# Patient Record
Sex: FEMALE | Race: WHITE | NOT HISPANIC OR LATINO | Employment: OTHER | URBAN - METROPOLITAN AREA
[De-identification: names, ages, dates, MRNs, and addresses within clinical notes are randomized per-mention and may not be internally consistent; named-entity substitution may affect disease eponyms.]

---

## 2017-01-05 DIAGNOSIS — E89.0 POSTOPERATIVE HYPOTHYROIDISM: Primary | ICD-10-CM

## 2017-01-06 RX ORDER — LEVOTHYROXINE SODIUM 100 UG/1
TABLET ORAL
Qty: 90 TABLET | Refills: 0 | Status: SHIPPED | OUTPATIENT
Start: 2017-01-06 | End: 2017-05-17 | Stop reason: DRUGHIGH

## 2017-04-28 DIAGNOSIS — E78.5 HYPERLIPIDEMIA LDL GOAL <130: ICD-10-CM

## 2017-05-01 RX ORDER — SIMVASTATIN 20 MG
TABLET ORAL
Qty: 30 TABLET | Refills: 0 | Status: SHIPPED | OUTPATIENT
Start: 2017-05-01 | End: 2017-06-29

## 2017-05-01 NOTE — TELEPHONE ENCOUNTER
Medication is being filled for 1 time refill only due to:  Patient needs to be seen because it has been more than one year since last visit.   Reminder sent. Marija Guzman RN

## 2017-05-01 NOTE — TELEPHONE ENCOUNTER
SIMVASTATIN     Last Written Prescription Date: 2-26-17  Last Fill Quantity: 90, # refills: 0  Last Office Visit with [facility] or [facility] prescribing provider: 2-1-16       Lab Results   Component Value Date    CHOL 143 02/01/2016     Lab Results   Component Value Date    HDL 59 02/01/2016     Lab Results   Component Value Date    LDL 73 02/01/2016     Lab Results   Component Value Date    TRIG 54 02/01/2016     Lab Results   Component Value Date    CHOLHDLRATIO 2.0 11/21/2014

## 2017-05-15 ENCOUNTER — MYC MEDICAL ADVICE (OUTPATIENT)
Dept: ENDOCRINOLOGY | Facility: CLINIC | Age: 57
End: 2017-05-15

## 2017-05-15 DIAGNOSIS — E89.0 POSTOPERATIVE HYPOTHYROIDISM: Primary | ICD-10-CM

## 2017-05-16 DIAGNOSIS — E89.0 POSTOPERATIVE HYPOTHYROIDISM: ICD-10-CM

## 2017-05-16 LAB
T4 FREE SERPL-MCNC: 1.06 NG/DL (ref 0.76–1.46)
TSH SERPL DL<=0.05 MIU/L-ACNC: 6.37 MU/L (ref 0.4–4)

## 2017-05-16 PROCEDURE — 84439 ASSAY OF FREE THYROXINE: CPT | Performed by: INTERNAL MEDICINE

## 2017-05-16 PROCEDURE — 36415 COLL VENOUS BLD VENIPUNCTURE: CPT | Performed by: INTERNAL MEDICINE

## 2017-05-16 PROCEDURE — 84443 ASSAY THYROID STIM HORMONE: CPT | Performed by: INTERNAL MEDICINE

## 2017-05-17 DIAGNOSIS — E89.0 POSTSURGICAL HYPOTHYROIDISM: Primary | ICD-10-CM

## 2017-05-17 RX ORDER — LEVOTHYROXINE SODIUM 112 UG/1
112 TABLET ORAL DAILY
Qty: 90 TABLET | Refills: 0 | Status: SHIPPED | OUTPATIENT
Start: 2017-05-17 | End: 2017-08-16

## 2017-05-30 ENCOUNTER — OFFICE VISIT (OUTPATIENT)
Dept: FAMILY MEDICINE | Facility: CLINIC | Age: 57
End: 2017-05-30
Payer: COMMERCIAL

## 2017-05-30 VITALS
OXYGEN SATURATION: 98 % | HEIGHT: 65 IN | TEMPERATURE: 98.1 F | DIASTOLIC BLOOD PRESSURE: 76 MMHG | HEART RATE: 78 BPM | SYSTOLIC BLOOD PRESSURE: 127 MMHG | WEIGHT: 215.8 LBS | BODY MASS INDEX: 35.96 KG/M2

## 2017-05-30 DIAGNOSIS — F43.22 ADJUSTMENT DISORDER WITH ANXIOUS MOOD: ICD-10-CM

## 2017-05-30 DIAGNOSIS — D12.6 TUBULAR ADENOMA OF COLON: ICD-10-CM

## 2017-05-30 DIAGNOSIS — R14.0 POSTPRANDIAL ABDOMINAL BLOATING: Primary | ICD-10-CM

## 2017-05-30 DIAGNOSIS — K59.00 CONSTIPATION, UNSPECIFIED CONSTIPATION TYPE: ICD-10-CM

## 2017-05-30 DIAGNOSIS — K22.70 BARRETT'S ESOPHAGUS DETERMINED BY ENDOSCOPY: ICD-10-CM

## 2017-05-30 PROCEDURE — 99214 OFFICE O/P EST MOD 30 MIN: CPT | Performed by: FAMILY MEDICINE

## 2017-05-30 NOTE — NURSING NOTE
"Chief Complaint   Patient presents with     MOOD CHANGES     Constipation       Initial /76  Pulse 78  Temp 98.1  F (36.7  C) (Tympanic)  Ht 5' 4.5\" (1.638 m)  Wt 215 lb 12.8 oz (97.9 kg)  LMP 04/01/2012  SpO2 98%  Breastfeeding? No  BMI 36.47 kg/m2 Estimated body mass index is 36.47 kg/(m^2) as calculated from the following:    Height as of this encounter: 5' 4.5\" (1.638 m).    Weight as of this encounter: 215 lb 12.8 oz (97.9 kg).  Medication Reconciliation: complete       Susanne Collazo MA      "

## 2017-05-30 NOTE — PROGRESS NOTES
SUBJECTIVE:                                                    Laura Ferreira is a 56 year old female who presents to clinic today for the following health issues:      Constipation     Onset: chronic     Description:   Frequency of bowel movements: very small BM daily/ but can go x3 days without BM .  Stool consistency: all different consistencies     Progression of Symptoms:  worsening    Accompanying Signs & Symptoms:  Abdominal pain (cramping?): YES- lower abdominal pains when going x2 days w/o BM.  Abdominal pains while having BM   Blood in stool: no   Rectal pain: YES- last week   Nausea/vomiting: YES- nausea after eating   Weight loss or gain: YES- weight gain    History:   History of abdominal surgery: YES- appendix     Precipitating factors:   Recent use of narcotics, anticholinergics, calcium channel blockers, antacids, or iron supplements: no   Chronic Laxative Use: no          Therapies Tried and outcome: laxatives 1x q3 weeks       Previous upper and lower endoscopies reviewed.     2/2009: Colonoscopy reviewed two polyps otherwise normal. Pathology report revealed Tubular adenoma. Overdue for a repeat Colonoscopy. Patient states that she was unaware.    12/2014: Upper Endoscopy revealed an esophageal mucosal changes suspicious for short-segment Leblanc's esophagus. Pathology reports no evidence of dysplasia or malignancy. Patient has not had a follow up Endoscopy since then or followed with GI    Abnormal Mood Symptoms     Onset: x8 months     Description:   Depression: no  Anxiety: YES- feeling anxious often     Accompanying Signs & Symptoms:  Still participating in activities that you used to enjoy: YES  Fatigue: YES  Irritability: no  Difficulty concentrating: YES- sometimes  Changes in appetite: YES- possibly increased because she is trying to watch what she is eating.  Has had a 30# gain in a x3 mo period.  Feels very nauseated after eating meals.   Problems with sleep: YES- sleeping 4.5-6 hours/  night.  Challenges with falling asleep and staying asleep.  Does not nap during the day.   Heart racing/beating fast : YES, thought it was from her thyroid medication.    Thoughts of hurting yourself or others: none     History:   Recent stress: YES- will be losing job at the end of the year (found news out x6 months ago)   Prior depression hospitalization: None  Family history of depression: no  Family history of anxiety: no      Precipitating factors:   Alcohol/drug use: YES- alcohol; 2x/ week     Alleviating factors:  none       Therapies Tried and outcome: Tried tylenol PM and Zquil - no relief, would still wake in the middle of the night.  Is currently taking melatonin has offered more sleep for her.       Denies a prior history of depression/anxiety.    ROBER-7   [employer], 2002; Used with Permission) 5/31/2017   1. Feeling nervous, anxious, or on edge 2   2. Not being able to stop or control worrying 0   3. Worrying too much about different things 1   4. Trouble relaxing 0   5. Being so restless that it is hard to sit still 1   6. Becoming easily annoyed or irritable 0   7. Feeling afraid, as if something awful might happen 0   ROBER-7 Total Score 4   If you checked any problems, how difficult have they made it for you to do your work, take care of things at home, or get along with other people? Not difficult at all         Problem list and histories reviewed & adjusted, as indicated.  Additional history: as documented    Current Outpatient Prescriptions   Medication Sig Dispense Refill     pantoprazole (PROTONIX) 40 MG EC tablet Take 1 tablet (40 mg) by mouth daily - DUE FOR ANNUAL FOLLOW UP 90 tablet 0     levothyroxine (SYNTHROID/LEVOTHROID) 112 MCG tablet Take 1 tablet (112 mcg) by mouth daily 90 tablet 0     simvastatin (ZOCOR) 20 MG tablet TAKE 1 TABLET(20 MG) BY MOUTH DAILY 30 tablet 0     Fish Oil-Cholecalciferol (FISH OIL-VITAMIN D) 4852-0039 MG-UNIT CAPS        MULTI-VITAMIN OR TABS 1 TABLET DAILY    "    Allergies   Allergen Reactions     Latex Anaphylaxis     Cortisone Nausea and Vomiting     oral     Vicodin [Acetaminophen] Nausea and Vomiting       Reviewed and updated as needed this visit by clinical staff  Tobacco  Allergies  Meds       Reviewed and updated as needed this visit by Provider         ROS:  Constitutional, HEENT, cardiovascular, pulmonary, gi and gu systems are negative, except as otherwise noted.    OBJECTIVE:                                                    /76  Pulse 78  Temp 98.1  F (36.7  C) (Tympanic)  Ht 5' 4.5\" (1.638 m)  Wt 215 lb 12.8 oz (97.9 kg)  LMP 04/01/2012  SpO2 98%  Breastfeeding? No  BMI 36.47 kg/m2  Body mass index is 36.47 kg/(m^2).  GENERAL: healthy, alert and no distress  PSYCH: Mood and affect seem appropriate. Judgment and insight seem appropriate.   RESP: lungs clear to auscultation - no rales, rhonchi or wheezes  CV: regular rate and rhythm, normal S1 S2, no S3 or S4, no murmur, click or rub, no peripheral edema and peripheral pulses strong  ABDOMEN: soft, nontender, no hepatosplenomegaly, no masses and bowel sounds normal    Diagnostic Test Results:  Labs in process, will notify patient with results.      ASSESSMENT/PLAN:                                                    Laura was seen today for mood changes and constipation.    Diagnoses and all orders for this visit:    Postprandial abdominal bloating, differentials to include H pylori; IBS  -     H Pylori antigen stool; Future    Constipation, unspecified constipation type       -    Recommended a trial of OTC Miralax as needed    Adjustment disorder with anxious mood       -     PHQ/ROBER 7 completed, see above       -     Likely due to recent stress, losing job in the next 6 months       -     Recommended watchful waiting. Offered referral for Psychotherapy Counseling, patient declined at this time.    Leblanc's esophagus determined by endoscopy  Recommended a follow up Upper Endoscopy  -     " GASTROENTEROLOGY ADULT REF PROCEDURE ONLY; Future    Tubular adenoma of colon  Due for a follow up Colonoscopy  -     GASTROENTEROLOGY ADULT REF PROCEDURE ONLY; Future      Will notify patient with results.     Total time spent face to face was 30 min. Greater than 50% of the time was spent counseling and/OR coordination of care regarding today's complaints and symptoms and discussing the treatment plan as described immediately above.    Sarita Shultz MD  [facility]

## 2017-05-30 NOTE — MR AVS SNAPSHOT
After Visit Summary   5/30/2017    Laura Ferreira    MRN: [id number]           Patient Information     Date Of Birth          1960        Visit Information        Provider Department      5/30/2017 2:00 PM Sarita Shultz MD [facility]        Today's Diagnoses     Leblanc's esophagus determined by endoscopy    -  1    Tubular adenoma of colon        Postprandial abdominal bloating        Constipation, unspecified constipation type        Adjustment disorder with anxious mood          Care Instructions      Constipation (Adult)  Constipation means that you have bowel movements that are less frequent than usual. Stools often become very hard and difficult to pass.  Constipation is very common. At some point in life it affects almost everyone. Since everyone's bowel habits are different, what is constipation to one person may not be to another. Your healthcare provider may do tests to diagnose constipation. It depends on what he or she finds when evaluating you.    Symptoms of constipation include:    Abdominal pain    Bloating    Vomiting    Painful bowel movements    Itching, swelling, bleeding, or pain around the anus  Causes  Constipation can have many causes. These include:    Diet low in fiber    Too much dairy    Not drinking enough liquids    Lack of exercise or physical activity. This is especially true for older adults.    Changes in lifestyle or daily routine, including pregnancy, aging, work, and travel    Frequent use or misuse of laxatives    Ignoring the urge to have a bowel movement or delaying it until later    Medicines, such as certain prescription pain medicines, iron supplements, antacids, certain antidepressants, and calcium supplements    Diseases like irritable bowel syndrome, bowel obstructions, stroke, diabetes, thyroid disease, Parkinson disease, hemorrhoids, and colon cancer  Complications  Potential complications of constipation can  include:    Hemorrhoids    Rectal bleeding from hemorrhoids or anal fissures (skin tears)    Hernias    Dependency on laxatives    Chronic constipation    Fecal impaction    Bowel obstruction or perforation  Home care  All treatment should be done after talking with your healthcare provider. This is especially true if you have another medical problems, are taking prescription medicines, or are an older adult. Treatment most often involves lifestyle changes. You may also need medicines. Your healthcare provider will tell you which will work best for you. Follow the advice below to help avoid this problem in the future.  Lifestyle changes  These lifestyle changes can help prevent constipation:    Diet. Eat a high-fiber diet, with fresh fruit and vegetables, and reduce dairy intake, meats, and processed foods    Fluids. It's important to get enough fluids each day. Drink plenty of water when you eat more fiber. If you are on diet that limits the amount of fluid you can have, talk about this with your healthcare provider.    Regular exercise. Check with your healthcare provider first.  Medications  Take any medicines as directed. Some laxatives are safe to use only every now and then. Others can be taken on a regular basis. Talk with your doctor or pharmacist if you have questions.  Prescription pain medicines can cause constipation. If you are taking this kind of medicine, ask your healthcare provider if you should also take a stool softener.  Medicines you may take to treat constipation include:    Fiber supplements    Stool softeners    Laxatives    Enemas    Rectal suppositories  Follow-up care  Follow up with your healthcare provider if symptoms don't get better in the next few days. You may need to have more tests or see a specialist.  Call 911  Call 911 if any of these occur:    Trouble breathing    Stiff, rigid abdomen that is severely painful to touch    Confusion    Fainting or loss of consciousness    Rapid  heart rate    Chest pain  When to seek medical advice  Call your healthcare provider right away if any of these occur:    Fever over 100.4 F (38 C)    Failure to resume normal bowel movements    Pain in your abdomen or back gets worse    Nausea or vomiting    Swelling in your abdomen    Blood in the stool    Black, tarry stool    Involuntary weight loss    Weakness    8193-3002 [employer]. [address] [place] [address]. All rights reserved. This information is not intended as a substitute for professional medical care. Always follow your healthcare professional's instructions.                Follow-ups after your visit        Additional Services     GASTROENTEROLOGY ADULT REF PROCEDURE ONLY       Leblanc's esophagus                  Follow-up notes from your care team     Return if symptoms worsen or fail to improve.      Future tests that were ordered for you today     Open Future Orders        Priority Expected Expires Ordered    H Pylori antigen stool Routine  6/29/2017 5/30/2017            Who to contact     Normal or non-critical lab and imaging results will be communicated to you by Simplificarehart, letter or phone within 4 business days after the clinic has received the results. If you do not hear from us within 7 days, please contact the clinic through Simplificarehart or phone. If you have a critical or abnormal lab result, we will notify you by phone as soon as possible.  Submit refill requests through ThemBid or call your pharmacy and they will forward the refill request to us. Please allow 3 business days for your refill to be completed.          If you need to speak with a  for additional information , please call: [phone]             Additional Information About Your Visit        ThemBid Information     ThemBid gives you secure access to your electronic health record. If you see a primary care provider, you can also send messages to your care team and make appointments.  "If you have questions, please call your primary care clinic.  If you do not have a primary care provider, please call [phone] and they will assist you.        Care EveryWhere ID     This is your Care EveryWhere ID. This could be used by other organizations to access your [facility] medical records  [id number]        Your Vitals Were     Pulse Temperature Height Last Period Pulse Oximetry Breastfeeding?    78 98.1  F (36.7  C) (Tympanic) 5' 4.5\" (1.638 m) 04/01/2012 98% No    BMI (Body Mass Index)                   36.47 kg/m2            Blood Pressure from Last 3 Encounters:   05/30/17 127/76   02/01/16 113/73   11/30/15 120/68    Weight from Last 3 Encounters:   05/30/17 215 lb 12.8 oz (97.9 kg)   02/01/16 201 lb 5 oz (91.3 kg)   11/30/15 207 lb 6.4 oz (94.1 kg)              We Performed the Following     GASTROENTEROLOGY ADULT REF PROCEDURE ONLY        Primary Care Provider Office Phone # Fax #    Mary Hogan PA-C [phone]       [facility] [address]  [facility] [address]        Thank you!     Thank you for choosing [facility]  for your care. Our goal is always to provide you with excellent care. Hearing back from our patients is one way we can continue to improve our services. Please take a few minutes to complete the written survey that you may receive in the mail after your visit with us. Thank you!             Your Updated Medication List - Protect others around you: Learn how to safely use, store and throw away your medicines at www.disposemymeds.org.          This list is accurate as of: 5/30/17  2:52 PM.  Always use your most recent med list.                   Brand Name Dispense Instructions for use    Fish Oil-Vitamin D 9686-3598 MG-UNIT Caps          levothyroxine 112 MCG tablet    SYNTHROID/LEVOTHROID    90 tablet    Take 1 tablet (112 mcg) by mouth daily       Multi-vitamin Tabs tablet   Generic drug:  multivitamin, therapeutic with minerals      " 1 TABLET DAILY       pantoprazole 40 MG EC tablet    PROTONIX    90 tablet    Take 1 tablet (40 mg) by mouth daily - DUE FOR ANNUAL FOLLOW UP       simvastatin 20 MG tablet    ZOCOR    30 tablet    TAKE 1 TABLET(20 MG) BY MOUTH DAILY

## 2017-05-30 NOTE — PATIENT INSTRUCTIONS

## 2017-05-31 ASSESSMENT — ANXIETY QUESTIONNAIRES
7. FEELING AFRAID AS IF SOMETHING AWFUL MIGHT HAPPEN: NOT AT ALL
5. BEING SO RESTLESS THAT IT IS HARD TO SIT STILL: SEVERAL DAYS
2. NOT BEING ABLE TO STOP OR CONTROL WORRYING: NOT AT ALL
1. FEELING NERVOUS, ANXIOUS, OR ON EDGE: MORE THAN HALF THE DAYS
3. WORRYING TOO MUCH ABOUT DIFFERENT THINGS: SEVERAL DAYS
IF YOU CHECKED OFF ANY PROBLEMS ON THIS QUESTIONNAIRE, HOW DIFFICULT HAVE THESE PROBLEMS MADE IT FOR YOU TO DO YOUR WORK, TAKE CARE OF THINGS AT HOME, OR GET ALONG WITH OTHER PEOPLE: NOT DIFFICULT AT ALL
6. BECOMING EASILY ANNOYED OR IRRITABLE: NOT AT ALL
GAD7 TOTAL SCORE: 4

## 2017-05-31 ASSESSMENT — PATIENT HEALTH QUESTIONNAIRE - PHQ9: 5. POOR APPETITE OR OVEREATING: NOT AT ALL

## 2017-06-01 DIAGNOSIS — R14.0 POSTPRANDIAL ABDOMINAL BLOATING: ICD-10-CM

## 2017-06-01 PROCEDURE — 87338 HPYLORI STOOL AG IA: CPT | Performed by: FAMILY MEDICINE

## 2017-06-01 ASSESSMENT — PATIENT HEALTH QUESTIONNAIRE - PHQ9: SUM OF ALL RESPONSES TO PHQ QUESTIONS 1-9: 8

## 2017-06-01 ASSESSMENT — ANXIETY QUESTIONNAIRES: GAD7 TOTAL SCORE: 4

## 2017-06-02 LAB
H PYLORI AG STL QL IA: NORMAL
MICRO REPORT STATUS: NORMAL
SPECIMEN SOURCE: NORMAL

## 2017-06-29 DIAGNOSIS — E78.5 HYPERLIPIDEMIA LDL GOAL <130: ICD-10-CM

## 2017-06-29 NOTE — TELEPHONE ENCOUNTER
SIMVASTATIN     Last Written Prescription Date: 5-1-17  Last Fill Quantity: 30, # refills: 0  Last Office Visit with [facility] or [facility] prescribing provider: 11-30-15       Lab Results   Component Value Date    CHOL 143 02/01/2016     Lab Results   Component Value Date    HDL 59 02/01/2016     Lab Results   Component Value Date    LDL 73 02/01/2016     Lab Results   Component Value Date    TRIG 54 02/01/2016     Lab Results   Component Value Date    CHOLHDLRATIO 2.0 11/21/2014

## 2017-06-30 ENCOUNTER — TRANSFERRED RECORDS (OUTPATIENT)
Dept: HEALTH INFORMATION MANAGEMENT | Facility: CLINIC | Age: 57
End: 2017-06-30

## 2017-06-30 RX ORDER — SIMVASTATIN 20 MG
TABLET ORAL
Qty: 30 TABLET | Refills: 0 | Status: SHIPPED | OUTPATIENT
Start: 2017-06-30 | End: 2017-08-25

## 2017-08-16 DIAGNOSIS — E89.0 POSTSURGICAL HYPOTHYROIDISM: ICD-10-CM

## 2017-08-16 RX ORDER — LEVOTHYROXINE SODIUM 112 UG/1
112 TABLET ORAL DAILY
Qty: 90 TABLET | Refills: 0 | Status: SHIPPED | OUTPATIENT
Start: 2017-08-16 | End: 2017-12-05

## 2017-08-16 NOTE — TELEPHONE ENCOUNTER
Detailed message left on patient cell phone. Encouraged to return clinic call with any questions.    Per Austyn Message:  I gave her 3 mo without refill. She should f/u with PCP for thyroid.     Meggan Salinas LPN  Adult Endocrinology  [facility]

## 2017-08-16 NOTE — TELEPHONE ENCOUNTER
Last Office Visit: 2/1/16  Future Appt: NONE    Will forward to Dr. Zaman for review per Endocrine Refill Protocol.    Yamileth Salinas LPN  Adult Endocrinology  [facility]

## 2017-08-25 DIAGNOSIS — E78.5 HYPERLIPIDEMIA LDL GOAL <130: ICD-10-CM

## 2017-08-25 RX ORDER — SIMVASTATIN 20 MG
20 TABLET ORAL DAILY
Qty: 30 TABLET | Refills: 0 | Status: SHIPPED | OUTPATIENT
Start: 2017-08-25 | End: 2017-12-05

## 2017-08-25 NOTE — TELEPHONE ENCOUNTER
Routing refill request to provider for review/approval because:  Kita given x1 and patient did not follow up, please advise  Labs not current:

## 2017-08-25 NOTE — TELEPHONE ENCOUNTER
SIMVASTATIN     Last Written Prescription Date: 6-30-17  Last Fill Quantity: 30, # refills: 0  Last Office Visit with [facility], [facility] or [facility] prescribing provider: 11-30-15       Lab Results   Component Value Date    CHOL 143 02/01/2016     Lab Results   Component Value Date    HDL 59 02/01/2016     Lab Results   Component Value Date    LDL 73 02/01/2016     Lab Results   Component Value Date    TRIG 54 02/01/2016     Lab Results   Component Value Date    CHOLHDLRATIO 2.0 11/21/2014

## 2017-09-13 ENCOUNTER — MYC REFILL (OUTPATIENT)
Dept: FAMILY MEDICINE | Facility: CLINIC | Age: 57
End: 2017-09-13

## 2017-09-13 DIAGNOSIS — K21.9 GASTROESOPHAGEAL REFLUX DISEASE WITHOUT ESOPHAGITIS: ICD-10-CM

## 2017-09-13 NOTE — TELEPHONE ENCOUNTER
[facility]  Original authorizing provider: LASHAWN Beltran would like a refill of the following medications:  pantoprazole (PROTONIX) 40 MG EC tablet [Mary Hogan PA-C]    Preferred pharmacy: [facility] [address] [place] [address] [facility] [address] [facility]    Comment:      Medication renewals requested in this message routed to other providers:  levothyroxine (SYNTHROID/LEVOTHROID) 112 MCG tablet [Meggan Zaman MD]

## 2017-09-13 NOTE — TELEPHONE ENCOUNTER
Routing refill request to provider for review/approval because:  Kita given x1 and patient did not follow up, please advise  Patient needs to be seen because it has been more than 1 year since last office visit.  Pended for 1 month with reminder appt due

## 2017-09-14 RX ORDER — PANTOPRAZOLE SODIUM 40 MG/1
40 TABLET, DELAYED RELEASE ORAL DAILY
Qty: 30 TABLET | Refills: 0 | Status: CANCELLED | OUTPATIENT
Start: 2017-09-14

## 2017-09-19 DIAGNOSIS — K21.9 GASTROESOPHAGEAL REFLUX DISEASE WITHOUT ESOPHAGITIS: ICD-10-CM

## 2017-09-20 RX ORDER — PANTOPRAZOLE SODIUM 40 MG/1
TABLET, DELAYED RELEASE ORAL
Qty: 90 TABLET | Refills: 0 | Status: SHIPPED | OUTPATIENT
Start: 2017-09-20 | End: 2017-12-05

## 2017-09-20 NOTE — TELEPHONE ENCOUNTER
Routing refill request to provider for review/approval because:  Patient has not seen Mary in over a year.  Dr Shultz has seen patient more recent.  Will send to her to advise.

## 2017-09-20 NOTE — TELEPHONE ENCOUNTER
Pantoprazole      Last Written Prescription Date: 08/13/17  Last Fill Quantity: 90,  # refills: 0   Last Office Visit with [facility], [facility] or [facility] prescribing provider: 05/30/17

## 2017-12-04 NOTE — PROGRESS NOTES
SUBJECTIVE:   CC: Laura Ferreira is an 57 year old woman who presents for preventive health visit.     Physical   Annual:     Getting at least 3 servings of Calcium per day::  Yes    Bi-annual eye exam::  Yes    Dental care twice a year::  Yes    Sleep apnea or symptoms of sleep apnea::  Daytime drowsiness    Diet::  Regular (no restrictions)    Frequency of exercise::  2-3 days/week    Duration of exercise::  15-30 minutes    Taking medications regularly::  Yes    Medication side effects::  Not applicable    Additional concerns today::  YES    Sleep Problem- ongoing issue  Trouble with falling asleep and staying asleep.  Sleeping 5 at most hours/ night.  Denies daytime napping.  Also reporting a lot of fatigue.  Recent stressor=  had heart attack.   Postmenopausal with occasional night sweats.   Denies a history of depression or fatigue.     PHQ-9 ([employer]) 12/6/2017   1.  Little interest or pleasure in doing things 1   2.  Feeling down, depressed, or hopeless 0   3.  Trouble falling or staying asleep, or sleeping too much 3   4.  Feeling tired or having little energy 3   5.  Poor appetite or overeating 1   6.  Feeling bad about yourself 1   7.  Trouble concentrating 0   8.  Moving slowly or restless 1   9.  Suicidal or self-harm thoughts 0   PHQ-9 Total Score 10   Difficulty at work, home, or with people Not difficult at all     ROBER-7   [employer], 2002; Used with Permission) 12/6/2017   1. Feeling nervous, anxious, or on edge 1   2. Not being able to stop or control worrying 1   3. Worrying too much about different things 0   4. Trouble relaxing 0   5. Being so restless that it is hard to sit still 1   6. Becoming easily annoyed or irritable 1   7. Feeling afraid, as if something awful might happen 0   ROBER-7 Total Score 4   If you checked any problems, how difficult have they made it for you to do your work, take care of things at home, or get along with other people? Not difficult at all     Patient  informed that anything we discuss that is not related to preventative medicine, may be billed for; patient verbalizes understanding.      Today's PHQ-2 Score:   PHQ-2 ( 1999 [employer]) 12/5/2017   Q1: Little interest or pleasure in doing things 0   Q2: Feeling down, depressed or hopeless 1   PHQ-2 Score 1   Q1: Little interest or pleasure in doing things -   Q2: Feeling down, depressed or hopeless -   PHQ-2 Score -       Abuse: Current or Past(Physical, Sexual or Emotional)- No  Do you feel safe in your environment - Yes    Social History   Substance Use Topics     Smoking status: Former Smoker     Years: 14.00     Types: Cigarettes     Quit date: 12/15/1998     Smokeless tobacco: Never Used      Comment: quit 12 years ago     Alcohol use Yes      Comment: occ     The patient does not drink >3 drinks per day nor >7 drinks per week.    Reviewed orders with patient.  Reviewed health maintenance and updated orders accordingly - Yes  Patient Active Problem List   Diagnosis     GERD (gastroesophageal reflux disease)     Menorrhagia     Iron deficiency anemia     Simple endometrial hyperplasia without atypia     Postsurgical hypothyroidism     Hyperlipidemia LDL goal <130     Past Surgical History:   Procedure Laterality Date     APPENDECTOMY       ARTHROSCOPY KNEE RT/LT  11/15/2007    right knee arthroscopy with arthroscopic lateral meniscectomy     BIOPSY Left     Breast. Benign     C THYROIDECTOMY      goitre     CL AFF SURGICAL PATHOLOGY  09/18/2002    endometrial biopsy     COLONOSCOPY       ESOPHAGOSCOPY, GASTROSCOPY, DUODENOSCOPY (EGD), COMBINED N/A 12/23/2014    Procedure: COMBINED ESOPHAGOSCOPY, GASTROSCOPY, DUODENOSCOPY (EGD), BIOPSY SINGLE OR MULTIPLE;  Surgeon: Paradise Radford MD;  Location: [facility] OR     LAMINECTOMY, FUSION LUMBAR ONE LEVEL, COMBINED  10/26/2011    Procedure:COMBINED LAMINECTOMY, FUSION LUMBAR ONE LEVEL; L4-5 Decompression, L4-5 Posterior Lateral Fusion with Madhavi and Local Bone; Surgeon:DEANGELO  BARBRA JONES; Location: OR     TONSILLECTOMY & ADENOIDECTOMY         Social History   Substance Use Topics     Smoking status: Former Smoker     Years: 14.00     Types: Cigarettes     Quit date: 12/15/1998     Smokeless tobacco: Never Used      Comment: quit 12 years ago     Alcohol use Yes      Comment: occ     Family History   Problem Relation Age of Onset     Asthma Mother      DIABETES Mother      Hypertension Mother      Arthritis Mother      Circulatory Mother      Obesity Mother      Thyroid Disease Mother      Hypertension Father      Alcohol/Drug Father      Arthritis Father      Obesity Father      Breast Cancer Maternal Grandmother      Thyroid Disease Brother      Colon Cancer No family hx of          Current Outpatient Prescriptions   Medication Sig Dispense Refill     pantoprazole (PROTONIX) 40 MG EC tablet TAKE 1 TABLET(40 MG) BY MOUTH DAILY 90 tablet 3     simvastatin (ZOCOR) 20 MG tablet Take 1 tablet (20 mg) by mouth daily - LAST REFILL 90 tablet 3     levothyroxine (SYNTHROID/LEVOTHROID) 112 MCG tablet Take 1 tablet (112 mcg) by mouth daily 30 tablet 0     Fish Oil-Cholecalciferol (FISH OIL-VITAMIN D) 3110-4331 MG-UNIT CAPS        MULTI-VITAMIN OR TABS 1 TABLET DAILY       Allergies   Allergen Reactions     Latex Anaphylaxis     Cortisone Nausea and Vomiting     oral     Vicodin [Acetaminophen] Nausea and Vomiting         Patient over age 50, mutual decision to screen reflected in health maintenance.      Pertinent mammograms are reviewed under the imaging tab.  History of abnormal Pap smear:   NO - age 30- 65 PAP every 3 years recommended  Last 3 Pap Results:   PAP (no units)   Date Value   12/05/2017 NIL   11/28/2014 NIL   10/14/2013 NIL       Reviewed and updated as needed this visit by clinical staff  Tobacco  Allergies  Meds         Reviewed and updated as needed this visit by Provider        Past Medical History:   Diagnosis Date     Leblanc's esophagus     EGD in 2014; recent EGD in 2017 was  normal     Ex-smoker     1 PPD x 28 years, quit 20 years ago     Hematuria 2007     History of breast biopsy     left     Menopausal symptoms 2009     Symptomatic menopausal or female climacteric states 2009     Tear of lateral cartilage or meniscus of knee, current 10/24/2007     Unspecified hypothyroidism 2009      Past Surgical History:   Procedure Laterality Date     APPENDECTOMY       ARTHROSCOPY KNEE RT/LT  11/15/2007    right knee arthroscopy with arthroscopic lateral meniscectomy     BIOPSY Left     Breast. Benign     C THYROIDECTOMY      goitre     CL AFF SURGICAL PATHOLOGY  2002    endometrial biopsy     COLONOSCOPY       ESOPHAGOSCOPY, GASTROSCOPY, DUODENOSCOPY (EGD), COMBINED N/A 2014    Procedure: COMBINED ESOPHAGOSCOPY, GASTROSCOPY, DUODENOSCOPY (EGD), BIOPSY SINGLE OR MULTIPLE;  Surgeon: Paradise Radford MD;  Location: [facility] OR     LAMINECTOMY, FUSION LUMBAR ONE LEVEL, COMBINED  10/26/2011    Procedure:COMBINED LAMINECTOMY, FUSION LUMBAR ONE LEVEL; L4-5 Decompression, L4-5 Posterior Lateral Fusion with Madhavi and Local Bone; Surgeon:BARBRA BRIGHT; Location:[facility] OR     TONSILLECTOMY & ADENOIDECTOMY       Obstetric History       T2      L2     SAB0   TAB0   Ectopic0   Multiple0   Live Births2       # Outcome Date GA Lbr Arthur/2nd Weight Sex Delivery Anes PTL Lv   2 Term 77 40w0d   F Vag-Spont   ADAM   1 Term 76 40w0d   F Vag-Spont   ADAM      Review of Systems  C: NEGATIVE for fever, chills, change in weight  I: NEGATIVE for worrisome rashes, moles or lesions  E: NEGATIVE for vision changes or irritation  ENT: NEGATIVE for ear, mouth and throat problems  R: NEGATIVE for significant cough or SOB  B: NEGATIVE for masses, tenderness or discharge  CV: NEGATIVE for chest pain, palpitations or peripheral edema  GI: NEGATIVE for nausea, abdominal pain, heartburn, or change in bowel habits  : NEGATIVE for unusual urinary or vaginal symptoms. No vaginal  "bleeding.  M: NEGATIVE for significant arthralgias or myalgia  N: NEGATIVE for weakness, dizziness or paresthesias  P: NEGATIVE for changes in mood or affect      OBJECTIVE:   /76  Pulse 74  Temp 97.7  F (36.5  C) (Tympanic)  Ht 5' 4.5\" (1.638 m)  Wt 216 lb (98 kg)  LMP 04/01/2012  SpO2 98%  Breastfeeding? No  BMI 36.5 kg/m2  Physical Exam  GENERAL: healthy, alert and no distress  EYES: Eyes grossly normal to inspection, PERRL and conjunctivae and sclerae normal  HENT: ear canals and TM's normal, nose and mouth without ulcers or lesions  NECK: no adenopathy, no asymmetry, masses, or scars and thyroid normal to palpation  RESP: lungs clear to auscultation - no rales, rhonchi or wheezes  BREAST: normal without masses, tenderness or nipple discharge and no palpable axillary masses or adenopathy  CV: regular rate and rhythm, normal S1 S2, no S3 or S4, no murmur, click or rub, no peripheral edema and peripheral pulses strong  ABDOMEN: soft, nontender, no hepatosplenomegaly, no masses and bowel sounds normal   (female): normal female external genitalia, normal urethral meatus, vaginal mucosa pink, moist, well rugated, and normal cervix/adnexa/uterus without masses or discharge  MS: no gross musculoskeletal defects noted, no edema  SKIN: no suspicious lesions or rashes  NEURO: Normal strength and tone, mentation intact and speech normal  PSYCH: mentation appears normal, affect normal/bright    DATA  Future labs ordered.    ASSESSMENT/PLAN:   Laura was seen today for physical.    Diagnoses and all orders for this visit:    Routine general medical examination at a health care facility    Encounter for screening mammogram for breast cancer  -     *MA Screening Digital Bilateral; Future    Need for hepatitis C screening test  -     **Hepatitis C Screen Reflex to RNA FUTURE anytime; Future    Hyperlipidemia LDL goal <130  -     Lipid Profile (Chol, Trig, HDL, LDL calc); Future  -     Comprehensive metabolic panel " "(BMP + Alb, Alk Phos, ALT, AST, Total. Bili, TP); Future  -     simvastatin (ZOCOR) 20 MG tablet; Take 1 tablet (20 mg) by mouth daily - LAST REFILL    Postsurgical hypothyroidism  -  TSH with reflex free T4 FUTURE  - Refill Levothyroxine (SYNTHROID/LEVOTHROID) 112 MCG tablet; Take 1 tablet (112 mcg) by mouth daily    Cervical cancer screening  -     Pap imaged thin layer screen with HPV - recommended age 30 - 65 years (select HPV order below)  -     HPV High Risk Types DNA Cervical    Gastroesophageal reflux disease without esophagitis  -    Refill: pantoprazole (PROTONIX) 40 MG EC tablet; TAKE 1 TABLET(40 MG) BY MOUTH DAILY    Insomnia, unspecified type, likely multifactorial  PHQ-9/ROBER 7 completed.   Patient opted to try OTC Melatonin.      Postmenopausal status  Patient education and Handout given  Treatment options discussed. Patient opted to improve and maximize on non-pharmacology lifestyle changes    Need for prophylactic vaccination and inoculation against influenza  -     FLU VAC, SPLIT VIRUS IM > 3 YO (QUADRIVALENT) [19650]  -     Vaccine Administration, Initial [28285]    Advanced directives, counseling/discussion  -     HONORING CHOICES REFERRAL          COUNSELING:  Reviewed preventive health counseling, as reflected in patient instructions       Regular exercise       Healthy diet/nutrition    BP Screening:   Last 3 BP Readings:    BP Readings from Last 3 Encounters:   12/05/17 138/76   05/30/17 127/76   02/01/16 113/73       The following was recommended to the patient:  Re-screen BP within a year and recommended lifestyle modifications     reports that she quit smoking about 19 years ago. Her smoking use included Cigarettes. She quit after 14.00 years of use. She has never used smokeless tobacco.    Estimated body mass index is 36.5 kg/(m^2) as calculated from the following:    Height as of this encounter: 5' 4.5\" (1.638 m).    Weight as of this encounter: 216 lb (98 kg).   Weight management plan: " Discussed healthy diet and exercise guidelines and patient will follow up in 12 months in clinic to re-evaluate.    Counseling Resources:  ATP IV Guidelines  Pooled Cohorts Equation Calculator  Breast Cancer Risk Calculator  FRAX Risk Assessment  ICSI Preventive Guidelines  Dietary Guidelines for Americans, 2010  USDA's MyPlate  ASA Prophylaxis  Lung CA Screening      Follow up annually and as needed thoughout the year.    Sarita Shultz MD  [facility]

## 2017-12-05 ENCOUNTER — OFFICE VISIT (OUTPATIENT)
Dept: FAMILY MEDICINE | Facility: CLINIC | Age: 57
End: 2017-12-05
Payer: COMMERCIAL

## 2017-12-05 VITALS
SYSTOLIC BLOOD PRESSURE: 138 MMHG | HEIGHT: 65 IN | BODY MASS INDEX: 35.99 KG/M2 | WEIGHT: 216 LBS | HEART RATE: 74 BPM | TEMPERATURE: 97.7 F | DIASTOLIC BLOOD PRESSURE: 76 MMHG | OXYGEN SATURATION: 98 %

## 2017-12-05 DIAGNOSIS — Z71.89 ADVANCED DIRECTIVES, COUNSELING/DISCUSSION: ICD-10-CM

## 2017-12-05 DIAGNOSIS — G47.00 INSOMNIA, UNSPECIFIED TYPE: ICD-10-CM

## 2017-12-05 DIAGNOSIS — Z12.4 CERVICAL CANCER SCREENING: ICD-10-CM

## 2017-12-05 DIAGNOSIS — Z23 NEED FOR PROPHYLACTIC VACCINATION AND INOCULATION AGAINST INFLUENZA: ICD-10-CM

## 2017-12-05 DIAGNOSIS — E78.5 HYPERLIPIDEMIA LDL GOAL <130: ICD-10-CM

## 2017-12-05 DIAGNOSIS — Z00.00 ROUTINE GENERAL MEDICAL EXAMINATION AT A HEALTH CARE FACILITY: Primary | ICD-10-CM

## 2017-12-05 DIAGNOSIS — E89.0 POSTSURGICAL HYPOTHYROIDISM: ICD-10-CM

## 2017-12-05 DIAGNOSIS — Z78.0 POSTMENOPAUSAL STATUS: ICD-10-CM

## 2017-12-05 DIAGNOSIS — K21.9 GASTROESOPHAGEAL REFLUX DISEASE WITHOUT ESOPHAGITIS: ICD-10-CM

## 2017-12-05 DIAGNOSIS — Z11.59 NEED FOR HEPATITIS C SCREENING TEST: ICD-10-CM

## 2017-12-05 DIAGNOSIS — Z12.31 ENCOUNTER FOR SCREENING MAMMOGRAM FOR BREAST CANCER: ICD-10-CM

## 2017-12-05 PROCEDURE — 87624 HPV HI-RISK TYP POOLED RSLT: CPT | Performed by: FAMILY MEDICINE

## 2017-12-05 PROCEDURE — 99212 OFFICE O/P EST SF 10 MIN: CPT | Mod: 25 | Performed by: FAMILY MEDICINE

## 2017-12-05 PROCEDURE — 99396 PREV VISIT EST AGE 40-64: CPT | Mod: 25 | Performed by: FAMILY MEDICINE

## 2017-12-05 PROCEDURE — 90686 IIV4 VACC NO PRSV 0.5 ML IM: CPT | Performed by: FAMILY MEDICINE

## 2017-12-05 PROCEDURE — G0145 SCR C/V CYTO,THINLAYER,RESCR: HCPCS | Performed by: FAMILY MEDICINE

## 2017-12-05 PROCEDURE — 90471 IMMUNIZATION ADMIN: CPT | Performed by: FAMILY MEDICINE

## 2017-12-05 RX ORDER — PANTOPRAZOLE SODIUM 40 MG/1
TABLET, DELAYED RELEASE ORAL
Qty: 90 TABLET | Refills: 3 | Status: SHIPPED | OUTPATIENT
Start: 2017-12-05 | End: 2018-12-06

## 2017-12-05 RX ORDER — LEVOTHYROXINE SODIUM 112 UG/1
112 TABLET ORAL DAILY
Qty: 30 TABLET | Refills: 0 | Status: SHIPPED | OUTPATIENT
Start: 2017-12-05 | End: 2018-01-03

## 2017-12-05 RX ORDER — SIMVASTATIN 20 MG
20 TABLET ORAL DAILY
Qty: 90 TABLET | Refills: 3 | Status: SHIPPED | OUTPATIENT
Start: 2017-12-05 | End: 2018-11-27

## 2017-12-05 NOTE — PATIENT INSTRUCTIONS
"Sleep Hygiene habits that tend to improve and maintain good sleep  -Sleep only long enough to feel rested and then get out of bed  -Go to bed and get up at the same time every day  -Do not try to force yourself to sleep. If you can't sleep, get out of bed and try again later.  -Have coffee, tea, and other foods that have caffeine only in the morning  -Avoid alcohol in the late afternoon, evening, and bedtime  -Avoid smoking, especially in the evening  -Keep your bedroom dark, cool, quiet, and free of reminders of work or other things that cause you stress  -Solve problems you have before you go to bed  -Exercise several days a week, but not right before bed  - Avoid looking at phones or reading devices (\"e-books\") that give off light before bed. This can make it harder to fall asleep    Anxiety Reaction  Anxiety is the feeling we all get when we think something bad might happen. It is a normal response to stress and usually causes only a mild reaction. When anxiety becomes more severe, it can interfere with daily life. In some cases, you may not even be aware of what it is you re anxious about. There may also be a genetic link or it may be a learned behavior in the home.  Both psychological and physical triggers cause stress reaction. It's often a response to fear or emotional stress, real or imagined. This stress may come from home, family, work, or social relationships.  During an anxiety reaction, you may feel:    Helpless    Nervous    Depressed    Irritable  Your body may show signs of anxiety in many ways. You may experience:    Dry mouth    Shakiness    Dizziness    Weakness    Trouble breathing    Breathing fast (hyperventilating)    Chest pressure    Sweating    Headache    Nausea    Diarrhea    Tiredness    Inability to sleep    Sexual problems  Home care    Try to locate the sources of stress in your life. They may not be obvious. These may include:    Daily hassles of life (traffic jams, missed " appointments, car troubles, etc.)    Major life changes, both good (new baby, job promotion) and bad (loss of job, loss of loved one)    Overload: feeling that you have too many responsibilities and can't take care of all of them at once    Feeling helpless, feeling that your problems are beyond what you re able to solve    Notice how your body reacts to stress. Learn to listen to your body signals. This will help you take action before the stress becomes severe.    When you can, do something about the source of your stress. (Avoid hassles, limit the amount of change that happens in your life at one time and take a break when you feel overloaded).    Unfortunately, many stressful situations can't be avoided. It is necessary to learn how to better manage stress. There are many proven methods that will reduce your anxiety. These include simple things like exercise, good nutrition and adequate rest. Also, there are certain techniques that are helpful:    Relaxation    Breathing exercises    Visualization    Biofeedback    Meditation  For more information about this, consult your doctor or go to a local bookstore and review the many books and tapes available on this subject.  Follow-up care  If you feel that your anxiety is not responding to self-help measures, contact your doctor or make an appointment with a counselor. You may need short-term psychological counseling and temporary medicine to help you manage stress.  Call 911  Call your healthcare provider right away if any of these occur:    Trouble breathing    Confusion    Drowsiness or trouble wakening    Fainting or loss of consciousness    Rapid heart rate    Seizure    New chest pain that becomes more severe, lasts longer, or spreads into your shoulder, arm, neck, jaw, or back  When to seek medical advice  Call your healthcare provider right away if any of these occur:    Your symptoms get worse    Severe headache not relieved by rest and mild pain  reliever  Date Last Reviewed: 9/29/2015 2000-2017 [employer]. [address] [place] [address], [place] [address]. All rights reserved. This information is not intended as a substitute for professional medical care. Always follow your healthcare professional's instructions.

## 2017-12-05 NOTE — MR AVS SNAPSHOT
"              After Visit Summary   12/5/2017    Laura Ferreira    MRN: [id number]           Patient Information     Date Of Birth          1960        Visit Information        Provider Department      12/5/2017 4:30 PM [facility]ita Shultz [facility]        Today's Diagnoses     Routine general medical examination at a health care facility    -  1    Encounter for screening mammogram for breast cancer        Need for hepatitis C screening test        Hyperlipidemia LDL goal <130        Postsurgical hypothyroidism        Cervical cancer screening        Gastroesophageal reflux disease without esophagitis        Insomnia, unspecified type        Postmenopausal status        Need for prophylactic vaccination and inoculation against influenza        Muscle cramps        Advanced directives, counseling/discussion          Care Instructions    Sleep Hygiene habits that tend to improve and maintain good sleep  -Sleep only long enough to feel rested and then get out of bed  -Go to bed and get up at the same time every day  -Do not try to force yourself to sleep. If you can't sleep, get out of bed and try again later.  -Have coffee, tea, and other foods that have caffeine only in the morning  -Avoid alcohol in the late afternoon, evening, and bedtime  -Avoid smoking, especially in the evening  -Keep your bedroom dark, cool, quiet, and free of reminders of work or other things that cause you stress  -Solve problems you have before you go to bed  -Exercise several days a week, but not right before bed  - Avoid looking at phones or reading devices (\"e-books\") that give off light before bed. This can make it harder to fall asleep            Follow-ups after your visit        Additional Services     HONORING CHOICES REFERRAL       Your provider has referred you to Outpatient [facility] Advance Care Planning Facilitator or Serious illness clinic support staff. The facilitator or support staff will contact " you to schedule the appointment or for the follow up call    Reason for Referral: Basic Advance Care Planning - 1:1 need                  Follow-up notes from your care team     Return if symptoms worsen or fail to improve.      Future tests that were ordered for you today     Open Future Orders        Priority Expected Expires Ordered    *MA Screening Digital Bilateral Routine  12/5/2018 12/5/2017    Lipid Profile (Chol, Trig, HDL, LDL calc) Routine  12/5/2018 12/5/2017    Comprehensive metabolic panel (BMP + Alb, Alk Phos, ALT, AST, Total. Bili, TP) Routine  12/5/2018 12/5/2017    **Hepatitis C Screen Reflex to RNA FUTURE anytime Routine 12/5/2017 12/5/2018 12/5/2017            Who to contact     Normal or non-critical lab and imaging results will be communicated to you by [employer], letter or phone within 4 business days after the clinic has received the results. If you do not hear from us within 7 days, please contact the clinic through [employer] or phone. If you have a critical or abnormal lab result, we will notify you by phone as soon as possible.  Submit refill requests through [employer] or call your pharmacy and they will forward the refill request to us. Please allow 3 business days for your refill to be completed.          If you need to speak with a  for additional information , please call: [phone]             Additional Information About Your Visit        [employer] Information     [employer] gives you secure access to your electronic health record. If you see a primary care provider, you can also send messages to your care team and make appointments. If you have questions, please call your primary care clinic.  If you do not have a primary care provider, please call [phone] and they will assist you.        Care EveryWhere ID     This is your Care EveryWhere ID. This could be used by other organizations to access your [facility] medical records  [id number]        Your Vitals Were      Last Period                   04/01/2012            Blood Pressure from Last 3 Encounters:   05/30/17 127/76   02/01/16 113/73   11/30/15 120/68    Weight from Last 3 Encounters:   05/30/17 215 lb 12.8 oz (97.9 kg)   02/01/16 201 lb 5 oz (91.3 kg)   11/30/15 207 lb 6.4 oz (94.1 kg)              We Performed the Following     HONORING CHOICES REFERRAL     HPV High Risk Types DNA Cervical     Pap imaged thin layer screen with HPV - recommended age 30 - 65 years (select HPV order below)          Today's Medication Changes          These changes are accurate as of: 12/5/17  5:21 PM.  If you have any questions, ask your nurse or doctor.               These medicines have changed or have updated prescriptions.        Dose/Directions    pantoprazole 40 MG EC tablet   Commonly known as:  PROTONIX   This may have changed:  See the new instructions.   Used for:  Gastroesophageal reflux disease without esophagitis   Changed by:  Sarita Shultz MD        TAKE 1 TABLET(40 MG) BY MOUTH DAILY   Quantity:  90 tablet   Refills:  3            Where to get your medicines      [id number] [place] - [address] [place] AT [address] [place], [place] [address]     Phone:  [phone]     levothyroxine 112 MCG tablet    pantoprazole 40 MG EC tablet    simvastatin 20 MG tablet                Primary Care Provider Office Phone # Fax #    Mary Hogan PA-C [phone]       [address] [place] [address]        Equal Access to Services     [facility] AH: Hadii aad ku hadasho Soomaali, waaxda luqadaha, qaybta kaalmada adeegyada, gabbi smith haynelson briggs . So [facility] [phone].    ATENCIÓN: Si habla español, tiene a sanchez disposición servicios gratuitos de asistencia lingüística. Llame al [phone].    We comply with applicable federal civil rights laws and [place] laws. We do not  discriminate on the basis of race, color, national origin, age, disability, sex, sexual orientation, or gender identity.            Thank you!     Thank you for choosing [facility]  for your care. Our goal is always to provide you with excellent care. Hearing back from our patients is one way we can continue to improve our services. Please take a few minutes to complete the written survey that you may receive in the mail after your visit with us. Thank you!             Your Updated Medication List - Protect others around you: Learn how to safely use, store and throw away your medicines at www.disposemymeds.org.          This list is accurate as of: 12/5/17  5:21 PM.  Always use your most recent med list.                   Brand Name Dispense Instructions for use Diagnosis    Fish Oil-Vitamin D 3278-5278 MG-UNIT Caps           levothyroxine 112 MCG tablet    SYNTHROID/LEVOTHROID    30 tablet    Take 1 tablet (112 mcg) by mouth daily    Postsurgical hypothyroidism       Multi-vitamin Tabs tablet   Generic drug:  multivitamin, therapeutic with minerals      1 TABLET DAILY        pantoprazole 40 MG EC tablet    PROTONIX    90 tablet    TAKE 1 TABLET(40 MG) BY MOUTH DAILY    Gastroesophageal reflux disease without esophagitis       simvastatin 20 MG tablet    ZOCOR    90 tablet    Take 1 tablet (20 mg) by mouth daily - LAST REFILL    Hyperlipidemia LDL goal <130

## 2017-12-05 NOTE — PROGRESS NOTES

## 2017-12-06 ASSESSMENT — ANXIETY QUESTIONNAIRES
5. BEING SO RESTLESS THAT IT IS HARD TO SIT STILL: SEVERAL DAYS
3. WORRYING TOO MUCH ABOUT DIFFERENT THINGS: NOT AT ALL
IF YOU CHECKED OFF ANY PROBLEMS ON THIS QUESTIONNAIRE, HOW DIFFICULT HAVE THESE PROBLEMS MADE IT FOR YOU TO DO YOUR WORK, TAKE CARE OF THINGS AT HOME, OR GET ALONG WITH OTHER PEOPLE: NOT DIFFICULT AT ALL
GAD7 TOTAL SCORE: 4
6. BECOMING EASILY ANNOYED OR IRRITABLE: SEVERAL DAYS
1. FEELING NERVOUS, ANXIOUS, OR ON EDGE: SEVERAL DAYS
2. NOT BEING ABLE TO STOP OR CONTROL WORRYING: SEVERAL DAYS
7. FEELING AFRAID AS IF SOMETHING AWFUL MIGHT HAPPEN: NOT AT ALL

## 2017-12-06 ASSESSMENT — PATIENT HEALTH QUESTIONNAIRE - PHQ9
SUM OF ALL RESPONSES TO PHQ QUESTIONS 1-9: 10
5. POOR APPETITE OR OVEREATING: NOT AT ALL

## 2017-12-07 LAB
COPATH REPORT: NORMAL
PAP: NORMAL

## 2017-12-07 ASSESSMENT — ANXIETY QUESTIONNAIRES: GAD7 TOTAL SCORE: 4

## 2017-12-11 LAB
FINAL DIAGNOSIS: NORMAL
HPV HR 12 DNA CVX QL NAA+PROBE: NEGATIVE
HPV16 DNA SPEC QL NAA+PROBE: NEGATIVE
HPV18 DNA SPEC QL NAA+PROBE: NEGATIVE
SPECIMEN DESCRIPTION: NORMAL

## 2017-12-27 ENCOUNTER — TRANSFERRED RECORDS (OUTPATIENT)
Dept: HEALTH INFORMATION MANAGEMENT | Facility: CLINIC | Age: 57
End: 2017-12-27

## 2017-12-27 DIAGNOSIS — E78.5 HYPERLIPIDEMIA LDL GOAL <130: ICD-10-CM

## 2017-12-27 DIAGNOSIS — Z11.59 NEED FOR HEPATITIS C SCREENING TEST: ICD-10-CM

## 2017-12-27 LAB
ALBUMIN SERPL-MCNC: 3.5 G/DL (ref 3.4–5)
ALP SERPL-CCNC: 57 U/L (ref 40–150)
ALT SERPL W P-5'-P-CCNC: 37 U/L (ref 0–50)
ANION GAP SERPL CALCULATED.3IONS-SCNC: 7 MMOL/L (ref 3–14)
AST SERPL W P-5'-P-CCNC: 22 U/L (ref 0–45)
BILIRUB SERPL-MCNC: 0.3 MG/DL (ref 0.2–1.3)
BUN SERPL-MCNC: 13 MG/DL (ref 7–30)
CALCIUM SERPL-MCNC: 8.8 MG/DL (ref 8.5–10.1)
CHLORIDE SERPL-SCNC: 107 MMOL/L (ref 94–109)
CHOLEST SERPL-MCNC: 190 MG/DL
CO2 SERPL-SCNC: 28 MMOL/L (ref 20–32)
CREAT SERPL-MCNC: 0.68 MG/DL (ref 0.52–1.04)
GFR SERPL CREATININE-BSD FRML MDRD: 89 ML/MIN/1.7M2
GLUCOSE SERPL-MCNC: 97 MG/DL (ref 70–99)
HDLC SERPL-MCNC: 71 MG/DL
LDLC SERPL CALC-MCNC: 91 MG/DL
NONHDLC SERPL-MCNC: 119 MG/DL
POTASSIUM SERPL-SCNC: 4.4 MMOL/L (ref 3.4–5.3)
PROT SERPL-MCNC: 7.1 G/DL (ref 6.8–8.8)
SODIUM SERPL-SCNC: 142 MMOL/L (ref 133–144)
TRIGL SERPL-MCNC: 140 MG/DL

## 2017-12-27 PROCEDURE — 86803 HEPATITIS C AB TEST: CPT | Performed by: FAMILY MEDICINE

## 2017-12-27 PROCEDURE — 36415 COLL VENOUS BLD VENIPUNCTURE: CPT | Performed by: FAMILY MEDICINE

## 2017-12-27 PROCEDURE — 80053 COMPREHEN METABOLIC PANEL: CPT | Performed by: FAMILY MEDICINE

## 2017-12-27 PROCEDURE — 80061 LIPID PANEL: CPT | Performed by: FAMILY MEDICINE

## 2017-12-28 LAB — HCV AB SERPL QL IA: NONREACTIVE

## 2018-01-02 ENCOUNTER — MYC MEDICAL ADVICE (OUTPATIENT)
Dept: FAMILY MEDICINE | Facility: CLINIC | Age: 58
End: 2018-01-02

## 2018-01-02 DIAGNOSIS — E89.0 POSTSURGICAL HYPOTHYROIDISM: ICD-10-CM

## 2018-01-03 RX ORDER — LEVOTHYROXINE SODIUM 112 UG/1
112 TABLET ORAL DAILY
Qty: 90 TABLET | Refills: 1 | Status: SHIPPED | OUTPATIENT
Start: 2018-01-03 | End: 2018-03-06

## 2018-02-28 ENCOUNTER — OFFICE VISIT (OUTPATIENT)
Dept: FAMILY MEDICINE | Facility: CLINIC | Age: 58
End: 2018-02-28
Payer: COMMERCIAL

## 2018-02-28 VITALS
HEIGHT: 65 IN | DIASTOLIC BLOOD PRESSURE: 73 MMHG | BODY MASS INDEX: 35.85 KG/M2 | TEMPERATURE: 98.1 F | HEART RATE: 81 BPM | WEIGHT: 215.2 LBS | SYSTOLIC BLOOD PRESSURE: 113 MMHG | OXYGEN SATURATION: 96 %

## 2018-02-28 DIAGNOSIS — E89.0 POSTSURGICAL HYPOTHYROIDISM: ICD-10-CM

## 2018-02-28 DIAGNOSIS — R53.83 FATIGUE, UNSPECIFIED TYPE: ICD-10-CM

## 2018-02-28 DIAGNOSIS — K21.9 GASTROESOPHAGEAL REFLUX DISEASE WITH HIATAL HERNIA: ICD-10-CM

## 2018-02-28 DIAGNOSIS — K44.9 GASTROESOPHAGEAL REFLUX DISEASE WITH HIATAL HERNIA: ICD-10-CM

## 2018-02-28 DIAGNOSIS — F43.23 SITUATIONAL MIXED ANXIETY AND DEPRESSIVE DISORDER: ICD-10-CM

## 2018-02-28 DIAGNOSIS — K22.70 BARRETT'S ESOPHAGUS DETERMINED BY BIOPSY: Primary | ICD-10-CM

## 2018-02-28 DIAGNOSIS — E66.9 OBESITY (BMI 30-39.9): ICD-10-CM

## 2018-02-28 DIAGNOSIS — R13.10 DYSPHAGIA, UNSPECIFIED TYPE: ICD-10-CM

## 2018-02-28 PROBLEM — E66.01 MORBID OBESITY (H): Status: ACTIVE | Noted: 2018-02-28

## 2018-02-28 LAB
ERYTHROCYTE [DISTWIDTH] IN BLOOD BY AUTOMATED COUNT: 12.4 % (ref 10–15)
HCT VFR BLD AUTO: 42.2 % (ref 35–47)
HGB BLD-MCNC: 13.7 G/DL (ref 11.7–15.7)
MCH RBC QN AUTO: 31.2 PG (ref 26.5–33)
MCHC RBC AUTO-ENTMCNC: 32.5 G/DL (ref 31.5–36.5)
MCV RBC AUTO: 96 FL (ref 78–100)
PLATELET # BLD AUTO: 225 10E9/L (ref 150–450)
RBC # BLD AUTO: 4.39 10E12/L (ref 3.8–5.2)
WBC # BLD AUTO: 6.4 10E9/L (ref 4–11)

## 2018-02-28 PROCEDURE — 80048 BASIC METABOLIC PNL TOTAL CA: CPT | Performed by: FAMILY MEDICINE

## 2018-02-28 PROCEDURE — 82306 VITAMIN D 25 HYDROXY: CPT | Performed by: FAMILY MEDICINE

## 2018-02-28 PROCEDURE — 85027 COMPLETE CBC AUTOMATED: CPT | Performed by: FAMILY MEDICINE

## 2018-02-28 PROCEDURE — 99214 OFFICE O/P EST MOD 30 MIN: CPT | Performed by: FAMILY MEDICINE

## 2018-02-28 PROCEDURE — 36415 COLL VENOUS BLD VENIPUNCTURE: CPT | Performed by: FAMILY MEDICINE

## 2018-02-28 PROCEDURE — 84443 ASSAY THYROID STIM HORMONE: CPT | Performed by: FAMILY MEDICINE

## 2018-02-28 PROCEDURE — 82607 VITAMIN B-12: CPT | Performed by: FAMILY MEDICINE

## 2018-02-28 ASSESSMENT — ANXIETY QUESTIONNAIRES
2. NOT BEING ABLE TO STOP OR CONTROL WORRYING: SEVERAL DAYS
5. BEING SO RESTLESS THAT IT IS HARD TO SIT STILL: SEVERAL DAYS
IF YOU CHECKED OFF ANY PROBLEMS ON THIS QUESTIONNAIRE, HOW DIFFICULT HAVE THESE PROBLEMS MADE IT FOR YOU TO DO YOUR WORK, TAKE CARE OF THINGS AT HOME, OR GET ALONG WITH OTHER PEOPLE: SOMEWHAT DIFFICULT
3. WORRYING TOO MUCH ABOUT DIFFERENT THINGS: SEVERAL DAYS
6. BECOMING EASILY ANNOYED OR IRRITABLE: NOT AT ALL
7. FEELING AFRAID AS IF SOMETHING AWFUL MIGHT HAPPEN: SEVERAL DAYS
1. FEELING NERVOUS, ANXIOUS, OR ON EDGE: MORE THAN HALF THE DAYS
GAD7 TOTAL SCORE: 7

## 2018-02-28 ASSESSMENT — PATIENT HEALTH QUESTIONNAIRE - PHQ9: 5. POOR APPETITE OR OVEREATING: SEVERAL DAYS

## 2018-02-28 NOTE — PROGRESS NOTES
SUBJECTIVE:   Laura Ferreira is a 57 year old female who presents to clinic today for the following health issues:      Throat Problem x9 months  Trouble swallowing- feels tightness with swallowing, hard to breathe with laying down.    Cramping and pain in right side of neck.    Thyroid Removal 10+ years ago.   Patient has a known history of GERD with a hiatal hernia and Barretts esophagus by biopsy.in 2014.   Most recent Upper endoscopy was unrevealing in 6/2017.     Rib Pain  Reporting that she has a cyst near 3rd rib, has been having pain in that area. States that this has been noted on some imaging studies completed at [facility]. Will request for records. VIDAL signed.  Pain has been intermittent over the past x15 years.  Has been happening more frequently since she has had cough on and off over the past x1 year, cough has worsened over the past x3 months.     Fatigue  Previously dx with insomnia, tried taking Melatonin to help her sleep- no relief of symptoms.   Feels she has a hard time sleeping.    States that she is in the process of being laid off from work. Thinking of possibly retiring.  recently had a heart attack.   Has been feeling slightly anxious.     ROBER-7   [employer], 2002; Used with Permission) 2/28/2018   1. Feeling nervous, anxious, or on edge 2   2. Not being able to stop or control worrying 1   3. Worrying too much about different things 1   4. Trouble relaxing 1   5. Being so restless that it is hard to sit still 1   6. Becoming easily annoyed or irritable 0   7. Feeling afraid, as if something awful might happen 1   ROBER-7 Total Score 7   If you checked any problems, how difficult have they made it for you to do your work, take care of things at home, or get along with other people? Somewhat difficult       PHQ-9 ([employer]) 2/28/2018   1.  Little interest or pleasure in doing things 1   2.  Feeling down, depressed, or hopeless 0   3.  Trouble falling or staying asleep, or sleeping  too much 2   4.  Feeling tired or having little energy 2   5.  Poor appetite or overeating 1   6.  Feeling bad about yourself 0   7.  Trouble concentrating 1   8.  Moving slowly or restless 1   9.  Suicidal or self-harm thoughts 0   PHQ-9 Total Score 8   Difficulty at work, home, or with people Somewhat difficult         Problem list and histories reviewed & adjusted, as indicated.  Additional history: as documented    Patient Active Problem List   Diagnosis     GERD (gastroesophageal reflux disease)     Menorrhagia     Iron deficiency anemia     Simple endometrial hyperplasia without atypia     Postsurgical hypothyroidism     Hyperlipidemia LDL goal <130     Morbid obesity (H)     Past Surgical History:   Procedure Laterality Date     APPENDECTOMY       ARTHROSCOPY KNEE RT/LT  11/15/2007    right knee arthroscopy with arthroscopic lateral meniscectomy     BIOPSY Left     Breast. Benign     C THYROIDECTOMY      goitre     CL AFF SURGICAL PATHOLOGY  09/18/2002    endometrial biopsy     COLONOSCOPY       ESOPHAGOSCOPY, GASTROSCOPY, DUODENOSCOPY (EGD), COMBINED N/A 12/23/2014    Procedure: COMBINED ESOPHAGOSCOPY, GASTROSCOPY, DUODENOSCOPY (EGD), BIOPSY SINGLE OR MULTIPLE;  Surgeon: Paradise Radford MD;  Location:  OR     LAMINECTOMY, FUSION LUMBAR ONE LEVEL, COMBINED  10/26/2011    Procedure:COMBINED LAMINECTOMY, FUSION LUMBAR ONE LEVEL; L4-5 Decompression, L4-5 Posterior Lateral Fusion with Madhavi and Local Bone; Surgeon:BARBRA BRIGHT; Location: OR     TONSILLECTOMY & ADENOIDECTOMY         Social History   Substance Use Topics     Smoking status: Former Smoker     Years: 14.00     Types: Cigarettes     Quit date: 12/15/1998     Smokeless tobacco: Never Used      Comment: quit 12 years ago     Alcohol use Yes      Comment: occ     Family History   Problem Relation Age of Onset     Asthma Mother      DIABETES Mother      Hypertension Mother      Arthritis Mother      Circulatory Mother      Obesity Mother       "Thyroid Disease Mother      Hypertension Father      Alcohol/Drug Father      Arthritis Father      Obesity Father      Breast Cancer Maternal Grandmother      Thyroid Disease Brother      Colon Cancer No family hx of          Current Outpatient Prescriptions   Medication Sig Dispense Refill     Lifitegrast (XIIDRA) 5 % SOLN opthalmic solution        levothyroxine (SYNTHROID/LEVOTHROID) 112 MCG tablet Take 1 tablet (112 mcg) by mouth daily 90 tablet 1     pantoprazole (PROTONIX) 40 MG EC tablet TAKE 1 TABLET(40 MG) BY MOUTH DAILY 90 tablet 3     simvastatin (ZOCOR) 20 MG tablet Take 1 tablet (20 mg) by mouth daily - LAST REFILL 90 tablet 3     Fish Oil-Cholecalciferol (FISH OIL-VITAMIN D) 3706-0027 MG-UNIT CAPS        MULTI-VITAMIN OR TABS 1 TABLET DAILY       Allergies   Allergen Reactions     Latex Anaphylaxis     Cortisone Nausea and Vomiting     oral     Vicodin [Acetaminophen] Nausea and Vomiting       Reviewed and updated as needed this visit by clinical staff  Tobacco  Meds       Reviewed and updated as needed this visit by Provider         ROS:  Constitutional, HEENT, cardiovascular, pulmonary, gi and gu systems are negative, except as otherwise noted.    OBJECTIVE:     /73  Pulse 81  Temp 98.1  F (36.7  C) (Tympanic)  Ht 5' 4.5\" (1.638 m)  Wt 215 lb 3.2 oz (97.6 kg)  LMP 04/01/2012  SpO2 96%  Breastfeeding? No  BMI 36.37 kg/m2  Body mass index is 36.37 kg/(m^2).  GENERAL: healthy, alert and no distress  HENT: ear canals and TM's normal, nose and mouth without ulcers or lesions  NECK: no adenopathy, no asymmetry, masses, or scars and thyroid normal to palpation  RESP: lungs clear to auscultation - no rales, rhonchi or wheezes  CV: regular rate and rhythm, normal S1 S2, no S3 or S4, no murmur, click or rub, no peripheral edema and peripheral pulses strong  ABDOMEN: soft, nontender, no hepatosplenomegaly, no masses and bowel sounds normal    Diagnostic Test Results:  Labs in " progress    ASSESSMENT/PLAN:   Laura was seen today for throat problem, rib pain and fatigue.    Diagnoses and all orders for this visit:      Dysphagia, unspecified type  -   Recent Upper endoscopy in 6/2017 was unrevealing; will consul with GI    -   GASTROENTEROLOGY ADULT REF CONSULT ONLY; Future      Leblanc's esophagus determined by biopsy in 12/2014  -    Recent Upper endoscopy in 6/2017 was unrevealing; will consul with GI  -     GASTROENTEROLOGY ADULT REF CONSULT ONLY; Future      Gastroesophageal reflux disease with hiatal hernia  -    GASTROENTEROLOGY ADULT REF CONSULT ONLY    Fatigue, unspecified type  -     CBC with platelets  -     TSH with free T4 reflex  -     Vitamin D Deficiency  -     Vitamin B12  -     Basic metabolic panel  -     PHQ-9/ROBER 7 completed, see Epic for details    Postsurgical hypothyroidism, on Levothyroxine  -     TSH with free T4 reflex    Situational mixed anxiety and depressive disorder  -     DEPRESSION ACTION PLAN (DAP)        -     PHQ-9/ROBER 7 completed, see Epic for details    Obesity (BMI 30-39.9)  Weight management plan: Discussed healthy diet and exercise guidelines and patient will follow up in 6 months in clinic to re-evaluate.      Follow up in a month, sooner if needed.       Sarita Shultz MD  [facility]

## 2018-02-28 NOTE — MR AVS SNAPSHOT
After Visit Summary   2/28/2018    Laura Ferreira    MRN: [id number]           Patient Information     Date Of Birth          1960        Visit Information        Provider Department      2/28/2018 4:30 PM Sarita Shultz MD [facility]        Today's Diagnoses     Leblanc's esophagus determined by biopsy    -  1    Dysphagia, unspecified type        Gastroesophageal reflux disease with hiatal hernia        Fatigue, unspecified type        Postsurgical hypothyroidism        Situational mixed anxiety and depressive disorder           Follow-ups after your visit        Additional Services     GASTROENTEROLOGY ADULT REF CONSULT ONLY       Preferred Location: [facility] [phone] or [facility]: [phone]      Please be aware that coverage of these services is subject to the terms and limitations of your health insurance plan.  Call member services at your health plan with any benefit or coverage questions.  Any procedures must be performed at a [facility] facility OR coordinated by your clinic's referral office.    Please bring the following with you to your appointment:    (1) Any X-Rays, CTs or MRIs which have been performed.  Contact the facility where they were done to arrange for  prior to your scheduled appointment.    (2) List of current medications   (3) This referral request   (4) Any documents/labs given to you for this referral                  Follow-up notes from your care team     Return in about 1 month (around 3/28/2018) for Follow up, sooner if needed..      Future tests that were ordered for you today     Open Future Orders        Priority Expected Expires Ordered    GASTROENTEROLOGY ADULT REF CONSULT ONLY Routine  12/28/2018 2/28/2018            Who to contact     Normal or non-critical lab and imaging results will be communicated to you by MyChart, letter or phone within 4 business days after the clinic has received the results. If  "you do not hear from us within 7 days, please contact the clinic through [employer] or phone. If you have a critical or abnormal lab result, we will notify you by phone as soon as possible.  Submit refill requests through [employer] or call your pharmacy and they will forward the refill request to us. Please allow 3 business days for your refill to be completed.          If you need to speak with a  for additional information , please call: [phone]             Additional Information About Your Visit        Par[employer] Information     [employer] gives you secure access to your electronic health record. If you see a primary care provider, you can also send messages to your care team and make appointments. If you have questions, please call your primary care clinic.  If you do not have a primary care provider, please call [phone] and they will assist you.        Care EveryWhere ID     This is your Care EveryWhere ID. This could be used by other organizations to access your [place] [facility] medical records  [id number]        Your Vitals Were     Pulse Temperature Height Last Period Pulse Oximetry Breastfeeding?    81 98.1  F (36.7  C) (Tympanic) 5' 4.5\" (1.638 m) 04/01/2012 96% No    BMI (Body Mass Index)                   36.37 kg/m2            Blood Pressure from Last 3 Encounters:   02/28/18 113/73   12/05/17 138/76   05/30/17 127/76    Weight from Last 3 Encounters:   02/28/18 215 lb 3.2 oz (97.6 kg)   12/05/17 216 lb (98 kg)   05/30/17 215 lb 12.8 oz (97.9 kg)              We Performed the Following     Basic metabolic panel     CBC with platelets     TSH with free T4 reflex     Vitamin B12     Vitamin D Deficiency        Primary Care Provider Office Phone # Fax #    Mary Hogan PA-C [phone]       [address] [place] [address]        Equal Access to Services     JUSTIN [facility] AH: Hadii iraj webbero Sosandra, waaxda luqadaha, qaybta kaalmada mehreen, gabbi clements " mando dixonradhanu galeanaRubionelson ah. So [facility] [phone].    ATENCIÓN: Si joryla carmela, tiene a sanchez disposición servicios gratuitos de asistencia lingüística. Leighton al [phone].    We comply with applicable federal civil rights laws and [place] laws. We do not discriminate on the basis of race, color, national origin, age, disability, sex, sexual orientation, or gender identity.            Thank you!     Thank you for choosing [facility]  for your care. Our goal is always to provide you with excellent care. Hearing back from our patients is one way we can continue to improve our services. Please take a few minutes to complete the written survey that you may receive in the mail after your visit with us. Thank you!             Your Updated Medication List - Protect others around you: Learn how to safely use, store and throw away your medicines at www.disposemymeds.org.          This list is accurate as of 2/28/18  5:24 PM.  Always use your most recent med list.                   Brand Name Dispense Instructions for use Diagnosis    Fish Oil-Vitamin D 2324-6122 MG-UNIT Caps           levothyroxine 112 MCG tablet    SYNTHROID/LEVOTHROID    90 tablet    Take 1 tablet (112 mcg) by mouth daily    Postsurgical hypothyroidism       Multi-vitamin Tabs tablet   Generic drug:  multivitamin, therapeutic with minerals      1 TABLET DAILY        pantoprazole 40 MG EC tablet    PROTONIX    90 tablet    TAKE 1 TABLET(40 MG) BY MOUTH DAILY    Gastroesophageal reflux disease without esophagitis       simvastatin 20 MG tablet    ZOCOR    90 tablet    Take 1 tablet (20 mg) by mouth daily - LAST REFILL    Hyperlipidemia LDL goal <130       XIIDRA 5 % Soln opthalmic solution   Generic drug:  Lifitegrast

## 2018-03-01 ENCOUNTER — TELEPHONE (OUTPATIENT)
Dept: FAMILY MEDICINE | Facility: CLINIC | Age: 58
End: 2018-03-01

## 2018-03-01 DIAGNOSIS — K22.70 BARRETT'S ESOPHAGUS DETERMINED BY BIOPSY: Primary | ICD-10-CM

## 2018-03-01 DIAGNOSIS — R13.10 DYSPHAGIA, UNSPECIFIED TYPE: ICD-10-CM

## 2018-03-01 DIAGNOSIS — K21.9 GASTROESOPHAGEAL REFLUX DISEASE WITH HIATAL HERNIA: ICD-10-CM

## 2018-03-01 DIAGNOSIS — K44.9 GASTROESOPHAGEAL REFLUX DISEASE WITH HIATAL HERNIA: ICD-10-CM

## 2018-03-01 LAB
ANION GAP SERPL CALCULATED.3IONS-SCNC: 5 MMOL/L (ref 3–14)
BUN SERPL-MCNC: 17 MG/DL (ref 7–30)
CALCIUM SERPL-MCNC: 9 MG/DL (ref 8.5–10.1)
CHLORIDE SERPL-SCNC: 106 MMOL/L (ref 94–109)
CO2 SERPL-SCNC: 30 MMOL/L (ref 20–32)
CREAT SERPL-MCNC: 0.71 MG/DL (ref 0.52–1.04)
DEPRECATED CALCIDIOL+CALCIFEROL SERPL-MC: 52 UG/L (ref 20–75)
GFR SERPL CREATININE-BSD FRML MDRD: 84 ML/MIN/1.7M2
GLUCOSE SERPL-MCNC: 100 MG/DL (ref 70–99)
POTASSIUM SERPL-SCNC: 4.5 MMOL/L (ref 3.4–5.3)
SODIUM SERPL-SCNC: 141 MMOL/L (ref 133–144)
TSH SERPL DL<=0.005 MIU/L-ACNC: 1.52 MU/L (ref 0.4–4)
VIT B12 SERPL-MCNC: 1022 PG/ML (ref 193–986)

## 2018-03-01 ASSESSMENT — PATIENT HEALTH QUESTIONNAIRE - PHQ9: SUM OF ALL RESPONSES TO PHQ QUESTIONS 1-9: 8

## 2018-03-01 ASSESSMENT — ANXIETY QUESTIONNAIRES: GAD7 TOTAL SCORE: 7

## 2018-03-01 NOTE — TELEPHONE ENCOUNTER
Patient cannot get into [facility] in [place] until July. Can she get a referral for [facility] in [place]? There phone is [phone].  Please advise. Ok to leave a message.

## 2018-04-18 ENCOUNTER — TRANSFERRED RECORDS (OUTPATIENT)
Dept: HEALTH INFORMATION MANAGEMENT | Facility: CLINIC | Age: 58
End: 2018-04-18

## 2018-05-25 ENCOUNTER — TRANSFERRED RECORDS (OUTPATIENT)
Dept: HEALTH INFORMATION MANAGEMENT | Facility: CLINIC | Age: 58
End: 2018-05-25

## 2018-11-27 DIAGNOSIS — E78.5 HYPERLIPIDEMIA LDL GOAL <130: ICD-10-CM

## 2018-11-27 NOTE — LETTER
November 28, 2018        Laura Ferreira  [address] [place] [id number]      Dear Laura,    Your medication has been approved for simvastatin (ZOCOR) 20 MG tablet for one month only.    However, you are due for annual Physical and med check appointment for further refills. Please schedule this visit at your earliest convenience.    Thank you.      Sarita Shultz MD/radha

## 2018-11-27 NOTE — TELEPHONE ENCOUNTER
"Requested Prescriptions   Pending Prescriptions Disp Refills     simvastatin (ZOCOR) 20 MG tablet 90 tablet 3     Sig: Take 1 tablet (20 mg) by mouth daily - LAST REFILL    Statins Protocol Passed    11/27/2018 10:22 AM       Passed - LDL on file in past 12 months    Recent Labs   Lab Test  12/27/17   0937   LDL  91            Passed - No abnormal creatine kinase in past 12 months    Recent Labs   Lab Test  02/15/13   1350   CKT  161               Passed - Recent (12 mo) or future (30 days) visit within the authorizing provider's specialty    Patient had office visit in the last 12 months or has a visit in the next 30 days with authorizing provider or within the authorizing provider's specialty.  See \"Patient Info\" tab in inbasket, or \"Choose Columns\" in Meds & Orders section of the refill encounter.             Passed - Patient is age 18 or older       Passed - No active pregnancy on record       Passed - No positive pregnancy test in past 12 months          "

## 2018-11-28 RX ORDER — SIMVASTATIN 20 MG
20 TABLET ORAL DAILY
Qty: 30 TABLET | Refills: 0 | Status: SHIPPED | OUTPATIENT
Start: 2018-11-28 | End: 2018-12-07

## 2018-11-28 NOTE — TELEPHONE ENCOUNTER
Refill x 1 month completed. Please call/send letter reminding patient that she is due for an annual Physical and med check. Thanks

## 2018-11-28 NOTE — TELEPHONE ENCOUNTER
Routing refill request to provider for review/approval because:  Patient needs to be seen because:  Due for follow up now and labs in a month    Med pended for 30 day supply with reminder.

## 2018-12-06 ENCOUNTER — RADIANT APPOINTMENT (OUTPATIENT)
Dept: GENERAL RADIOLOGY | Facility: CLINIC | Age: 58
End: 2018-12-06
Attending: FAMILY MEDICINE
Payer: COMMERCIAL

## 2018-12-06 ENCOUNTER — OFFICE VISIT (OUTPATIENT)
Dept: FAMILY MEDICINE | Facility: CLINIC | Age: 58
End: 2018-12-06
Payer: COMMERCIAL

## 2018-12-06 VITALS
TEMPERATURE: 98.2 F | OXYGEN SATURATION: 97 % | DIASTOLIC BLOOD PRESSURE: 77 MMHG | BODY MASS INDEX: 36.19 KG/M2 | HEIGHT: 65 IN | SYSTOLIC BLOOD PRESSURE: 139 MMHG | WEIGHT: 217.2 LBS | HEART RATE: 78 BPM

## 2018-12-06 DIAGNOSIS — E78.5 HYPERLIPIDEMIA LDL GOAL <130: ICD-10-CM

## 2018-12-06 DIAGNOSIS — M72.2 PLANTAR FASCIITIS, BILATERAL: ICD-10-CM

## 2018-12-06 DIAGNOSIS — Z00.00 ROUTINE GENERAL MEDICAL EXAMINATION AT A HEALTH CARE FACILITY: Primary | ICD-10-CM

## 2018-12-06 DIAGNOSIS — E89.0 POSTSURGICAL HYPOTHYROIDISM: ICD-10-CM

## 2018-12-06 DIAGNOSIS — E66.01 MORBID OBESITY (H): ICD-10-CM

## 2018-12-06 DIAGNOSIS — M25.561 RIGHT KNEE PAIN, UNSPECIFIED CHRONICITY: ICD-10-CM

## 2018-12-06 LAB
ALBUMIN SERPL-MCNC: 3.7 G/DL (ref 3.4–5)
ALP SERPL-CCNC: 59 U/L (ref 40–150)
ALT SERPL W P-5'-P-CCNC: 39 U/L (ref 0–50)
ANION GAP SERPL CALCULATED.3IONS-SCNC: 9 MMOL/L (ref 3–14)
AST SERPL W P-5'-P-CCNC: 23 U/L (ref 0–45)
BILIRUB SERPL-MCNC: 0.3 MG/DL (ref 0.2–1.3)
BUN SERPL-MCNC: 17 MG/DL (ref 7–30)
CALCIUM SERPL-MCNC: 8.9 MG/DL (ref 8.5–10.1)
CHLORIDE SERPL-SCNC: 108 MMOL/L (ref 94–109)
CHOLEST SERPL-MCNC: 188 MG/DL
CO2 SERPL-SCNC: 25 MMOL/L (ref 20–32)
CREAT SERPL-MCNC: 0.67 MG/DL (ref 0.52–1.04)
GFR SERPL CREATININE-BSD FRML MDRD: >90 ML/MIN/1.7M2
GLUCOSE SERPL-MCNC: 97 MG/DL (ref 70–99)
HDLC SERPL-MCNC: 67 MG/DL
LDLC SERPL CALC-MCNC: 107 MG/DL
NONHDLC SERPL-MCNC: 121 MG/DL
POTASSIUM SERPL-SCNC: 4.4 MMOL/L (ref 3.4–5.3)
PROT SERPL-MCNC: 7 G/DL (ref 6.8–8.8)
SODIUM SERPL-SCNC: 142 MMOL/L (ref 133–144)
TRIGL SERPL-MCNC: 72 MG/DL
TSH SERPL DL<=0.005 MIU/L-ACNC: 0.7 MU/L (ref 0.4–4)

## 2018-12-06 PROCEDURE — 36415 COLL VENOUS BLD VENIPUNCTURE: CPT | Performed by: FAMILY MEDICINE

## 2018-12-06 PROCEDURE — 80061 LIPID PANEL: CPT | Performed by: FAMILY MEDICINE

## 2018-12-06 PROCEDURE — 84443 ASSAY THYROID STIM HORMONE: CPT | Performed by: FAMILY MEDICINE

## 2018-12-06 PROCEDURE — 99396 PREV VISIT EST AGE 40-64: CPT | Performed by: FAMILY MEDICINE

## 2018-12-06 PROCEDURE — 80053 COMPREHEN METABOLIC PANEL: CPT | Performed by: FAMILY MEDICINE

## 2018-12-06 PROCEDURE — 73562 X-RAY EXAM OF KNEE 3: CPT | Mod: RT

## 2018-12-06 PROCEDURE — 99214 OFFICE O/P EST MOD 30 MIN: CPT | Mod: 25 | Performed by: FAMILY MEDICINE

## 2018-12-06 RX ORDER — DICLOFENAC SODIUM 75 MG/1
75 TABLET, DELAYED RELEASE ORAL 2 TIMES DAILY PRN
Qty: 30 TABLET | Refills: 1 | Status: SHIPPED | OUTPATIENT
Start: 2018-12-06 | End: 2019-01-05

## 2018-12-06 ASSESSMENT — PATIENT HEALTH QUESTIONNAIRE - PHQ9
SUM OF ALL RESPONSES TO PHQ QUESTIONS 1-9: 5
5. POOR APPETITE OR OVEREATING: NOT AT ALL

## 2018-12-06 ASSESSMENT — ANXIETY QUESTIONNAIRES
3. WORRYING TOO MUCH ABOUT DIFFERENT THINGS: NOT AT ALL
2. NOT BEING ABLE TO STOP OR CONTROL WORRYING: NOT AT ALL
IF YOU CHECKED OFF ANY PROBLEMS ON THIS QUESTIONNAIRE, HOW DIFFICULT HAVE THESE PROBLEMS MADE IT FOR YOU TO DO YOUR WORK, TAKE CARE OF THINGS AT HOME, OR GET ALONG WITH OTHER PEOPLE: NOT DIFFICULT AT ALL
1. FEELING NERVOUS, ANXIOUS, OR ON EDGE: SEVERAL DAYS
6. BECOMING EASILY ANNOYED OR IRRITABLE: NOT AT ALL
GAD7 TOTAL SCORE: 1
7. FEELING AFRAID AS IF SOMETHING AWFUL MIGHT HAPPEN: NOT AT ALL
5. BEING SO RESTLESS THAT IT IS HARD TO SIT STILL: NOT AT ALL

## 2018-12-06 NOTE — MR AVS SNAPSHOT
After Visit Summary   12/6/2018    Laura Ferreira    MRN: [id number]           Patient Information     Date Of Birth          1960        Visit Information        Provider Department      12/6/2018 10:30 AM Sarita Shultz MD [facility]        Today's Diagnoses     Routine general medical examination at a health care facility    -  1    Hyperlipidemia LDL goal <130        Right knee pain, unspecified chronicity        Postsurgical hypothyroidism        Plantar fasciitis, bilateral          Care Instructions      Preventive Health Recommendations  Female Ages 50 - 64    Yearly exam: See your health care provider every year in order to  o Review health changes.   o Discuss preventive care.    o Review your medicines if your doctor has prescribed any.      Get a Pap test every three years (unless you have an abnormal result and your provider advises testing more often).    If you get Pap tests with HPV test, you only need to test every 5 years, unless you have an abnormal result.     You do not need a Pap test if your uterus was removed (hysterectomy) and you have not had cancer.    You should be tested each year for STDs (sexually transmitted diseases) if you're at risk.     Have a mammogram every 1 to 2 years.    Have a colonoscopy at age 50, or have a yearly FIT test (stool test). These exams screen for colon cancer.      Have a cholesterol test every 5 years, or more often if advised.    Have a diabetes test (fasting glucose) every three years. If you are at risk for diabetes, you should have this test more often.     If you are at risk for osteoporosis (brittle bone disease), think about having a bone density scan (DEXA).    Shots: Get a flu shot each year. Get a tetanus shot every 10 years.    Nutrition:     Eat at least 5 servings of fruits and vegetables each day.    Eat whole-grain bread, whole-wheat pasta and brown rice instead of white grains and rice.    Get adequate  "Calcium and Vitamin D.     Lifestyle    Exercise at least 150 minutes a week (30 minutes a day, 5 days a week). This will help you control your weight and prevent disease.    Limit alcohol to one drink per day.    No smoking.     Wear sunscreen to prevent skin cancer.     See your dentist every six months for an exam and cleaning.    See your eye doctor every 1 to 2 years.            Follow-ups after your visit        Follow-up notes from your care team     Return in about 1 year (around 12/6/2019) for Physical Exam and as needed throughout the year.      Who to contact     Normal or non-critical lab and imaging results will be communicated to you by Covelushart, letter or phone within 4 business days after the clinic has received the results. If you do not hear from us within 7 days, please contact the clinic through IMTt or phone. If you have a critical or abnormal lab result, we will notify you by phone as soon as possible.  Submit refill requests through [employer] or call your pharmacy and they will forward the refill request to us. Please allow 3 business days for your refill to be completed.          If you need to speak with a  for additional information , please call: [phone]             Additional Information About Your Visit        [facility] Information     [facility] gives you secure access to your electronic health record. If you see a primary care provider, you can also send messages to your care team and make appointments. If you have questions, please call your primary care clinic.  If you do not have a primary care provider, please call [phone] and they will assist you.        Care EveryWhere ID     This is your Care EveryWhere ID. This could be used by other organizations to access your [place] medical records  [id number]        Your Vitals Were     Pulse Temperature Height Last Period Pulse Oximetry Breastfeeding?    78 98.2  F (36.8  C) (Tympanic) 5' 4.5\" (1.638 m) 04/01/2012 " 97% No    BMI (Body Mass Index)                   36.71 kg/m2            Blood Pressure from Last 3 Encounters:   12/06/18 139/77   02/28/18 113/73   12/05/17 138/76    Weight from Last 3 Encounters:   12/06/18 217 lb 3.2 oz (98.5 kg)   02/28/18 215 lb 3.2 oz (97.6 kg)   12/05/17 216 lb (98 kg)              We Performed the Following     Comprehensive metabolic panel (BMP + Alb, Alk Phos, ALT, AST, Total. Bili, TP)     Lipid Profile (Chol, Trig, HDL, LDL calc)     TSH with free T4 reflex     XR Knee Right 3 Views          Today's Medication Changes          These changes are accurate as of 12/6/18 11:13 AM.  If you have any questions, ask your nurse or doctor.               Start taking these medicines.        Dose/Directions    diclofenac 75 MG EC tablet   Commonly known as:  VOLTAREN   Used for:  Plantar fasciitis, bilateral   Started by:  Sarita Shultz MD        Dose:  75 mg   Take 1 tablet (75 mg) by mouth 2 times daily as needed for moderate pain (take with food)   Quantity:  30 tablet   Refills:  1            Where to get your medicines      These medications were sent to [facility] [address] [place] [facility] [place] [address] [place] [address], [place] [address]     Phone:  [phone]     diclofenac 75 MG EC tablet                Primary Care Provider Office Phone # Fax #    Sarita Shultz [phone]       [address] [place] [address]        Equal Access to Services     [facility]: Hadii aad ku hadasho Soomaali, waaxda luqadaha, qaybta kaalmada adeegdarnell, gabbi briggs . So [facility] [phone].    ATENCIÓN: Si habla español, tiene a sanchez disposición servicios gratuitos de asistencia lingüística. Llame al [phone].    We comply with applicable federal civil rights laws and [place] laws. We do not discriminate on the basis of race, color, national origin, age, disability, sex, sexual  orientation, or gender identity.            Thank you!     Thank you for choosing [facility]  for your care. Our goal is always to provide you with excellent care. Hearing back from our patients is one way we can continue to improve our services. Please take a few minutes to complete the written survey that you may receive in the mail after your visit with us. Thank you!             Your Updated Medication List - Protect others around you: Learn how to safely use, store and throw away your medicines at www.disposemymeds.org.          This list is accurate as of 12/6/18 11:13 AM.  Always use your most recent med list.                   Brand Name Dispense Instructions for use Diagnosis    diclofenac 75 MG EC tablet    VOLTAREN    30 tablet    Take 1 tablet (75 mg) by mouth 2 times daily as needed for moderate pain (take with food)    Plantar fasciitis, bilateral       Fish Oil-Vitamin D 7109-4343 MG-UNIT Caps           levothyroxine 112 MCG tablet    SYNTHROID/LEVOTHROID    90 tablet    Take 1 tablet (112 mcg) by mouth daily    Postsurgical hypothyroidism       Multi-vitamin tablet   Generic drug:  multivitamin w/minerals      1 TABLET DAILY        OMEPRAZOLE PO      Take 20 mg by mouth 2 times daily (before meals)        simvastatin 20 MG tablet    ZOCOR    30 tablet    Take 1 tablet (20 mg) by mouth daily    Hyperlipidemia LDL goal <130

## 2018-12-06 NOTE — PROGRESS NOTES
SUBJECTIVE:   CC: Laura Ferreira is an 58 year old woman who presents for preventive health visit.     Healthy Habits:    Do you get at least three servings of calcium containing foods daily (dairy, green leafy vegetables, etc.)? yes    Amount of exercise or daily activities, outside of work: 4 day(s) per week    Problems taking medications regularly No    Medication side effects: No    Have you had an eye exam in the past two years? yes    Do you see a dentist twice per year? yes    Do you have sleep apnea, excessive snoring or daytime drowsiness?no        Right Knee Pain  Previous knee surgery 2002- knee arthroscopy  Has become bothersome over the past x 8 months  No history of trauma or recent injury.   Tried orthotics. OTC NSAIDs- mild relief.       Heel  Pain- Bilateral   Previously dx with plantar fasciitis  Stabbing pain with pressure, walking. 7/10  Worse after inactivity or long periods of standing.   Tried orthotics. OTC NSAIDs- mild relief.         Patient informed that anything we discuss that is not related to preventative medicine, may be billed for; patient verbalizes understanding.      Today's PHQ-2 Score:   PHQ-2 ( 1999 [employer]) 12/6/2018 12/5/2017   Q1: Little interest or pleasure in doing things 0 0   Q2: Feeling down, depressed or hopeless 0 1   PHQ-2 Score 0 1   Q1: Little interest or pleasure in doing things - -   Q2: Feeling down, depressed or hopeless - -   PHQ-2 Score - -       Abuse: Current or Past(Physical, Sexual or Emotional)- No  Do you feel safe in your environment? Yes    Social History   Substance Use Topics     Smoking status: Former Smoker     Years: 14.00     Types: Cigarettes     Quit date: 12/15/1998     Smokeless tobacco: Never Used      Comment: quit 20 years ago     Alcohol use Yes      Comment: occ     If you drink alcohol do you typically have >3 drinks per day or >7 drinks per week? No                     Reviewed orders with patient.  Reviewed health maintenance and  updated orders accordingly - Yes  BP Readings from Last 3 Encounters:   12/06/18 139/77   02/28/18 113/73   12/05/17 138/76    Wt Readings from Last 3 Encounters:   12/06/18 217 lb 3.2 oz (98.5 kg)   02/28/18 215 lb 3.2 oz (97.6 kg)   12/05/17 216 lb (98 kg)                  Patient Active Problem List   Diagnosis     GERD (gastroesophageal reflux disease)     Menorrhagia     Iron deficiency anemia     Simple endometrial hyperplasia without atypia     Postsurgical hypothyroidism     Hyperlipidemia LDL goal <130     Morbid obesity (H)     Past Surgical History:   Procedure Laterality Date     APPENDECTOMY OPEN       ARTHROSCOPY KNEE RT/LT  11/15/2007    right knee arthroscopy with arthroscopic lateral meniscectomy     BIOPSY Left     Breast. Benign     C THYROIDECTOMY      goitre     CL AFF SURGICAL PATHOLOGY  09/18/2002    endometrial biopsy     COLONOSCOPY       ESOPHAGOSCOPY, GASTROSCOPY, DUODENOSCOPY (EGD), COMBINED N/A 12/23/2014    Procedure: COMBINED ESOPHAGOSCOPY, GASTROSCOPY, DUODENOSCOPY (EGD), BIOPSY SINGLE OR MULTIPLE;  Surgeon: Paradise Radford MD;  Location:  OR     LAMINECTOMY, FUSION LUMBAR ONE LEVEL, COMBINED  10/26/2011    Procedure:COMBINED LAMINECTOMY, FUSION LUMBAR ONE LEVEL; L4-5 Decompression, L4-5 Posterior Lateral Fusion with Madhavi and Local Bone; Surgeon:BARBRA BRIGHT; Location: OR     TONSILLECTOMY & ADENOIDECTOMY         Social History   Substance Use Topics     Smoking status: Former Smoker     Years: 14.00     Types: Cigarettes     Quit date: 12/15/1998     Smokeless tobacco: Never Used      Comment: quit 20 years ago     Alcohol use Yes      Comment: occ     Family History   Problem Relation Age of Onset     Asthma Mother      Diabetes Mother      Hypertension Mother      Arthritis Mother      Circulatory Mother      Obesity Mother      Thyroid Disease Mother      Hypertension Father      Alcohol/Drug Father      Arthritis Father      Obesity Father      Breast Cancer Maternal  Grandmother      Thyroid Disease Brother      Colon Cancer No family hx of          Current Outpatient Prescriptions   Medication Sig Dispense Refill     diclofenac (VOLTAREN) 75 MG EC tablet Take 1 tablet (75 mg) by mouth 2 times daily as needed for moderate pain (take with food) 30 tablet 1     Fish Oil-Cholecalciferol (FISH OIL-VITAMIN D) 1280-7571 MG-UNIT CAPS        levothyroxine (SYNTHROID/LEVOTHROID) 112 MCG tablet Take 1 tablet (112 mcg) by mouth daily 90 tablet 3     MULTI-VITAMIN OR TABS 1 TABLET DAILY       OMEPRAZOLE PO Take 20 mg by mouth 2 times daily (before meals)       simvastatin (ZOCOR) 20 MG tablet Take 1 tablet (20 mg) by mouth daily 30 tablet 0     Allergies   Allergen Reactions     Latex Anaphylaxis     Cortisone Nausea and Vomiting     oral     Vicodin [Acetaminophen] Nausea and Vomiting       Mammogram Screening: Patient over age 50, mutual decision to screen reflected in health maintenance.    Pertinent mammograms are reviewed under the imaging tab.  History of abnormal Pap smear: NO - age 30- 65 PAP every 3 years recommended  PAP / HPV Latest Ref Rng & Units 12/5/2017 11/28/2014 10/14/2013   PAP - NIL NIL NIL   HPV 16 DNA NEG:Negative Negative - -   HPV 18 DNA NEG:Negative Negative - -   OTHER HR HPV NEG:Negative Negative - -     Reviewed and updated as needed this visit by clinical staff  Tobacco  Allergies  Meds  Med Hx  Surg Hx  Fam Hx  Soc Hx        Reviewed and updated as needed this visit by Provider  Tobacco  Med Hx  Surg Hx  Fam Hx  Soc Hx           ROS:  CONSTITUTIONAL: NEGATIVE for fever, chills, change in weight  INTEGUMENTARY/SKIN: NEGATIVE for worrisome rashes, moles or lesions  EYES: NEGATIVE for vision changes or irritation  ENT: NEGATIVE for ear, mouth and throat problems  RESP: NEGATIVE for significant cough or SOB  BREAST: NEGATIVE for masses, tenderness or discharge  CV: NEGATIVE for chest pain, palpitations or peripheral edema  GI: NEGATIVE for nausea,  "abdominal pain, heartburn, or change in bowel habits  : NEGATIVE for unusual urinary or vaginal symptoms. No vaginal bleeding.  MUSCULOSKELETAL:as above  NEURO: NEGATIVE for weakness, dizziness or paresthesias  PSYCHIATRIC: NEGATIVE for changes in mood or affect     OBJECTIVE:   /77  Pulse 78  Temp 98.2  F (36.8  C) (Tympanic)  Ht 5' 4.5\" (1.638 m)  Wt 217 lb 3.2 oz (98.5 kg)  LMP 04/01/2012  SpO2 97%  Breastfeeding? No  BMI 36.71 kg/m2  EXAM:  GENERAL: healthy, alert and no distress  EYES: Eyes grossly normal to inspection, PERRL and conjunctivae and sclerae normal  HENT: ear canals and TM's normal, nose and mouth without ulcers or lesions  NECK: no adenopathy, no asymmetry, masses, or scars and thyroid normal to palpation  RESP: lungs clear to auscultation - no rales, rhonchi or wheezes  BREAST: normal without masses, tenderness or nipple discharge and no palpable axillary masses or adenopathy  CV: regular rate and rhythm, normal S1 S2, no S3 or S4, no murmur, click or rub, no peripheral edema and peripheral pulses strong  ABDOMEN: soft, nontender, no hepatosplenomegaly, no masses and bowel sounds normal  MS: no gross musculoskeletal defects noted, no edema  SKIN: no suspicious lesions or rashes  NEURO: Normal strength and tone, mentation intact and speech normal  PSYCH: mentation appears normal, affect normal/bright    Diagnostic Test Results:  Labs drawn and in process    ASSESSMENT/PLAN:   Laura was seen today for physical.    Diagnoses and all orders for this visit:    Routine general medical examination at a health care facility  -     Comprehensive metabolic panel (BMP + Alb, Alk Phos, ALT, AST, Total. Bili, TP)    Hyperlipidemia LDL goal <130  -     Lipid Profile (Chol, Trig, HDL, LDL calc)  -     Comprehensive metabolic panel (BMP + Alb, Alk Phos, ALT, AST, Total. Bili, TP)    Right knee pain, unspecified chronicity  -     XR Knee Right 3 Views  If X rays are unremarkable- will obtain Knee " "MRI to further evaluate    Postsurgical hypothyroidism, on Levothyroxine  -     TSH with free T4 reflex    Plantar fasciitis, bilateral  -     diclofenac (VOLTAREN) 75 MG EC tablet; Take 1 tablet (75 mg) by mouth 2 times daily as needed for moderate pain (take with food)  Recommended RICE therapy, NSAIDs, special foot exercises. Patient handout from up to date given.     Morbid obesity (H)  Weight management plan: Discussed healthy diet and exercise guidelines      COUNSELING:   Reviewed preventive health counseling, as reflected in patient instructions       Regular exercise       Healthy diet/nutrition    BP Readings from Last 1 Encounters:   12/06/18 139/77     Estimated body mass index is 36.71 kg/(m^2) as calculated from the following:    Height as of this encounter: 5' 4.5\" (1.638 m).    Weight as of this encounter: 217 lb 3.2 oz (98.5 kg).    BP Screening:   Last 3 BP Readings:    BP Readings from Last 3 Encounters:   12/06/18 139/77   02/28/18 113/73   12/05/17 138/76       The following was recommended to the patient:  Re-screen BP within a year and recommended lifestyle modifications  Weight management plan: Discussed healthy diet and exercise guidelines     reports that she quit smoking about 19 years ago. Her smoking use included Cigarettes. She quit after 14.00 years of use. She has never used smokeless tobacco.      Counseling Resources:  ATP IV Guidelines  Pooled Cohorts Equation Calculator  Breast Cancer Risk Calculator  FRAX Risk Assessment  ICSI Preventive Guidelines  Dietary Guidelines for Americans, 2010  USDA's MyPlate  ASA Prophylaxis  Lung CA Screening    Follow up annually and as needed thoughout the year.    Sarita Shultz MD  [facility]  "

## 2018-12-07 DIAGNOSIS — E78.5 HYPERLIPIDEMIA LDL GOAL <130: ICD-10-CM

## 2018-12-07 DIAGNOSIS — M25.561 RIGHT KNEE PAIN, UNSPECIFIED CHRONICITY: Primary | ICD-10-CM

## 2018-12-07 RX ORDER — SIMVASTATIN 20 MG
20 TABLET ORAL DAILY
Qty: 90 TABLET | Refills: 3 | Status: SHIPPED | OUTPATIENT
Start: 2018-12-07 | End: 2019-12-27

## 2018-12-07 ASSESSMENT — ANXIETY QUESTIONNAIRES: GAD7 TOTAL SCORE: 1

## 2018-12-19 ENCOUNTER — ANCILLARY PROCEDURE (OUTPATIENT)
Dept: MRI IMAGING | Facility: CLINIC | Age: 58
End: 2018-12-19
Attending: FAMILY MEDICINE
Payer: COMMERCIAL

## 2018-12-19 DIAGNOSIS — M25.561 RIGHT KNEE PAIN, UNSPECIFIED CHRONICITY: ICD-10-CM

## 2018-12-19 PROCEDURE — 73721 MRI JNT OF LWR EXTRE W/O DYE: CPT | Mod: TC

## 2018-12-28 ENCOUNTER — TRANSFERRED RECORDS (OUTPATIENT)
Dept: HEALTH INFORMATION MANAGEMENT | Facility: CLINIC | Age: 58
End: 2018-12-28

## 2018-12-31 ENCOUNTER — TRANSFERRED RECORDS (OUTPATIENT)
Dept: HEALTH INFORMATION MANAGEMENT | Facility: CLINIC | Age: 58
End: 2018-12-31

## 2019-01-05 DIAGNOSIS — M72.2 PLANTAR FASCIITIS, BILATERAL: ICD-10-CM

## 2019-01-07 RX ORDER — DICLOFENAC SODIUM 75 MG/1
TABLET, DELAYED RELEASE ORAL
Qty: 30 TABLET | Refills: 0 | Status: SHIPPED | OUTPATIENT
Start: 2019-01-07 | End: 2019-12-10

## 2019-01-07 NOTE — TELEPHONE ENCOUNTER
"Requested Prescriptions   Pending Prescriptions Disp Refills     diclofenac (VOLTAREN) 75 MG EC tablet [Pharmacy Med Name: DICLOFENAC SODIUM 75MG DR TABLETS] 30 tablet 0    Last Written Prescription Date:  12-18-18  Last Fill Quantity: 30,  # refills: 0   Last office visit: 12/6/2018 with prescribing provider:  12-6-18   Future Office Visit:   Sig: TAKE 1 TABLET(75 MG) BY MOUTH TWICE DAILY WITH FOOD AS NEEDED FOR MODERATE PAIN    NSAID Medications Passed - 1/5/2019  7:14 PM       Passed - Blood pressure under 140/90 in past 12 months    BP Readings from Last 3 Encounters:   12/06/18 139/77   02/28/18 113/73   12/05/17 138/76                Passed - Normal ALT on file in past 12 months    Recent Labs   Lab Test 12/06/18  1120   ALT 39            Passed - Normal AST on file in past 12 months    Recent Labs   Lab Test 12/06/18  1120   AST 23            Passed - Recent (12 mo) or future (30 days) visit within the authorizing provider's specialty    Patient had office visit in the last 12 months or has a visit in the next 30 days with authorizing provider or within the authorizing provider's specialty.  See \"Patient Info\" tab in inbasket, or \"Choose Columns\" in Meds & Orders section of the refill encounter.             Passed - Patient is age 6-64 years       Passed - Normal CBC on file in past 12 months    Recent Labs   Lab Test 02/28/18  1727   WBC 6.4   RBC 4.39   HGB 13.7   HCT 42.2                   Passed - Medication is active on med list       Passed - No active pregnancy on record       Passed - Normal serum creatinine on file in past 12 months    Recent Labs   Lab Test 12/06/18  1120   CR 0.67            Passed - No positive pregnancy test in past 12 months        "

## 2019-01-08 ENCOUNTER — ALLIED HEALTH/NURSE VISIT (OUTPATIENT)
Dept: NURSING | Facility: CLINIC | Age: 59
End: 2019-01-08
Payer: COMMERCIAL

## 2019-01-08 DIAGNOSIS — Z23 NEED FOR SHINGLES VACCINE: Primary | ICD-10-CM

## 2019-01-08 PROCEDURE — 99207 ZZC NO CHARGE NURSE ONLY: CPT

## 2019-01-08 PROCEDURE — 90750 HZV VACC RECOMBINANT IM: CPT

## 2019-01-08 PROCEDURE — 90471 IMMUNIZATION ADMIN: CPT

## 2019-01-09 ENCOUNTER — OFFICE VISIT (OUTPATIENT)
Dept: ORTHOPEDICS | Facility: CLINIC | Age: 59
End: 2019-01-09
Payer: COMMERCIAL

## 2019-01-09 VITALS
DIASTOLIC BLOOD PRESSURE: 80 MMHG | HEIGHT: 65 IN | BODY MASS INDEX: 36.82 KG/M2 | WEIGHT: 221 LBS | SYSTOLIC BLOOD PRESSURE: 123 MMHG

## 2019-01-09 DIAGNOSIS — M25.561 CHRONIC PAIN OF RIGHT KNEE: Primary | ICD-10-CM

## 2019-01-09 DIAGNOSIS — G89.29 CHRONIC PAIN OF RIGHT KNEE: Primary | ICD-10-CM

## 2019-01-09 DIAGNOSIS — M25.861 IMPINGEMENT SYNDROME INVOLVING PATELLAR FAT PAD OF RIGHT KNEE: ICD-10-CM

## 2019-01-09 DIAGNOSIS — M25.561 PATELLOFEMORAL ARTHRALGIA OF RIGHT KNEE: ICD-10-CM

## 2019-01-09 PROCEDURE — 20611 DRAIN/INJ JOINT/BURSA W/US: CPT | Mod: RT | Performed by: FAMILY MEDICINE

## 2019-01-09 PROCEDURE — 99243 OFF/OP CNSLTJ NEW/EST LOW 30: CPT | Mod: 25 | Performed by: FAMILY MEDICINE

## 2019-01-09 RX ADMIN — ROPIVACAINE HYDROCHLORIDE 3 ML: 5 INJECTION, SOLUTION EPIDURAL; INFILTRATION; PERINEURAL at 18:40

## 2019-01-09 RX ADMIN — TRIAMCINOLONE ACETONIDE 40 MG: 40 INJECTION, SUSPENSION INTRA-ARTICULAR; INTRAMUSCULAR at 18:40

## 2019-01-09 ASSESSMENT — MIFFLIN-ST. JEOR: SCORE: 1565.39

## 2019-01-09 NOTE — LETTER
2019         RE: Laura Ferreira  [address] [place] [facility] [address]        Dear Colleague,    Thank you for referring your patient, Laura Ferreira, to the [facility]. Please see a copy of my visit note below.    Laura Ferreira  :  1960  DOS: 2019  MRN: [id number]    Sports Medicine Clinic Visit    PCP: Sarita Shultz    Laura Ferreira is a 58 year old female who is seen in consultation at the request of  Sarita Shultz M.D. presenting with chronic right knee pain.    Injury: Gradual onset of right knee pain over the last ~ 10 months.  Pain located over right deep medial and lateral joint lines, nonradiating.  Additional Features:  Positive: swelling, weakness and stiffness.  Symptoms are better with Ibuprofen and Rest.  Symptoms are worse with: going from sit to stand, walking, ascending/descending stairs.  Other evaluation and/or treatments so far consists of: Ibuprofen, Rest and PCP consult.  Recent imaging completed: MRI completed 1/3/19.  Prior History of related problems: H/o right knee arthroscopy in .    Social History: unemployed    Review of Systems  Musculoskeletal: as above  Remainder of review of systems is negative including constitutional, CV, pulmonary, GI, Skin and Neurologic except as noted in HPI or medical history.    Past Medical History:   Diagnosis Date     Leblanc's esophagus     EGD in ; recent EGD in 2018; repeat EGD in 3 years     Ex-smoker     1 PPD x 28 years, quit 20 years ago     Hematuria 2007     History of breast biopsy     left     Menopausal symptoms 2009     Symptomatic menopausal or female climacteric states 2009     Tear of lateral cartilage or meniscus of knee, current 10/24/2007     Unspecified hypothyroidism 2009     Past Surgical History:   Procedure Laterality Date     APPENDECTOMY OPEN       ARTHROSCOPY KNEE RT/LT  11/15/2007    right knee arthroscopy with arthroscopic lateral  "meniscectomy     BIOPSY Left     Breast. Benign     C THYROIDECTOMY      goitre     CL AFF SURGICAL PATHOLOGY  09/18/2002    endometrial biopsy     COLONOSCOPY       ESOPHAGOSCOPY, GASTROSCOPY, DUODENOSCOPY (EGD), COMBINED N/A 12/23/2014    Procedure: COMBINED ESOPHAGOSCOPY, GASTROSCOPY, DUODENOSCOPY (EGD), BIOPSY SINGLE OR MULTIPLE;  Surgeon: Paradise Radford MD;  Location: [facility] OR     LAMINECTOMY, FUSION LUMBAR ONE LEVEL, COMBINED  10/26/2011    Procedure:COMBINED LAMINECTOMY, FUSION LUMBAR ONE LEVEL; L4-5 Decompression, L4-5 Posterior Lateral Fusion with Madhavi and Local Bone; Surgeon:BARBRA BRIGHT; Location:[facility] OR     TONSILLECTOMY & ADENOIDECTOMY         Objective  /80   Ht 1.638 m (5' 4.5\")   Wt 100.2 kg (221 lb)   LMP 04/01/2012   BMI 37.35 kg/m       General: healthy, alert and in no distress    HEENT: no scleral icterus or conjunctival erythema   Skin: no suspicious lesions or rash. No jaundice.   CV: regular rhythm by palpation, 2+ distal pulses, no pedal edema    Resp: normal respiratory effort without conversational dyspnea   Psych: normal mood and affect    Gait: mildly antalgic, appropriate coordination and balance   Neuro: normal light touch sensory exam of the extremities. Motor strength as noted below     Right Knee exam    ROM:        Flexion 140 degrees       Extension 0 degrees       Range of motion limited by mild pain in terminal flexion    Inspection:       no visible ecchymosis        effusion noted trace    Skin:       no visible deformities       well perfused       capillary refill brisk    Patellar Motion:        Lateral tilt noted in patella       Crepitus noted in the patellofemoral joint    Tender:        lateral patellar border       medial joint line    Non Tender:         remainder of knee area        medial patellar border        lateral joint line        along MCL        distal IT Band        infrapatellar tendon        tibial tubercle       pes anserine bursa    Special " Tests:        neg (-) Roberto       neg (-) Lachmans       neg (-) anterior drawer       neg (-) posterior drawer       + PF grind       neg (-) varus at 0 deg and 30 deg       neg (-) valgus at 0 deg and 30 deg    Evaluation of ipsilateral kinetic chain       normal strength with hip extension and abduction, but somewhat deconditioned b/l       Decreased strength with single leg squat b/l, R>L    Radiology  Results for orders placed or performed in visit on 12/19/18   MR Knee Right w/o Contrast    Narrative    MR KNEE RIGHT WITHOUT CONTRAST December 19, 2018 11:40 AM    HISTORY: Right knee pain, unspecified chronicity.     COMPARISON: 3/17/2012.     TECHNIQUE: Axial and coronal T2 with fat suppression. Coronal T1.  Sagittal dual echo T2.    FINDINGS:   Medial Meniscus: No tear, displaced fragment, or extrusion.        Lateral Meniscus: There appears to be degeneration/fraying at the free  edge of the anterior horn with no definite discrete tear identified.        Anterior Cruciate Ligament: Unremarkable. No sprain or tear  identified.     Posterior Cruciate Ligament: Unremarkable. No sprain or tear  identified.     Medial Collateral Ligament: No sprain or tear identified.    Lateral Collateral Ligament Complex, Popliteus Tendon: The iliotibial  band, fibular collateral ligament, biceps femoris tendon, and  popliteus tendon are intact.    Osseous and Cartilaginous Structures: No bone contusion. Small focus  of medial patellar chondromalacia (at least grade 1).    Extensor Mechanism: The quadriceps and patellar tendons are intact.  The medial and lateral patellar retinacula appear unremarkable.    Joint Space: Minimal joint effusion. Again there is an oval shaped  area of fairly low signal posterior to the medial meniscus. This  measures 0.5 x 1.2 x 1.3 cm (previously 0.5 x 1 x 1.3 cm). This could  represent focal synovitis, arthrofibrosis, or a nonossified loose body    Additional Findings: No significant Junior's  cyst. There is minimal  gastrocnemius muscle edema, which is nonspecific but could simply  relate to recent strenuous exercise. No semimembranosus-tibial  collateral ligament or pes anserine bursitis. Suprapatellar fat pad  edema.         Impression    IMPRESSION:  1. Degeneration/fraying at the free edge of the anterior horn lateral  meniscus, with no discrete tear identified.  2. Small focus of patellar chondromalacia.  3. Minimal effusion.  4. Minimal interval increase in the focal soft tissue density  posterior to the medial meniscus, as above.  5. Suprapatellar fat pad edema. This can be seen in association with  suprapatellar (quadriceps) fat pad impingement syndrome and clinical  correlation is recommended.          MARIA LUISA CISNEROS MD     Large Joint Injection/Arthocentesis: R knee joint  Date/Time: 1/9/2019 6:40 PM  Performed by: Alexis Aparicio DO  Authorized by: Alexis Aparicio DO     Indications:  Osteoarthritis and pain  Needle Size:  21 G  Guidance: ultrasound    Approach:  Superolateral  Location:  Knee  Site:  R knee joint  Medications:  40 mg triamcinolone 40 MG/ML; 3 mL ropivacaine 5 MG/ML  Outcome:  Tolerated well, no immediate complications  Procedure discussed: discussed risks, benefits, and alternatives    Consent Given by:  Patient  Prep: patient was prepped and draped in usual sterile fashion          Assessment:  1. Chronic pain of right knee    2. Impingement syndrome involving patellar fat pad of right knee    3. Patellofemoral arthralgia of right knee        Plan:  Discussed the assessment with the patient.  Follow up: prn based on clinical progress  Signs of PFS and fat pad impingement clinically and on imaging  Trial of US guided CSI today  PT can help with safe HEP development, increasing activity progressively, K-Tape if needed/helpful for superolateral PF pain/impingement  MR images independently visualized and reviewed with patient today in clinic  Expectations and  goals of CSI reviewed  Often 2-3 days for steroid effect, and can take up to two weeks for maximum effect  We discussed modified progressive pain-free activity as tolerated  Do not overuse in first two weeks if feeling better due to concern for vulnerability while steroid is working  Supportive care reviewed  All questions were answered today  Contact us with additional questions or concerns  Signs and sx of concern reviewed    Thanks very much for sending this nice lady to us, I will keep you updated with her progress      Alexis Aparicio DO, CAQ  Primary Care Sports Medicine  [facility]             Disclaimer: This note consists of symbols derived from keyboarding, dictation and/or voice recognition software. As a result, there may be errors in the script that have gone undetected. Please consider this when interpreting information found in this chart.    Again, thank you for allowing me to participate in the care of your patient.        Sincerely,        Alexis Aparicio DO

## 2019-01-10 NOTE — PROGRESS NOTES
Laura Ferreira  :  1960  DOS: 2019  MRN: [id number]    Sports Medicine Clinic Visit    PCP: Sarita Shultz RAMO Ferreira is a 58 year old female who is seen in consultation at the request of  Sarita Shultz M.D. presenting with chronic right knee pain.    Injury: Gradual onset of right knee pain over the last ~ 10 months.  Pain located over right deep medial and lateral joint lines, nonradiating.  Additional Features:  Positive: swelling, weakness and stiffness.  Symptoms are better with Ibuprofen and Rest.  Symptoms are worse with: going from sit to stand, walking, ascending/descending stairs.  Other evaluation and/or treatments so far consists of: Ibuprofen, Rest and PCP consult.  Recent imaging completed: MRI completed 1/3/19.  Prior History of related problems: H/o right knee arthroscopy in .    Social History: unemployed    Review of Systems  Musculoskeletal: as above  Remainder of review of systems is negative including constitutional, CV, pulmonary, GI, Skin and Neurologic except as noted in HPI or medical history.    Past Medical History:   Diagnosis Date     Leblanc's esophagus     EGD in ; recent EGD in 2018; repeat EGD in 3 years     Ex-smoker     1 PPD x 28 years, quit 20 years ago     Hematuria 2007     History of breast biopsy     left     Menopausal symptoms 2009     Symptomatic menopausal or female climacteric states 2009     Tear of lateral cartilage or meniscus of knee, current 10/24/2007     Unspecified hypothyroidism 2009     Past Surgical History:   Procedure Laterality Date     APPENDECTOMY OPEN       ARTHROSCOPY KNEE RT/LT  11/15/2007    right knee arthroscopy with arthroscopic lateral meniscectomy     BIOPSY Left     Breast. Benign     C THYROIDECTOMY      goitre     CL AFF SURGICAL PATHOLOGY  2002    endometrial biopsy     COLONOSCOPY       ESOPHAGOSCOPY, GASTROSCOPY, DUODENOSCOPY (EGD), COMBINED N/A 2014    Procedure: COMBINED  "ESOPHAGOSCOPY, GASTROSCOPY, DUODENOSCOPY (EGD), BIOPSY SINGLE OR MULTIPLE;  Surgeon: Paradise Radford MD;  Location: [facility]     LAMINECTOMY, FUSION LUMBAR ONE LEVEL, COMBINED  10/26/2011    Procedure:COMBINED LAMINECTOMY, FUSION LUMBAR ONE LEVEL; L4-5 Decompression, L4-5 Posterior Lateral Fusion with Madhavi and Local Bone; Surgeon:BARBRA BRIGHT; Location:[facility]     TONSILLECTOMY & ADENOIDECTOMY         Objective  /80   Ht 1.638 m (5' 4.5\")   Wt 100.2 kg (221 lb)   LMP 04/01/2012   BMI 37.35 kg/m      General: healthy, alert and in no distress    HEENT: no scleral icterus or conjunctival erythema   Skin: no suspicious lesions or rash. No jaundice.   CV: regular rhythm by palpation, 2+ distal pulses, no pedal edema    Resp: normal respiratory effort without conversational dyspnea   Psych: normal mood and affect    Gait: mildly antalgic, appropriate coordination and balance   Neuro: normal light touch sensory exam of the extremities. Motor strength as noted below     Right Knee exam    ROM:        Flexion 140 degrees       Extension 0 degrees       Range of motion limited by mild pain in terminal flexion    Inspection:       no visible ecchymosis        effusion noted trace    Skin:       no visible deformities       well perfused       capillary refill brisk    Patellar Motion:        Lateral tilt noted in patella       Crepitus noted in the patellofemoral joint    Tender:        lateral patellar border       medial joint line    Non Tender:         remainder of knee area        medial patellar border        lateral joint line        along MCL        distal IT Band        infrapatellar tendon        tibial tubercle       pes anserine bursa    Special Tests:        neg (-) Roberto       neg (-) Lachmans       neg (-) anterior drawer       neg (-) posterior drawer       + PF grind       neg (-) varus at 0 deg and 30 deg       neg (-) valgus at 0 deg and 30 deg    Evaluation of ipsilateral kinetic chain       " normal strength with hip extension and abduction, but somewhat deconditioned b/l       Decreased strength with single leg squat b/l, R>L    Radiology  Results for orders placed or performed in visit on 12/19/18   MR Knee Right w/o Contrast    Narrative    MR KNEE RIGHT WITHOUT CONTRAST December 19, 2018 11:40 AM    HISTORY: Right knee pain, unspecified chronicity.     COMPARISON: 3/17/2012.     TECHNIQUE: Axial and coronal T2 with fat suppression. Coronal T1.  Sagittal dual echo T2.    FINDINGS:   Medial Meniscus: No tear, displaced fragment, or extrusion.        Lateral Meniscus: There appears to be degeneration/fraying at the free  edge of the anterior horn with no definite discrete tear identified.        Anterior Cruciate Ligament: Unremarkable. No sprain or tear  identified.     Posterior Cruciate Ligament: Unremarkable. No sprain or tear  identified.     Medial Collateral Ligament: No sprain or tear identified.    Lateral Collateral Ligament Complex, Popliteus Tendon: The iliotibial  band, fibular collateral ligament, biceps femoris tendon, and  popliteus tendon are intact.    Osseous and Cartilaginous Structures: No bone contusion. Small focus  of medial patellar chondromalacia (at least grade 1).    Extensor Mechanism: The quadriceps and patellar tendons are intact.  The medial and lateral patellar retinacula appear unremarkable.    Joint Space: Minimal joint effusion. Again there is an oval shaped  area of fairly low signal posterior to the medial meniscus. This  measures 0.5 x 1.2 x 1.3 cm (previously 0.5 x 1 x 1.3 cm). This could  represent focal synovitis, arthrofibrosis, or a nonossified loose body    Additional Findings: No significant Baker's cyst. There is minimal  gastrocnemius muscle edema, which is nonspecific but could simply  relate to recent strenuous exercise. No semimembranosus-tibial  collateral ligament or pes anserine bursitis. Suprapatellar fat pad  edema.         Impression     IMPRESSION:  1. Degeneration/fraying at the free edge of the anterior horn lateral  meniscus, with no discrete tear identified.  2. Small focus of patellar chondromalacia.  3. Minimal effusion.  4. Minimal interval increase in the focal soft tissue density  posterior to the medial meniscus, as above.  5. Suprapatellar fat pad edema. This can be seen in association with  suprapatellar (quadriceps) fat pad impingement syndrome and clinical  correlation is recommended.          MARIA LUISA CISNEROS MD     Large Joint Injection/Arthocentesis: R knee joint  Date/Time: 1/9/2019 6:40 PM  Performed by: Alexis Aparicio DO  Authorized by: Alexis Aparicio DO     Indications:  Osteoarthritis and pain  Needle Size:  21 G  Guidance: ultrasound    Approach:  Superolateral  Location:  Knee  Site:  R knee joint  Medications:  40 mg triamcinolone 40 MG/ML; 3 mL ropivacaine 5 MG/ML  Outcome:  Tolerated well, no immediate complications  Procedure discussed: discussed risks, benefits, and alternatives    Consent Given by:  Patient  Prep: patient was prepped and draped in usual sterile fashion          Assessment:  1. Chronic pain of right knee    2. Impingement syndrome involving patellar fat pad of right knee    3. Patellofemoral arthralgia of right knee        Plan:  Discussed the assessment with the patient.  Follow up: prn based on clinical progress  Signs of PFS and fat pad impingement clinically and on imaging  Trial of US guided CSI today  PT can help with safe HEP development, increasing activity progressively, K-Tape if needed/helpful for superolateral PF pain/impingement  MR images independently visualized and reviewed with patient today in clinic  Expectations and goals of CSI reviewed  Often 2-3 days for steroid effect, and can take up to two weeks for maximum effect  We discussed modified progressive pain-free activity as tolerated  Do not overuse in first two weeks if feeling better due to concern for  vulnerability while steroid is working  Supportive care reviewed  All questions were answered today  Contact us with additional questions or concerns  Signs and sx of concern reviewed    Thanks very much for sending this nice lady to us, I will keep you updated with her progress      Alexis Aparicio DO, [place]  Primary Care Sports Medicine  [facility]             Disclaimer: This note consists of symbols derived from keyboarding, dictation and/or voice recognition software. As a result, there may be errors in the script that have gone undetected. Please consider this when interpreting information found in this chart.

## 2019-01-11 RX ORDER — TRIAMCINOLONE ACETONIDE 40 MG/ML
40 INJECTION, SUSPENSION INTRA-ARTICULAR; INTRAMUSCULAR
Status: DISCONTINUED | OUTPATIENT
Start: 2019-01-09 | End: 2021-06-11

## 2019-01-11 RX ORDER — ROPIVACAINE HYDROCHLORIDE 5 MG/ML
3 INJECTION, SOLUTION EPIDURAL; INFILTRATION; PERINEURAL
Status: DISCONTINUED | OUTPATIENT
Start: 2019-01-09 | End: 2021-06-11

## 2019-01-21 ENCOUNTER — THERAPY VISIT (OUTPATIENT)
Dept: PHYSICAL THERAPY | Facility: CLINIC | Age: 59
End: 2019-01-21
Payer: COMMERCIAL

## 2019-01-21 DIAGNOSIS — M25.861 IMPINGEMENT SYNDROME INVOLVING PATELLAR FAT PAD OF RIGHT KNEE: ICD-10-CM

## 2019-01-21 DIAGNOSIS — M25.561 CHRONIC PAIN OF RIGHT KNEE: ICD-10-CM

## 2019-01-21 DIAGNOSIS — G89.29 CHRONIC PAIN OF RIGHT KNEE: ICD-10-CM

## 2019-01-21 DIAGNOSIS — M25.561 PATELLOFEMORAL ARTHRALGIA OF RIGHT KNEE: ICD-10-CM

## 2019-01-21 PROCEDURE — 97161 PT EVAL LOW COMPLEX 20 MIN: CPT | Mod: GP | Performed by: PHYSICAL THERAPIST

## 2019-01-21 PROCEDURE — 97110 THERAPEUTIC EXERCISES: CPT | Mod: GP | Performed by: PHYSICAL THERAPIST

## 2019-01-21 ASSESSMENT — ACTIVITIES OF DAILY LIVING (ADL)
SIT WITH YOUR KNEE BENT: ACTIVITY IS SOMEWHAT DIFFICULT
RISE FROM A CHAIR: ACTIVITY IS MINIMALLY DIFFICULT
SQUAT: ACTIVITY IS SOMEWHAT DIFFICULT
KNEE_ACTIVITY_OF_DAILY_LIVING_SCORE: 71.43
GO UP STAIRS: ACTIVITY IS SOMEWHAT DIFFICULT
SWELLING: I HAVE THE SYMPTOM BUT IT DOES NOT AFFECT MY ACTIVITY
KNEEL ON THE FRONT OF YOUR KNEE: ACTIVITY IS VERY DIFFICULT
PAIN: I HAVE THE SYMPTOM BUT IT DOES NOT AFFECT MY ACTIVITY
GO DOWN STAIRS: ACTIVITY IS SOMEWHAT DIFFICULT
HOW_WOULD_YOU_RATE_THE_CURRENT_FUNCTION_OF_YOUR_KNEE_DURING_YOUR_USUAL_DAILY_ACTIVITIES_ON_A_SCALE_FROM_0_TO_100_WITH_100_BEING_YOUR_LEVEL_OF_KNEE_FUNCTION_PRIOR_TO_YOUR_INJURY_AND_0_BEING_THE_INABILITY_TO_PERFORM_ANY_OF_YOUR_USUAL_DAILY_ACTIVITIES?: 50
WALK: ACTIVITY IS MINIMALLY DIFFICULT
HOW_WOULD_YOU_RATE_THE_OVERALL_FUNCTION_OF_YOUR_KNEE_DURING_YOUR_USUAL_DAILY_ACTIVITIES?: ABNORMAL
STAND: ACTIVITY IS MINIMALLY DIFFICULT
WEAKNESS: I HAVE THE SYMPTOM BUT IT DOES NOT AFFECT MY ACTIVITY
AS_A_RESULT_OF_YOUR_KNEE_INJURY,_HOW_WOULD_YOU_RATE_YOUR_CURRENT_LEVEL_OF_DAILY_ACTIVITY?: ABNORMAL
LIMPING: I HAVE THE SYMPTOM BUT IT DOES NOT AFFECT MY ACTIVITY
STIFFNESS: I HAVE THE SYMPTOM BUT IT DOES NOT AFFECT MY ACTIVITY
KNEE_ACTIVITY_OF_DAILY_LIVING_SUM: 50
RAW_SCORE: 50
GIVING WAY, BUCKLING OR SHIFTING OF KNEE: I DO NOT HAVE THE SYMPTOM

## 2019-01-21 NOTE — PROGRESS NOTES
"[facility] Initial Evaluation  Subjective:  The history is provided by the patient. No  was used.   Laura Ferreira is a 58 year old female with a right knee condition.  Condition occurred with:  Insidious onset.  Condition occurred: for unknown reasons.  This is a chronic condition  Pt states has been battling knee pain for ten months without specific incident.  Anti-inflammatory course in December helpful in that pain was less constant.  Saw Dr Aparicio nearly two weeks ago and had injection, which pt states has been somewhat helpful.  Referred to PT 01/09/2019 and went on vacation the next day and found walking was rather uncomfortable.    Patient reports pain:  Lateral.    Pain is described as aching (\"just uncomfortable\") and is intermittent and reported as 1/10.   Pain is worse in the P.M..  Exacerbated by: up and down stairs, walking, fully straightening the knee. Relieved by: slight bend in the knee.  Pain course: pt states she has gotten better since injection but has stalled lately.  Special testing: see xray and MRI in chart 12/2018.  Previous treatment: injection 01/09/2019 as above.    General health as reported by patient is good.  Pertinent medical history includes:  Overweight and menopausal.  Medical allergies: yes (latex).  Surgical history: R knee scope about eleven years ago without residual difficulty, lumbar fusion, elbow, breast, appendix, thyroid.  Current medications:  Anti-inflammatory and thyroid medication.  Current occupation is retired.        Barriers include:  Stairs.    Red flags:  None as reported by the patient.    Pt states her goals are to return her \"old routine of exercising;\" has included treadmill walking and elliptical.  She also describes inclusion of wall squats, lunges, and some running which have been OK in the context of her lumbar fusion history.                    Objective:  Standing Alignment:              Knee:  Genu valgus R and " "genu valgus L (greater lateral patellar tilt R than L)      Gait:  Pt ambulates without device without gross asymmetry.                                                     Hip Evaluation  Hip PROM:  Hip PROM:  Left Hip:    Normal  Right Hip:  Normal                          Hip Strength:      Extension:  Left: 4/5  -  Pain:Right: 4/5    -  Pain:    Abduction:  Left: 4/5    -   Pain:Right: 4/5   -   Pain:                           Knee Evaluation:  ROM:    AROM    Hyperextension: Left:  1    Right:  Extension: Left:    Right:  3  Flexion: Left: 111    Right: 102  PROM        Flexion: Left:   Right:  122  Pain: \"tight\" end range flexion and extension    Strength:     Extension:  Left: 5-/5    Pain:-      Right: 4+/5    Pain:-  Flexion:  Left: 5-/5    Pain:-      Right: 4+/5    Pain:-    Quad Set Left:  Good    Pain: -   Quad Set Right:  Good    Pain: -  Ligament Testing:    Varus 0:  Left:  Neg   Right:  Neg  Varus 30:  Left:  Neg  Right:  Neg  Valgus 0:  Left:  Neg  Right:  Neg  Valgus 30:  Left:  Neg    Right:  Neg    Posterior Drawer: Left:  Neg  Right:  Neg  Lachman's:  Left:  Neg  Right:  Neg    Palpation:      Right knee tenderness present at:  Lateral Joint Line  Right knee tenderness not present at:  Medial Joint Line; Patellar Medial; Patellar Lateral; Patellar Superior and Patellar Inferior  Edema:    Circumference:      Joint Line:  Left:  44.8 cm   Right:  44.9 cm    Mobility Testing:      Patellofemoral Medial:  Left: normal    Right: hypomobile  Patellofemoral Lateral:  Left: normal    Right: hypomobile        Valgus deviation double limb squat B.    General     ROS    Assessment/Plan:    Patient is a 58 year old female with right side knee complaints.    Patient has the following significant findings with corresponding treatment plan.                Diagnosis 1:  R knee pain  Pain -  hot/cold therapy and splint/taping/bracing/orthotics  Decreased ROM/flexibility - manual therapy and therapeutic " exercise  Decreased joint mobility - manual therapy and therapeutic exercise  Decreased strength - therapeutic exercise and therapeutic activities  Impaired muscle performance - neuro re-education  Decreased function - therapeutic activities  Impaired posture - neuro re-education    Therapy Evaluation Codes:   1) History comprised of:   Personal factors that impact the plan of care:      Time since onset of symptoms.    Comorbidity factors that impact the plan of care are:      Overweight.     Medications impacting care: Anti-inflammatory.  2) Examination of Body Systems comprised of:   Body structures and functions that impact the plan of care:      Hip and Knee.   Activity limitations that impact the plan of care are:      Stairs and Walking.  3) Clinical presentation characteristics are:   Stable/Uncomplicated.  4) Decision-Making    Low complexity using standardized patient assessment instrument and/or measureable assessment of functional outcome.  Cumulative Therapy Evaluation is: Low complexity.    Previous and current functional limitations:  (See Goal Flow Sheet for this information)    Short term and Long term goals: (See Goal Flow Sheet for this information)     Communication ability:  Patient appears to be able to clearly communicate and understand verbal and written communication and follow directions correctly.  Treatment Explanation - The following has been discussed with the patient:   RX ordered/plan of care  Anticipated outcomes  Possible risks and side effects  This patient would benefit from PT intervention to resume normal activities.   Rehab potential is good.    Frequency:  1 X week, once daily  Duration:  for 4 weeks  Discharge Plan:  Achieve all LTG.  Independent in home treatment program.  Reach maximal therapeutic benefit.    Please refer to the daily flowsheet for treatment today, total treatment time and time spent performing 1:1 timed codes.

## 2019-02-04 ENCOUNTER — THERAPY VISIT (OUTPATIENT)
Dept: PHYSICAL THERAPY | Facility: CLINIC | Age: 59
End: 2019-02-04
Payer: COMMERCIAL

## 2019-02-04 DIAGNOSIS — M25.561 CHRONIC PAIN OF RIGHT KNEE: ICD-10-CM

## 2019-02-04 DIAGNOSIS — G89.29 CHRONIC PAIN OF RIGHT KNEE: ICD-10-CM

## 2019-02-04 PROCEDURE — 97110 THERAPEUTIC EXERCISES: CPT | Mod: GP | Performed by: PHYSICAL THERAPIST

## 2019-02-04 PROCEDURE — 97530 THERAPEUTIC ACTIVITIES: CPT | Mod: GP | Performed by: PHYSICAL THERAPIST

## 2019-02-04 NOTE — PROGRESS NOTES
Subjective:  HPI                    Objective:  System    Physical Exam    General     ROS    Assessment/Plan:    SUBJECTIVE  Subjective: Pt reports still having discomfort but notices that stairs are definitely easier.  Still struggling with prolonged sitting.   Current Pain level: 1/10   Changes in function:  Yes (See Goal flowsheet attached for changes in current functional level)     Adverse reaction to treatment or activity:  None    OBJECTIVE  Objective: AROM 0-0-112 R knee.  Ascent and descent of stairs in reciprocal pattern with railing.     ASSESSMENT  Laura continues to require intervention to meet STG and LTG's: PT  Patient is progressing as expected.  Response to therapy has shown an improvement in  function  Progress made towards STG/LTG?  Yes (See Goal flowsheet attached for updates on achievement of STG and LTG)    PLAN  Current treatment program is being advanced to more complex exercises.    PTA/ATC plan:  N/A    Please refer to the daily flowsheet for treatment today, total treatment time and time spent performing 1:1 timed codes.

## 2019-02-11 ENCOUNTER — THERAPY VISIT (OUTPATIENT)
Dept: PHYSICAL THERAPY | Facility: CLINIC | Age: 59
End: 2019-02-11
Payer: COMMERCIAL

## 2019-02-11 DIAGNOSIS — M25.561 CHRONIC PAIN OF RIGHT KNEE: ICD-10-CM

## 2019-02-11 DIAGNOSIS — G89.29 CHRONIC PAIN OF RIGHT KNEE: ICD-10-CM

## 2019-02-11 PROCEDURE — 97110 THERAPEUTIC EXERCISES: CPT | Mod: GP | Performed by: PHYSICAL THERAPIST

## 2019-02-11 ASSESSMENT — ACTIVITIES OF DAILY LIVING (ADL)
GIVING WAY, BUCKLING OR SHIFTING OF KNEE: I DO NOT HAVE THE SYMPTOM
GO UP STAIRS: ACTIVITY IS NOT DIFFICULT
WEAKNESS: I HAVE THE SYMPTOM BUT IT DOES NOT AFFECT MY ACTIVITY
KNEE_ACTIVITY_OF_DAILY_LIVING_SUM: 65
GO DOWN STAIRS: ACTIVITY IS NOT DIFFICULT
LIMPING: I DO NOT HAVE THE SYMPTOM
RISE FROM A CHAIR: ACTIVITY IS NOT DIFFICULT
HOW_WOULD_YOU_RATE_THE_OVERALL_FUNCTION_OF_YOUR_KNEE_DURING_YOUR_USUAL_DAILY_ACTIVITIES?: NORMAL
STIFFNESS: I HAVE THE SYMPTOM BUT IT DOES NOT AFFECT MY ACTIVITY
RAW_SCORE: 65
SQUAT: ACTIVITY IS MINIMALLY DIFFICULT
SWELLING: I DO NOT HAVE THE SYMPTOM
WALK: ACTIVITY IS NOT DIFFICULT
KNEE_ACTIVITY_OF_DAILY_LIVING_SCORE: 92.86
PAIN: I DO NOT HAVE THE SYMPTOM
AS_A_RESULT_OF_YOUR_KNEE_INJURY,_HOW_WOULD_YOU_RATE_YOUR_CURRENT_LEVEL_OF_DAILY_ACTIVITY?: NORMAL
KNEEL ON THE FRONT OF YOUR KNEE: ACTIVITY IS MINIMALLY DIFFICULT
STAND: ACTIVITY IS NOT DIFFICULT
SIT WITH YOUR KNEE BENT: ACTIVITY IS MINIMALLY DIFFICULT
HOW_WOULD_YOU_RATE_THE_CURRENT_FUNCTION_OF_YOUR_KNEE_DURING_YOUR_USUAL_DAILY_ACTIVITIES_ON_A_SCALE_FROM_0_TO_100_WITH_100_BEING_YOUR_LEVEL_OF_KNEE_FUNCTION_PRIOR_TO_YOUR_INJURY_AND_0_BEING_THE_INABILITY_TO_PERFORM_ANY_OF_YOUR_USUAL_DAILY_ACTIVITIES?: 90

## 2019-02-11 NOTE — PROGRESS NOTES
"DISCHARGE REPORT    Progress reporting period is from 01/21/2019 to 02/11/2019.       SUBJECTIVE  Subjective: \"I feel better.  I really seriously don't have the pain that much.\"   Has found HEP helpful and is not painful to do.  No issues with stairs any longer.    Current Pain level: 0/10.     Initial Pain level: 1/10.   Changes in function:  Yes (See Goal flowsheet attached for changes in current functional level)  Adverse reaction to treatment or activity: None    OBJECTIVE  Objective: AROM 0-0-111 without discomfort.  No tenderness to palpation.  No discomfort resisted knee flexion and extension.     ASSESSMENT/PLAN  Updated problem list and treatment plan: Diagnosis 1:  R knee pain -- home program  STG/LTGs have been met or progress has been made towards goals:  Yes (See Goal flow sheet completed today.)  Assessment of Progress: The patient's condition is improving.  Self Management Plans:  Patient is independent in a home treatment program.  I have re-evaluated this patient and find that the nature, scope, duration and intensity of the therapy is appropriate for the medical condition of the patient.  Laura continues to require the following intervention to meet STG and LTG's:  PT intervention is no longer required to meet STG/LTG.    Recommendations:  Given progress, pt agrees discharge to HEP but will let me know if there are further issues.    Please refer to the daily flowsheet for treatment today, total treatment time and time spent performing 1:1 timed codes.        "

## 2019-02-22 ENCOUNTER — OFFICE VISIT (OUTPATIENT)
Dept: FAMILY MEDICINE | Facility: CLINIC | Age: 59
End: 2019-02-22
Payer: COMMERCIAL

## 2019-02-22 VITALS
RESPIRATION RATE: 20 BRPM | SYSTOLIC BLOOD PRESSURE: 121 MMHG | HEART RATE: 76 BPM | DIASTOLIC BLOOD PRESSURE: 79 MMHG | TEMPERATURE: 97.6 F | WEIGHT: 214.8 LBS | BODY MASS INDEX: 36.3 KG/M2 | OXYGEN SATURATION: 97 %

## 2019-02-22 DIAGNOSIS — K21.00 GASTROESOPHAGEAL REFLUX DISEASE WITH ESOPHAGITIS: ICD-10-CM

## 2019-02-22 DIAGNOSIS — R35.0 URINARY FREQUENCY: ICD-10-CM

## 2019-02-22 DIAGNOSIS — E66.01 MORBID OBESITY (H): ICD-10-CM

## 2019-02-22 DIAGNOSIS — R10.11 POSTPRANDIAL ABDOMINAL PAIN IN RIGHT UPPER QUADRANT: Primary | ICD-10-CM

## 2019-02-22 LAB
ALBUMIN UR-MCNC: NEGATIVE MG/DL
APPEARANCE UR: CLEAR
BILIRUB UR QL STRIP: NEGATIVE
COLOR UR AUTO: YELLOW
GLUCOSE UR STRIP-MCNC: NEGATIVE MG/DL
HGB UR QL STRIP: NEGATIVE
KETONES UR STRIP-MCNC: NEGATIVE MG/DL
LEUKOCYTE ESTERASE UR QL STRIP: NEGATIVE
NITRATE UR QL: NEGATIVE
PH UR STRIP: 7 PH (ref 5–7)
SOURCE: NORMAL
SP GR UR STRIP: 1.01 (ref 1–1.03)
UROBILINOGEN UR STRIP-ACNC: 0.2 EU/DL (ref 0.2–1)

## 2019-02-22 PROCEDURE — 87338 HPYLORI STOOL AG IA: CPT | Performed by: FAMILY MEDICINE

## 2019-02-22 PROCEDURE — 83516 IMMUNOASSAY NONANTIBODY: CPT | Performed by: FAMILY MEDICINE

## 2019-02-22 PROCEDURE — 83516 IMMUNOASSAY NONANTIBODY: CPT | Mod: 59 | Performed by: FAMILY MEDICINE

## 2019-02-22 PROCEDURE — 99214 OFFICE O/P EST MOD 30 MIN: CPT | Performed by: FAMILY MEDICINE

## 2019-02-22 PROCEDURE — 36415 COLL VENOUS BLD VENIPUNCTURE: CPT | Performed by: FAMILY MEDICINE

## 2019-02-22 PROCEDURE — 81003 URINALYSIS AUTO W/O SCOPE: CPT | Performed by: FAMILY MEDICINE

## 2019-02-22 RX ORDER — SUCRALFATE 1 G/1
1 TABLET ORAL 4 TIMES DAILY
Qty: 40 TABLET | Refills: 3 | Status: SHIPPED | OUTPATIENT
Start: 2019-02-22 | End: 2019-12-10

## 2019-02-22 NOTE — PROGRESS NOTES
SUBJECTIVE:   Laura Ferreira is a 58 year old female who presents to clinic today for the following health issues:      ABDOMINAL   PAIN     Onset: x1 week    Description:   Character: Sharp  Location: right upper quadrant  Radiation: None    Intensity: 5/10 at it's worse    Progression of Symptoms:  Improving- no longer having constant pain     Accompanying Signs & Symptoms:  Fever/Chills?: YES- cold sweats  Gas/Bloating: YES  Nausea: YES  Vomitting: no   Diarrhea?: YES  Constipation:YES  Dysuria or Hematuria: YES- increased urination    History:   Trauma: no   Previous similar pain: 5/30/17   Previous tests done: none    Precipitating factors:   Does the pain change with:     Food: YES- worsens after eating     BM: YES- will sometimes have relief after BM    Urination: no     Alleviating factors:  none    Therapies Tried and outcome: omeprazole, tums- no relief     LMP:  not applicable       Reports that she has had Abdominal US in the past that were reported normal.   States that she has been under stress lately preparing for a road trip to [place], [place].         Problem list and histories reviewed & adjusted, as indicated.  Additional history: as documented    Patient Active Problem List   Diagnosis     GERD (gastroesophageal reflux disease)     Menorrhagia     Iron deficiency anemia     Simple endometrial hyperplasia without atypia     Postsurgical hypothyroidism     Hyperlipidemia LDL goal <130     Morbid obesity (H)     Past Surgical History:   Procedure Laterality Date     APPENDECTOMY OPEN       ARTHROSCOPY KNEE RT/LT  11/15/2007    right knee arthroscopy with arthroscopic lateral meniscectomy     BIOPSY Left     Breast. Benign     C THYROIDECTOMY      goitre     CL AFF SURGICAL PATHOLOGY  09/18/2002    endometrial biopsy     COLONOSCOPY       ESOPHAGOSCOPY, GASTROSCOPY, DUODENOSCOPY (EGD), COMBINED N/A 12/23/2014    Procedure: COMBINED ESOPHAGOSCOPY, GASTROSCOPY, DUODENOSCOPY (EGD), BIOPSY SINGLE OR  MULTIPLE;  Surgeon: Paradise Radford MD;  Location: [facility]     LAMINECTOMY, FUSION LUMBAR ONE LEVEL, COMBINED  10/26/2011    Procedure:COMBINED LAMINECTOMY, FUSION LUMBAR ONE LEVEL; L4-5 Decompression, L4-5 Posterior Lateral Fusion with Madhavi and Local Bone; Surgeon:BARBRA BRIGHT; Location:[facility]     TONSILLECTOMY & ADENOIDECTOMY         Social History     Tobacco Use     Smoking status: Former Smoker     Years: 14.00     Types: Cigarettes     Last attempt to quit: 12/15/1998     Years since quittin.2     Smokeless tobacco: Never Used     Tobacco comment: quit 20 years ago   Substance Use Topics     Alcohol use: Yes     Comment: occ     Family History   Problem Relation Age of Onset     Asthma Mother      Diabetes Mother      Hypertension Mother      Arthritis Mother      Circulatory Mother      Obesity Mother      Thyroid Disease Mother      Hypertension Father      Alcohol/Drug Father      Arthritis Father      Obesity Father      Breast Cancer Maternal Grandmother      Thyroid Disease Brother      Colon Cancer No family hx of          Current Outpatient Medications   Medication Sig Dispense Refill     levothyroxine (SYNTHROID/LEVOTHROID) 112 MCG tablet Take 1 tablet (112 mcg) by mouth daily 90 tablet 3     MULTI-VITAMIN OR TABS 1 TABLET DAILY       OMEPRAZOLE PO Take 20 mg by mouth 2 times daily (before meals)       simvastatin (ZOCOR) 20 MG tablet Take 1 tablet (20 mg) by mouth daily 90 tablet 3     sucralfate (CARAFATE) 1 GM tablet Take 1 tablet (1 g) by mouth 4 times daily 40 tablet 3     diclofenac (VOLTAREN) 75 MG EC tablet TAKE 1 TABLET(75 MG) BY MOUTH TWICE DAILY WITH FOOD AS NEEDED FOR MODERATE PAIN 30 tablet 0     Allergies   Allergen Reactions     Latex Anaphylaxis     Cortisone Nausea and Vomiting     oral     Vicodin [Acetaminophen] Nausea and Vomiting       Reviewed and updated as needed this visit by clinical staff       Reviewed and updated as needed this visit by Provider          ROS:  Constitutional, HEENT, cardiovascular, pulmonary and gu systems are negative, except as otherwise noted.    OBJECTIVE:     /79   Pulse 76   Temp 97.6  F (36.4  C) (Tympanic)   Resp 20   Wt 97.4 kg (214 lb 12.8 oz)   LMP 04/01/2012   SpO2 97%   BMI 36.30 kg/m    Body mass index is 36.3 kg/m .  GENERAL: healthy, alert and no distress  RESP: lungs clear to auscultation - no rales, rhonchi or wheezes  CV: regular rate and rhythm, normal S1 S2, no S3 or S4, no murmur, click or rub, no peripheral edema and peripheral pulses strong  ABDOMEN: soft, tenderness in the RUQ, mild epigastric tenderness, no hepatosplenomegaly, no masses and bowel sounds normal    Diagnostic Test Results:  Reviewed and discussed with patient prior to discharge.  Results for orders placed or performed in visit on 02/22/19   UA reflex to Microscopic and Culture   Result Value Ref Range    Color Urine Yellow     Appearance Urine Clear     Glucose Urine Negative NEG^Negative mg/dL    Bilirubin Urine Negative NEG^Negative    Ketones Urine Negative NEG^Negative mg/dL    Specific Gravity Urine 1.010 1.003 - 1.035    Blood Urine Negative NEG^Negative    pH Urine 7.0 5.0 - 7.0 pH    Protein Albumin Urine Negative NEG^Negative mg/dL    Urobilinogen Urine 0.2 0.2 - 1.0 EU/dL    Nitrite Urine Negative NEG^Negative    Leukocyte Esterase Urine Negative NEG^Negative    Source Midstream Urine      Previous Colonoscopy and Upper Endoscopy reviewed.     ASSESSMENT/PLAN:   Laura was seen today for abdominal pain.    Diagnoses and all orders for this visit:    Postprandial abdominal pain in right upper quadrant  -     H Pylori antigen stool; Future  -     Tissue transglutaminase sharonda IgA and IgG  -     US ABDOMEN COMPLETE; Future; if unremarkable consider obtaining a HIDA scan.   -     -     Trial: sucralfate (CARAFATE) 1 GM tablet; Take 1 tablet (1 g) by mouth 4 times daily    Gastroesophageal reflux disease with esophagitis  -     Trial:  sucralfate (CARAFATE) 1 GM tablet; Take 1 tablet (1 g) by mouth 4 times daily  Continue taking Omeprazole    Urinary frequency  -     UA reflex to Microscopic and Culture    Morbid obesity (H)  Weight management plan: Discussed healthy diet and exercise guidelines      Will notify her with results.        Follow up if symptoms fail to improve in 1 weeks or worsen.      The patient was in agreement with the plan today and had no questions or concerns prior to leaving the clinic.      Sarita Shultz MD  [facility]

## 2019-02-25 DIAGNOSIS — R10.11 POSTPRANDIAL ABDOMINAL PAIN IN RIGHT UPPER QUADRANT: ICD-10-CM

## 2019-02-25 LAB
TTG IGA SER-ACNC: 1 U/ML
TTG IGG SER-ACNC: <1 U/ML

## 2019-02-26 LAB
H PYLORI AG STL QL IA: NORMAL
SPECIMEN SOURCE: NORMAL

## 2019-03-04 ENCOUNTER — MYC MEDICAL ADVICE (OUTPATIENT)
Dept: FAMILY MEDICINE | Facility: CLINIC | Age: 59
End: 2019-03-04

## 2019-03-05 NOTE — TELEPHONE ENCOUNTER
Per US abdomen order, this is placed as a future order expected date of 5/23/19.    Please advise if you would like patient to have this completed prior to this date -- if so new order will need to be placed.     Shawna Rothman RN, BSN, PHN

## 2019-03-09 ENCOUNTER — ANCILLARY PROCEDURE (OUTPATIENT)
Dept: ULTRASOUND IMAGING | Facility: CLINIC | Age: 59
End: 2019-03-09
Attending: FAMILY MEDICINE
Payer: COMMERCIAL

## 2019-03-09 DIAGNOSIS — R10.11 POSTPRANDIAL ABDOMINAL PAIN IN RIGHT UPPER QUADRANT: ICD-10-CM

## 2019-03-09 PROCEDURE — 76700 US EXAM ABDOM COMPLETE: CPT

## 2019-03-28 DIAGNOSIS — E89.0 POSTSURGICAL HYPOTHYROIDISM: ICD-10-CM

## 2019-03-28 RX ORDER — LEVOTHYROXINE SODIUM 112 UG/1
TABLET ORAL
Qty: 90 TABLET | Refills: 1 | Status: SHIPPED | OUTPATIENT
Start: 2019-03-28 | End: 2019-09-26

## 2019-03-28 NOTE — TELEPHONE ENCOUNTER
"Requested Prescriptions   Pending Prescriptions Disp Refills     levothyroxine (SYNTHROID/LEVOTHROID) 112 MCG tablet [Pharmacy Med Name: LEVOTHYROXINE 0.112MG (112MCG) TABS] 90 tablet 0    Last Written Prescription Date:  3-3-19  Last Fill Quantity: 90,  # refills: 0   Last office visit: 2/22/2019 with prescribing provider:  2-22-19   Future Office Visit:   Sig: TAKE 1 TABLET(112 MCG) BY MOUTH DAILY    Thyroid Protocol Passed - 3/28/2019  6:59 AM       Passed - Patient is 12 years or older       Passed - Recent (12 mo) or future (30 days) visit within the authorizing provider's specialty    Patient had office visit in the last 12 months or has a visit in the next 30 days with authorizing provider or within the authorizing provider's specialty.  See \"Patient Info\" tab in inbasket, or \"Choose Columns\" in Meds & Orders section of the refill encounter.             Passed - Medication is active on med list       Passed - Normal TSH on file in past 12 months    Recent Labs   Lab Test 12/06/18  1120   TSH 0.70             Passed - No active pregnancy on record    If patient is pregnant or has had a positive pregnancy test, please check TSH.         Passed - No positive pregnancy test in past 12 months    If patient is pregnant or has had a positive pregnancy test, please check TSH.          "

## 2019-09-26 DIAGNOSIS — E89.0 POSTSURGICAL HYPOTHYROIDISM: ICD-10-CM

## 2019-09-26 NOTE — TELEPHONE ENCOUNTER
"Requested Prescriptions   Pending Prescriptions Disp Refills     levothyroxine (SYNTHROID/LEVOTHROID) 112 MCG tablet [Pharmacy Med Name: LEVOTHYROXINE 0.112MG (112MCG) TABS] 90 tablet 0     Sig: TAKE 1 TABLET(112 MCG) BY MOUTH DAILY   Last Written Prescription Date:  8-25-19  Last Fill Quantity: 90,  # refills: 0   Last office visit: 2/22/2019 with prescribing provider:  2-22-19   Future Office Visit:      Thyroid Protocol Passed - 9/26/2019  7:42 AM        Passed - Patient is 12 years or older        Passed - Recent (12 mo) or future (30 days) visit within the authorizing provider's specialty     Patient had office visit in the last 12 months or has a visit in the next 30 days with authorizing provider or within the authorizing provider's specialty.  See \"Patient Info\" tab in inbasket, or \"Choose Columns\" in Meds & Orders section of the refill encounter.              Passed - Medication is active on med list        Passed - Normal TSH on file in past 12 months     Recent Labs   Lab Test 12/06/18  1120   TSH 0.70              Passed - No active pregnancy on record     If patient is pregnant or has had a positive pregnancy test, please check TSH.          Passed - No positive pregnancy test in past 12 months     If patient is pregnant or has had a positive pregnancy test, please check TSH.          "

## 2019-09-27 RX ORDER — LEVOTHYROXINE SODIUM 112 UG/1
TABLET ORAL
Qty: 90 TABLET | Refills: 0 | Status: SHIPPED | OUTPATIENT
Start: 2019-09-27 | End: 2019-12-11

## 2019-09-27 NOTE — TELEPHONE ENCOUNTER
Prescription approved per [facility] Refill Protocol.    Shawna Mariscal, RN, BSN, PHN

## 2019-10-28 ENCOUNTER — TRANSFERRED RECORDS (OUTPATIENT)
Dept: HEALTH INFORMATION MANAGEMENT | Facility: CLINIC | Age: 59
End: 2019-10-28

## 2019-11-04 ENCOUNTER — HEALTH MAINTENANCE LETTER (OUTPATIENT)
Age: 59
End: 2019-11-04

## 2019-12-08 ASSESSMENT — ENCOUNTER SYMPTOMS
DIARRHEA: 0
FEVER: 0
HEARTBURN: 1
PARESTHESIAS: 0
PALPITATIONS: 1
HEMATOCHEZIA: 0
COUGH: 1
NAUSEA: 0
HEADACHES: 0
DIZZINESS: 0
WEAKNESS: 0
DYSURIA: 0
JOINT SWELLING: 0
FREQUENCY: 0
BREAST MASS: 0
HEMATURIA: 0
NERVOUS/ANXIOUS: 1
ARTHRALGIAS: 1
EYE PAIN: 0
CONSTIPATION: 0
CHILLS: 0
MYALGIAS: 1
SORE THROAT: 0
ABDOMINAL PAIN: 0

## 2019-12-10 ENCOUNTER — OFFICE VISIT (OUTPATIENT)
Dept: FAMILY MEDICINE | Facility: CLINIC | Age: 59
End: 2019-12-10
Payer: COMMERCIAL

## 2019-12-10 VITALS
HEIGHT: 64 IN | OXYGEN SATURATION: 96 % | SYSTOLIC BLOOD PRESSURE: 114 MMHG | DIASTOLIC BLOOD PRESSURE: 76 MMHG | TEMPERATURE: 98.5 F | BODY MASS INDEX: 34.45 KG/M2 | WEIGHT: 201.8 LBS | RESPIRATION RATE: 20 BRPM | HEART RATE: 82 BPM

## 2019-12-10 DIAGNOSIS — E89.0 POSTSURGICAL HYPOTHYROIDISM: ICD-10-CM

## 2019-12-10 DIAGNOSIS — F43.23 ADJUSTMENT DISORDER WITH MIXED ANXIETY AND DEPRESSED MOOD: ICD-10-CM

## 2019-12-10 DIAGNOSIS — Z11.4 ENCOUNTER FOR SCREENING FOR HIV: ICD-10-CM

## 2019-12-10 DIAGNOSIS — Z12.31 ENCOUNTER FOR SCREENING MAMMOGRAM FOR BREAST CANCER: ICD-10-CM

## 2019-12-10 DIAGNOSIS — Z00.00 ROUTINE GENERAL MEDICAL EXAMINATION AT A HEALTH CARE FACILITY: Primary | ICD-10-CM

## 2019-12-10 LAB — TSH SERPL DL<=0.005 MIU/L-ACNC: 3.27 MU/L (ref 0.4–4)

## 2019-12-10 PROCEDURE — 99396 PREV VISIT EST AGE 40-64: CPT | Performed by: FAMILY MEDICINE

## 2019-12-10 PROCEDURE — 99213 OFFICE O/P EST LOW 20 MIN: CPT | Mod: 25 | Performed by: FAMILY MEDICINE

## 2019-12-10 PROCEDURE — 36415 COLL VENOUS BLD VENIPUNCTURE: CPT | Performed by: FAMILY MEDICINE

## 2019-12-10 PROCEDURE — 87389 HIV-1 AG W/HIV-1&-2 AB AG IA: CPT | Performed by: FAMILY MEDICINE

## 2019-12-10 PROCEDURE — 84443 ASSAY THYROID STIM HORMONE: CPT | Performed by: FAMILY MEDICINE

## 2019-12-10 ASSESSMENT — ENCOUNTER SYMPTOMS
BREAST MASS: 0
HEMATURIA: 0
COUGH: 1
ARTHRALGIAS: 1
ABDOMINAL PAIN: 0
HEMATOCHEZIA: 0
NERVOUS/ANXIOUS: 1
FREQUENCY: 0
FEVER: 0
PARESTHESIAS: 0
HEARTBURN: 1
DYSURIA: 0
DIZZINESS: 0
CONSTIPATION: 0
JOINT SWELLING: 0
WEAKNESS: 0
PALPITATIONS: 1
CHILLS: 0
NAUSEA: 0
MYALGIAS: 1
HEADACHES: 0
DIARRHEA: 0
EYE PAIN: 0
SORE THROAT: 0

## 2019-12-10 ASSESSMENT — PATIENT HEALTH QUESTIONNAIRE - PHQ9
SUM OF ALL RESPONSES TO PHQ QUESTIONS 1-9: 8
5. POOR APPETITE OR OVEREATING: SEVERAL DAYS

## 2019-12-10 ASSESSMENT — ANXIETY QUESTIONNAIRES
IF YOU CHECKED OFF ANY PROBLEMS ON THIS QUESTIONNAIRE, HOW DIFFICULT HAVE THESE PROBLEMS MADE IT FOR YOU TO DO YOUR WORK, TAKE CARE OF THINGS AT HOME, OR GET ALONG WITH OTHER PEOPLE: NOT DIFFICULT AT ALL
1. FEELING NERVOUS, ANXIOUS, OR ON EDGE: SEVERAL DAYS
6. BECOMING EASILY ANNOYED OR IRRITABLE: SEVERAL DAYS
2. NOT BEING ABLE TO STOP OR CONTROL WORRYING: SEVERAL DAYS
3. WORRYING TOO MUCH ABOUT DIFFERENT THINGS: SEVERAL DAYS
7. FEELING AFRAID AS IF SOMETHING AWFUL MIGHT HAPPEN: SEVERAL DAYS
5. BEING SO RESTLESS THAT IT IS HARD TO SIT STILL: SEVERAL DAYS
GAD7 TOTAL SCORE: 7

## 2019-12-10 ASSESSMENT — MIFFLIN-ST. JEOR: SCORE: 1468.11

## 2019-12-10 NOTE — PROGRESS NOTES
SUBJECTIVE:   CC: Laura Ferreira is an 59 year old woman who presents for preventive health visit.     Healthy Habits:     Getting at least 3 servings of Calcium per day:  Yes    Bi-annual eye exam:  Yes    Dental care twice a year:  Yes    Sleep apnea or symptoms of sleep apnea:  Daytime drowsiness and Excessive snoring    Diet:  Regular (no restrictions)    Frequency of exercise:  4-5 days/week    Duration of exercise:  45-60 minutes    Taking medications regularly:  Yes    Medication side effects:  None    PHQ-2 Total Score: 2    Additional concerns today:  Yes     Reporting increased stress at home,  is an alcoholic.    Would like to talk to someone about these issues.    Feels she has been struggling with this mentally over the past few years.    Last PHQ-9 12/10/2019   1.  Little interest or pleasure in doing things 1   2.  Feeling down, depressed, or hopeless 1   3.  Trouble falling or staying asleep, or sleeping too much 1   4.  Feeling tired or having little energy 1   5.  Poor appetite or overeating 1   6.  Feeling bad about yourself 1   7.  Trouble concentrating 1   8.  Moving slowly or restless 1   Q9: Thoughts of better off dead/self-harm past 2 weeks 0   PHQ-9 Total Score 8   Difficulty at work, home, or with people Not difficult at all     ROBER-7  12/10/2019   1. Feeling nervous, anxious, or on edge 1   2. Not being able to stop or control worrying 1   3. Worrying too much about different things 1   4. Trouble relaxing 1   5. Being so restless that it is hard to sit still 1   6. Becoming easily annoyed or irritable 1   7. Feeling afraid, as if something awful might happen 1   ROBER-7 Total Score 7   If you checked any problems, how difficult have they made it for you to do your work, take care of things at home, or get along with other people? Not difficult at all       History of postsurgical hypothyroidism- on Levothyroxine.   Due for a TSH check.     States that she recently had biometric  screening labs done at work to include lipid panel and fasting glucose.     Patient informed that anything we discuss that is not related to preventative medicine, may be billed for; patient verbalizes understanding.      Today's PHQ-2 Score:   PHQ-2 (  [employer]) 2019   Q1: Little interest or pleasure in doing things 1   Q2: Feeling down, depressed or hopeless 1   PHQ-2 Score 2   Q1: Little interest or pleasure in doing things Several days   Q2: Feeling down, depressed or hopeless Several days   PHQ-2 Score 2       Abuse: Current or Past(Physical, Sexual or Emotional)- No  Do you feel safe in your environment? Yes        Social History     Tobacco Use     Smoking status: Former Smoker     Years: 14.00     Types: Cigarettes     Last attempt to quit: 12/15/1998     Years since quittin.0     Smokeless tobacco: Never Used     Tobacco comment: quit 20 years ago   Substance Use Topics     Alcohol use: Yes     Comment: occ     If you drink alcohol do you typically have >3 drinks per day or >7 drinks per week? No    No flowsheet data found.    Reviewed orders with patient.  Reviewed health maintenance and updated orders accordingly - Yes  Lab work is in process  Labs reviewed in EPIC  BP Readings from Last 3 Encounters:   12/10/19 114/76   19 121/79   19 123/80    Wt Readings from Last 3 Encounters:   12/10/19 91.5 kg (201 lb 12.8 oz)   19 97.4 kg (214 lb 12.8 oz)   19 100.2 kg (221 lb)                  Patient Active Problem List   Diagnosis     GERD (gastroesophageal reflux disease)     Menorrhagia     Iron deficiency anemia     Simple endometrial hyperplasia without atypia     Postsurgical hypothyroidism     Hyperlipidemia LDL goal <130     Morbid obesity (H)     Past Surgical History:   Procedure Laterality Date     APPENDECTOMY OPEN       ARTHROSCOPY KNEE RT/LT  11/15/2007    right knee arthroscopy with arthroscopic lateral meniscectomy     BIOPSY Left     Breast. Benign     CL AFF  SURGICAL PATHOLOGY  2002    endometrial biopsy     COLONOSCOPY       ESOPHAGOSCOPY, GASTROSCOPY, DUODENOSCOPY (EGD), COMBINED N/A 2014    Procedure: COMBINED ESOPHAGOSCOPY, GASTROSCOPY, DUODENOSCOPY (EGD), BIOPSY SINGLE OR MULTIPLE;  Surgeon: Paradise Radford MD;  Location: [facility] OR     HC THYROIDECTOMY      goitre     LAMINECTOMY, FUSION LUMBAR ONE LEVEL, COMBINED  10/26/2011    Procedure:COMBINED LAMINECTOMY, FUSION LUMBAR ONE LEVEL; L4-5 Decompression, L4-5 Posterior Lateral Fusion with Madhavi and Local Bone; Surgeon:BARBRA BRIGHT; Location:[facility] OR     TONSILLECTOMY & ADENOIDECTOMY         Social History     Tobacco Use     Smoking status: Former Smoker     Years: 14.00     Types: Cigarettes     Last attempt to quit: 12/15/1998     Years since quittin.0     Smokeless tobacco: Never Used     Tobacco comment: quit 20 years ago   Substance Use Topics     Alcohol use: Yes     Comment: occ     Family History   Problem Relation Age of Onset     Asthma Mother      Diabetes Mother      Hypertension Mother      Arthritis Mother      Circulatory Mother      Obesity Mother      Thyroid Disease Mother      Hypertension Father      Alcohol/Drug Father      Arthritis Father      Obesity Father      Breast Cancer Maternal Grandmother      Thyroid Disease Brother      Colon Cancer No family hx of          Current Outpatient Medications   Medication Sig Dispense Refill     MULTI-VITAMIN OR TABS 1 TABLET DAILY       OMEPRAZOLE PO Take 20 mg by mouth 2 times daily (before meals)       simvastatin (ZOCOR) 20 MG tablet Take 1 tablet (20 mg) by mouth daily 90 tablet 3     levothyroxine (SYNTHROID/LEVOTHROID) 112 MCG tablet TAKE 1 TABLET(112 MCG) BY MOUTH DAILY 90 tablet 3     Allergies   Allergen Reactions     Latex Anaphylaxis     Cortisone Nausea and Vomiting     oral     Vicodin [Acetaminophen] Nausea and Vomiting       Mammogram Screening: Patient over age 50, mutual decision to screen reflected in health  "maintenance.    Pertinent mammograms are reviewed under the imaging tab.  History of abnormal Pap smear: NO - age 30-65 PAP every 5 years with negative HPV co-testing recommended  PAP / HPV Latest Ref Rng & Units 12/5/2017 11/28/2014 10/14/2013   PAP - NIL NIL NIL   HPV 16 DNA NEG:Negative Negative - -   HPV 18 DNA NEG:Negative Negative - -   OTHER HR HPV NEG:Negative Negative - -     Reviewed and updated as needed this visit by clinical staff  Tobacco  Allergies  Meds  Med Hx  Surg Hx  Soc Hx        Reviewed and updated as needed this visit by Provider  Tobacco  Surg Hx  Soc Hx           Review of Systems   Constitutional: Negative for chills and fever.   HENT: Positive for congestion. Negative for ear pain, hearing loss and sore throat.    Eyes: Negative for pain and visual disturbance.   Respiratory: Positive for cough.    Cardiovascular: Positive for chest pain, palpitations and peripheral edema.   Gastrointestinal: Positive for heartburn. Negative for abdominal pain, constipation, diarrhea, hematochezia and nausea.   Breasts:  Positive for tenderness. Negative for breast mass and discharge.   Genitourinary: Negative for dysuria, frequency, genital sores, hematuria, pelvic pain, urgency, vaginal bleeding and vaginal discharge.   Musculoskeletal: Positive for arthralgias and myalgias. Negative for joint swelling.   Skin: Negative for rash.   Neurological: Negative for dizziness, weakness, headaches and paresthesias.   Psychiatric/Behavioral: Positive for mood changes. The patient is nervous/anxious.           OBJECTIVE:   /76   Pulse 82   Temp 98.5  F (36.9  C) (Tympanic)   Resp 20   Ht 1.63 m (5' 4.17\")   Wt 91.5 kg (201 lb 12.8 oz)   LMP 04/01/2012   SpO2 96%   BMI 34.45 kg/m    Physical Exam  GENERAL APPEARANCE: healthy, alert and no distress  EYES: Eyes grossly normal to inspection, PERRL and conjunctivae and sclerae normal  HENT: ear canals and TM's normal, nose and mouth without ulcers " or lesions, oropharynx clear and oral mucous membranes moist  NECK: no adenopathy, no asymmetry, masses, or scars and thyroid normal to palpation  RESP: lungs clear to auscultation - no rales, rhonchi or wheezes  BREAST: normal without masses, tenderness or nipple discharge and no palpable axillary masses or adenopathy  CV: regular rate and rhythm, normal S1 S2, no S3 or S4, no murmur, click or rub, no peripheral edema and peripheral pulses strong  ABDOMEN: soft, nontender, no hepatosplenomegaly, no masses and bowel sounds normal  MS: no musculoskeletal defects are noted and gait is age appropriate without ataxia  SKIN: no suspicious lesions or rashes  NEURO: Normal strength and tone, sensory exam grossly normal, mentation intact and speech normal  PSYCH: mentation appears normal and affect normal/bright    Diagnostic Test Results:  Labs reviewed in [facility]  Had [facility] Screening tests- brought in a copy.   Lipid panel done on 10/28/2019 revealed a   total cholesterol 157,   HDL 61, triglycerides 54   LDL 85.  Fasting glucose 94.    Refer to copy in Epic for details.    ASSESSMENT/PLAN:   Laura was seen today for physical.    Diagnoses and all orders for this visit:    Routine general medical examination at a health care facility    Encounter for screening mammogram for breast cancer  -     *MA Screening Digital Bilateral; Future    Encounter for screening for HIV  -     HIV Antigen Antibody Combo;     Postsurgical hypothyroidism  -     TSH with free T4 reflex  -      Refill levothyroxine after labs.      Adjustment disorder with mixed anxiety and depressed mood  -   PHQ-9/ROBER 7 completed, see above/Epic for details    -   MENTAL HEALTH REFERRAL  - Adult; Outpatient Treatment; Individual/Couples/Family/Group Therapy/Health Psychology[place] [facility] [place] [facility] [phone]; We will contact you to schedule the appointment or please call with any questions        COUNSELING:  Reviewed preventive health  "counseling, as reflected in patient instructions       Regular exercise       Healthy diet/nutrition    Estimated body mass index is 34.45 kg/m  as calculated from the following:    Height as of this encounter: 1.63 m (5' 4.17\").    Weight as of this encounter: 91.5 kg (201 lb 12.8 oz).    Weight management plan: Discussed healthy diet and exercise guidelines     reports that she quit smoking about 21 years ago. Her smoking use included cigarettes. She quit after 14.00 years of use. She has never used smokeless tobacco.      Counseling Resources:  ATP IV Guidelines  Pooled Cohorts Equation Calculator  Breast Cancer Risk Calculator  FRAX Risk Assessment  ICSI Preventive Guidelines  Dietary Guidelines for Americans, 2010  USDA's MyPlate  ASA Prophylaxis  Lung CA Screening    Follow up annually and as needed thoughout the year.    Sarita Shultz MD  [facility]  "

## 2019-12-11 DIAGNOSIS — E89.0 POSTSURGICAL HYPOTHYROIDISM: ICD-10-CM

## 2019-12-11 LAB — HIV 1+2 AB+HIV1 P24 AG SERPL QL IA: NONREACTIVE

## 2019-12-11 RX ORDER — LEVOTHYROXINE SODIUM 112 UG/1
TABLET ORAL
Qty: 90 TABLET | Refills: 3 | Status: SHIPPED | OUTPATIENT
Start: 2019-12-11 | End: 2020-12-29

## 2019-12-11 ASSESSMENT — ANXIETY QUESTIONNAIRES: GAD7 TOTAL SCORE: 7

## 2019-12-27 DIAGNOSIS — E78.5 HYPERLIPIDEMIA LDL GOAL <130: ICD-10-CM

## 2019-12-28 NOTE — TELEPHONE ENCOUNTER
Routing refill request to provider for review/approval because:  Labs not current:  LDL  Patient needs to be seen because it has been more than 1 year since last office visit. Pt was due for annual 12/10/19    Med pended for 30 day supply with reminder

## 2019-12-29 RX ORDER — SIMVASTATIN 20 MG
TABLET ORAL
Qty: 30 TABLET | Refills: 0 | Status: SHIPPED | OUTPATIENT
Start: 2019-12-29 | End: 2020-01-28

## 2020-01-27 DIAGNOSIS — E78.5 HYPERLIPIDEMIA LDL GOAL <130: ICD-10-CM

## 2020-01-27 NOTE — TELEPHONE ENCOUNTER
"Requested Prescriptions   Pending Prescriptions Disp Refills     simvastatin (ZOCOR) 20 MG tablet [Pharmacy Med Name: SIMVASTATIN 20MG TABLETS] 30 tablet 0     Sig: TAKE 1 TABLET(20 MG) BY MOUTH DAILY  Last Written Prescription Date:  1/27/20  Last Fill Quantity: 30,  # refills: 0   Last office visit: 12/10/2019 with prescribing provider:  Lexii   Future Office Visit:   Next 5 appointments (look out 90 days)    Feb 20, 2020 12:00 PM CST  Return Visit with Trice [facility] [address] [address]  [phone]              Statins Protocol Failed - 1/27/2020 11:29 AM        Failed - LDL on file in past 12 months     Recent Labs   Lab Test 12/06/18  1120   *             Passed - No abnormal creatine kinase in past 12 months     Recent Labs   Lab Test 02/15/13  1350                   Passed - Recent (12 mo) or future (30 days) visit within the authorizing provider's specialty     Patient has had an office visit with the authorizing provider or a provider within the authorizing providers department within the previous 12 mos or has a future within next 30 days. See \"Patient Info\" tab in inbasket, or \"Choose Columns\" in Meds & Orders section of the refill encounter.              Passed - Medication is active on med list        Passed - Patient is age 18 or older        Passed - No active pregnancy on record        Passed - No positive pregnancy test in past 12 months        "

## 2020-01-28 NOTE — TELEPHONE ENCOUNTER
Routing refill request to provider for review/approval because:  Labs not current:  ldl    Med pended for 30 day supply with reminder for fasting labs. Lipid panel also pended for approval.     Pt seen by pcp for annual visit on 12/10/19

## 2020-01-29 RX ORDER — SIMVASTATIN 20 MG
TABLET ORAL
Qty: 30 TABLET | Refills: 0 | Status: SHIPPED | OUTPATIENT
Start: 2020-01-29 | End: 2020-02-26

## 2020-02-13 ENCOUNTER — OFFICE VISIT (OUTPATIENT)
Dept: PSYCHOLOGY | Facility: CLINIC | Age: 60
End: 2020-02-13
Attending: FAMILY MEDICINE
Payer: COMMERCIAL

## 2020-02-13 DIAGNOSIS — F43.23 ADJUSTMENT DISORDER WITH MIXED ANXIETY AND DEPRESSED MOOD: Primary | ICD-10-CM

## 2020-02-13 PROCEDURE — 90791 PSYCH DIAGNOSTIC EVALUATION: CPT | Performed by: SOCIAL WORKER

## 2020-02-13 ASSESSMENT — COLUMBIA-SUICIDE SEVERITY RATING SCALE - C-SSRS
3. HAVE YOU BEEN THINKING ABOUT HOW YOU MIGHT KILL YOURSELF?: NO
5. HAVE YOU STARTED TO WORK OUT OR WORKED OUT THE DETAILS OF HOW TO KILL YOURSELF? DO YOU INTEND TO CARRY OUT THIS PLAN?: NO
TOTAL  NUMBER OF ABORTED OR SELF INTERRUPTED ATTEMPTS PAST LIFETIME: NO
TOTAL  NUMBER OF INTERRUPTED ATTEMPTS PAST 3 MONTHS: NO
6. HAVE YOU EVER DONE ANYTHING, STARTED TO DO ANYTHING, OR PREPARED TO DO ANYTHING TO END YOUR LIFE?: NO
1. IN THE PAST MONTH, HAVE YOU WISHED YOU WERE DEAD OR WISHED YOU COULD GO TO SLEEP AND NOT WAKE UP?: NO
TOTAL  NUMBER OF ABORTED OR SELF INTERRUPTED ATTEMPTS PAST 3 MONTHS: NO
1. IN THE PAST MONTH, HAVE YOU WISHED YOU WERE DEAD OR WISHED YOU COULD GO TO SLEEP AND NOT WAKE UP?: NO
2. HAVE YOU ACTUALLY HAD ANY THOUGHTS OF KILLING YOURSELF LIFETIME?: NO
6. HAVE YOU EVER DONE ANYTHING, STARTED TO DO ANYTHING, OR PREPARED TO DO ANYTHING TO END YOUR LIFE?: NO
ATTEMPT LIFETIME: NO
4. HAVE YOU HAD THESE THOUGHTS AND HAD SOME INTENTION OF ACTING ON THEM?: NO
2. HAVE YOU ACTUALLY HAD ANY THOUGHTS OF KILLING YOURSELF?: NO
ATTEMPT PAST THREE MONTHS: NO
TOTAL  NUMBER OF INTERRUPTED ATTEMPTS LIFETIME: NO
5. HAVE YOU STARTED TO WORK OUT OR WORKED OUT THE DETAILS OF HOW TO KILL YOURSELF? DO YOU INTEND TO CARRY OUT THIS PLAN?: NO
4. HAVE YOU HAD THESE THOUGHTS AND HAD SOME INTENTION OF ACTING ON THEM?: NO

## 2020-02-13 ASSESSMENT — ANXIETY QUESTIONNAIRES
4. TROUBLE RELAXING: SEVERAL DAYS
5. BEING SO RESTLESS THAT IT IS HARD TO SIT STILL: SEVERAL DAYS
IF YOU CHECKED OFF ANY PROBLEMS ON THIS QUESTIONNAIRE, HOW DIFFICULT HAVE THESE PROBLEMS MADE IT FOR YOU TO DO YOUR WORK, TAKE CARE OF THINGS AT HOME, OR GET ALONG WITH OTHER PEOPLE: SOMEWHAT DIFFICULT
7. FEELING AFRAID AS IF SOMETHING AWFUL MIGHT HAPPEN: SEVERAL DAYS
1. FEELING NERVOUS, ANXIOUS, OR ON EDGE: SEVERAL DAYS
3. WORRYING TOO MUCH ABOUT DIFFERENT THINGS: SEVERAL DAYS
GAD7 TOTAL SCORE: 7
2. NOT BEING ABLE TO STOP OR CONTROL WORRYING: SEVERAL DAYS
6. BECOMING EASILY ANNOYED OR IRRITABLE: SEVERAL DAYS

## 2020-02-13 ASSESSMENT — PATIENT HEALTH QUESTIONNAIRE - PHQ9: SUM OF ALL RESPONSES TO PHQ QUESTIONS 1-9: 8
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
## 2020-02-14 ASSESSMENT — ANXIETY QUESTIONNAIRES: GAD7 TOTAL SCORE: 7

## 2020-02-20 ENCOUNTER — OFFICE VISIT (OUTPATIENT)
Dept: PSYCHOLOGY | Facility: CLINIC | Age: 60
End: 2020-02-20
Attending: FAMILY MEDICINE
Payer: COMMERCIAL

## 2020-02-20 DIAGNOSIS — F43.23 ADJUSTMENT DISORDER WITH MIXED ANXIETY AND DEPRESSED MOOD: Primary | ICD-10-CM

## 2020-02-20 PROCEDURE — 90834 PSYTX W PT 45 MINUTES: CPT | Performed by: SOCIAL WORKER

## 2020-02-20 ASSESSMENT — ANXIETY QUESTIONNAIRES
GAD7 TOTAL SCORE: 0
6. BECOMING EASILY ANNOYED OR IRRITABLE: NOT AT ALL
7. FEELING AFRAID AS IF SOMETHING AWFUL MIGHT HAPPEN: NOT AT ALL
IF YOU CHECKED OFF ANY PROBLEMS ON THIS QUESTIONNAIRE, HOW DIFFICULT HAVE THESE PROBLEMS MADE IT FOR YOU TO DO YOUR WORK, TAKE CARE OF THINGS AT HOME, OR GET ALONG WITH OTHER PEOPLE: NOT DIFFICULT AT ALL
5. BEING SO RESTLESS THAT IT IS HARD TO SIT STILL: NOT AT ALL
2. NOT BEING ABLE TO STOP OR CONTROL WORRYING: NOT AT ALL
1. FEELING NERVOUS, ANXIOUS, OR ON EDGE: NOT AT ALL
4. TROUBLE RELAXING: NOT AT ALL
3. WORRYING TOO MUCH ABOUT DIFFERENT THINGS: NOT AT ALL

## 2020-02-20 ASSESSMENT — PATIENT HEALTH QUESTIONNAIRE - PHQ9: SUM OF ALL RESPONSES TO PHQ QUESTIONS 1-9: 0

## 2020-02-20 NOTE — PROGRESS NOTES
Progress Note    Patient Name: Laura Ferreira  Date: 2/20/2020           Service Type: Individual  Video Visit: No     Session Start Time: 1205  Session End Time: 1255     Session Length: 50    Session #: 1    Attendees: Client     Treatment Plan Last Reviewed: 2/20/2020    PHQ-9 / ROBER-7 : 0/0    DATA  Interactive Complexity: No  Crisis: No       Progress Since Last Session (Related to Symptoms / Goals / Homework):   Symptoms: first session since DA    Homework: first session since DA      Episode of Care Goals: first session since DA     Current / Ongoing Stressors and Concerns:   family conflict, developmental history/trauma     Treatment Objective(s) Addressed in This Session:     Patient will demonstrate/report improved family dynamics that can be safely managed in a lower level of care. absence of persistent conflict in family relationships and reduction in family conflict to level of comfort and satisfaction of family members as measured by client report for a period of at least 30 days within 6 months as clinically observed and by patient self-report.  Patient will demonstrate/report an improved level of self-care and self-worth that can be safely managed at a lower level of care.  Absence of persistent low self-care and report of reduced frequency and intensity of low mood to acceptable levels, report subjective improved self-care for a period of 90 days, within 6 months as clinically observed and by patient self-reports.     Intervention:   Motivational Interviewing    MI Intervention: Co-Developed Goal: family conflict/communication and self-care, Expressed Empathy/Understanding, Supported Autonomy, Collaboration, Evocation, Permission to raise concern or advise and Open-ended questions     Change Talk Expressed by the Patient: Desire to change Ability to change Reasons to change Need to change    Provider Response to Change Talk: E - Evoked more info from patient  about behavior change, A - Affirmed patient's thoughts, decisions, or attempts at behavior change, R - Reflected patient's change talk and S - Summarized patient's change talk statements          ASSESSMENT: Current Emotional / Mental Status (status of significant symptoms):   Risk status (Self / Other harm or suicidal ideation)   Patient denies current fears or concerns for personal safety.   Patient denies current or recent suicidal ideation or behaviors.   Patientdenies current or recent homicidal ideation or behaviors.   Patient denies current or recent self injurious behavior or ideation.   Patient denies other safety concerns.   Patient reports there has been no change in risk factors since their last session.     Patient reports there has been no change in protective factors since their last session.     Recommended that patient call 911 or go to the local ED should there be a change in any of these risk factors.     Appearance:   Appropriate    Eye Contact:   Good    Psychomotor Behavior: Normal    Attitude:   Cooperative    Orientation:   All   Speech    Rate / Production: Normal     Volume:  Soft    Mood:    Anxious  Depressed    Affect:    Worrisome    Thought Content:  Clear    Thought Form:  Coherent    Insight:    Fair      Medication Review:   No current psychiatric medications prescribed     Medication Compliance:   NA     Changes in Health Issues:   None reported     Chemical Use Review:   Substance Use: Chemical use reviewed, no active concerns identified      Tobacco Use: No current tobacco use.      Diagnosis:  1. Adjustment disorder with mixed anxiety and depressed mood        Collateral Reports Completed:   Not Applicable    PLAN: (Patient Tasks / Therapist Tasks / Other)  Patient to look at self-care and hobbies        Trice Cole, RICHIE                                                         ______________________________________________________________________    Treatment  Plan    Patient's Name: Laura Ferreira  YOB: 1960    Date: 2/20/2020      DSM5 Diagnoses: Adjustment Disorders  309.28 (F43.23) With mixed anxiety and depressed mood  Psychosocial / Contextual Factors: family conflict, developmental history/trauma  WHODAS: 15    Referral / Collaboration:  Referral to another professional/service is not indicated at this time..    Anticipated number of session or this episode of care: 20      MeasurableTreatment Goal(s) related to diagnosis / functional impairment(s)  Goal 1: Patient will report absence of persistent conflict in family relationships and reduction in family conflict to level of comfort and satisfaction of family members as measured by client report for a period of at least 30 days within 6 months as clinically observed and by patient self-report    I will know I've met my goal when I feel better about it and I can talk openly with them.      Objective #A (Patient Action)    Patient will demonstrate/report improved family dynamics that can be safely managed in a lower level of care. absence of persistent conflict in family relationships and reduction in family conflict to level of comfort and satisfaction of family members as measured by client report for a period of at least 30 days within 6 months as clinically observed and by patient self-report.  Status: New - Date: 2.20.20     Intervention(s)  Therapist will provide individual therapy to process stressors within family dynamics, identify areas within her control, and identify preferred way of being within family relationships.  Tx to discuss current stressors and interpersonal conflicts and how to cope with these, coaching, diagnostic testing, referral for medication as indicated, use prescribed medication, cognitive restructuring, interpersonal family therapy, supportive therapy services.      Goal 2: Patient will report an increase of self-care and report of reduced frequency and intensity of low  mood to acceptable levels, report subjective improved self-care for a period of 90 days, within 6 months as clinically observed and by patient self-report    I will know I've met my goal when I can put myself first.      Objective #A (Patient Action)    Status: New - Date: 2.20.20     Patient will demonstrate/report an improved level of self-care and self-worth that can be safely managed at a lower level of care.  Absence of persistent low self-care and report of reduced frequency and intensity of low mood to acceptable levels, report subjective improved self-care for a period of 90 days, within 6 months as clinically observed and by patient self-reports.    Intervention(s)  Therapist will provide individual therapy to identify etiology of limited self-care, identify and practice thought-reframing techniques, and identify preferred way of being. Tx to discuss current stressors and interpersonal conflicts and how to cope with these, coaching, diagnostic testing, referral for medication as indicated, use prescribed medication, cognitive restructuring, interpersonal family therapy, supportive therapy services.                Patient has reviewed and agreed to the above plan.      Trice Cole, [facility]  February 20, 2020  '

## 2020-02-21 ASSESSMENT — ANXIETY QUESTIONNAIRES: GAD7 TOTAL SCORE: 0

## 2020-02-26 ENCOUNTER — MYC REFILL (OUTPATIENT)
Dept: FAMILY MEDICINE | Facility: CLINIC | Age: 60
End: 2020-02-26

## 2020-02-26 DIAGNOSIS — E78.5 HYPERLIPIDEMIA LDL GOAL <130: ICD-10-CM

## 2020-02-27 NOTE — TELEPHONE ENCOUNTER
Left message on voice mail for patient to call clinic. [phone]    My Chart message also sent.     Patient is informed that she is due for her Lipid panel reflex to direct LDL Fasting.     Routing refill request to provider for review/approval because:  Kita given x1 and patient did not follow up, please advise  Last written on 1/29/20.   Pended for 30 days with Lab reminder.     Ginger Rodriguez RN BSN

## 2020-03-02 DIAGNOSIS — E78.5 HYPERLIPIDEMIA LDL GOAL <130: ICD-10-CM

## 2020-03-02 RX ORDER — SIMVASTATIN 20 MG
20 TABLET ORAL DAILY
Qty: 30 TABLET | Refills: 0 | Status: SHIPPED | OUTPATIENT
Start: 2020-03-02 | End: 2020-03-02

## 2020-03-02 RX ORDER — SIMVASTATIN 20 MG
20 TABLET ORAL DAILY
Qty: 90 TABLET | Refills: 3 | Status: SHIPPED | OUTPATIENT
Start: 2020-03-02 | End: 2020-12-15

## 2020-03-17 ENCOUNTER — E-VISIT (OUTPATIENT)
Dept: FAMILY MEDICINE | Facility: CLINIC | Age: 60
End: 2020-03-17
Payer: COMMERCIAL

## 2020-03-17 DIAGNOSIS — J01.90 ACUTE SINUSITIS WITH SYMPTOMS > 10 DAYS: Primary | ICD-10-CM

## 2020-03-17 PROCEDURE — 99421 OL DIG E/M SVC 5-10 MIN: CPT | Performed by: FAMILY MEDICINE

## 2020-03-17 NOTE — PATIENT INSTRUCTIONS
Thank you for choosing us for your care. I have placed an order for a prescription so that you can start treatment. View your full visit summary for details by clicking on the link below. Your pharmacist will able to address any questions you may have about the medication.     If you're not feeling better within 5-7 days, please schedule an appointment.  You can schedule an appointment right here in [facility], or call [phone]  If the visit is for the same symptoms as your e-visit, we'll refund the cost of your e-visit if seen within seven days.      Sinusitis (Antibiotic Treatment)    The sinuses are air-filled spaces within the bones of the face. They connect to the inside of the nose. Sinusitis is an inflammation of the tissue that lines the sinuses. Sinusitis can occur during a cold. It can also happen due to allergies to pollens and other particles in the air. Sinusitis can cause symptoms of sinus congestion and a feeling of fullness. A sinus infection causes fever, headache, and facial pain. There is often green or yellow fluid draining from the nose or into the back of the throat (post-nasal drip). You have been given antibiotics to treat this condition.  Home care    Take the full course of antibiotics as instructed. Do not stop taking them, even when you feel better.    Drink plenty of water, hot tea, and other liquids. This may help thin nasal mucus. It also may help your sinuses drain fluids.    Heat may help soothe painful areas of your face. Use a towel soaked in hot water. Or,  the shower and direct the warm spray onto your face. Using a vaporizer along with a menthol rub at night may also help soothe symptoms.     An expectorant with guaifenesin may help thin nasal mucus and help your sinuses drain fluids.    You can use an over-the-counter decongestant, unless a similar medicine was prescribed to you. Nasal sprays work the fastest. Use one that contains phenylephrine or oxymetazoline.  First blow your nose gently. Then use the spray. Do not use these medicines more often than directed on the label. If you do, your symptoms may get worse. You may also take pills that contain pseudoephedrine. Don t use products that combine multiple medicines. This is because side effects may be increased. Read labels. You can also ask the pharmacist for help. (People with high blood pressure should not use decongestants. They can raise blood pressure.)    Over-the-counter antihistamines may help if allergies contributed to your sinusitis.      Do not use nasal rinses or irrigation during an acute sinus infection, unless your healthcare provider tells you to. Rinsing may spread the infection to other areas in your sinuses.    Use acetaminophen or ibuprofen to control pain, unless another pain medicine was prescribed to you. If you have chronic liver or kidney disease or ever had a stomach ulcer, talk with your healthcare provider before using these medicines. (Aspirin should never be taken by anyone under age 18 who is ill with a fever. It may cause severe liver damage.)    Don't smoke. This can make symptoms worse.  Follow-up care  Follow up with your healthcare provider or our staff if you are not better in 1 week.  When to seek medical advice  Call your healthcare provider if any of these occur:    Facial pain or headache that gets worse    Stiff neck    Unusual drowsiness or confusion    Swelling of your forehead or eyelids    Vision problems, such as blurred or double vision    Fever of 100.4 F (38 C) or higher, or as directed by your healthcare provider    Seizure    Breathing problems    Symptoms don't go away in 10 days  Prevention  Here are steps you can take to help prevent an infection:    Keep good hand washing habits.    Don t have close contact with people who have sore throats, colds, or other upper respiratory infections.    Don t smoke, and stay away from secondhand smoke.    Stay up to date with of  your vaccines.  Date Last Reviewed: 11/1/2017 2000-2019 [employer], [email or web address]. [address] [place] [address], [place] [address]. All rights reserved. This information is not intended as a substitute for professional medical care. Always follow your healthcare professional's instructions.

## 2020-12-14 ASSESSMENT — ENCOUNTER SYMPTOMS
HEMATOCHEZIA: 0
NERVOUS/ANXIOUS: 1
DIZZINESS: 0
ABDOMINAL PAIN: 0
EYE PAIN: 0
FREQUENCY: 0
HEADACHES: 0
HEMATURIA: 0
DIARRHEA: 0
COUGH: 1
BREAST MASS: 0
FEVER: 0
CHILLS: 0

## 2020-12-15 ENCOUNTER — OFFICE VISIT (OUTPATIENT)
Dept: FAMILY MEDICINE | Facility: CLINIC | Age: 60
End: 2020-12-15
Payer: COMMERCIAL

## 2020-12-15 VITALS
HEART RATE: 73 BPM | OXYGEN SATURATION: 96 % | WEIGHT: 195.6 LBS | RESPIRATION RATE: 16 BRPM | DIASTOLIC BLOOD PRESSURE: 64 MMHG | TEMPERATURE: 97.8 F | HEIGHT: 65 IN | SYSTOLIC BLOOD PRESSURE: 134 MMHG | BODY MASS INDEX: 32.59 KG/M2

## 2020-12-15 DIAGNOSIS — E78.5 HYPERLIPIDEMIA LDL GOAL <130: ICD-10-CM

## 2020-12-15 DIAGNOSIS — E89.0 POSTSURGICAL HYPOTHYROIDISM: ICD-10-CM

## 2020-12-15 DIAGNOSIS — Z12.31 ENCOUNTER FOR SCREENING MAMMOGRAM FOR BREAST CANCER: ICD-10-CM

## 2020-12-15 DIAGNOSIS — Z00.00 ROUTINE GENERAL MEDICAL EXAMINATION AT A HEALTH CARE FACILITY: Primary | ICD-10-CM

## 2020-12-15 DIAGNOSIS — Z12.4 CERVICAL CANCER SCREENING: ICD-10-CM

## 2020-12-15 DIAGNOSIS — Z86.16 HISTORY OF 2019 NOVEL CORONAVIRUS DISEASE (COVID-19): ICD-10-CM

## 2020-12-15 DIAGNOSIS — Z23 NEED FOR VACCINATION: ICD-10-CM

## 2020-12-15 LAB
ALBUMIN SERPL-MCNC: 4 G/DL (ref 3.4–5)
ALP SERPL-CCNC: 65 U/L (ref 40–150)
ALT SERPL W P-5'-P-CCNC: 35 U/L (ref 0–50)
ANION GAP SERPL CALCULATED.3IONS-SCNC: 6 MMOL/L (ref 3–14)
AST SERPL W P-5'-P-CCNC: 19 U/L (ref 0–45)
BILIRUB SERPL-MCNC: 0.4 MG/DL (ref 0.2–1.3)
BUN SERPL-MCNC: 15 MG/DL (ref 7–30)
CALCIUM SERPL-MCNC: 9.2 MG/DL (ref 8.5–10.1)
CHLORIDE SERPL-SCNC: 106 MMOL/L (ref 94–109)
CHOLEST SERPL-MCNC: 250 MG/DL
CO2 SERPL-SCNC: 29 MMOL/L (ref 20–32)
CREAT SERPL-MCNC: 0.72 MG/DL (ref 0.52–1.04)
GFR SERPL CREATININE-BSD FRML MDRD: 89 ML/MIN/{1.73_M2}
GLUCOSE SERPL-MCNC: 91 MG/DL (ref 70–99)
HDLC SERPL-MCNC: 68 MG/DL
LDLC SERPL CALC-MCNC: 162 MG/DL
NONHDLC SERPL-MCNC: 182 MG/DL
POTASSIUM SERPL-SCNC: 4.2 MMOL/L (ref 3.4–5.3)
PROT SERPL-MCNC: 7.3 G/DL (ref 6.8–8.8)
SODIUM SERPL-SCNC: 141 MMOL/L (ref 133–144)
TRIGL SERPL-MCNC: 98 MG/DL
TSH SERPL DL<=0.005 MIU/L-ACNC: 1.06 MU/L (ref 0.4–4)

## 2020-12-15 PROCEDURE — 99213 OFFICE O/P EST LOW 20 MIN: CPT | Mod: 25 | Performed by: FAMILY MEDICINE

## 2020-12-15 PROCEDURE — 80053 COMPREHEN METABOLIC PANEL: CPT | Performed by: FAMILY MEDICINE

## 2020-12-15 PROCEDURE — 80061 LIPID PANEL: CPT | Performed by: FAMILY MEDICINE

## 2020-12-15 PROCEDURE — 90471 IMMUNIZATION ADMIN: CPT | Performed by: FAMILY MEDICINE

## 2020-12-15 PROCEDURE — 87624 HPV HI-RISK TYP POOLED RSLT: CPT | Performed by: FAMILY MEDICINE

## 2020-12-15 PROCEDURE — 36415 COLL VENOUS BLD VENIPUNCTURE: CPT | Performed by: FAMILY MEDICINE

## 2020-12-15 PROCEDURE — 99396 PREV VISIT EST AGE 40-64: CPT | Mod: 25 | Performed by: FAMILY MEDICINE

## 2020-12-15 PROCEDURE — G0145 SCR C/V CYTO,THINLAYER,RESCR: HCPCS | Performed by: FAMILY MEDICINE

## 2020-12-15 PROCEDURE — 84443 ASSAY THYROID STIM HORMONE: CPT | Performed by: FAMILY MEDICINE

## 2020-12-15 PROCEDURE — 90715 TDAP VACCINE 7 YRS/> IM: CPT | Performed by: FAMILY MEDICINE

## 2020-12-15 ASSESSMENT — ENCOUNTER SYMPTOMS
HEMATOCHEZIA: 0
DIARRHEA: 0
BREAST MASS: 0
FEVER: 0
EYE PAIN: 0
CHILLS: 0
HEADACHES: 0
FREQUENCY: 0
ABDOMINAL PAIN: 0
COUGH: 1
NERVOUS/ANXIOUS: 1
HEMATURIA: 0
DIZZINESS: 0

## 2020-12-15 ASSESSMENT — MIFFLIN-ST. JEOR: SCORE: 1440.18

## 2020-12-15 NOTE — PROGRESS NOTES
"   SUBJECTIVE:   CC: Laura Ferreira is an 60 year old woman who presents for preventive health visit.     {Split Bill scripting  The purpose of this visit is to discuss your medical history and prevent health problems before you are sick. You may be responsible for a co-pay, coinsurance, or deductible if your visit today includes services such as checking on a sore throat, having an x-ray or lab test, or treating and evaluating a new or existing condition :764429}  Patient has been advised of split billing requirements and indicates understanding: {Yes and No:432593}  Healthy Habits:    Do you get at least three servings of calcium containing foods daily (dairy, green leafy vegetables, etc.)? { :360132::\"yes\"}    Amount of exercise or daily activities, outside of work: { :858586}    Problems taking medications regularly { :287408::\"No\"}    Medication side effects: { :526292::\"No\"}    Have you had an eye exam in the past two years? { :241836}    Do you see a dentist twice per year? { :753204}    Do you have sleep apnea, excessive snoring or daytime drowsiness?{ :917997}  {Outside tests to abstract? :348961}    {additional problems to add (Optional):343271}    Today's PHQ-2 Score:   PHQ-2 (  [employer]) 2020   Q1: Little interest or pleasure in doing things 0 1   Q2: Feeling down, depressed or hopeless 0 1   PHQ-2 Score 0 2   Q1: Little interest or pleasure in doing things Not at all Several days   Q2: Feeling down, depressed or hopeless Not at all Several days   PHQ-2 Score 0 2     {PHQ-2 LOOK IN ASSESSMENTS (Optional) :749321}  Abuse: Current or Past(Physical, Sexual or Emotional)- {YES/NO/NA:926457}  Do you feel safe in your environment? {YES/NO/NA:533337}        Social History     Tobacco Use     Smoking status: Former Smoker     Years: 14.00     Types: Cigarettes     Quit date: 12/15/1998     Years since quittin.0     Smokeless tobacco: Never Used     Tobacco comment: quit 20 years ago " "  Substance Use Topics     Alcohol use: Yes     Comment: occ     If you drink alcohol do you typically have >3 drinks per day or >7 drinks per week? {ETOH :569099}                     Reviewed orders with patient.  Reviewed health maintenance and updated orders accordingly - {Yes/No:090767::\"Yes\"}  {Chronicprobdata (Optional):537936}    {Mammo Decision Support (Optional):864282}    Pertinent mammograms are reviewed under the imaging tab.  History of abnormal Pap smear: {PAP HX:095004}  PAP / HPV Latest Ref Rng & Units 12/5/2017 11/28/2014 10/14/2013   PAP - NIL NIL NIL   HPV 16 DNA NEG:Negative Negative - -   HPV 18 DNA NEG:Negative Negative - -   OTHER HR HPV NEG:Negative Negative - -     Reviewed and updated as needed this visit by clinical staff                 Reviewed and updated as needed this visit by Provider                {HISTORY OPTIONS (Optional):771800}    ROS:  { :701775}    OBJECTIVE:   LMP 04/01/2012   EXAM:  {Exam Choices:014610}    {Diagnostic Test Results (Optional):617718::\"Diagnostic Test Results:\",\"Labs reviewed in Epic\"}    ASSESSMENT/PLAN:   {Diag Picklist:768644}    Patient has been advised of split billing requirements and indicates understanding: {YES / NO:349377::\"Yes\"}  COUNSELING:   {FEMALE COUNSELING MESSAGES:625486::\"Reviewed preventive health counseling, as reflected in patient instructions\"}    Estimated body mass index is 34.45 kg/m  as calculated from the following:    Height as of 12/10/19: 1.63 m (5' 4.17\").    Weight as of 12/10/19: 91.5 kg (201 lb 12.8 oz).    {Weight Management Plan (ACO) Complete if BMI is abnormal-  Ages 18-64  BMI >24.9.  Age 65+ with BMI <23 or >30 (Optional):450215}    She reports that she quit smoking about 22 years ago. Her smoking use included cigarettes. She quit after 14.00 years of use. She has never used smokeless tobacco.      Counseling Resources:  ATP IV Guidelines  Pooled Cohorts Equation Calculator  Breast Cancer Risk " Calculator  BRCA-Related Cancer Risk Assessment: FHS-7 Tool  FRAX Risk Assessment  ICSI Preventive Guidelines  Dietary Guidelines for Americans, 2010  [employer]'s MyPlate  ASA Prophylaxis  Lung CA Screening    Sairta Shultz MD  [facility]

## 2020-12-15 NOTE — PROGRESS NOTES
SUBJECTIVE:   CC: Laura Ferreira is an 60 year old woman who presents for preventive health visit.       Patient has been advised of split billing requirements and indicates understanding: Yes       Healthy Habits:     Getting at least 3 servings of Calcium per day:  Yes    Bi-annual eye exam:  Yes    Dental care twice a year:  Yes    Sleep apnea or symptoms of sleep apnea:  None    Diet:  Regular (no restrictions)    Frequency of exercise:  4-5 days/week    Duration of exercise:  45-60 minutes    Taking medications regularly:  Yes    Medication side effects:  None    PHQ-2 Total Score: 0    ADDITIONAL CONCERNS    Recent history of COVID-19- infection.   Reports that her symptoms have significantly improved. No longer having any fever, chills, cough.     Component      Latest Ref Rng & Units 11/16/2020   COVID-19 Virus by PCR (External Result)      Not Detected Detected (A)       Hypothyroidism Follow-up  Currently on Levothyroxine 112 mcg/day.     Since last visit, patient describes the following symptoms: Weight stable, no hair loss, no skin changes, no constipation, no loose stools    Last TSH-  TSH   Date Value Ref Range Status   12/10/2019 3.27 0.40 - 4.00 mU/L Final       Hyperlipidemia Follow-Up    Are you regularly taking any medication or supplement to lower your cholesterol?   No- Stopped taking simvastatin due to muscle cramps, has been off medication x 3.5 months.    Are you having muscle aches or other side effects that you think could be caused by your cholesterol lowering medication?  Yes- muscle cramps.        Patient informed that anything we discuss that is not related to preventative medicine, may be billed for; patient verbalizes understanding.      Today's PHQ-2 Score:   PHQ-2 ( 1999 Pfizer) 12/14/2020   Q1: Little interest or pleasure in doing things 0   Q2: Feeling down, depressed or hopeless 0   PHQ-2 Score 0   Q1: Little interest or pleasure in doing things Not at all   Q2: Feeling down,  depressed or hopeless Not at all   PHQ-2 Score 0       Abuse: Current or Past (Physical, Sexual or Emotional) - No  Do you feel safe in your environment? Yes        Social History     Tobacco Use     Smoking status: Former Smoker     Years: 14.00     Types: Cigarettes     Quit date: 12/15/1998     Years since quittin.0     Smokeless tobacco: Never Used     Tobacco comment: quit 20 years ago   Substance Use Topics     Alcohol use: Yes     Comment: occ     If you drink alcohol do you typically have >3 drinks per day or >7 drinks per week? No    No flowsheet data found.    Reviewed orders with patient.  Reviewed health maintenance and updated orders accordingly - Yes  Lab work is in process  Labs reviewed in EPIC  BP Readings from Last 3 Encounters:   12/15/20 134/64   12/10/19 114/76   19 121/79    Wt Readings from Last 3 Encounters:   12/15/20 88.7 kg (195 lb 9.6 oz)   12/10/19 91.5 kg (201 lb 12.8 oz)   19 97.4 kg (214 lb 12.8 oz)                  Patient Active Problem List   Diagnosis     GERD (gastroesophageal reflux disease)     Menorrhagia     Iron deficiency anemia     Simple endometrial hyperplasia without atypia     Postsurgical hypothyroidism     Hyperlipidemia LDL goal <130     Morbid obesity (H)     Past Surgical History:   Procedure Laterality Date     APPENDECTOMY OPEN       ARTHROSCOPY KNEE RT/LT  11/15/2007    right knee arthroscopy with arthroscopic lateral meniscectomy     BIOPSY Left     Breast. Benign     CL AFF SURGICAL PATHOLOGY  2002    endometrial biopsy     COLONOSCOPY       ESOPHAGOSCOPY, GASTROSCOPY, DUODENOSCOPY (EGD), COMBINED N/A 2014    Procedure: COMBINED ESOPHAGOSCOPY, GASTROSCOPY, DUODENOSCOPY (EGD), BIOPSY SINGLE OR MULTIPLE;  Surgeon: Paradise Radford MD;  Location: [facility] OR     HC THYROIDECTOMY      goitre     LAMINECTOMY, FUSION LUMBAR ONE LEVEL, COMBINED  10/26/2011    Procedure:COMBINED LAMINECTOMY, FUSION LUMBAR ONE LEVEL; L4-5 Decompression, L4-5  Posterior Lateral Fusion with Madhavi and Local Bone; Surgeon:BARBRA BRIGHT; Location:[facility] OR     TONSILLECTOMY & ADENOIDECTOMY         Social History     Tobacco Use     Smoking status: Former Smoker     Years: 14.00     Types: Cigarettes     Quit date: 12/15/1998     Years since quittin.0     Smokeless tobacco: Never Used     Tobacco comment: quit 20 years ago   Substance Use Topics     Alcohol use: Yes     Comment: occ     Family History   Problem Relation Age of Onset     Asthma Mother      Diabetes Mother      Hypertension Mother      Arthritis Mother      Circulatory Mother      Obesity Mother      Thyroid Disease Mother      Hypertension Father      Alcohol/Drug Father      Arthritis Father      Obesity Father      Breast Cancer Maternal Grandmother      Thyroid Disease Brother      Colon Cancer No family hx of          Current Outpatient Medications   Medication Sig Dispense Refill     levothyroxine (SYNTHROID/LEVOTHROID) 112 MCG tablet TAKE 1 TABLET(112 MCG) BY MOUTH DAILY 90 tablet 3     MULTI-VITAMIN OR TABS 1 TABLET DAILY       OMEPRAZOLE PO Take 20 mg by mouth 2 times daily (before meals)       simvastatin (ZOCOR) 20 MG tablet Take 1 tablet (20 mg) by mouth daily 90 tablet 3     Allergies   Allergen Reactions     Latex Anaphylaxis     Cortisone Nausea and Vomiting     oral     Vicodin [Acetaminophen] Nausea and Vomiting       Mammogram Screening: Patient over age 50, mutual decision to screen reflected in health maintenance.    Pertinent mammograms are reviewed under the imaging tab.  History of abnormal Pap smear: NO - age 30-65 PAP every 5 years with negative HPV co-testing recommended  PAP / HPV Latest Ref Rng & Units 2017 2014 10/14/2013   PAP - NIL NIL NIL   HPV 16 DNA NEG:Negative Negative - -   HPV 18 DNA NEG:Negative Negative - -   OTHER HR HPV NEG:Negative Negative - -     Reviewed and updated as needed this visit by clinical staff  Tobacco  Allergies  Meds   Med Hx  Surg Hx  " [facility] Hx          Reviewed and updated as needed this visit by Provider  Tobacco     Med Hx  Surg Hx  Fam Hx           Review of Systems   Constitutional: Negative for chills and fever.   HENT: Positive for congestion. Negative for ear pain.    Eyes: Negative for pain.   Respiratory: Positive for cough.    Cardiovascular: Negative for chest pain.   Gastrointestinal: Negative for abdominal pain, diarrhea and hematochezia.   Breasts:  Negative for tenderness, breast mass and discharge.   Genitourinary: Negative for frequency, genital sores, hematuria, pelvic pain, vaginal bleeding and vaginal discharge.   Neurological: Negative for dizziness and headaches.   Psychiatric/Behavioral: The patient is nervous/anxious.           OBJECTIVE:   /64   Pulse 73   Temp 97.8  F (36.6  C) (Tympanic)   Resp 16   Ht 1.638 m (5' 4.5\")   Wt 88.7 kg (195 lb 9.6 oz)   LMP 04/01/2012   SpO2 96%   Breastfeeding No   BMI 33.06 kg/m    Physical Exam  GENERAL APPEARANCE: healthy, alert and no distress  EYES: Eyes grossly normal to inspection, PERRL and conjunctivae and sclerae normal  HENT: ear canals and TM's normal, nose and mouth without ulcers or lesions, oropharynx clear and oral mucous membranes moist  NECK: no adenopathy, no asymmetry, masses, or scars and thyroid normal to palpation  RESP: lungs clear to auscultation - no rales, rhonchi or wheezes  BREAST: normal without masses, tenderness or nipple discharge and no palpable axillary masses or adenopathy  CV: regular rate and rhythm, normal S1 S2, no S3 or S4, no murmur, click or rub, no peripheral edema and peripheral pulses strong  ABDOMEN: soft, nontender, no hepatosplenomegaly, no masses and bowel sounds normal   (female): normal female external genitalia, normal urethral meatus, vaginal mucosal atrophy noted, normal cervix, adnexae, and uterus without masses or abnormal discharge. Pap smear done.   MS: no musculoskeletal defects are noted and gait is age " "appropriate without ataxia  SKIN: no suspicious lesions or rashes  NEURO: Normal strength and tone, sensory exam grossly normal, mentation intact and speech normal  PSYCH: mentation appears normal and affect normal/bright    Diagnostic Test Results:  Labs in process    ASSESSMENT/PLAN:   Laura was seen today for physical.    Diagnoses and all orders for this visit:    Routine general medical examination at a health care facility  -     Comprehensive metabolic panel    Encounter for screening mammogram for breast cancer  -     *MA Screening Digital Bilateral; Future    Cervical cancer screening  -     Pap imaged thin layer screen with HPV - recommended age 30 - 65 years (select HPV order below)  -     HPV High Risk Types DNA Cervical    Postsurgical hypothyroidism, on Levothyroxine  -     TSH with free T4 reflex  -     Will refill Levothyroxine after labs     Hyperlipidemia LDL goal <130, off Simvastatin (concerns about myalgias)  -     Lipid panel reflex to direct LDL Fasting    History of 2019 novel coronavirus disease (COVID-19)        -     Symptoms have since resolved.     Need for vaccination  -     TDAP VACCINE (Adacel, Boostrix)  [[id number]]  -     ADMIN 1st VACCINE        Patient has been advised of split billing requirements and indicates understanding: Yes  COUNSELING:  Reviewed preventive health counseling, as reflected in patient instructions       Regular exercise       Healthy diet/nutrition    Estimated body mass index is 33.06 kg/m  as calculated from the following:    Height as of this encounter: 1.638 m (5' 4.5\").    Weight as of this encounter: 88.7 kg (195 lb 9.6 oz).    Weight management plan: Discussed healthy diet and exercise guidelines    She reports that she quit smoking about 22 years ago. Her smoking use included cigarettes. She quit after 14.00 years of use. She has never used smokeless tobacco.      Counseling Resources:  ATP IV Guidelines  Pooled Cohorts Equation Calculator  Breast Cancer " Risk Calculator  BRCA-Related Cancer Risk Assessment: FHS-7 Tool  FRAX Risk Assessment  ICSI Preventive Guidelines  Dietary Guidelines for Americans, 2010  USDA's MyPlate  ASA Prophylaxis  Lung CA Screening    Follow up annually and as needed thoughout the year.    Sarita Shultz MD  [facility]

## 2020-12-17 LAB
COPATH REPORT: NORMAL
PAP: NORMAL

## 2020-12-18 LAB
FINAL DIAGNOSIS: NORMAL
HPV HR 12 DNA CVX QL NAA+PROBE: NEGATIVE
HPV16 DNA SPEC QL NAA+PROBE: NEGATIVE
HPV18 DNA SPEC QL NAA+PROBE: NEGATIVE
SPECIMEN DESCRIPTION: NORMAL
SPECIMEN SOURCE CVX/VAG CYTO: NORMAL

## 2020-12-19 DIAGNOSIS — E78.5 HYPERLIPIDEMIA LDL GOAL <130: Primary | ICD-10-CM

## 2020-12-19 RX ORDER — ROSUVASTATIN CALCIUM 20 MG/1
20 TABLET, COATED ORAL DAILY
Qty: 90 TABLET | Refills: 3 | Status: SHIPPED | OUTPATIENT
Start: 2020-12-19 | End: 2021-11-21

## 2020-12-27 DIAGNOSIS — E89.0 POSTSURGICAL HYPOTHYROIDISM: ICD-10-CM

## 2020-12-29 RX ORDER — LEVOTHYROXINE SODIUM 112 UG/1
TABLET ORAL
Qty: 90 TABLET | Refills: 3 | Status: SHIPPED | OUTPATIENT
Start: 2020-12-29 | End: 2021-03-16

## 2020-12-29 NOTE — TELEPHONE ENCOUNTER
TSH   Date Value Ref Range Status   12/15/2020 1.06 0.40 - 4.00 mU/L Final     Last Written Prescription Date:  12/11/2019  Last Fill Quantity: 90,  # refills: 3   Last office visit: 12/15/2020 with prescribing provider:  Dr. Shultz   Future Office Visit:  None    Prescription approved per [facility] Refill Protocol.    Guadalupe Harris RN, BSN  [facility]

## 2021-01-06 ENCOUNTER — ANCILLARY PROCEDURE (OUTPATIENT)
Dept: MAMMOGRAPHY | Facility: CLINIC | Age: 61
End: 2021-01-06
Attending: FAMILY MEDICINE
Payer: COMMERCIAL

## 2021-01-06 DIAGNOSIS — Z12.31 ENCOUNTER FOR SCREENING MAMMOGRAM FOR BREAST CANCER: ICD-10-CM

## 2021-01-06 PROCEDURE — 77063 BREAST TOMOSYNTHESIS BI: CPT | Mod: TC | Performed by: RADIOLOGY

## 2021-01-06 PROCEDURE — 77067 SCR MAMMO BI INCL CAD: CPT | Mod: TC | Performed by: RADIOLOGY

## 2021-01-20 ENCOUNTER — ANCILLARY PROCEDURE (OUTPATIENT)
Dept: ULTRASOUND IMAGING | Facility: CLINIC | Age: 61
End: 2021-01-20
Attending: FAMILY MEDICINE
Payer: COMMERCIAL

## 2021-01-20 ENCOUNTER — ANCILLARY PROCEDURE (OUTPATIENT)
Dept: MAMMOGRAPHY | Facility: CLINIC | Age: 61
End: 2021-01-20
Attending: FAMILY MEDICINE
Payer: COMMERCIAL

## 2021-01-20 DIAGNOSIS — R92.8 ABNORMAL MAMMOGRAM: ICD-10-CM

## 2021-01-20 PROCEDURE — 77065 DX MAMMO INCL CAD UNI: CPT | Mod: RT

## 2021-01-20 PROCEDURE — 76642 ULTRASOUND BREAST LIMITED: CPT | Mod: RT

## 2021-01-28 ENCOUNTER — ANCILLARY PROCEDURE (OUTPATIENT)
Dept: CT IMAGING | Facility: CLINIC | Age: 61
End: 2021-01-28
Attending: FAMILY MEDICINE
Payer: COMMERCIAL

## 2021-01-28 ENCOUNTER — ANCILLARY PROCEDURE (OUTPATIENT)
Dept: MAMMOGRAPHY | Facility: CLINIC | Age: 61
End: 2021-01-28
Attending: FAMILY MEDICINE
Payer: COMMERCIAL

## 2021-01-28 DIAGNOSIS — R92.8 ABNORMAL MAMMOGRAM: ICD-10-CM

## 2021-01-28 PROCEDURE — 19082 BX BREAST ADD LESION STRTCTC: CPT | Mod: RT | Performed by: RADIOLOGY

## 2021-01-28 PROCEDURE — 88342 IMHCHEM/IMCYTCHM 1ST ANTB: CPT | Mod: 59 | Performed by: RADIOLOGY

## 2021-01-28 PROCEDURE — 77066 DX MAMMO INCL CAD BI: CPT | Performed by: RADIOLOGY

## 2021-01-28 PROCEDURE — 88305 TISSUE EXAM BY PATHOLOGIST: CPT | Performed by: RADIOLOGY

## 2021-01-28 PROCEDURE — 88360 TUMOR IMMUNOHISTOCHEM/MANUAL: CPT | Performed by: RADIOLOGY

## 2021-01-28 PROCEDURE — 19081 BX BREAST 1ST LESION STRTCTC: CPT | Mod: RT | Performed by: RADIOLOGY

## 2021-01-28 PROCEDURE — 88341 IMHCHEM/IMCYTCHM EA ADD ANTB: CPT | Mod: 59 | Performed by: RADIOLOGY

## 2021-01-28 RX ORDER — LIDOCAINE HYDROCHLORIDE AND EPINEPHRINE 10; 10 MG/ML; UG/ML
32 INJECTION, SOLUTION INFILTRATION; PERINEURAL ONCE
Status: COMPLETED | OUTPATIENT
Start: 2021-01-28 | End: 2021-01-28

## 2021-01-28 RX ORDER — IOPAMIDOL 755 MG/ML
100 INJECTION, SOLUTION INTRAVASCULAR ONCE
Status: COMPLETED | OUTPATIENT
Start: 2021-01-28 | End: 2021-01-28

## 2021-01-28 RX ADMIN — LIDOCAINE HYDROCHLORIDE AND EPINEPHRINE 32 ML: 10; 10 INJECTION, SOLUTION INFILTRATION; PERINEURAL at 15:05

## 2021-01-28 RX ADMIN — IOPAMIDOL 100 ML: 755 INJECTION, SOLUTION INTRAVASCULAR at 13:16

## 2021-02-02 ENCOUNTER — TELEPHONE (OUTPATIENT)
Dept: GENERAL RADIOLOGY | Facility: CLINIC | Age: 61
End: 2021-02-02

## 2021-02-02 DIAGNOSIS — R92.8 ABNORMAL MAMMOGRAM: Primary | ICD-10-CM

## 2021-02-02 LAB — COPATH REPORT: NORMAL

## 2021-02-02 NOTE — TELEPHONE ENCOUNTER
Spoke to patient regarding right breast biopsy results from last week with findings of site A: DCIS, high nuclear grade, solid,cribriform   and comedo types. No evidence of invasive malignancy identified this biopsy. Calcification are associated with DCIS and benign epithelium. Fibrocystic changes including microcysts and apocrine metaplasia. DCIS is positive for estrogen and progesterone receptors.    Site B:DCIS, high nuclear grade, solid, cribriform and comedo types. No evidence of invasive malignancy identified this biopsy. Calcification are associated with DCIS and benign epithelium. Fibrocystic changes including microcysts and apocrine metaplasia.Radiologist recommendation for Surgical consultation discussed with the patient and referral orders placed. All questions and concerns answered.

## 2021-02-03 NOTE — TELEPHONE ENCOUNTER
ONCOLOGY INTAKE: Records Information      APPT INFORMATION:  Referring provider:  Sarita Shultz MD  Referring provider s clinic:  PCP  Reason for visit/diagnosis: Abnormal mammogram.  Has patient been notified of appointment date and time?: Yes    RECORDS INFORMATION:  Were the records received with the referral (via [employer])? No    Has patient been seen for any external appt for this diagnosis? No    If yes, where? N/a    Has patient had any imaging or procedures outside of [facility] for this condition? No      If Yes, where? N/a    ADDITIONAL INFORMATION:  None

## 2021-02-10 ENCOUNTER — TELEPHONE (OUTPATIENT)
Dept: SURGERY | Facility: CLINIC | Age: 61
End: 2021-02-10

## 2021-02-10 NOTE — TELEPHONE ENCOUNTER
PREVISIT INFORMATION                                                    Laura Ferreira is scheduled for future visit with Dr. Stein on 2/11/21 at the [facility].    Reason for visit: abnormal mammgoram  Referring provider: [facility]  Have images been done?: Yes   Location: [facility]   Date: 1/6/21, 1/20/21, 1/28/21  Previous pathology reports?: yes, available in chart  Have previous office records been requested?: no      REVIEW                                                      New patient packet mailed to patient: No    PLAN/FOLLOW-UP NEEDED                                                      Previsit review complete.  Patient will see provider at future scheduled appointment.     Patient Reminders Given:    Informed patient to bring an updated list of allergies, medications, pharmacy details and insurance information. Directed patient to come to the 2nd floor, check-in D for their appointment. Informed patient to call back if appointment needs to be cancelled or rescheduled at [phone].    Reminded patient to bring any outside records regarding this appointment or have them faxed to clinic at [phone].    Magdalene Crawford LPN

## 2021-02-11 ENCOUNTER — TELEPHONE (OUTPATIENT)
Dept: SURGERY | Facility: CLINIC | Age: 61
End: 2021-02-11

## 2021-02-11 ENCOUNTER — OFFICE VISIT (OUTPATIENT)
Dept: SURGERY | Facility: CLINIC | Age: 61
End: 2021-02-11
Attending: FAMILY MEDICINE
Payer: COMMERCIAL

## 2021-02-11 VITALS — HEIGHT: 65 IN | WEIGHT: 203.6 LBS | BODY MASS INDEX: 33.92 KG/M2

## 2021-02-11 DIAGNOSIS — D05.11 DUCTAL CARCINOMA IN SITU (DCIS) OF RIGHT BREAST: Primary | ICD-10-CM

## 2021-02-11 DIAGNOSIS — R92.8 ABNORMAL MAMMOGRAM: ICD-10-CM

## 2021-02-11 PROCEDURE — 99205 OFFICE O/P NEW HI 60 MIN: CPT | Performed by: SURGERY

## 2021-02-11 RX ORDER — DIAZEPAM 5 MG
5 TABLET ORAL PRN
Qty: 2 TABLET | Refills: 0 | Status: SHIPPED | OUTPATIENT
Start: 2021-02-11 | End: 2021-03-12

## 2021-02-11 ASSESSMENT — MIFFLIN-ST. JEOR: SCORE: 1476.46

## 2021-02-11 NOTE — PROGRESS NOTES
[facility] Breast Surgery Consultation    HPI:   Laura Ferriera is a 60 year old female who is seen in consultation at the request of Dr. Shultz for evaluation of newly diagnosed right breast DCIS. She presented for screening imaging on 1/6/2021 which revealed developing microcalcifications in the right breast at 9:00 spanning 6cm from 5-11cm FN. Diagnostic imaging confirmed this. She had a stereotactic biopsy with the above results. She had no breast concerns prior to her mammogram. Mammogram prior was 2018. She has had a prior left lumpectomy which was benign. She does note some intermittent right upper outer breast tenderness recently.     Hormonal history:   menarche 10,  2 children, 1st at age 17, postmenopausal, <5 years OCP use, no HRT, no fertility treatment.     Family history of breast cancer: Yes - maternal grandmother at 60  Family history of ovarian cancer:  No  Family history of colon cancer: No  Family history of prostate cancer: No      Past Medical History:   has a past medical history of Leblanc's esophagus, Ex-smoker, Hematuria (01/23/2007), History of breast biopsy, Menopausal symptoms (07/17/2009), Postsurgical hypothyroidism (07/17/2009), Symptomatic menopausal or female climacteric states (07/17/2009), and Tear of lateral cartilage or meniscus of knee, current (10/24/2007).      Current Outpatient Medications:      levothyroxine (SYNTHROID/LEVOTHROID) 112 MCG tablet, TAKE 1 TABLET(112 MCG) BY MOUTH DAILY, Disp: 90 tablet, Rfl: 3     MULTI-VITAMIN OR TABS, 1 TABLET DAILY, Disp: , Rfl:      omeprazole (PRILOSEC) 20 MG DR capsule, Take 1 capsule (20 mg) by mouth 2 times daily (before meals), Disp: 180 capsule, Rfl: 1     rosuvastatin (CRESTOR) 20 MG tablet, Take 1 tablet (20 mg) by mouth daily, Disp: 90 tablet, Rfl: 3    Current Facility-Administered Medications:      ropivacaine (NAROPIN) injection 3 mL, 3 mL, , , Alexis Aparicio DO, 3 mL at 01/09/19 9693      triamcinolone (KENALOG-40) injection 40 mg, 40 mg, , , ReprenatoAlexis, , 40 mg at 19 1840    Past Surgical History:  Past Surgical History:   Procedure Laterality Date     APPENDECTOMY OPEN       ARTHROSCOPY KNEE RT/LT  11/15/2007    right knee arthroscopy with arthroscopic lateral meniscectomy     BIOPSY Left     Breast. Benign     CL AFF SURGICAL PATHOLOGY  2002    endometrial biopsy     COLONOSCOPY       ESOPHAGOSCOPY, GASTROSCOPY, DUODENOSCOPY (EGD), COMBINED N/A 2014    Procedure: COMBINED ESOPHAGOSCOPY, GASTROSCOPY, DUODENOSCOPY (EGD), BIOPSY SINGLE OR MULTIPLE;  Surgeon: Paradise Radford MD;  Location: [facility] OR     HC THYROIDECTOMY      goitre     LAMINECTOMY, FUSION LUMBAR ONE LEVEL, COMBINED  10/26/2011    Procedure:COMBINED LAMINECTOMY, FUSION LUMBAR ONE LEVEL; L4-5 Decompression, L4-5 Posterior Lateral Fusion with Madhavi and Local Bone; Surgeon:BARBRA BRIGHT; Location:[facility] OR     TONSILLECTOMY & ADENOIDECTOMY             Allergies   Allergen Reactions     Latex Anaphylaxis     Cortisone Nausea and Vomiting     oral     Vicodin [Acetaminophen] Nausea and Vomiting         Social History:  Social History     Socioeconomic History     Marital status:      Spouse name: Not on file     Number of children: Not on file     Years of education: Not on file     Highest education level: Not on file   Occupational History     Not on file   Social Needs     Financial resource strain: Not on file     Food insecurity     Worry: Not on file     Inability: Not on file     Transportation needs     Medical: Not on file     Non-medical: Not on file   Tobacco Use     Smoking status: Former Smoker     Years: 14.00     Types: Cigarettes     Quit date: 12/15/1998     Years since quittin.1     Smokeless tobacco: Never Used     Tobacco comment: quit 20 years ago   Substance and Sexual Activity     Alcohol use: Yes     Comment: occ     Drug use: No     Sexual activity: Yes     Partners: Male    Lifestyle     Physical activity     Days per week: Not on file     Minutes per session: Not on file     Stress: Not on file   Relationships     Social connections     Talks on phone: Not on file     Gets together: Not on file     Attends [employer] service: Not on file     Active member of club or organization: Not on file     Attends meetings of clubs or organizations: Not on file     Relationship status: Not on file     Intimate partner violence     Fear of current or ex partner: Not on file     Emotionally abused: Not on file     Physically abused: Not on file     Forced sexual activity: Not on file   Other Topics Concern     Parent/sibling w/ CABG, MI or angioplasty before 65F 55M? Not Asked   Social History Narrative     Not on file        ROS:  The 10 point review of systems is negative other than noted in the HPI and above.    PE:  Vitals: LMP 04/01/2012   General appearance: well-nourished, sitting comfortably, no apparent distress  Psych: normal affect, pleasant  HEENT:  Head normocephalic and atraumatic, pupils equal and round, conjunctivae clear, mucous membranes moist, external ears and nose normal  Neck: Supple without thyromegaly or masses  Lungs: Respirations unlabored  Lymphatic: No cervical, or supraclavicular lymphadenopathy  Extremities: Without edema  Musculoskeletal:  Normal station and gait  Neurologic: nonfocal, grossly intact times four extremities, alert and oriented times three  Psychiatric: Mood and affect are appropriate  Skin: Without lesions or rashes    Breast:  A bilateral breast exam was performed in the supine position.. Bilateral breasts were palpated in a circumferential clockwise fashion including the supraclavicular and axillary areas.   Right breast is larger than left. There is significant ecchymosis surrounding the right NAC and breast is very tender which limits exam. No palpable masses in either breast. Biopsy sites look fine on the right upper outer and lateral breast.  NAC everted bilaterally.       Lymph:       No supraclavicular/infraclavicular adenopathy.   Axillary adenopathy: none    Assessment/Plan: Laura Ferreira is a 60 year old who presents with right breast high grade ER/ND positive DCIS in the associated with a 6cm area of calcification seen on mammogram.  I reviewed the imaging and pathology reports with her and explained the findings.  We talked about the fact that this is non-invasive, or stage 0, breast cancer that was found on screening mammogram.  We also talked about the fact that the tumor has favorable features (ER strongly positive) and should be quite treatable and not life threatening.    We next discussed the surgical options for treatment.  I described the procedures for lumpectomy and mastectomy including the details of the procedures, the risks, anesthesia and expected recovery.  We talked about post-lumpectomy radiation, the course and usual side effects. we discussed that a breast MRI would be helpful to further evaluate for ability to do lumpectomy given the span of disease. She does have large breasts and if on MRI the span is 6cm or less, lumpectomy can be considered. If >6cm, I would recommend a mastectomy.       We also talked about post-mastectomy reconstruction and the stages involved.  I reviewed the data regarding lumpectomy and radiation vs mastectomy that shows that the local recurrence risk is slightly higher for lumpectomy and radiation vs mastectomy (5-10% vs. 1-2%), but the survival at 20 years is the same.  I advised  that lymph node biopsy is recommended whenever we are dealing with invasive breast cancer, but that if this is just DCIS, no lymph node biopsy is needed.  If she were to have a lumpectomy, I would not do a lymph node biopsy, but if she were to have a mastectomy I would because I would not be able to go back and do a sentinel lymph node biopsy in the event that invasive cancer is found on final pathology.  I described the  procedure for sentinel lymph node biopsy.  We talked about the risk for lymphedema which is small with removal of only a few nodes, but certainly not zero.    Lastly, we discussed genetic testing.  She is interested in this and a referral was placed.     Plan:   Breast MRI  Determine plan for right breast two wire or seed localized lumpectomies vs mastectomy with SLNB.       Time spent with the patient with greater that 50% of the time in discussion was 60 minutes.    Carmen Stein MD      Please route or send letter to:  Primary Care Provider (PCP) and Referring Provider

## 2021-02-11 NOTE — NURSING NOTE
"Laura Ferreira's goals for this visit include:   Chief Complaint   Patient presents with     Consult     abnormal mammogram       She requests these members of her care team be copied on today's visit information: Yes    PCP: Sarita Shultz    Referring Provider:  Sarita Shultz MD  [address] [place] [address]    Ht 1.638 m (5' 4.5\")   Wt 92.4 kg (203 lb 9.6 oz)   LMP 04/01/2012   BMI 34.41 kg/m      Do you need any medication refills at today's visit? No    Magdalene Crawford LPN      "

## 2021-02-11 NOTE — TELEPHONE ENCOUNTER
Call placed to Laura at request of Dr. Stein. Left message requesting call back.    Shima Sellers RN, BSN, OCN  Breast Nurse Navigator  [facility]  [facility]  Phone: [phone]

## 2021-02-11 NOTE — LETTER
2/11/2021         RE: Laura Ferreira  [address] [facility] [address]        Dear Colleague,    Thank you for referring your patient, Laura Ferreira, to the [facility]. Please see a copy of my visit note below.    [facility] Breast Surgery Consultation    HPI:   Laura Ferreira is a 60 year old female who is seen in consultation at the request of Dr. Shultz for evaluation of newly diagnosed right breast DCIS. She presented for screening imaging on 1/6/2021 which revealed developing microcalcifications in the right breast at 9:00 spanning 6cm from 5-11cm FN. Diagnostic imaging confirmed this. She had a stereotactic biopsy with the above results. She had no breast concerns prior to her mammogram. Mammogram prior was 2018. She has had a prior left lumpectomy which was benign. She does note some intermittent right upper outer breast tenderness recently.     Hormonal history:   menarche 10,  2 children, 1st at age 17, postmenopausal, <5 years OCP use, no HRT, no fertility treatment.     Family history of breast cancer: Yes - maternal grandmother at 60  Family history of ovarian cancer:  No  Family history of colon cancer: No  Family history of prostate cancer: No      Past Medical History:   has a past medical history of Leblanc's esophagus, Ex-smoker, Hematuria (01/23/2007), History of breast biopsy, Menopausal symptoms (07/17/2009), Postsurgical hypothyroidism (07/17/2009), Symptomatic menopausal or female climacteric states (07/17/2009), and Tear of lateral cartilage or meniscus of knee, current (10/24/2007).      Current Outpatient Medications:      levothyroxine (SYNTHROID/LEVOTHROID) 112 MCG tablet, TAKE 1 TABLET(112 MCG) BY MOUTH DAILY, Disp: 90 tablet, Rfl: 3     MULTI-VITAMIN OR TABS, 1 TABLET DAILY, Disp: , Rfl:      omeprazole (PRILOSEC) 20 MG DR capsule, Take 1 capsule (20 mg) by mouth 2 times daily (before meals), Disp: 180 capsule, Rfl: 1      rosuvastatin (CRESTOR) 20 MG tablet, Take 1 tablet (20 mg) by mouth daily, Disp: 90 tablet, Rfl: 3    Current Facility-Administered Medications:      ropivacaine (NAROPIN) injection 3 mL, 3 mL, , , Alexis Aparicio DO, 3 mL at 01/09/19 1840     triamcinolone (KENALOG-40) injection 40 mg, 40 mg, , , Alexis Aparicio DO, 40 mg at 01/09/19 1840    Past Surgical History:  Past Surgical History:   Procedure Laterality Date     APPENDECTOMY OPEN       ARTHROSCOPY KNEE RT/LT  11/15/2007    right knee arthroscopy with arthroscopic lateral meniscectomy     BIOPSY Left     Breast. Benign     CL AFF SURGICAL PATHOLOGY  09/18/2002    endometrial biopsy     COLONOSCOPY       ESOPHAGOSCOPY, GASTROSCOPY, DUODENOSCOPY (EGD), COMBINED N/A 12/23/2014    Procedure: COMBINED ESOPHAGOSCOPY, GASTROSCOPY, DUODENOSCOPY (EGD), BIOPSY SINGLE OR MULTIPLE;  Surgeon: Paradise Radford MD;  Location: [facility] OR     HC THYROIDECTOMY      goitre     LAMINECTOMY, FUSION LUMBAR ONE LEVEL, COMBINED  10/26/2011    Procedure:COMBINED LAMINECTOMY, FUSION LUMBAR ONE LEVEL; L4-5 Decompression, L4-5 Posterior Lateral Fusion with Madhavi and Local Bone; Surgeon:BARBRA BRIGHT; Location:[facility] OR     TONSILLECTOMY & ADENOIDECTOMY             Allergies   Allergen Reactions     Latex Anaphylaxis     Cortisone Nausea and Vomiting     oral     Vicodin [Acetaminophen] Nausea and Vomiting         Social History:  Social History     Socioeconomic History     Marital status:      Spouse name: Not on file     Number of children: Not on file     Years of education: Not on file     Highest education level: Not on file   Occupational History     Not on file   Social Needs     Financial resource strain: Not on file     Food insecurity     Worry: Not on file     Inability: Not on file     Transportation needs     Medical: Not on file     Non-medical: Not on file   Tobacco Use     Smoking status: Former Smoker     Years: 14.00     Types: Cigarettes     Quit  date: 12/15/1998     Years since quittin.1     Smokeless tobacco: Never Used     Tobacco comment: quit 20 years ago   Substance and Sexual Activity     Alcohol use: Yes     Comment: occ     Drug use: No     Sexual activity: Yes     Partners: Male   Lifestyle     Physical activity     Days per week: Not on file     Minutes per session: Not on file     Stress: Not on file   Relationships     Social connections     Talks on phone: Not on file     Gets together: Not on file     Attends [employer] service: Not on file     Active member of club or organization: Not on file     Attends meetings of clubs or organizations: Not on file     Relationship status: Not on file     Intimate partner violence     Fear of current or ex partner: Not on file     Emotionally abused: Not on file     Physically abused: Not on file     Forced sexual activity: Not on file   Other Topics Concern     Parent/sibling w/ CABG, MI or angioplasty before 65F 55M? Not Asked   Social History Narrative     Not on file        ROS:  The 10 point review of systems is negative other than noted in the HPI and above.    PE:  Vitals: LMP 2012   General appearance: well-nourished, sitting comfortably, no apparent distress  Psych: normal affect, pleasant  HEENT:  Head normocephalic and atraumatic, pupils equal and round, conjunctivae clear, mucous membranes moist, external ears and nose normal  Neck: Supple without thyromegaly or masses  Lungs: Respirations unlabored  Lymphatic: No cervical, or supraclavicular lymphadenopathy  Extremities: Without edema  Musculoskeletal:  Normal station and gait  Neurologic: nonfocal, grossly intact times four extremities, alert and oriented times three  Psychiatric: Mood and affect are appropriate  Skin: Without lesions or rashes    Breast:  A bilateral breast exam was performed in the supine position.. Bilateral breasts were palpated in a circumferential clockwise fashion including the supraclavicular and axillary  areas.   Right breast is larger than left. There is significant ecchymosis surrounding the right NAC and breast is very tender which limits exam. No palpable masses in either breast. Biopsy sites look fine on the right upper outer and lateral breast. NAC everted bilaterally.       Lymph:       No supraclavicular/infraclavicular adenopathy.   Axillary adenopathy: none    Assessment/Plan: Laura Ferreira is a 60 year old who presents with right breast high grade ER/AL positive DCIS in the associated with a 6cm area of calcification seen on mammogram.  I reviewed the imaging and pathology reports with her and explained the findings.  We talked about the fact that this is non-invasive, or stage 0, breast cancer that was found on screening mammogram.  We also talked about the fact that the tumor has favorable features (ER strongly positive) and should be quite treatable and not life threatening.    We next discussed the surgical options for treatment.  I described the procedures for lumpectomy and mastectomy including the details of the procedures, the risks, anesthesia and expected recovery.  We talked about post-lumpectomy radiation, the course and usual side effects. we discussed that a breast MRI would be helpful to further evaluate for ability to do lumpectomy given the span of disease. She does have large breasts and if on MRI the span is 6cm or less, lumpectomy can be considered. If >6cm, I would recommend a mastectomy.       We also talked about post-mastectomy reconstruction and the stages involved.  I reviewed the data regarding lumpectomy and radiation vs mastectomy that shows that the local recurrence risk is slightly higher for lumpectomy and radiation vs mastectomy (5-10% vs. 1-2%), but the survival at 20 years is the same.  I advised  that lymph node biopsy is recommended whenever we are dealing with invasive breast cancer, but that if this is just DCIS, no lymph node biopsy is needed.  If she were to have a  lumpectomy, I would not do a lymph node biopsy, but if she were to have a mastectomy I would because I would not be able to go back and do a sentinel lymph node biopsy in the event that invasive cancer is found on final pathology.  I described the procedure for sentinel lymph node biopsy.  We talked about the risk for lymphedema which is small with removal of only a few nodes, but certainly not zero.    Lastly, we discussed genetic testing.  She is interested in this and a referral was placed.     Plan:   Breast MRI  Determine plan for right breast two wire or seed localized lumpectomies vs mastectomy with SLNB.       Time spent with the patient with greater that 50% of the time in discussion was 60 minutes.    Carmen Stein MD      Please route or send letter to:  Primary Care Provider (PCP) and Referring Provider                  Again, thank you for allowing me to participate in the care of your patient.        Sincerely,        Carmen Stein MD

## 2021-02-12 NOTE — TELEPHONE ENCOUNTER
ONCOLOGY INTAKE: Records Information      APPT INFORMATION:  Referring provider:  Dr. Carmen Stein MD  Referring provider s clinic:  [facility]  Reason for visit/diagnosis:  Ductal carcinoma in situ (DCIS) of right breast   Has patient been notified of appointment date and time?: Yes    RECORDS INFORMATION:  Were the records received with the referral (via [employer])? No,Internal Referral      Has patient been seen for any external appt for this diagnosis? No    If yes, where? NA      ADDITIONAL INFORMATION:  None

## 2021-02-16 ENCOUNTER — HOSPITAL ENCOUNTER (OUTPATIENT)
Dept: MRI IMAGING | Facility: CLINIC | Age: 61
Discharge: HOME | End: 2021-02-16
Attending: SURGERY | Admitting: SURGERY
Payer: COMMERCIAL

## 2021-02-16 DIAGNOSIS — R92.8 ABNORMAL MAMMOGRAM: ICD-10-CM

## 2021-02-16 PROCEDURE — 255N000002 HC RX 255 OP 636: Performed by: SURGERY

## 2021-02-16 PROCEDURE — A9585 GADOBUTROL INJECTION: HCPCS | Performed by: SURGERY

## 2021-02-16 PROCEDURE — 77049 MRI BREAST C-+ W/CAD BI: CPT

## 2021-02-16 RX ORDER — GADOBUTROL 604.72 MG/ML
9 INJECTION INTRAVENOUS ONCE
Status: COMPLETED | OUTPATIENT
Start: 2021-02-16 | End: 2021-02-16

## 2021-02-16 RX ADMIN — GADOBUTROL 9 ML: 604.72 INJECTION INTRAVENOUS at 16:37

## 2021-02-17 ENCOUNTER — TELEPHONE (OUTPATIENT)
Dept: SURGERY | Facility: PHYSICIAN GROUP | Age: 61
End: 2021-02-17

## 2021-02-17 NOTE — TELEPHONE ENCOUNTER
I called Laura and left her a message that I would like to discuss her breast MRI. I will try her again later today.     Carmen Stein MD  [facility]  [phone]

## 2021-02-17 NOTE — TELEPHONE ENCOUNTER
I called Laura and discussed her breast MRI results. It revealed a 7.1cm span of concern for DCIS. With this I would recommend a mastectomy with SLNB. She is interested in reconstruction and I will have Savita call her to assist in scheduling.     Carmen Stein MD  [facility]  [phone]

## 2021-02-19 ENCOUNTER — TELEPHONE (OUTPATIENT)
Dept: SURGERY | Facility: PHYSICIAN GROUP | Age: 61
End: 2021-02-19

## 2021-02-23 ENCOUNTER — PRE VISIT (OUTPATIENT)
Dept: ONCOLOGY | Facility: CLINIC | Age: 61
End: 2021-02-23

## 2021-02-23 ENCOUNTER — VIRTUAL VISIT (OUTPATIENT)
Dept: ONCOLOGY | Facility: CLINIC | Age: 61
End: 2021-02-23
Attending: SURGERY
Payer: COMMERCIAL

## 2021-02-23 DIAGNOSIS — D05.11 DUCTAL CARCINOMA IN SITU (DCIS) OF RIGHT BREAST: Primary | ICD-10-CM

## 2021-02-23 DIAGNOSIS — Z80.3 FAMILY HISTORY OF MALIGNANT NEOPLASM OF BREAST: ICD-10-CM

## 2021-02-23 PROCEDURE — 96040 HC GENETIC COUNSELING, EACH 30 MINUTES: CPT | Mod: GT | Performed by: GENETIC COUNSELOR, MS

## 2021-02-23 NOTE — LETTER
2/23/2021         RE: Laura Ferreira  [address] [facility] [id number]        Dear Colleague,    Thank you for referring your patient, Laura Ferreira, to the [facility]. Please see a copy of my visit note below.    2/23/2021    Laura is a 60 year old who is being evaluated via a billable video visit.      How would you like to obtain your AVS? MyChart  If the video visit is dropped, the invitation should be resent by: Text to cell phone: [phone]  Will anyone else be joining your video visit? No    Video-Visit Details  Type of service:  Video Visit  Video Start Time: 2:08pm  Video End Time:3:24pm  Originating Location (pt. Location): Home  Distant Location (provider location):  [facility]   Platform used for Video Visit: [employer]    Referring Provider: Carmen Stein MD    Presenting Information:   Given concerns regarding the potential for COVID-19 exposure during a clinic visit, Laura elected for a video genetic counseling visit through the Cancer Risk Management Program to discuss her personal and family history of breast cancer. We reviewed this history, cancer screening recommendations, and available genetic testing options.    Personal History:  Laura is a 60 year old female. 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 Treatment is currently being planned, but will likely include a unilateral mastectomy in a few weeks. Of note, Laura stated she may consider changing surgery decisions based on her genetic testing results.    Laura has her ovaries, fallopian tubes and uterus in place, and she has had no ovarian cancer screening to date. She has annual mammograms and her most recent mammogram in January 2021 identified this cancer. Her most recent upper endoscopy and colonoscopy in April 2018 identified signs of Leblanc's esophagus, but no polyps; follow-up was recommended in 3-5 years. A previous  "colonoscopy in 2009 identified one hyperplastic polyp and one tubular adenoma. She does not regularly do any other cancer screening at this time.    Family History: (Please see scanned pedigree for detailed family history information)    One maternal uncle was diagnosed with Hodgkin's lymphoma and passed away in his 30's.    One maternal uncle and two maternal aunts were diagnosed with lung cancer and passed away, likely in their 40/50's. They all had a history of smoking.    Laura's maternal grandmother was diagnosed with breast cancer in her 60's and passed away at age 82.    Laura reports that she has no known paternal family history of cancer, but has limited information regarding several of these relatives.    Her maternal ethnicity is Scandinavian. Her paternal ethnicity is Slovenian and \"[employer]ovenian [place]\".    Discussion:    Laura's personal and family history of breast cancer may be suggestive of a hereditary cancer syndrome.    We reviewed the features of sporadic, familial, and hereditary cancers. In looking at Laura's family history, it is possible that a cancer susceptibility gene is present as both Laura and her maternal grandmother have been diagnosed with breast cancer. We also reviewed the association between certain hereditary cancer syndrome and ancestry. That being said, neither Laura or her grandmother were diagnosed under age 50, several of her other relatives with cancer had significant associated risk factors (I.e. lung cancer and smoking), and a number of her relatives on both sides of her family have never had a cancer. This likely reduces, but does not eliminate, the chance for a hereditary cancer syndrome in her family.    We discussed the natural history and genetics of several hereditary cancer syndromes, including Hereditary Breast and Ovarian Cancer (HBOC) syndrome.     We reviewed that the most common cause of hereditary breast cancer is HBOC syndrome, which is caused by mutations in the BRCA1 and " "BRCA2 genes. Individuals with HBOC syndrome are at increased risk for several different cancers, including breast, ovarian, male breast, prostate, melanoma, and pancreatic cancer.    We then discussed that there are three mutations in the BRCA genes that are more common in the Ashkenazi Bahai population (possibly what her family means by \"Sinhala Congregation\"). About 1 in 40 individuals of Ashkenazi Bahai background have one of these three mutations, which account for about 90% of the BRCA mutations in this population.    We discussed that there are additional genes that could cause increased risk for breast and related cancers. As many of these genes present with overlapping features in a family and accurate cancer risk cannot always be established based upon the pedigree analysis alone, it would be reasonable for Laura to consider panel genetic testing to analyze multiple genes at once.    Based on her personal and family history, particularly if she is of Ashkenazi Bahai ancestry, Laura meets current National Comprehensive Cancer Network (NCCN) criteria for genetic testing of BRCA1, BRCA2, and other high penetrance breast cancer genes (i.e. JULIA, CHEK2, CDH1, PALB2, PTEN, TP53, etc.).    A detailed handout regarding these genes/syndromes and the information we discussed was provided to Laura via [employer] and can be found in the after visit summary. Topics included: inheritance pattern, cancer risks, cancer screening recommendations, and also risks, benefits and limitations of testing.    We reviewed genetic testing options for hereditary breast and related cancers: actionable breast cancer panel (BRCAplus genes, 8 genes), expanded breast/gynecologic cancer panel (BRCANext-Expanded, 23 genes), and expanded multi-cancer panel (CancerNext, 36 genes). We also reviewed the estimated turn around time to receive the results of these panels. Laura expressed interested in testing that would provide her with results quickly to make " upcoming surgery decisions. As such, she opted for the BRCAplus panel. Of note, Laura explained that she may be interested in pursuing expanded testing at a later date, which we will readdress once her BRCAplus results are available.    Genetic testing is available for 8 genes associated with high or moderate risk for breast cancer: BRCAplus (JULIA, BRCA1, BRCA2, CDH1, CHEK2, PALB2, PTEN, and TP53).    We discussed that many of the genes in the BRCAplus panel are associated with specific hereditary cancer syndromes and published management guidelines: Hereditary Breast and Ovarian Cancer syndrome (BRCA1, BRCA2), Hereditary Diffuse Gastric Cancer (CDH1), Cowden syndrome (PTEN), and Li Fraumeni syndrome (TP53).     The remaining genes (JULIA, CHEK2, and PALB2) are associated with moderate breast cancer risk and have published screening guidelines.    Consent was obtained over the video. Laura elected to schedule a lab appointment at the [facility] at her earliest convenience (ideally tomorrow). Once her blood is drawn, genetic testing using the BRCAplus will be sent to [facility]. Turn around time: 7-10 days after [facility] receives her blood sample.    Medical Management: For Laura, we reviewed that the information from genetic testing may determine:    surgery to treat Laura's active cancer diagnosis (i.e. unilateral versus bilateral mastectomy),    additional cancer screening for which Laura may qualify (i.e. mammogram and breast MRI, more frequent colonoscopies, more frequent dermatologic exams, etc.),    options for risk reducing surgeries Laura could consider (i.e. surgery to remove her ovaries and/or uterus, etc.),      and targeted chemotherapies for Laura's active cancer, or if she were to develop certain cancers in the future (i.e. immunotherapy for individuals with Lugo syndrome, PARP inhibitors, etc.).     These recommendations and possible targeted chemotherapies will be discussed  in detail once genetic testing is completed.     Plan:  1) Today Laura elected to proceed with genetic testing using the BRCAplus panel offered by [facility]. She will schedule a lab appointment at her earliest convenience.  2) The results should be available in 7-10 days, after Stephanie receives her blood sample.  3) Laura will be called to discuss the results. At that time, we will then readdress her expanded testing options.    Vanita Orozco MS, [facility]  Licensed Genetic Counselor  Office: [phone]  Pager: [phone]

## 2021-02-23 NOTE — PROGRESS NOTES
2/23/2021    Laura is a 60 year old who is being evaluated via a billable video visit.      How would you like to obtain your AVS? MyChart  If the video visit is dropped, the invitation should be resent by: Text to cell phone: [phone]  Will anyone else be joining your video visit? No    Video-Visit Details  Type of service:  Video Visit  Video Start Time: 2:08pm  Video End Time:3:24pm  Originating Location (pt. Location): Home  Distant Location (provider location):  [facility]   Platform used for Video Visit: [employer]    Referring Provider: Carmen Stein MD    Presenting Information:   Given concerns regarding the potential for COVID-19 exposure during a clinic visit, Laura elected for a video genetic counseling visit through the Cancer Risk Management Program to discuss her personal and family history of breast cancer. We reviewed this history, cancer screening recommendations, and available genetic testing options.    Personal History:  Laura is a 60 year old female. 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 Treatment is currently being planned, but will likely include a unilateral mastectomy in a few weeks. Of note, Laura stated she may consider changing surgery decisions based on her genetic testing results.    Laura has her ovaries, fallopian tubes and uterus in place, and she has had no ovarian cancer screening to date. She has annual mammograms and her most recent mammogram in January 2021 identified this cancer. Her most recent upper endoscopy and colonoscopy in April 2018 identified signs of Leblanc's esophagus, but no polyps; follow-up was recommended in 3-5 years. A previous colonoscopy in 2009 identified one hyperplastic polyp and one tubular adenoma. She does not regularly do any other cancer screening at this time.    Family History: (Please see scanned pedigree for detailed family history information)    One maternal uncle was  "diagnosed with Hodgkin's lymphoma and passed away in his 30's.    One maternal uncle and two maternal aunts were diagnosed with lung cancer and passed away, likely in their 40/50's. They all had a history of smoking.    Laura's maternal grandmother was diagnosed with breast cancer in her 60's and passed away at age 82.    Laura reports that she has no known paternal family history of cancer, but has limited information regarding several of these relatives.    Her maternal ethnicity is Scandinavian. Her paternal ethnicity is Turkish and \"Turkish Congregation\".    Discussion:    Laura's personal and family history of breast cancer may be suggestive of a hereditary cancer syndrome.    We reviewed the features of sporadic, familial, and hereditary cancers. In looking at Laura's family history, it is possible that a cancer susceptibility gene is present as both Laura and her maternal grandmother have been diagnosed with breast cancer. We also reviewed the association between certain hereditary cancer syndrome and ancestry. That being said, neither Laura or her grandmother were diagnosed under age 50, several of her other relatives with cancer had significant associated risk factors (I.e. lung cancer and smoking), and a number of her relatives on both sides of her family have never had a cancer. This likely reduces, but does not eliminate, the chance for a hereditary cancer syndrome in her family.    We discussed the natural history and genetics of several hereditary cancer syndromes, including Hereditary Breast and Ovarian Cancer (HBOC) syndrome.     We reviewed that the most common cause of hereditary breast cancer is HBOC syndrome, which is caused by mutations in the BRCA1 and BRCA2 genes. Individuals with HBOC syndrome are at increased risk for several different cancers, including breast, ovarian, male breast, prostate, melanoma, and pancreatic cancer.    We then discussed that there are three mutations in the BRCA genes that are more " "common in the Ashkenazi Hoahaoism population (possibly what her family means by \"Irish Lutheran\"). About 1 in 40 individuals of Ashkenazi Hoahaoism background have one of these three mutations, which account for about 90% of the BRCA mutations in this population.    We discussed that there are additional genes that could cause increased risk for breast and related cancers. As many of these genes present with overlapping features in a family and accurate cancer risk cannot always be established based upon the pedigree analysis alone, it would be reasonable for Laura to consider panel genetic testing to analyze multiple genes at once.    Based on her personal and family history, particularly if she is of Ashkenazi Hoahaoism ancestry, Laura meets current National Comprehensive Cancer Network (NCCN) criteria for genetic testing of BRCA1, BRCA2, and other high penetrance breast cancer genes (i.e. JULIA, CHEK2, CDH1, PALB2, PTEN, TP53, etc.).    A detailed handout regarding these genes/syndromes and the information we discussed was provided to Laura via [employer] and can be found in the after visit summary. Topics included: inheritance pattern, cancer risks, cancer screening recommendations, and also risks, benefits and limitations of testing.    We reviewed genetic testing options for hereditary breast and related cancers: actionable breast cancer panel (BRCAplus genes, 8 genes), expanded breast/gynecologic cancer panel (BRCANext-Expanded, 23 genes), and expanded multi-cancer panel (CancerNext, 36 genes). We also reviewed the estimated turn around time to receive the results of these panels. Laura expressed interested in testing that would provide her with results quickly to make upcoming surgery decisions. As such, she opted for the BRCAplus panel. Of note, Laura explained that she may be interested in pursuing expanded testing at a later date, which we will readdress once her BRCAplus results are available.    Genetic testing is available " for 8 genes associated with high or moderate risk for breast cancer: BRCAplus (JULIA, BRCA1, BRCA2, CDH1, CHEK2, PALB2, PTEN, and TP53).    We discussed that many of the genes in the BRCAplus panel are associated with specific hereditary cancer syndromes and published management guidelines: Hereditary Breast and Ovarian Cancer syndrome (BRCA1, BRCA2), Hereditary Diffuse Gastric Cancer (CDH1), Cowden syndrome (PTEN), and Li Fraumeni syndrome (TP53).     The remaining genes (JULIA, CHEK2, and PALB2) are associated with moderate breast cancer risk and have published screening guidelines.    Consent was obtained over the video. Laura elected to schedule a lab appointment at the [facility] at her earliest convenience (ideally tomorrow). Once her blood is drawn, genetic testing using the BRCAplus will be sent to [facility]. Turn around time: 7-10 days after Stephanie receives her blood sample.    Medical Management: For Laura, we reviewed that the information from genetic testing may determine:    surgery to treat Laura's active cancer diagnosis (i.e. unilateral versus bilateral mastectomy),    additional cancer screening for which Laura may qualify (i.e. mammogram and breast MRI, more frequent colonoscopies, more frequent dermatologic exams, etc.),    options for risk reducing surgeries Laura could consider (i.e. surgery to remove her ovaries and/or uterus, etc.),      and targeted chemotherapies for Laura's active cancer, or if she were to develop certain cancers in the future (i.e. immunotherapy for individuals with Lugo syndrome, PARP inhibitors, etc.).     These recommendations and possible targeted chemotherapies will be discussed in detail once genetic testing is completed.     Plan:  1) Today Laura elected to proceed with genetic testing using the BRCAplus panel offered by [facility]. She will schedule a lab appointment at her earliest convenience.  2) The results should be available in  7-10 days, after Stephanie receives her blood sample.  3) Laura will be called to discuss the results. At that time, we will then readdress her expanded testing options.    Vanita Orozco MS, [facility]  Licensed Genetic Counselor  Office: [phone]  Pager: [phone]

## 2021-02-24 DIAGNOSIS — Z80.3 FAMILY HISTORY OF MALIGNANT NEOPLASM OF BREAST: ICD-10-CM

## 2021-02-24 DIAGNOSIS — D05.11 DUCTAL CARCINOMA IN SITU (DCIS) OF RIGHT BREAST: ICD-10-CM

## 2021-02-24 LAB — MISCELLANEOUS TEST: NORMAL

## 2021-02-25 ENCOUNTER — PREP FOR PROCEDURE (OUTPATIENT)
Dept: SURGERY | Facility: CLINIC | Age: 61
End: 2021-02-25

## 2021-02-25 ENCOUNTER — TELEPHONE (OUTPATIENT)
Dept: SURGERY | Facility: PHYSICIAN GROUP | Age: 61
End: 2021-02-25

## 2021-02-25 DIAGNOSIS — C50.912 MALIGNANT NEOPLASM OF LEFT BREAST (H): Primary | ICD-10-CM

## 2021-02-25 NOTE — TELEPHONE ENCOUNTER
I called Laura and discussed a number of questions. She would like to proceed with scheduling RIGHT skin sparing mastectomy with RIGHT SLNB (possible bilateral pending genetic testing) for about 2 weeks from now as we should have genetic testing back by then. Plan for immediate reconstruction with Dr. Garcia.     I will have Savita call her to assist with scheduling.     Addendum: this note was changed  to change the side of surgery. Pt has RIGHT ductal carcinoma in situ.      Carmen Stein MD  [facility]  [phone]

## 2021-02-26 ENCOUNTER — TELEPHONE (OUTPATIENT)
Dept: SURGERY | Facility: PHYSICIAN GROUP | Age: 61
End: 2021-02-26

## 2021-02-26 ENCOUNTER — PRE VISIT (OUTPATIENT)
Dept: SURGERY | Facility: CLINIC | Age: 61
End: 2021-02-26

## 2021-02-26 DIAGNOSIS — C50.912 MALIGNANT NEOPLASM OF LEFT BREAST (H): Primary | ICD-10-CM

## 2021-02-26 NOTE — TELEPHONE ENCOUNTER
Type of surgery: Bilateral skin sparing mastectomy with right sentinel lymph node biopsy  Location of surgery: [facility]  Date and time of surgery: 3/17/21 at 1pm  Surgeon: Dr. Carmen Stein  Pre-Op Appt Date: Patient to schedule  Post-Op Appt Date: Patient to schedule   Packet sent out: Yes  Pre-cert/Authorization completed:  Not Applicable  Date: 2/26/21

## 2021-02-28 DIAGNOSIS — Z11.59 ENCOUNTER FOR SCREENING FOR OTHER VIRAL DISEASES: ICD-10-CM

## 2021-03-02 ENCOUNTER — CARE COORDINATION (OUTPATIENT)
Dept: SURGERY | Facility: CLINIC | Age: 61
End: 2021-03-02

## 2021-03-02 DIAGNOSIS — Z90.12 ACQUIRED ABSENCE OF LEFT BREAST AND NIPPLE: Primary | ICD-10-CM

## 2021-03-02 DIAGNOSIS — C50.912 MALIGNANT NEOPLASM OF LEFT BREAST (H): ICD-10-CM

## 2021-03-02 NOTE — PATIENT INSTRUCTIONS
Assessing Cancer Risk  Only about 5-10% of cancers are thought to be due to an inherited cancer susceptibility gene.    These families often have:    Several people with the same or related types of cancer    Cancers diagnosed at a young age (before age 50)    Individuals with more than one primary cancer    Multiple generations of the family affected with cancer    Some people may be candidates for genetic testing of more than one gene.  For these families, genetic testing using a multi-gene cancer panel may be offered.  These panels may test genes known to increase the risk for breast (and other) cancers: JULIA, BRCA1, BRCA2, CDH1, CHEK2, PALB2, PTEN, and TP53.  The purpose of this handout is to serve as a brief summary of the high/moderate-risk breast cancer genes which have published clinical management guidelines for individuals who are found to carry a mutation.    Hereditary Breast and Ovarian Cancer Syndrome (HBOC)  A single mutation in one of the copies of BRCA1 or BRCA2 increases the risk for breast and ovarian cancer, among others.  The risk for pancreatic cancer and melanoma may also be slightly increased in some families.  The tables below list the chance that someone with a BRCA mutation would develop cancer in his or her lifetime1,2,3,4.          Women   Men     General Population BRCA+    General Population BRCA+   Breast  12% 40-80%  Breast <1% ~7%   Ovarian  1-2% 10-40%  Prostate 16% 20%     A person s ethnic background is also important to consider, as individuals of Ashkenazi Buddhist ancestry have a higher chance of having a BRCA gene mutation.  There are three BRCA mutations that occur more frequently in this population.    Li-Fraumeni Syndrome (LFS)  LFS is a cancer predisposition syndrome. Individuals with LFS are at an increased risk for developing cancer at a young age. The general lifetime risk for development of cancer is 50% by age 30 and 90% by age 60.  LFS is caused by a mutation in the  TP53 gene.  A single mutation in one of the copies of TP53 increases the risk for multiple cancers.     Core Cancers: Sarcomas, Breast, Brain, Lung, Leukemias/Lymphomas, Adrenocortical carcinomas  Other Cancers: Gastrointestinal, Thyroid, Skin, Genitourinary    Cowden Syndrome  Cowden syndrome is a hereditary condition that increases the risk for breast, thyroid, endometrial, and kidney cancer.  Cowden syndrome is caused by a mutation in the PTEN gene.  A single mutation in one of the copies of PTEN causes Cowden syndrome and increases cancer risk.  The table below shows the chance that someone with a PTEN mutation would develop cancer in their lifetime5,6.  Other benign features seen in some individuals with Cowden syndrome include benign skin lesions (facial papules, keratoses, lipomas), learning disability, autism, thyroid nodules, colon polyps, and larger head size.      Lifetime Cancer Risk   Cancer Type General Population Cowden Syndrome   Breast  12% 25-50%*   Thyroid  1% 35%   Renal 1-2% 35%   Endometrial  2-3% 28%   Colon 5% 9%   Melanoma 2-3% >5%     *One recent study found breast cancer risk to be increased to 85%    Hereditary Diffuse Gastric Cancer (HDGC)  Currently, one gene is known to cause hereditary diffuse gastric cancer: CDH1.  Individuals with HDGC are at increased risk for diffuse gastric cancer and lobular breast cancer. Of people diagnosed with HDGC, 30-50% have a mutation in the CDH1 gene.  This suggests there are likely other genes that may cause HDGC that have not been identified yet.      Lifetime Cancer Risks    General Pop HDGC    Diffuse Gastric  <1% ~80%   Breast 12% 39-52%     Additional Genes Associated with Increased Breast Cancer Risk  PALB2  Mutations in PALB2 have been shown to increase the risk of breast cancer up to 33-58% in some families; where individuals fall within this risk range is dependent upon family history.9 PALB2 mutations have also been associated with increased  risk for pancreatic cancer, although this risk has not been quantified yet.  Individuals who inherit two PALB2 mutations--one from their mother and one from their father--have a condition called Fanconi Anemia.  This rare autosomal recessive condition is associated with short stature, developmental delay, bone marrow failure, and increased risk for childhood cancers.    JULIA  JULIA is a moderate-risk breast cancer gene. Women who have a mutation in JULIA can have between a 2-4 fold increased risk for breast cancer compared to the general population.10  JULIA mutations have also been associated with increased risk for pancreatic cancer, however an estimate of this cancer risk is not well understood.11  Individuals who inherit two JULIA mutations have a condition called ataxia-telangiectasia (AT).  This rare autosomal recessive condition affects the nervous system and immune system, and is associated with progressive cerebellar ataxia beginning in childhood.  Individuals with ataxia-telangiectasia often have a weakened immune system and have an increased risk for childhood cancers.           CHEK2   CHEK2 is a moderate-risk breast cancer gene.  Women who have a mutation in CHEK2 have around a 2-fold increased risk for breast cancer compared to the general population, and this risk may be higher depending upon family history.12,13,14  Mutations in CHEK2 have also been shown to increase the risk of a number of other cancers, including colon and prostate, however these cancer risks are currently not well understood.         Inheritance   All of the genes reviewed above are inherited in an autosomal dominant pattern.  This means that if a parent has a mutation, each of his or her children will have a 50% chance of inheriting that same mutation.  Therefore, each child--male or female--would have a 50% chance of being at increased risk for developing cancer.    Image obtained from Genetics Home Reference, 2013     Mutations in some  genes can occur de ophelia, which means that a person s mutation occurred for the first time in them and was not inherited from a parent.  Now that they have the mutation, however, it can be passed on to future generations.    Genetic Testing  Genetic testing involves a blood test and will look for any harmful mutations within genes that are associated with increased cancer risk.  If possible, it is recommended that the person(s) who has had cancer be tested before other family members.  That person will give us the most useful information about whether or not a specific gene is associated with the cancer in the family.    Results  There are three possible results of genetic testing:    Positive--a harmful mutation was identified in one or more of the genes    Negative--no mutation was identified in any of the genes on this panel    Variant of unknown significance--a variation in one of the genes was identified, but it is unclear how this impacts cancer risk in the family    Advantages and Disadvantages  There are advantages and disadvantages to genetic testing.  Advantages    May clarify your cancer risk    Can help you make medical decisions    May explain the cancers in your family    May give useful information to your family members (if you share your results)    Disadvantages    Possible negative emotional impact of learning about inherited cancer risk    Uncertainty in interpreting a negative test result in some situations    Possible genetic discrimination concerns (see below)    Genetic Information Nondiscrimination Act (SHIRA)  SHIRA is a federal law that protects individuals from health insurance or employment discrimination based on a genetic test result alone.  Although rare, there are currently no legal discrimination protections in terms of life insurance, long term care, or disability insurances.  Visit the National Human Genome Research [place] genome.gov/46638022 to learn more.    Reducing Cancer  Risk  Each of the genes listed within this handout have nationally recognized cancer screening guidelines that would be recommended for individuals who test positive.  In addition to increased cancer screening, surgeries may be offered or recommended to reduce cancer risk.  Recommendations are based upon an individual s genetic test result as well as their personal and family history of cancer.    Questions to Think About Regarding Genetic Testing    What effect will the test result have on me and my relationship with my family members if I have an inherited gene mutation?  If I don t have a gene mutation?    Should I share my test results, and how will my family react to this news, which may also affect them?    Are my children ready to learn new information that may one day affect their own health?    Resources  FORCE: Facing Our Risk of Cancer Empowered facingourrisk.org   Bright Pink bebrightpink.org   Li-Fraumeni Syndrome Association lfsassociation.org   PTEN World PTENworld.Meteor   No stomach for cancer, Inc. nostomachforcancer.org   Stomach cancer relief network scrnet.org   Collaborative Group of the Americas on Inherited Colorectal Cancer (CGA) cgaicc.com    Cancer Care cancercare.org   American Cancer Society (ACS) cancer.org   National Cancer [place] (NCI) cancer.gov     Please call us if you have any questions or concerns.   Cancer Risk Management Program [phone] ([phone])  ? Kleber Yamile, MS, [facility] [phone]  ? Nathaly Dominique, MS, [facility]  [phone]  ? Chuyita Connor, MS, [facility]  [phone]  ? Vanita Orozco, MS, [facility] [phone]  ? Gabrielle Garcia, MS, [facility] [phone]  ? Alina Yogesh, MS, [facility]  [phone]    -References  1. A-adams A, Veronika PDP, Darci S, Rebeca SERRANO, Dyana JE, Roselyn JL, Xavi N, Heath H, Karol O, Michael A, Sreekanth B, Sreekanth P, Jesse S, Moncho DM, Cleve N, Iza E, Eleni H, Homero E, Faiza J, Roque J, Audrey B, Pranay H, Dwaine S, Pavel H, Brianna  H, Nuria K, Tigre OP. Average risks of breast and ovarian cancer associated with BRCA1 or BRCA2 mutations detected in case series unselected for family history: a combined analysis of 222 studies. Am J Hum Estrella. 2003;72:1117-30.  2. Chun PARK, Ritika MCCONNELL, Kristin G.  BRCA1 and BRCA2 Hereditary Breast and Ovarian Cancer. Gene Reviews online. 2013.  3. Lucho YC, Marylou S, Alley G, Shanks S. Breast cancer risk among male BRCA1 and BRCA2 mutation carriers. J Natl Cancer Inst. 2007;99:1811-4.  4. Gabino KING, Dmitri I, Tam J, Abad E, Dayna ER, Macy F. Risk of breast cancer in male BRCA2 carriers. J Med Estrella. 2010;47:710-1.  5. Niranjan MH, Rosa J, Arlette J, Robbin LA, Hardeep MS, Will C. Lifetime cancer risks in individuals with germline PTEN mutations. Clin Cancer Res. 2012;18:400-7.  6. Jenaro DONALDSON. Cowden Syndrome: A Critical Review of the Clinical Literature. J Estrella . 2009:18:13-27.  7. National Comprehensive Cancer Network. Clinical practice guidelines in oncology, colorectal cancer screening. Available online (registration required). 2013.  8. National Cancer [place]. SEER Cancer Stat Fact Sheets.  December 2013.  9. Venus VARGAS, et al. Breast-Cancer Risk in Families with Mutations in PALB2. NEJM. 2014; 371(6):497-506.  10. Fuad RALPH, Polo D, Rangel S, Albina P, Marianai T, Ann M, Edwar B, Fabian H, Melaine R, Tay K, Ivelisse L, Gabino KING, Moncho D, Raymon DF, Leland MR, The Breast Cancer Susceptibility Collaboration (UK) & Kelvin PARK. JULIA mutations that cause ataxia-telangiectasia are breast cancer susceptibility alleles. Nature Genetics. 2006;38:873-875  11. Peter PARK , Donna Y, Sindhu J, Zach L, Anna GM , Linda ML, Suryainger S, Schaeffer AG, Syngal S, Sonia ML, Sylvia J , Gerard R, Hortencia SZ, Deb JR, Vandana VE, Farideh M, Voellen B, David N, Maryjo RH, Mack KW, and Vivienne AP. JULIA mutations in patients with hereditary pancreatic cancer. Cancer  Discover. 2012;2:41-46  12. CHEK2 Breast Cancer Case-Control Consortium. CHEK2*1100delC and susceptibility to breast cancer: A collaborative analysis involving 10,860 breast cancer cases and 9,065 controls from 10 studies. Am J Hum Estrella, 74 (2004), pp. 8195-7964  13. Sameera T, Grady S, Pawel K, et al. Spectrum of Mutations in BRCA1, BRCA2, CHEK2, and TP53 in Families at High Risk of Breast Cancer. BRIAN. 2006;295(12):8176-3525.   14. Benny C, Giorgi D, Matthew RALPH, et al. Risk of breast cancer in women with a CHEK2 mutation with and without a family history of breast cancer. J Clin Oncol. 2011;29:3166-2632

## 2021-03-02 NOTE — PROGRESS NOTES
Prescription for post surgical camisole faxed to [facility].    Shima Sellers RN, BSN, OCN  Breast Nurse Navigator  [facility]  [facility]  Phone: [phone]

## 2021-03-04 ENCOUNTER — TELEPHONE (OUTPATIENT)
Dept: ONCOLOGY | Facility: CLINIC | Age: 61
End: 2021-03-04

## 2021-03-04 ENCOUNTER — PREP FOR PROCEDURE (OUTPATIENT)
Dept: SURGERY | Facility: CLINIC | Age: 61
End: 2021-03-04

## 2021-03-04 ENCOUNTER — TELEPHONE (OUTPATIENT)
Dept: SURGERY | Facility: CLINIC | Age: 61
End: 2021-03-04

## 2021-03-04 ENCOUNTER — CARE COORDINATION (OUTPATIENT)
Dept: SURGERY | Facility: CLINIC | Age: 61
End: 2021-03-04

## 2021-03-04 DIAGNOSIS — D05.11 DUCTAL CARCINOMA IN SITU (DCIS) OF RIGHT BREAST: Primary | ICD-10-CM

## 2021-03-04 DIAGNOSIS — Z90.11 ACQUIRED ABSENCE OF BREAST AND ABSENT NIPPLE, RIGHT: ICD-10-CM

## 2021-03-04 LAB — LAB SCANNED RESULT: NORMAL

## 2021-03-04 NOTE — TELEPHONE ENCOUNTER
3/4/2021    I called Laura today to discuss her BRCAPlus genetic testing results, but was unable to reach her. I left a non-detailed voicemail with my name and phone number.    Vanita Orozco MS, [facility]  Licensed Genetic Counselor  Office: [phone]  Pager: [phone]

## 2021-03-04 NOTE — TELEPHONE ENCOUNTER
Laura called our office to inform us she'd like to proceed with bilateral mastectomies with reconstruction. She is currently scheduled for single mastectomy with reconstruction. Will review with Dr. Steni and Savita, surgery scheduler. Will call Laura back with further instructions.     Shima Sellers RN, BSN, OCN  Breast Nurse Navigator  [facility]  [facility] [place]  Phone: [phone]

## 2021-03-09 ENCOUNTER — TELEPHONE (OUTPATIENT)
Dept: ONCOLOGY | Facility: CLINIC | Age: 61
End: 2021-03-09

## 2021-03-09 NOTE — LETTER
"    Cancer Risk Management  Program Locations    [facility]  [facility]  [facility]  [facility]  Mailing Address  Cancer Risk Management Program  [address]  [place] [address]    New patient appointments  [phone]  March 15, 2021    Laura Ferreira  [address] [facility] [address]      Dear Laura,    It was a pleasure speaking with you over the phone recently regarding your genetic testing results. Here is a copy of the progress note summarizing our conversation. If you have any additional questions, please feel free to call.    3/9/2021    Referring Provider: Carmen Stein MD    Presenting Information:  I spoke to Laura by phone today to discuss her genetic testing results. We met on 2/23/2021 and her blood was drawn on 2/24/2021. BRCA1/2 Analyses with the BRCAPlus panel was ordered from "[facility]. This testing was done because of Laura's personal and family history of breast cancer.    Genetic Testing Result: NEGATIVE  Laura tested negative for mutations in the JULIA, BRCA1, BRCA2, CDH1, CHEK2, PALB2, PTEN, and TP53 genes by sequencing and deletion/duplication analysis. No mutations were found in any of the eight genes analyzed.     She does not have an identifiable mutation associated with Hereditary Breast and Ovarian Cancer syndrome (BRCA1, BRCA2), Li Fraumeni syndrome (TP53), Hereditary Diffuse Gastric Cancer (CDH1), or Cowden syndrome (PTEN).    A copy of the test report can be found in the Laboratory tab, dated 2/24/2021, and named \"SEND OUTS MISC TEST\". The report is scanned in as a linked document.      Interpretation:  We discussed several different interpretations of this negative test result.    1. One explanation may be that there is a different gene or combination of genes and environment that are " associated with the cancers in this family that are not identifiable using this test.  2. Another explanation may be that her breast cancer is sporadic and caused by random cellular changes.  3. There is also a small possibility that there is a mutation in one of these genes, and the testing laboratory could not find it with their current testing methods.       Screening:  Based on this negative test result, it is important for Laura and her relatives to refer back to the family history for appropriate cancer screening.      Laura should continue to follow all breast cancer treatment and future follow-up recommendations as made by her medical providers.    Laura s close female relatives remain at increased risk for breast cancer given their family history. Breast cancer screening is generally recommended to begin approximately 10 years younger than the earliest age of breast cancer diagnosis in the family, or at age 40, whichever comes first. In this family, screening may begin at age 40. Breast screening options should be discussed with an individual's primary care provider and a genetic counselor, to determine at what age to begin screening, what screening is appropriate, and if additional screening (such as breast MRI) is necessary based on personal/family history factors.    Other population cancer screening options, such as those recommended by the American Cancer Society and the National Comprehensive Cancer Network (NCCN), are also appropriate for Laura and her family. These screening recommendations may change if there are changes to Laura's personal and/or family history of cancer. Final screening recommendations should be made by each individual's managing physician.      Inheritance:  We reviewed the autosomal dominant inheritance of mutations in these eight genes. We discussed that Laura did not pass on an identifiable mutation in these genes to her children based on this test result. Mutations in these genes  do not skip generations.      Additional Testing Considerations:  As discussed during our previous visit, there are other genes associated with increased cancer risk that were not included in the BRCAPlus panel. This means it is still possible Laura does carry a currently identifiable gene mutation or combination of genes and environment that increase her risk for breast and/or related cancers. We again reviewed the option of larger gene panels covering more moderate risk genes associated with hereditary breast cancer. Laura stated that she would feel comfortable declining additional genetic testing today, as her major concern was testing to inform her upcoming surgery decisions. We reviewed [facility]'s 60 day re-requisitioning policy and I encouraged her to contact me if she wishes to readdress these larger gene panel options in the future.    Also, given that Laura is declining additional genetic testing at this time, other relatives may still carry a mutation associated with hereditary cancer in another gene. As such, Laura's daughters could consider meeting with a genetic counselor to discuss their multi-gene testing options. If her daughters, or any other relative, does pursue genetic testing, Laura is encouraged to contact me so that we may review the impact of their test results on her.    Summary:  We do not have an explanation for Laura's breast cancer. While no genetic changes were identified, Laura may still be at risk for certain cancers due to family history, environmental factors, or other genetic causes not identified by this test. Because of that, it is important that she continue with cancer screening based on her personal and family history as discussed above.    Genetic testing is rapidly advancing, and new cancer susceptibility genes will most likely be identified in the future. Therefore, I encouraged Laura to contact me regularly or if there are changes in her personal or family history. This may  change how we assess her cancer risk, screening, and the testing we would offer.    Plan:  1. Laura will be mailed a copy of her test results.  2. She plans to follow-up with her medical providers, as needed.  3. She should contact me regularly, if her personal/family history changes, and/or if she wishes to readdress her expanded genetic testing options.    If Laura has any further questions, I encouraged her to contact me at [phone].    Vanita Orozco MS, [facility]  Licensed Genetic Counselor  Office: [phone]  Pager: [phone]

## 2021-03-09 NOTE — TELEPHONE ENCOUNTER
"3/9/2021    Referring Provider: Carmen Stein MD    Presenting Information:  I spoke to Laura by phone today to discuss her genetic testing results. We met on 2/23/2021 and her blood was drawn on 2/24/2021. BRCA1/2 Analyses with the BRCAPlus panel was ordered from [facility]. This testing was done because of Laura's personal and family history of breast cancer.    Genetic Testing Result: NEGATIVE  Laura tested negative for mutations in the JULIA, BRCA1, BRCA2, CDH1, CHEK2, PALB2, PTEN, and TP53 genes by sequencing and deletion/duplication analysis. No mutations were found in any of the eight genes analyzed.     She does not have an identifiable mutation associated with Hereditary Breast and Ovarian Cancer syndrome (BRCA1, BRCA2), Li Fraumeni syndrome (TP53), Hereditary Diffuse Gastric Cancer (CDH1), or Cowden syndrome (PTEN).    A copy of the test report can be found in the Laboratory tab, dated 2/24/2021, and named \"SEND OUTS MISC TEST\". The report is scanned in as a linked document.    Interpretation:  We discussed several different interpretations of this negative test result.    1. One explanation may be that there is a different gene or combination of genes and environment that are associated with the cancers in this family that are not identifiable using this test.  2. Another explanation may be that her breast cancer is sporadic and caused by random cellular changes.  3. There is also a small possibility that there is a mutation in one of these genes, and the testing laboratory could not find it with their current testing methods.       Screening:  Based on this negative test result, it is important for Laura and her relatives to refer back to the family history for appropriate cancer screening.      Laura should continue to follow all breast cancer treatment and future follow-up recommendations as made by her medical providers.    Laura s close female relatives remain at increased risk for breast cancer given " their family history. Breast cancer screening is generally recommended to begin approximately 10 years younger than the earliest age of breast cancer diagnosis in the family, or at age 40, whichever comes first. In this family, screening may begin at age 40. Breast screening options should be discussed with an individual's primary care provider and a genetic counselor, to determine at what age to begin screening, what screening is appropriate, and if additional screening (such as breast MRI) is necessary based on personal/family history factors.    Other population cancer screening options, such as those recommended by the American Cancer Society and the National Comprehensive Cancer Network (NCCN), are also appropriate for Laura and her family. These screening recommendations may change if there are changes to Laura's personal and/or family history of cancer. Final screening recommendations should be made by each individual's managing physician.      Inheritance:  We reviewed the autosomal dominant inheritance of mutations in these eight genes. We discussed that Laura did not pass on an identifiable mutation in these genes to her children based on this test result. Mutations in these genes do not skip generations.      Additional Testing Considerations:  As discussed during our previous visit, there are other genes associated with increased cancer risk that were not included in the BRCAPlus panel. This means it is still possible Laura does carry a currently identifiable gene mutation or combination of genes and environment that increase her risk for breast and/or related cancers. We again reviewed the option of larger gene panels covering more moderate risk genes associated with hereditary breast cancer. Laura stated that she would feel comfortable declining additional genetic testing today, as her major concern was testing to inform her upcoming surgery decisions. We reviewed [facility]'s 60 day re-requisitioning  policy and I encouraged her to contact me if she wishes to readdress these larger gene panel options in the future.    Also, given that Laura is declining additional genetic testing at this time, other relatives may still carry a mutation associated with hereditary cancer in another gene. As such, Laura's daughters could consider meeting with a genetic counselor to discuss their multi-gene testing options. If her daughters, or any other relative, does pursue genetic testing, Laura is encouraged to contact me so that we may review the impact of their test results on her.    Summary:  We do not have an explanation for Laura's breast cancer. While no genetic changes were identified, Laura may still be at risk for certain cancers due to family history, environmental factors, or other genetic causes not identified by this test. Because of that, it is important that she continue with cancer screening based on her personal and family history as discussed above.    Genetic testing is rapidly advancing, and new cancer susceptibility genes will most likely be identified in the future. Therefore, I encouraged Laura to contact me regularly or if there are changes in her personal or family history. This may change how we assess her cancer risk, screening, and the testing we would offer.    Plan:  1. Laura will be mailed a copy of her test results.  2. She plans to follow-up with her medical providers, as needed.  3. She should contact me regularly, if her personal/family history changes, and/or if she wishes to readdress her expanded genetic testing options.    If Laura has any further questions, I encouraged her to contact me at [phone].    Vanita Orozco MS, [facility]  Licensed Genetic Counselor  Office: [phone]  Pager: [phone]

## 2021-03-09 NOTE — Clinical Note
"Please print and send a copy of this letter and the patient's genetic testing report to the patient.    Please enclose test report: \"Send outs misc test Order: [id number]\"  "

## 2021-03-12 ENCOUNTER — OFFICE VISIT (OUTPATIENT)
Dept: FAMILY MEDICINE | Facility: CLINIC | Age: 61
End: 2021-03-12
Payer: COMMERCIAL

## 2021-03-12 VITALS
OXYGEN SATURATION: 97 % | WEIGHT: 200 LBS | BODY MASS INDEX: 33.8 KG/M2 | SYSTOLIC BLOOD PRESSURE: 133 MMHG | RESPIRATION RATE: 16 BRPM | DIASTOLIC BLOOD PRESSURE: 80 MMHG | TEMPERATURE: 97 F | HEART RATE: 68 BPM

## 2021-03-12 DIAGNOSIS — Z17.0 MALIGNANT NEOPLASM OF LEFT BREAST IN FEMALE, ESTROGEN RECEPTOR POSITIVE, UNSPECIFIED SITE OF BREAST (H): ICD-10-CM

## 2021-03-12 DIAGNOSIS — E66.01 MORBID OBESITY (H): ICD-10-CM

## 2021-03-12 DIAGNOSIS — Z01.818 PREOP GENERAL PHYSICAL EXAM: Primary | ICD-10-CM

## 2021-03-12 DIAGNOSIS — C50.912 MALIGNANT NEOPLASM OF LEFT BREAST IN FEMALE, ESTROGEN RECEPTOR POSITIVE, UNSPECIFIED SITE OF BREAST (H): ICD-10-CM

## 2021-03-12 LAB
ANION GAP SERPL CALCULATED.3IONS-SCNC: 5 MMOL/L (ref 3–14)
BUN SERPL-MCNC: 11 MG/DL (ref 7–30)
CALCIUM SERPL-MCNC: 9.7 MG/DL (ref 8.5–10.1)
CHLORIDE SERPL-SCNC: 105 MMOL/L (ref 94–109)
CO2 SERPL-SCNC: 30 MMOL/L (ref 20–32)
CREAT SERPL-MCNC: 0.76 MG/DL (ref 0.52–1.04)
ERYTHROCYTE [DISTWIDTH] IN BLOOD BY AUTOMATED COUNT: 12.4 % (ref 10–15)
GFR SERPL CREATININE-BSD FRML MDRD: 83 ML/MIN/{1.73_M2}
GLUCOSE SERPL-MCNC: 88 MG/DL (ref 70–99)
HCT VFR BLD AUTO: 44.6 % (ref 35–47)
HGB BLD-MCNC: 14.2 G/DL (ref 11.7–15.7)
MCH RBC QN AUTO: 30.7 PG (ref 26.5–33)
MCHC RBC AUTO-ENTMCNC: 31.8 G/DL (ref 31.5–36.5)
MCV RBC AUTO: 97 FL (ref 78–100)
PLATELET # BLD AUTO: 223 10E9/L (ref 150–450)
POTASSIUM SERPL-SCNC: 4.1 MMOL/L (ref 3.4–5.3)
RBC # BLD AUTO: 4.62 10E12/L (ref 3.8–5.2)
SODIUM SERPL-SCNC: 140 MMOL/L (ref 133–144)
WBC # BLD AUTO: 5.7 10E9/L (ref 4–11)

## 2021-03-12 PROCEDURE — 80048 BASIC METABOLIC PNL TOTAL CA: CPT | Performed by: FAMILY MEDICINE

## 2021-03-12 PROCEDURE — 99214 OFFICE O/P EST MOD 30 MIN: CPT | Performed by: FAMILY MEDICINE

## 2021-03-12 PROCEDURE — 36415 COLL VENOUS BLD VENIPUNCTURE: CPT | Performed by: FAMILY MEDICINE

## 2021-03-12 PROCEDURE — 93000 ELECTROCARDIOGRAM COMPLETE: CPT | Performed by: FAMILY MEDICINE

## 2021-03-12 PROCEDURE — 85027 COMPLETE CBC AUTOMATED: CPT | Performed by: FAMILY MEDICINE

## 2021-03-12 NOTE — PATIENT INSTRUCTIONS

## 2021-03-12 NOTE — PROGRESS NOTES
[facility]  [address]  [place] [address]  Phone: [phone]  Primary Provider: Sarita Shultz  Pre-op Performing Provider: SARITA SHULTZ      PREOPERATIVE EVALUATION:  Today's date: 3/12/2021    Laura Ferreira is a 60 year old female who presents for a preoperative evaluation.    Surgical Information:  Surgery/Procedure: bilateral  SKIN SPARING MASTECTOMY WITH right  SENTINEL LYMPH NODE BIOPSY // bilateral BREAST TISSUE EXPANDER  Surgery Location: [facility]   Surgeon: Sarita  Surgery Date: 3/17/21  Time of Surgery: 1 PM  Where patient plans to recover: At home with family  Fax number for surgical facility: Note does not need to be faxed, will be available electronically in Epic.    Type of Anesthesia Anticipated: General    Assessment & Plan     The proposed surgical procedure is considered LOW risk.    Preop general physical exam  - EKG 12-lead complete w/read - Clinics  - CBC with platelets  - Basic metabolic panel    Malignant neoplasm of left breast in female, estrogen receptor positive, unspecified site of breast (H)  Patient plans to proceed with a Bilateral Mastectomy as described above.     Morbid obesity (H)  Weight management plan: Discussed healthy diet and exercise guidelines         Risks and Recommendations:  The patient has the following additional risks and recommendations for perioperative complications:   - No identified additional risk factors other than previously addressed    Medication Instructions:  Patient is to take all scheduled medications on the day of surgery    RECOMMENDATION:  APPROVAL GIVEN to proceed with proposed procedure, without further diagnostic evaluation.        Subjective     HPI related to upcoming procedure:     Laura is a pleasant 60 year old female patient of mine here for a Pre-op Exam.   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 Patient has been seen and evaluated by  Genetics team, Oncology and General Surgery.   Patient wishes to proceed with bilateral  SKIN SPARING MASTECTOMY WITH Right  SENTINEL LYMPH NODE BIOPSY // bilateral BREAST TISSUE EXPANDER.   States that she understands the risks and benefits of procedure and wishes to proceed.     Preop Questions 3/6/2021   1. Have you ever had a heart attack or stroke? No   2. Have you ever had surgery on your heart or blood vessels, such as a stent placement, a coronary artery bypass, or surgery on an artery in your head, neck, heart, or legs? No   3. Do you have chest pain with activity? No   4. Do you have a history of  heart failure? No   5. Do you currently have a cold, bronchitis or symptoms of other infection? No   6. Do you have a cough, shortness of breath, or wheezing? No   7. Do you or anyone in your family have previous history of blood clots? YES -    8. Do you or does anyone in your family have a serious bleeding problem such as prolonged bleeding following surgeries or cuts? No   9. Have you ever had problems with anemia or been told to take iron pills? YES -    10. Have you had any abnormal blood loss such as black, tarry or bloody stools, or abnormal vaginal bleeding? YES -    11. Have you ever had a blood transfusion? No   12. Are you willing to have a blood transfusion if it is medically needed before, during, or after your surgery? Yes   13. Have you or any of your relatives ever had problems with anesthesia? No   14. Do you have sleep apnea, excessive snoring or daytime drowsiness? YES -    14a. Do you have a CPAP machine? No   15. Do you have any artifical heart valves or other implanted medical devices like a pacemaker, defibrillator, or continuous glucose monitor? No   16. Do you have artificial joints? No   17. Are you allergic to latex? YES:        Health Care Directive:  Patient does not have a Health Care Directive or Living Will: Patient states has Advance Directive and will bring in a copy to  clinic.    Preoperative Review of :   reviewed - no record of ongoing controlled substances prescribed.      Status of Chronic Conditions:  See problem list for active medical problems.  Problems all longstanding and stable, except as noted/documented.  See ROS for pertinent symptoms related to these conditions.      Review of Systems  Constitutional, neuro, ENT, endocrine, pulmonary, cardiac, gastrointestinal, genitourinary, musculoskeletal, integument and psychiatric systems are negative, except as otherwise noted.    Patient Active Problem List    Diagnosis Date Noted     Malignant neoplasm of left breast (H) 02/25/2021     Priority: Medium     Added automatically from request for surgery [id number]       Morbid obesity (H) 02/28/2018     Priority: Medium     Postsurgical hypothyroidism 12/05/2017     Priority: Medium     Hyperlipidemia LDL goal <130 12/05/2017     Priority: Medium     Simple endometrial hyperplasia without atypia 08/22/2011     Priority: Medium     Menorrhagia 05/12/2011     Priority: Medium     Iron deficiency anemia 05/12/2011     Priority: Medium     GERD (gastroesophageal reflux disease) 07/02/2010     Priority: Medium      Past Medical History:   Diagnosis Date     Leblanc's esophagus     EGD in 2014; recent EGD in 4/2018; repeat EGD in 3 years     Ex-smoker     1 PPD x 28 years, quit 20 years ago     Hematuria 01/23/2007     History of breast biopsy     left     Menopausal symptoms 07/17/2009     Postsurgical hypothyroidism 07/17/2009     Symptomatic menopausal or female climacteric states 07/17/2009     Tear of lateral cartilage or meniscus of knee, current 10/24/2007     Past Surgical History:   Procedure Laterality Date     APPENDECTOMY OPEN       ARTHROSCOPY KNEE RT/LT  11/15/2007    right knee arthroscopy with arthroscopic lateral meniscectomy     BIOPSY Left     Breast. Benign     CL AFF SURGICAL PATHOLOGY  09/18/2002    endometrial biopsy     COLONOSCOPY       ELBOW SURGERY Right       ESOPHAGOSCOPY, GASTROSCOPY, DUODENOSCOPY (EGD), COMBINED N/A 2014    Procedure: COMBINED ESOPHAGOSCOPY, GASTROSCOPY, DUODENOSCOPY (EGD), BIOPSY SINGLE OR MULTIPLE;  Surgeon: Paradise Radford MD;  Location: [facility] OR     HC THYROIDECTOMY      goitre     LAMINECTOMY, FUSION LUMBAR ONE LEVEL, COMBINED  10/26/2011    Procedure:COMBINED LAMINECTOMY, FUSION LUMBAR ONE LEVEL; L4-5 Decompression, L4-5 Posterior Lateral Fusion with Madhavi and Local Bone; Surgeon:BARBRA BRIGHT; Location:[facility] OR     TONSILLECTOMY & ADENOIDECTOMY       Current Outpatient Medications   Medication Sig Dispense Refill     levothyroxine (SYNTHROID/LEVOTHROID) 112 MCG tablet TAKE 1 TABLET(112 MCG) BY MOUTH DAILY 90 tablet 3     MULTI-VITAMIN OR TABS 1 TABLET DAILY       omeprazole (PRILOSEC) 20 MG DR capsule Take 1 capsule (20 mg) by mouth 2 times daily (before meals) 180 capsule 1     rosuvastatin (CRESTOR) 20 MG tablet Take 1 tablet (20 mg) by mouth daily 90 tablet 3       Allergies   Allergen Reactions     Latex Anaphylaxis     Adhesive Tape      Cortisone Nausea and Vomiting     oral     Vicodin [Acetaminophen] Nausea and Vomiting        Social History     Tobacco Use     Smoking status: Former Smoker     Years: 14.00     Types: Cigarettes     Quit date: 12/15/1998     Years since quittin.2     Smokeless tobacco: Never Used     Tobacco comment: quit 20 years ago   Substance Use Topics     Alcohol use: Yes     Comment: occ     Family History   Problem Relation Age of Onset     Asthma Mother      Diabetes Mother      Hypertension Mother      Arthritis Mother      Circulatory Mother      Obesity Mother      Thyroid Disease Mother      Aneurysm Mother         Brain,  at age 69     Hypertension Father      Alcohol/Drug Father      Arthritis Father      Obesity Father      Breast Cancer Maternal Grandmother      Thyroid Disease Brother      Colon Cancer No family hx of      Anesthesia Reaction No family hx of      History   Drug Use No          Objective     /80   Pulse 68   Temp 97  F (36.1  C) (Tympanic)   Resp 16   Wt 90.7 kg (200 lb)   LMP 04/01/2012   SpO2 97%   Breastfeeding No   BMI 33.80 kg/m      Physical Exam    GENERAL APPEARANCE: healthy, alert and no distress     EYES: EOMI, PERRL     HENT: ear canals and TM's normal and nose and mouth without ulcers or lesions     NECK: no adenopathy, no asymmetry, masses, or scars and thyroid normal to palpation     RESP: lungs clear to auscultation - no rales, rhonchi or wheezes     CV: regular rates and rhythm, normal S1 S2, no S3 or S4 and no murmur, click or rub     ABDOMEN:  soft, nontender, no HSM or masses and bowel sounds normal     MS: extremities normal- no gross deformities noted, no evidence of inflammation in joints, FROM in all extremities.     SKIN: no suspicious lesions or rashes     NEURO: Normal strength and tone, sensory exam grossly normal, mentation intact and speech normal     PSYCH: mentation appears normal. and affect normal/bright     LYMPHATICS: No cervical adenopathy    Recent Labs   Lab Test 12/15/20  1140      POTASSIUM 4.2   CR 0.72        Diagnostics:  Labs pending at this time.  Results will be reviewed when available.   EKG: appears normal, NSR, normal axis, normal intervals, no acute ST/T changes c/w ischemia, no LVH by voltage criteria, unchanged from previous tracings    Revised Cardiac Risk Index (RCRI):  The patient has the following serious cardiovascular risks for perioperative complications:   - No serious cardiac risks = 0 points     RCRI Interpretation: 0 points: Class I (very low risk - 0.4% complication rate)           Signed Electronically by: Sarita Shultz MD  Copy of this evaluation report is provided to requesting physician.

## 2021-03-15 DIAGNOSIS — Z11.59 ENCOUNTER FOR SCREENING FOR OTHER VIRAL DISEASES: ICD-10-CM

## 2021-03-15 LAB
SARS-COV-2 RNA RESP QL NAA+PROBE: NORMAL
SPECIMEN SOURCE: NORMAL

## 2021-03-15 PROCEDURE — U0003 INFECTIOUS AGENT DETECTION BY NUCLEIC ACID (DNA OR RNA); SEVERE ACUTE RESPIRATORY SYNDROME CORONAVIRUS 2 (SARS-COV-2) (CORONAVIRUS DISEASE [COVID-19]), AMPLIFIED PROBE TECHNIQUE, MAKING USE OF HIGH THROUGHPUT TECHNOLOGIES AS DESCRIBED BY CMS-2020-01-R: HCPCS | Performed by: SURGERY

## 2021-03-15 PROCEDURE — U0005 INFEC AGEN DETEC AMPLI PROBE: HCPCS | Performed by: SURGERY

## 2021-03-15 NOTE — PATIENT INSTRUCTIONS
Negative Genetic Test Result    Genetic Testing  You had a blood test that looked at the genetic information in one or more genes associated with increased cancer risk.  The testing looked for any harmful changes that would stop this particular gene from working like it should. If an individual does not have any harmful changes or variants of unknown significance found from their blood test, their genetic test result is reported as negative.       Results  The genetic test did not identify any pathogenic (harmful) changes in the genes that were tested. There are several possible explanations for a negative test result. Without knowing the gene mutation in your family, the cause of the cancer in you or your relatives is still unknown. Your genetic counselor can help interpret the result for you and your relatives. In this case, there are several reasons that may explain the negative test result:    There may be a gene mutation in the family that you did not inherit.     You may have a gene mutation in a different gene that was not included in the test, or has not yet been discovered.     The cancers in you or your family may be due to a combination of genetic factors and environment (multifactorial/familial).    The cancers in you or your family may be sporadic/random cancers.    There is very small chance that a mutation was not found by current testing methods.  As testing technology evolves over time, it may still be possible to identify a mutation in a gene that was not found on this test.    It is important to note which genes were included in your test. A list of these genes can be found on your test result.    Screening Recommendations  Due to this negative test result, cancer screening recommendations should be based on your personal and family history. This may include increased cancer screening for you and/or your family members. Your genetic counselor and health care provider can help make  appropriate recommendations.      Please call us if you have any questions or concerns.   Cancer Risk Management Program [phone]-CANCER ([phone])  ? Kleber Ovalle, MS, [facility] [phone]  ? Nathaly Dominique, MS, [facility]  [phone]  ? Chuyita Connor, MS, [facility]  [phone]  ? Vanita Orozco, MS, [facility] [phone]  ? Gabrielle Garcia, MS, [facility] [phone]  ? Alina Zuñiga, MS, [facility]  [phone]

## 2021-03-16 ENCOUNTER — TELEPHONE (OUTPATIENT)
Dept: SURGERY | Facility: CLINIC | Age: 61
End: 2021-03-16

## 2021-03-16 LAB
LABORATORY COMMENT REPORT: NORMAL
SARS-COV-2 RNA RESP QL NAA+PROBE: NEGATIVE
SPECIMEN SOURCE: NORMAL

## 2021-03-16 RX ORDER — ACETAMINOPHEN 500 MG
500-1000 TABLET ORAL DAILY PRN
COMMUNITY

## 2021-03-16 RX ORDER — LEVOTHYROXINE SODIUM 112 UG/1
112 TABLET ORAL DAILY
COMMUNITY
End: 2021-11-21

## 2021-03-16 NOTE — PROGRESS NOTES
PTA medications updated by Medication Scribe prior to surgery via phone call with patient        Comments:    Medication history sources: Patient, Surescripts and H&P  Medication history source reliability: Good  Adherence assessment: N/A Not Observed    Significant changes made to the medication list:  None      Additional medication history information:   None        Prior to Admission medications    Medication Sig Last Dose Taking? Auth Provider   acetaminophen (TYLENOL) 500 MG tablet Take 500-1,000 mg by mouth daily as needed for mild pain  at PRN Yes Reported, Patient   Ferrous Sulfate (IRON PO) Take 1 tablet by mouth daily 3/16/2021 at AM Yes Reported, Patient   levothyroxine (SYNTHROID/LEVOTHROID) 112 MCG tablet Take 112 mcg by mouth daily  at AM Yes Reported, Patient   Multiple Vitamins-Minerals (MULTIVITAL PO) Take 1 tablet by mouth daily 3/12/2021 Yes Reported, Patient   omeprazole (PRILOSEC) 20 MG DR capsule Take 1 capsule (20 mg) by mouth 2 times daily (before meals)  Yes Sarita Shultz MD   OVER-THE-COUNTER Place 1-2 drops into both eyes every hour as needed  at PRN Yes Reported, Patient   Probiotic Product (PROBIOTIC PO) Take 1 tablet by mouth daily 3/16/2021 at AM Yes Reported, Patient   rosuvastatin (CRESTOR) 20 MG tablet Take 1 tablet (20 mg) by mouth daily  at AM Yes Sarita Shultz MD

## 2021-03-16 NOTE — TELEPHONE ENCOUNTER
Laura is scheduled for bilateral skin sparing mastectomy, RIGHT sentinel lymph node biopsy on 3/17/2021 with Dr. Stein. Reconstruction per Dr. Garcia. Pre procedure call placed to patient.     Component 2d ago   SARS-CoV-2 Virus Specimen Source Nasopharyngeal    SARS-CoV-2 PCR Result NEGATIVE    Comment: SARS-CoV2 (COVID-19) RNA not detected, presumed negative.       Reviewed the following with Laura:   -One visitor is allowed for hospital visits, clinic appointments, and emergency department visits. It must be the same individual for the patient s entire stay.  -Adult surgical patients can have one visitor only before surgery.  -One or two visitors may attend a patient discharge meeting for hands-on training.  -Visitation hours for adult inpatients are 8 a.m. to 8:30 p.m.  -Please wear a mask to your appointment.   -Please arrive at 11 am at Welcome Desk.    Shima Sellers RN, BSN, OCN  Breast Nurse Navigator  [facility]  [facility]  Phone: [phone]

## 2021-03-17 ENCOUNTER — APPOINTMENT (OUTPATIENT)
Dept: SURGERY | Facility: PHYSICIAN GROUP | Age: 61
End: 2021-03-17
Payer: COMMERCIAL

## 2021-03-17 ENCOUNTER — ANESTHESIA (OUTPATIENT)
Dept: SURGERY | Facility: CLINIC | Age: 61
End: 2021-03-17
Payer: COMMERCIAL

## 2021-03-17 ENCOUNTER — HOSPITAL ENCOUNTER (OUTPATIENT)
Dept: NUCLEAR MEDICINE | Facility: CLINIC | Age: 61
Setting detail: NUCLEAR MEDICINE
Discharge: HOME | End: 2021-03-17
Attending: SURGERY | Admitting: SURGERY
Payer: COMMERCIAL

## 2021-03-17 ENCOUNTER — ANESTHESIA EVENT (OUTPATIENT)
Dept: SURGERY | Facility: CLINIC | Age: 61
End: 2021-03-17
Payer: COMMERCIAL

## 2021-03-17 ENCOUNTER — SURGERY (OUTPATIENT)
Age: 61
End: 2021-03-17
Payer: COMMERCIAL

## 2021-03-17 ENCOUNTER — HOSPITAL ENCOUNTER (OUTPATIENT)
Facility: CLINIC | Age: 61
Setting detail: OBSERVATION
Discharge: HOME | End: 2021-03-18
Attending: SURGERY | Admitting: PLASTIC SURGERY
Payer: COMMERCIAL

## 2021-03-17 DIAGNOSIS — C50.912 MALIGNANT NEOPLASM OF LEFT BREAST (H): ICD-10-CM

## 2021-03-17 DIAGNOSIS — C50.911 MALIGNANT NEOPLASM OF RIGHT FEMALE BREAST, UNSPECIFIED ESTROGEN RECEPTOR STATUS, UNSPECIFIED SITE OF BREAST (H): Primary | ICD-10-CM

## 2021-03-17 DIAGNOSIS — G89.18 ACUTE POST-OPERATIVE PAIN: ICD-10-CM

## 2021-03-17 PROCEDURE — 999N000141 HC STATISTIC PRE-PROCEDURE NURSING ASSESSMENT: Performed by: SURGERY

## 2021-03-17 PROCEDURE — 88307 TISSUE EXAM BY PATHOLOGIST: CPT | Mod: 26 | Performed by: PATHOLOGY

## 2021-03-17 PROCEDURE — 88360 TUMOR IMMUNOHISTOCHEM/MANUAL: CPT | Mod: 26 | Performed by: PATHOLOGY

## 2021-03-17 PROCEDURE — 88307 TISSUE EXAM BY PATHOLOGIST: CPT | Mod: TC | Performed by: SURGERY

## 2021-03-17 PROCEDURE — 250N000009 HC RX 250: Performed by: NURSE ANESTHETIST, CERTIFIED REGISTERED

## 2021-03-17 PROCEDURE — 19303 MAST SIMPLE COMPLETE: CPT | Mod: 50 | Performed by: PHYSICIAN ASSISTANT

## 2021-03-17 PROCEDURE — 88331 PATH CONSLTJ SURG 1 BLK 1SPC: CPT | Mod: TC,91 | Performed by: SURGERY

## 2021-03-17 PROCEDURE — 360N000076 HC SURGERY LEVEL 3, PER MIN: Performed by: SURGERY

## 2021-03-17 PROCEDURE — 343N000001 HC RX 343: Performed by: SURGERY

## 2021-03-17 PROCEDURE — 38900 IO MAP OF SENT LYMPH NODE: CPT | Performed by: SURGERY

## 2021-03-17 PROCEDURE — 38792 RA TRACER ID OF SENTINL NODE: CPT

## 2021-03-17 PROCEDURE — 250N000011 HC RX IP 250 OP 636: Performed by: ANESTHESIOLOGY

## 2021-03-17 PROCEDURE — 250N000013 HC RX MED GY IP 250 OP 250 PS 637: Performed by: ANESTHESIOLOGY

## 2021-03-17 PROCEDURE — 250N000013 HC RX MED GY IP 250 OP 250 PS 637: Performed by: PLASTIC SURGERY

## 2021-03-17 PROCEDURE — 250N000011 HC RX IP 250 OP 636: Performed by: NURSE ANESTHETIST, CERTIFIED REGISTERED

## 2021-03-17 PROCEDURE — 258N000003 HC RX IP 258 OP 636: Performed by: PLASTIC SURGERY

## 2021-03-17 PROCEDURE — G0378 HOSPITAL OBSERVATION PER HR: HCPCS

## 2021-03-17 PROCEDURE — 272N000001 HC OR GENERAL SUPPLY STERILE: Performed by: SURGERY

## 2021-03-17 PROCEDURE — 38525 BIOPSY/REMOVAL LYMPH NODES: CPT | Mod: RT | Performed by: SURGERY

## 2021-03-17 PROCEDURE — 88331 PATH CONSLTJ SURG 1 BLK 1SPC: CPT | Mod: 26 | Performed by: PATHOLOGY

## 2021-03-17 PROCEDURE — 250N000011 HC RX IP 250 OP 636: Performed by: PLASTIC SURGERY

## 2021-03-17 PROCEDURE — 370N000017 HC ANESTHESIA TECHNICAL FEE, PER MIN: Performed by: SURGERY

## 2021-03-17 PROCEDURE — 88360 TUMOR IMMUNOHISTOCHEM/MANUAL: CPT | Mod: TC | Performed by: SURGERY

## 2021-03-17 PROCEDURE — 250N000009 HC RX 250: Performed by: ANESTHESIOLOGY

## 2021-03-17 PROCEDURE — L8699 PROSTHETIC IMPLANT NOS: HCPCS | Performed by: SURGERY

## 2021-03-17 PROCEDURE — 710N000009 HC RECOVERY PHASE 1, LEVEL 1, PER MIN: Performed by: SURGERY

## 2021-03-17 PROCEDURE — A9520 TC99 TILMANOCEPT DIAG 0.5MCI: HCPCS | Performed by: SURGERY

## 2021-03-17 PROCEDURE — 19303 MAST SIMPLE COMPLETE: CPT | Mod: RT | Performed by: SURGERY

## 2021-03-17 PROCEDURE — 250N000025 HC SEVOFLURANE, PER MIN: Performed by: SURGERY

## 2021-03-17 PROCEDURE — 258N000003 HC RX IP 258 OP 636: Performed by: NURSE ANESTHETIST, CERTIFIED REGISTERED

## 2021-03-17 PROCEDURE — 250N000012 HC RX MED GY IP 250 OP 636 PS 637: Performed by: ANESTHESIOLOGY

## 2021-03-17 DEVICE — NATRELLE TE SMOOTH 133S-FX-14-T (US)
Type: IMPLANTABLE DEVICE | Site: BREAST | Status: FUNCTIONAL
Brand: NATRELLE 133S TISSUE EXPANDERS

## 2021-03-17 DEVICE — IMPLANTABLE DEVICE: Type: IMPLANTABLE DEVICE | Site: BREAST | Status: FUNCTIONAL

## 2021-03-17 RX ORDER — BISACODYL 10 MG
10 SUPPOSITORY, RECTAL RECTAL DAILY PRN
Status: DISCONTINUED | OUTPATIENT
Start: 2021-03-17 | End: 2021-03-18 | Stop reason: HOSPADM

## 2021-03-17 RX ORDER — PROPOFOL 10 MG/ML
INJECTION, EMULSION INTRAVENOUS PRN
Status: DISCONTINUED | OUTPATIENT
Start: 2021-03-17 | End: 2021-03-17

## 2021-03-17 RX ORDER — METHOCARBAMOL 750 MG/1
750 TABLET, FILM COATED ORAL EVERY 6 HOURS PRN
Status: DISCONTINUED | OUTPATIENT
Start: 2021-03-17 | End: 2021-03-18 | Stop reason: HOSPADM

## 2021-03-17 RX ORDER — OXYCODONE HYDROCHLORIDE 5 MG/1
10 TABLET ORAL EVERY 4 HOURS PRN
Status: DISCONTINUED | OUTPATIENT
Start: 2021-03-17 | End: 2021-03-18 | Stop reason: HOSPADM

## 2021-03-17 RX ORDER — PROPOFOL 10 MG/ML
INJECTION, EMULSION INTRAVENOUS CONTINUOUS PRN
Status: DISCONTINUED | OUTPATIENT
Start: 2021-03-17 | End: 2021-03-17

## 2021-03-17 RX ORDER — NALOXONE HYDROCHLORIDE 0.4 MG/ML
0.2 INJECTION, SOLUTION INTRAMUSCULAR; INTRAVENOUS; SUBCUTANEOUS
Status: DISCONTINUED | OUTPATIENT
Start: 2021-03-17 | End: 2021-03-18 | Stop reason: HOSPADM

## 2021-03-17 RX ORDER — AMOXICILLIN 250 MG
1 CAPSULE ORAL 2 TIMES DAILY
Status: DISCONTINUED | OUTPATIENT
Start: 2021-03-17 | End: 2021-03-18 | Stop reason: HOSPADM

## 2021-03-17 RX ORDER — CEFAZOLIN SODIUM 2 G/100ML
2 INJECTION, SOLUTION INTRAVENOUS
Status: DISCONTINUED | OUTPATIENT
Start: 2021-03-17 | End: 2021-03-17 | Stop reason: HOSPADM

## 2021-03-17 RX ORDER — ONDANSETRON 2 MG/ML
4 INJECTION INTRAMUSCULAR; INTRAVENOUS EVERY 6 HOURS PRN
Status: DISCONTINUED | OUTPATIENT
Start: 2021-03-17 | End: 2021-03-18 | Stop reason: HOSPADM

## 2021-03-17 RX ORDER — ONDANSETRON 2 MG/ML
INJECTION INTRAMUSCULAR; INTRAVENOUS PRN
Status: DISCONTINUED | OUTPATIENT
Start: 2021-03-17 | End: 2021-03-17

## 2021-03-17 RX ORDER — CEFAZOLIN SODIUM 2 G/100ML
2 INJECTION, SOLUTION INTRAVENOUS EVERY 8 HOURS
Status: COMPLETED | OUTPATIENT
Start: 2021-03-17 | End: 2021-03-18

## 2021-03-17 RX ORDER — NALOXONE HYDROCHLORIDE 0.4 MG/ML
0.4 INJECTION, SOLUTION INTRAMUSCULAR; INTRAVENOUS; SUBCUTANEOUS
Status: DISCONTINUED | OUTPATIENT
Start: 2021-03-17 | End: 2021-03-18 | Stop reason: HOSPADM

## 2021-03-17 RX ORDER — CEFAZOLIN SODIUM 2 G/100ML
2 INJECTION, SOLUTION INTRAVENOUS SEE ADMIN INSTRUCTIONS
Status: DISCONTINUED | OUTPATIENT
Start: 2021-03-17 | End: 2021-03-17 | Stop reason: HOSPADM

## 2021-03-17 RX ORDER — ONDANSETRON 4 MG/1
4 TABLET, ORALLY DISINTEGRATING ORAL EVERY 30 MIN PRN
Status: DISCONTINUED | OUTPATIENT
Start: 2021-03-17 | End: 2021-03-17 | Stop reason: HOSPADM

## 2021-03-17 RX ORDER — HYDROXYZINE HYDROCHLORIDE 25 MG/1
50 TABLET, FILM COATED ORAL ONCE
Status: COMPLETED | OUTPATIENT
Start: 2021-03-17 | End: 2021-03-17

## 2021-03-17 RX ORDER — EPHEDRINE SULFATE 50 MG/ML
INJECTION, SOLUTION INTRAMUSCULAR; INTRAVENOUS; SUBCUTANEOUS PRN
Status: DISCONTINUED | OUTPATIENT
Start: 2021-03-17 | End: 2021-03-17

## 2021-03-17 RX ORDER — LIDOCAINE 40 MG/G
CREAM TOPICAL
Status: DISCONTINUED | OUTPATIENT
Start: 2021-03-17 | End: 2021-03-18 | Stop reason: HOSPADM

## 2021-03-17 RX ORDER — ACETAMINOPHEN 500 MG
1000 TABLET ORAL ONCE
Status: COMPLETED | OUTPATIENT
Start: 2021-03-17 | End: 2021-03-17

## 2021-03-17 RX ORDER — SCOLOPAMINE TRANSDERMAL SYSTEM 1 MG/1
1 PATCH, EXTENDED RELEASE TRANSDERMAL ONCE
Status: COMPLETED | OUTPATIENT
Start: 2021-03-17 | End: 2021-03-18

## 2021-03-17 RX ORDER — VECURONIUM BROMIDE 1 MG/ML
INJECTION, POWDER, LYOPHILIZED, FOR SOLUTION INTRAVENOUS PRN
Status: DISCONTINUED | OUTPATIENT
Start: 2021-03-17 | End: 2021-03-17

## 2021-03-17 RX ORDER — APREPITANT 40 MG/1
40 CAPSULE ORAL DAILY
Status: DISCONTINUED | OUTPATIENT
Start: 2021-03-17 | End: 2021-03-17 | Stop reason: HOSPADM

## 2021-03-17 RX ORDER — HYDROMORPHONE HYDROCHLORIDE 1 MG/ML
0.2 INJECTION, SOLUTION INTRAMUSCULAR; INTRAVENOUS; SUBCUTANEOUS
Status: DISCONTINUED | OUTPATIENT
Start: 2021-03-17 | End: 2021-03-18 | Stop reason: HOSPADM

## 2021-03-17 RX ORDER — FENTANYL CITRATE 50 UG/ML
25-50 INJECTION, SOLUTION INTRAMUSCULAR; INTRAVENOUS
Status: DISCONTINUED | OUTPATIENT
Start: 2021-03-17 | End: 2021-03-17 | Stop reason: HOSPADM

## 2021-03-17 RX ORDER — CEFAZOLIN SODIUM 2 G/100ML
2 INJECTION, SOLUTION INTRAVENOUS
Status: COMPLETED | OUTPATIENT
Start: 2021-03-17 | End: 2021-03-17

## 2021-03-17 RX ORDER — HYDROMORPHONE HYDROCHLORIDE 1 MG/ML
0.4 INJECTION, SOLUTION INTRAMUSCULAR; INTRAVENOUS; SUBCUTANEOUS
Status: DISCONTINUED | OUTPATIENT
Start: 2021-03-17 | End: 2021-03-18 | Stop reason: HOSPADM

## 2021-03-17 RX ORDER — NALOXONE HYDROCHLORIDE 0.4 MG/ML
0.4 INJECTION, SOLUTION INTRAMUSCULAR; INTRAVENOUS; SUBCUTANEOUS
Status: DISCONTINUED | OUTPATIENT
Start: 2021-03-17 | End: 2021-03-17

## 2021-03-17 RX ORDER — INDOCYANINE GREEN AND WATER 25 MG
KIT INJECTION PRN
Status: DISCONTINUED | OUTPATIENT
Start: 2021-03-17 | End: 2021-03-17

## 2021-03-17 RX ORDER — SODIUM CHLORIDE, SODIUM LACTATE, POTASSIUM CHLORIDE, CALCIUM CHLORIDE 600; 310; 30; 20 MG/100ML; MG/100ML; MG/100ML; MG/100ML
INJECTION, SOLUTION INTRAVENOUS CONTINUOUS
Status: DISCONTINUED | OUTPATIENT
Start: 2021-03-17 | End: 2021-03-18

## 2021-03-17 RX ORDER — NALOXONE HYDROCHLORIDE 0.4 MG/ML
0.2 INJECTION, SOLUTION INTRAMUSCULAR; INTRAVENOUS; SUBCUTANEOUS
Status: DISCONTINUED | OUTPATIENT
Start: 2021-03-17 | End: 2021-03-17

## 2021-03-17 RX ORDER — ONDANSETRON 4 MG/1
4 TABLET, ORALLY DISINTEGRATING ORAL EVERY 6 HOURS PRN
Status: DISCONTINUED | OUTPATIENT
Start: 2021-03-17 | End: 2021-03-18 | Stop reason: HOSPADM

## 2021-03-17 RX ORDER — OXYCODONE HYDROCHLORIDE 5 MG/1
5 TABLET ORAL EVERY 4 HOURS PRN
Status: DISCONTINUED | OUTPATIENT
Start: 2021-03-17 | End: 2021-03-18 | Stop reason: HOSPADM

## 2021-03-17 RX ORDER — DOCUSATE SODIUM 100 MG/1
100 CAPSULE, LIQUID FILLED ORAL 2 TIMES DAILY
Status: DISCONTINUED | OUTPATIENT
Start: 2021-03-17 | End: 2021-03-18 | Stop reason: HOSPADM

## 2021-03-17 RX ORDER — LEVOTHYROXINE SODIUM 112 UG/1
112 TABLET ORAL DAILY
Status: CANCELLED | OUTPATIENT
Start: 2021-03-17

## 2021-03-17 RX ORDER — FENTANYL CITRATE 50 UG/ML
INJECTION, SOLUTION INTRAMUSCULAR; INTRAVENOUS PRN
Status: DISCONTINUED | OUTPATIENT
Start: 2021-03-17 | End: 2021-03-17

## 2021-03-17 RX ORDER — HYDROMORPHONE HYDROCHLORIDE 1 MG/ML
.3-.5 INJECTION, SOLUTION INTRAMUSCULAR; INTRAVENOUS; SUBCUTANEOUS EVERY 5 MIN PRN
Status: DISCONTINUED | OUTPATIENT
Start: 2021-03-17 | End: 2021-03-17 | Stop reason: HOSPADM

## 2021-03-17 RX ORDER — ACETAMINOPHEN 325 MG/1
650 TABLET ORAL EVERY 4 HOURS PRN
Status: DISCONTINUED | OUTPATIENT
Start: 2021-03-20 | End: 2021-03-18 | Stop reason: HOSPADM

## 2021-03-17 RX ORDER — PROCHLORPERAZINE MALEATE 10 MG
10 TABLET ORAL EVERY 6 HOURS PRN
Status: DISCONTINUED | OUTPATIENT
Start: 2021-03-17 | End: 2021-03-18 | Stop reason: HOSPADM

## 2021-03-17 RX ORDER — ONDANSETRON 2 MG/ML
4 INJECTION INTRAMUSCULAR; INTRAVENOUS EVERY 30 MIN PRN
Status: DISCONTINUED | OUTPATIENT
Start: 2021-03-17 | End: 2021-03-17 | Stop reason: HOSPADM

## 2021-03-17 RX ORDER — POLYETHYLENE GLYCOL 3350 17 G/17G
17 POWDER, FOR SOLUTION ORAL DAILY
Status: DISCONTINUED | OUTPATIENT
Start: 2021-03-18 | End: 2021-03-18 | Stop reason: HOSPADM

## 2021-03-17 RX ORDER — ACETAMINOPHEN 325 MG/1
975 TABLET ORAL EVERY 8 HOURS
Status: DISCONTINUED | OUTPATIENT
Start: 2021-03-17 | End: 2021-03-18 | Stop reason: HOSPADM

## 2021-03-17 RX ORDER — ROSUVASTATIN CALCIUM 20 MG/1
20 TABLET, COATED ORAL DAILY
Status: DISCONTINUED | OUTPATIENT
Start: 2021-03-18 | End: 2021-03-18 | Stop reason: HOSPADM

## 2021-03-17 RX ORDER — DEXAMETHASONE SODIUM PHOSPHATE 4 MG/ML
INJECTION, SOLUTION INTRA-ARTICULAR; INTRALESIONAL; INTRAMUSCULAR; INTRAVENOUS; SOFT TISSUE PRN
Status: DISCONTINUED | OUTPATIENT
Start: 2021-03-17 | End: 2021-03-17

## 2021-03-17 RX ORDER — SODIUM CHLORIDE, SODIUM LACTATE, POTASSIUM CHLORIDE, CALCIUM CHLORIDE 600; 310; 30; 20 MG/100ML; MG/100ML; MG/100ML; MG/100ML
INJECTION, SOLUTION INTRAVENOUS CONTINUOUS
Status: DISCONTINUED | OUTPATIENT
Start: 2021-03-17 | End: 2021-03-17 | Stop reason: HOSPADM

## 2021-03-17 RX ORDER — SODIUM CHLORIDE, SODIUM LACTATE, POTASSIUM CHLORIDE, CALCIUM CHLORIDE 600; 310; 30; 20 MG/100ML; MG/100ML; MG/100ML; MG/100ML
INJECTION, SOLUTION INTRAVENOUS CONTINUOUS PRN
Status: DISCONTINUED | OUTPATIENT
Start: 2021-03-17 | End: 2021-03-17

## 2021-03-17 RX ORDER — LIDOCAINE HYDROCHLORIDE 20 MG/ML
INJECTION, SOLUTION INFILTRATION; PERINEURAL PRN
Status: DISCONTINUED | OUTPATIENT
Start: 2021-03-17 | End: 2021-03-17

## 2021-03-17 RX ADMIN — Medication 5 MG: at 16:32

## 2021-03-17 RX ADMIN — PROPOFOL 200 MG: 10 INJECTION, EMULSION INTRAVENOUS at 13:04

## 2021-03-17 RX ADMIN — ACETAMINOPHEN 1000 MG: 500 TABLET, FILM COATED ORAL at 11:56

## 2021-03-17 RX ADMIN — SUGAMMADEX 180 MG: 100 INJECTION, SOLUTION INTRAVENOUS at 16:48

## 2021-03-17 RX ADMIN — CEFAZOLIN SODIUM 2 G: 2 INJECTION, SOLUTION INTRAVENOUS at 22:17

## 2021-03-17 RX ADMIN — ONDANSETRON 4 MG: 2 INJECTION INTRAMUSCULAR; INTRAVENOUS at 16:48

## 2021-03-17 RX ADMIN — VECURONIUM BROMIDE 2 MG: 1 INJECTION, POWDER, LYOPHILIZED, FOR SOLUTION INTRAVENOUS at 13:46

## 2021-03-17 RX ADMIN — SODIUM CHLORIDE, POTASSIUM CHLORIDE, SODIUM LACTATE AND CALCIUM CHLORIDE: 600; 310; 30; 20 INJECTION, SOLUTION INTRAVENOUS at 12:57

## 2021-03-17 RX ADMIN — ACETAMINOPHEN 975 MG: 325 TABLET, FILM COATED ORAL at 20:07

## 2021-03-17 RX ADMIN — SODIUM CHLORIDE, POTASSIUM CHLORIDE, SODIUM LACTATE AND CALCIUM CHLORIDE: 600; 310; 30; 20 INJECTION, SOLUTION INTRAVENOUS at 15:53

## 2021-03-17 RX ADMIN — APREPITANT 40 MG: 40 CAPSULE ORAL at 11:57

## 2021-03-17 RX ADMIN — HYDROMORPHONE HYDROCHLORIDE 0.5 MG: 1 INJECTION, SOLUTION INTRAMUSCULAR; INTRAVENOUS; SUBCUTANEOUS at 17:31

## 2021-03-17 RX ADMIN — CEFAZOLIN SODIUM 1 G: 2 INJECTION, SOLUTION INTRAVENOUS at 15:22

## 2021-03-17 RX ADMIN — OXYCODONE HYDROCHLORIDE 5 MG: 5 TABLET ORAL at 21:54

## 2021-03-17 RX ADMIN — Medication 5 MG: at 14:35

## 2021-03-17 RX ADMIN — LIDOCAINE HYDROCHLORIDE 100 MG: 20 INJECTION, SOLUTION INFILTRATION; PERINEURAL at 13:04

## 2021-03-17 RX ADMIN — HYDROMORPHONE HYDROCHLORIDE 0.4 MG: 1 INJECTION, SOLUTION INTRAMUSCULAR; INTRAVENOUS; SUBCUTANEOUS at 20:21

## 2021-03-17 RX ADMIN — PROPOFOL 50 MCG/KG/MIN: 10 INJECTION, EMULSION INTRAVENOUS at 13:04

## 2021-03-17 RX ADMIN — SENNOSIDES AND DOCUSATE SODIUM 1 TABLET: 8.6; 5 TABLET ORAL at 20:07

## 2021-03-17 RX ADMIN — HYDROXYZINE HYDROCHLORIDE 50 MG: 50 TABLET, FILM COATED ORAL at 11:57

## 2021-03-17 RX ADMIN — OMEPRAZOLE 20 MG: 20 CAPSULE, DELAYED RELEASE ORAL at 20:06

## 2021-03-17 RX ADMIN — Medication 10 MG: at 13:49

## 2021-03-17 RX ADMIN — VECURONIUM BROMIDE 1 MG: 1 INJECTION, POWDER, LYOPHILIZED, FOR SOLUTION INTRAVENOUS at 14:05

## 2021-03-17 RX ADMIN — FENTANYL CITRATE 50 MCG: 50 INJECTION, SOLUTION INTRAMUSCULAR; INTRAVENOUS at 13:04

## 2021-03-17 RX ADMIN — HYDROMORPHONE HYDROCHLORIDE 0.5 MG: 1 INJECTION, SOLUTION INTRAMUSCULAR; INTRAVENOUS; SUBCUTANEOUS at 13:29

## 2021-03-17 RX ADMIN — HYDROMORPHONE HYDROCHLORIDE 0.5 MG: 1 INJECTION, SOLUTION INTRAMUSCULAR; INTRAVENOUS; SUBCUTANEOUS at 18:01

## 2021-03-17 RX ADMIN — DEXAMETHASONE SODIUM PHOSPHATE 4 MG: 4 INJECTION, SOLUTION INTRA-ARTICULAR; INTRALESIONAL; INTRAMUSCULAR; INTRAVENOUS; SOFT TISSUE at 13:20

## 2021-03-17 RX ADMIN — TILMANOCEPT 0.55 MILLICURIE: KIT at 12:05

## 2021-03-17 RX ADMIN — SCOPOLAMINE 1 PATCH: 1 PATCH TRANSDERMAL at 11:57

## 2021-03-17 RX ADMIN — INDOCYANINE GREEN 10 MG: KIT INTRAVENOUS at 15:52

## 2021-03-17 RX ADMIN — FENTANYL CITRATE 50 MCG: 50 INJECTION, SOLUTION INTRAMUSCULAR; INTRAVENOUS at 13:25

## 2021-03-17 RX ADMIN — SODIUM CHLORIDE, POTASSIUM CHLORIDE, SODIUM LACTATE AND CALCIUM CHLORIDE: 600; 310; 30; 20 INJECTION, SOLUTION INTRAVENOUS at 22:18

## 2021-03-17 RX ADMIN — ROCURONIUM BROMIDE 50 MG: 10 INJECTION INTRAVENOUS at 13:05

## 2021-03-17 RX ADMIN — MIDAZOLAM 2 MG: 1 INJECTION INTRAMUSCULAR; INTRAVENOUS at 13:01

## 2021-03-17 RX ADMIN — SODIUM CHLORIDE, POTASSIUM CHLORIDE, SODIUM LACTATE AND CALCIUM CHLORIDE: 600; 310; 30; 20 INJECTION, SOLUTION INTRAVENOUS at 18:42

## 2021-03-17 RX ADMIN — Medication 10 MG: at 13:40

## 2021-03-17 RX ADMIN — DOCUSATE SODIUM 100 MG: 100 CAPSULE, LIQUID FILLED ORAL at 20:07

## 2021-03-17 RX ADMIN — CEFAZOLIN SODIUM 2 G: 2 INJECTION, SOLUTION INTRAVENOUS at 13:13

## 2021-03-17 ASSESSMENT — COPD QUESTIONNAIRES: COPD: 0

## 2021-03-17 ASSESSMENT — LIFESTYLE VARIABLES: TOBACCO_USE: 1

## 2021-03-17 ASSESSMENT — ENCOUNTER SYMPTOMS
SEIZURES: 0
DYSRHYTHMIAS: 0

## 2021-03-17 ASSESSMENT — MIFFLIN-ST. JEOR: SCORE: 1477.15

## 2021-03-17 NOTE — ANESTHESIA PROCEDURE NOTES
Airway       Patient location during procedure: OR ([facility] - Operating Room or Procedural Area)       Procedure Start/Stop Times: 3/17/2021 1:06 PM and 3/17/2021 1:06 PM  Staff -        Anesthesiologist:  Melody Cullen MD       CRNA: Byron Mann APRN CRNA       Performed By: CRNA  Consent for Airway        Urgency: elective  Indications and Patient Condition       Indications for airway management: tani-procedural       Induction type:intravenous       Mask difficulty assessment: 1 - vent by mask    Final Airway Details       Final airway type: endotracheal airway       Successful airway: ETT - single  Endotracheal Airway Details        ETT size (mm): 7.0       Cuffed: yes       Cuff volume (mL): 10       Successful intubation technique: direct laryngoscopy       DL Blade Type: MAC 3       Grade View of Cords: 1       Adjucts: stylet       Position: Right       Measured from: lips       Secured at (cm): 22       Bite block used: None    Post intubation assessment        Number of attempts at approach: 1       Number of other approaches attempted: 0       Secured with: pink tape       Ease of procedure: easy       Dentition: Intact and Unchanged    Medication(s) Administered   Medication Administration Time: 3/17/2021 1:08 PM

## 2021-03-17 NOTE — ANESTHESIA PREPROCEDURE EVALUATION
Anesthesia Pre-Procedure Evaluation    Patient: Laura Ferreira   MRN: [id number] : 1960        Preoperative Diagnosis: Malignant neoplasm of left breast (H) [C50.912]   Procedure : Procedure(s):  BILATERAL SKIN SPARING MASTECTOMY WITH right  SENTINEL LYMPH NODE BIOPSY  bilateral BREAST TISSUE EXPANDER     Past Medical History:   Diagnosis Date     Leblanc's esophagus     EGD in ; recent EGD in 2018; repeat EGD in 3 years     Ex-smoker     1 PPD x 28 years, quit 20 years ago     Hematuria 2007     History of breast biopsy     left     Menopausal symptoms 2009     Postsurgical hypothyroidism 2009     Symptomatic menopausal or female climacteric states 2009     Tear of lateral cartilage or meniscus of knee, current 10/24/2007      Past Surgical History:   Procedure Laterality Date     APPENDECTOMY OPEN       ARTHROSCOPY KNEE RT/LT  11/15/2007    right knee arthroscopy with arthroscopic lateral meniscectomy     BIOPSY Left     Breast. Benign     CL AFF SURGICAL PATHOLOGY  2002    endometrial biopsy     COLONOSCOPY       ELBOW SURGERY Right      ESOPHAGOSCOPY, GASTROSCOPY, DUODENOSCOPY (EGD), COMBINED N/A 2014    Procedure: COMBINED ESOPHAGOSCOPY, GASTROSCOPY, DUODENOSCOPY (EGD), BIOPSY SINGLE OR MULTIPLE;  Surgeon: Paradise Radford MD;  Location: [facility] OR     HC THYROIDECTOMY      goitre     LAMINECTOMY, FUSION LUMBAR ONE LEVEL, COMBINED  10/26/2011    Procedure:COMBINED LAMINECTOMY, FUSION LUMBAR ONE LEVEL; L4-5 Decompression, L4-5 Posterior Lateral Fusion with Madhavi and Local Bone; Surgeon:BARBRA BRIGHT; Location:[facility] OR     TONSILLECTOMY & ADENOIDECTOMY        Allergies   Allergen Reactions     Latex Anaphylaxis     Cortisone Nausea and Vomiting     oral     Vicodin [Acetaminophen] Nausea and Vomiting     Adhesive Tape Rash      Social History     Tobacco Use     Smoking status: Former Smoker     Years: 14.00     Types: Cigarettes     Quit date: 12/15/1998     Years  since quittin.2     Smokeless tobacco: Never Used     Tobacco comment: quit 20 years ago   Substance Use Topics     Alcohol use: Yes     Comment: occ      Wt Readings from Last 1 Encounters:   21 90.7 kg (200 lb)        Allergies   Allergen Reactions     Latex Anaphylaxis     Cortisone Nausea and Vomiting     oral     Vicodin [Acetaminophen] Nausea and Vomiting     Adhesive Tape Rash     Prior to Admission medications    Medication Sig Start Date End Date Taking? Authorizing Provider   acetaminophen (TYLENOL) 500 MG tablet Take 500-1,000 mg by mouth daily as needed for mild pain   Yes Reported, Patient   Ferrous Sulfate (IRON PO) Take 1 tablet by mouth daily   Yes Reported, Patient   levothyroxine (SYNTHROID/LEVOTHROID) 112 MCG tablet Take 112 mcg by mouth daily   Yes Reported, Patient   Multiple Vitamins-Minerals (MULTIVITAL PO) Take 1 tablet by mouth daily   Yes Reported, Patient   omeprazole (PRILOSEC) 20 MG DR capsule Take 1 capsule (20 mg) by mouth 2 times daily (before meals) 2/3/21  Yes Sarita Shultz MD   OVER-THE-COUNTER Place 1-2 drops into both eyes every hour as needed   Yes Reported, Patient   Probiotic Product (PROBIOTIC PO) Take 1 tablet by mouth daily   Yes Reported, Patient   rosuvastatin (CRESTOR) 20 MG tablet Take 1 tablet (20 mg) by mouth daily 20  Yes Sarita Shultz MD     ECG: Sinus  Rhythm   -RSR(V1) -nondiagnostic.       Anesthesia Evaluation            ROS/MED HX  ENT/Pulmonary:     (+) tobacco use, Past use,  (-) asthma, COPD and sleep apnea   Neurologic:     (+) no peripheral neuropathy  (-) no seizures and migraines   Cardiovascular:    (-) hypertension, CAD, MORROW, arrhythmias, valvular problems/murmurs and dyslipidemia   METS/Exercise Tolerance: >4 METS    Hematologic:    (-) history of blood clots and anemia   Musculoskeletal:    (-) arthritis   GI/Hepatic:     (+) GERD (dash's esophagus),  (-) liver disease   Renal/Genitourinary:    (-) renal disease   Endo:      (+) thyroid problem, hypothyroidism, Obesity,  (-) Type I DM   Psychiatric/Substance Use:    (-) psychiatric history   Infectious Disease:    (-) Recent Fever   Malignancy:   (+) Malignancy, History of Breast.    Other:            Physical Exam    Airway  airway exam normal      Mallampati: II       Respiratory Devices and Support         Dental  no notable dental history         Cardiovascular   cardiovascular exam normal       Rhythm and rate: normal     Pulmonary   pulmonary exam normal                OUTSIDE LABS:  CBC:   Lab Results   Component Value Date    WBC 5.7 03/12/2021    WBC 6.4 02/28/2018    HGB 14.2 03/12/2021    HGB 13.7 02/28/2018    HCT 44.6 03/12/2021    HCT 42.2 02/28/2018     03/12/2021     02/28/2018     BMP:   Lab Results   Component Value Date     03/12/2021     12/15/2020    POTASSIUM 4.1 03/12/2021    POTASSIUM 4.2 12/15/2020    CHLORIDE 105 03/12/2021    CHLORIDE 106 12/15/2020    CO2 30 03/12/2021    CO2 29 12/15/2020    BUN 11 03/12/2021    BUN 15 12/15/2020    CR 0.76 03/12/2021    CR 0.72 12/15/2020    GLC 88 03/12/2021    GLC 91 12/15/2020     COAGS: No results found for: PTT, INR, FIBR  POC:   Lab Results   Component Value Date     (H) 10/28/2011     HEPATIC:   Lab Results   Component Value Date    ALBUMIN 4.0 12/15/2020    PROTTOTAL 7.3 12/15/2020    ALT 35 12/15/2020    AST 19 12/15/2020    GGT 35 10/22/2013    ALKPHOS 65 12/15/2020    BILITOTAL 0.4 12/15/2020     OTHER:   Lab Results   Component Value Date    CYNDI 9.7 03/12/2021    PHOS 4.3 02/15/2013    MAG 2.0 02/15/2013    TSH 1.06 12/15/2020    T4 1.06 05/16/2017       Anesthesia Plan    ASA Status:  2   NPO Status:  NPO Appropriate    Anesthesia Type: General.     - Airway: ETT   Induction: Propofol.   Maintenance: Balanced.        Consents    Anesthesia Plan(s) and associated risks, benefits, and realistic alternatives discussed. Questions answered and patient/representative(s) expressed  understanding.     - Discussed with:  Patient         Postoperative Care    Pain management: IV analgesics, Oral pain medications.   PONV prophylaxis: Ondansetron (or other 5HT-3), Dexamethasone or Solumedrol, Background Propofol Infusion, Aprepitant, Scopolamine patch     Comments:    Pre-op tylenol  Hydromorphone, ketorolac            Melody Cullen MD

## 2021-03-17 NOTE — ANESTHESIA POSTPROCEDURE EVALUATION
Patient: Laura Ferreira    Procedure(s):  BILATERAL SKIN SPARING MASTECTOMY WITH right  SENTINEL LYMPH NODE BIOPSY  bilateral BREAST TISSUE EXPANDER    Diagnosis:Malignant neoplasm of left breast (H) [C50.912]  Diagnosis Additional Information: No value filed.    Anesthesia Type:  General    Note:     Postop Pain Control: Uneventful            Sign Out: Well controlled pain   PONV: No   Neuro/Psych: Uneventful            Sign Out: Acceptable/Baseline neuro status   Airway/Respiratory: Uneventful            Sign Out: AIRWAY IN SITU/Resp. Support   CV/Hemodynamics: Uneventful            Sign Out: Acceptable CV status   Other NRE: NONE   DID A NON-ROUTINE EVENT OCCUR? No         Last vitals:  Vitals:    03/17/21 1730 03/17/21 1745 03/17/21 1800   BP: (!) 152/76 132/67 138/70   Pulse: 79 80 86   Resp: 18 20 15   Temp:   36.9  C (98.4  F)   SpO2: 100% 95% 95%       Last vitals prior to Anesthesia Care Transfer:  CRNA VITALS  3/17/2021 1633 - 3/17/2021 1733      3/17/2021             Resp Rate (set):  10          Electronically Signed By: Peewee Chan MD  March 17, 2021  6:09 PM

## 2021-03-17 NOTE — OP NOTE
[facility] Breast Surgery Operative Note    PREOPERATIVE DIAGNOSIS:  Right breast ductal carcinoma in situ    POSTOPERATIVE DIAGNOSIS:  Same, pathology pending    PROCEDURE:    1.  Right skin sparing mastectomy, prophylactic left skin sparing mastectomy  2. Right sentinel lymph node biopsy  3. Reconstruction per Dr. Garcia, please see their operative report for details regarding their portion of the procedure.     SURGEON:  Carmen Stein MD    ASSISTANT:  Oleg Coats PA-C  The physician s assistant was medically necessary for their expertise in retraction and suctioning.    ANESTHESIA:  General.    BLOOD LOSS: 20ml    FINDINGS: two negative right axillary sentinel lymph nodes    INDICATIONS:   Laura is a 60yof who presented with newly diagnosed right breast DCIS. She presented for screening imaging on 1/6/2021 which revealed developing microcalcifications in the right breast at 9:00 spanning 6cm from 5-11cm FN. Diagnostic imaging confirmed this. She had a stereotactic biopsy with the above results. A breast MRI revealed a 7cm span of concern. She elected to proceed with bilateral skin sparing mastectomies with right SLNB.     DETAILS OF PROCEDURE:     The patient was taken to the operating room and placed in supine position and general anesthesia by endotracheal tube.   Perioperative antiobiotics were given. SCDs were placed on the lower extremities. A mccauley catheter was placed using sterile technique. Prior to coming to the operating room, patient had the right breast injected with radiolabeled sulfur colloid.   The patient was also marked by Dr. Garcia with simple ellipse periareolar  incisions.  The breasts and axilla were prepped with hibaclens and draped in the usual sterile fashion.  The timeout procedure was performed.      Starting on the right side, a periareolar ellipse skin incision was made following the pre operative markings with a #10 scalpel blade and deepened with electrocautery.  The superior  flap was raised with electrocautery up to the top edge of the breast tissue just under the clavicle.  The inferior flap was similarly raised using the cautery down to the inframammary fold.  Flaps were raised medially to the lateral aspect of the sternum and laterally to the lateral chest wall. The breast tissue was then elevated off of the pectoralis fascia with cautery. The fascia was excised with the breast tissue.  Once the breast was removed, it was oriented with sutures with a short suture superior and long suture lateral.  The breast was sent to pathology to be placed in formalin and have routine pathology exam.        Attention was then turned to the right axilla.  The fascia was incised along the edge of the pectoralis muscle.  The Neoprobe was used to identify the site of increased radioactivity. Two nodes were excised which had counts of >20.   The nodes were sent to pathology for review with frozen section. Pathology called in and reported the nodes were negative. The right side was packed with a saline-wetted lap pad and covered with a dry towel while attention was turned to the left breast.     A similar  incision was made on the left side with a #10 scalpel blade and deepened with electrocautery.  Again, the flaps were raised with electrocautery and the breast was dissected away from the pectoralis fascia.  The tissue was oriented with a short suture superior and long suture lateral.  The breast was then sent to pathology and placed in formalin for permanent section.        Dr. Garcia then performed their portion of the procedure with reconstruction. Please see their operative report for details.     Carmen Stein MD

## 2021-03-17 NOTE — OR NURSING
Report called to Guadalupe TIMMONS. Patient transferred to [facility] on cart with belongings. No discharge meds in pacu.  updated on transfer.

## 2021-03-18 VITALS
DIASTOLIC BLOOD PRESSURE: 57 MMHG | OXYGEN SATURATION: 99 % | BODY MASS INDEX: 33.66 KG/M2 | WEIGHT: 202 LBS | HEART RATE: 55 BPM | RESPIRATION RATE: 18 BRPM | TEMPERATURE: 99 F | HEIGHT: 65 IN | SYSTOLIC BLOOD PRESSURE: 115 MMHG

## 2021-03-18 LAB — GLUCOSE BLDC GLUCOMTR-MCNC: 123 MG/DL (ref 70–99)

## 2021-03-18 PROCEDURE — 250N000011 HC RX IP 250 OP 636: Performed by: PLASTIC SURGERY

## 2021-03-18 PROCEDURE — 250N000013 HC RX MED GY IP 250 OP 250 PS 637: Performed by: PLASTIC SURGERY

## 2021-03-18 PROCEDURE — 999N001017 HC STATISTIC GLUCOSE BY METER IP

## 2021-03-18 PROCEDURE — G0378 HOSPITAL OBSERVATION PER HR: HCPCS

## 2021-03-18 RX ORDER — OXYCODONE HYDROCHLORIDE 5 MG/1
5 TABLET ORAL EVERY 4 HOURS PRN
Qty: 24 TABLET | Refills: 0 | Status: SHIPPED | OUTPATIENT
Start: 2021-03-18 | End: 2021-06-08

## 2021-03-18 RX ORDER — AMOXICILLIN 250 MG
1 CAPSULE ORAL 2 TIMES DAILY
Qty: 24 TABLET | Refills: 0 | Status: SHIPPED | OUTPATIENT
Start: 2021-03-18 | End: 2021-10-07

## 2021-03-18 RX ORDER — CEPHALEXIN 500 MG/1
500 CAPSULE ORAL 3 TIMES DAILY
Qty: 21 CAPSULE | Refills: 0 | Status: SHIPPED | OUTPATIENT
Start: 2021-03-18 | End: 2021-06-08

## 2021-03-18 RX ADMIN — SENNOSIDES AND DOCUSATE SODIUM 1 TABLET: 8.6; 5 TABLET ORAL at 08:27

## 2021-03-18 RX ADMIN — DOCUSATE SODIUM 100 MG: 100 CAPSULE, LIQUID FILLED ORAL at 08:27

## 2021-03-18 RX ADMIN — POLYETHYLENE GLYCOL 3350 17 G: 17 POWDER, FOR SOLUTION ORAL at 08:25

## 2021-03-18 RX ADMIN — OXYCODONE HYDROCHLORIDE 5 MG: 5 TABLET ORAL at 02:51

## 2021-03-18 RX ADMIN — CEFAZOLIN SODIUM 2 G: 2 INJECTION, SOLUTION INTRAVENOUS at 06:05

## 2021-03-18 RX ADMIN — OXYCODONE HYDROCHLORIDE 5 MG: 5 TABLET ORAL at 06:50

## 2021-03-18 RX ADMIN — OMEPRAZOLE 20 MG: 20 CAPSULE, DELAYED RELEASE ORAL at 06:50

## 2021-03-18 RX ADMIN — OXYCODONE HYDROCHLORIDE 5 MG: 5 TABLET ORAL at 11:37

## 2021-03-18 RX ADMIN — ACETAMINOPHEN 975 MG: 325 TABLET, FILM COATED ORAL at 04:30

## 2021-03-18 RX ADMIN — ROSUVASTATIN CALCIUM 20 MG: 20 TABLET, FILM COATED ORAL at 08:27

## 2021-03-18 RX ADMIN — ACETAMINOPHEN 975 MG: 325 TABLET, FILM COATED ORAL at 11:36

## 2021-03-18 NOTE — PLAN OF CARE
Arrived to floow at 1830. POD 0 from a bilateral mastectomy with reconstruction. A&O. CMS intact. Bowel sounds +x4, - flatus, tolerating ice chips. VSS. Dressing CDI. ELIZABETH x2 patent. Log rolled with A2. C/o mild pain, declined interventions since arriving to floor. Pt scoring green on the Aggression Stop Light Tool. Continue to monitor.

## 2021-03-18 NOTE — PROGRESS NOTES
"[facility]  General Surgery Progress Note    Admission Date: 3/17/2021  3/18/2021         Assessment and Plan:     Laura Ferreira is a 60 year old female S/P Procedure(s):  B/L SKIN SPARING MASTECTOMY WITH RIGHT SENTINEL LYMPH NODE BIOPSY, B/L BREAST TISSUE EXPANDER, 1 Day Post-Op    - Pain controlled  - Ambulate 4x day and encourage IS at home  - Home today if AFVSS and able to ambulate, added Senna  - ELIZABETH drain teaching  - Follow up with Dr. Stein in 2 weeks  - DC instructions discussed              Interval History:     P 49 otherwise has been normal, pain controlled, tolerating diet, urinating without difficulty, not up much. Meds reviewed.                    Physical Exam:   Blood pressure 118/58, pulse (!) 49, temperature 98.3  F (36.8  C), temperature source Oral, resp. rate 18, height 1.651 m (5' 5\"), weight 91.6 kg (202 lb), last menstrual period 2012, SpO2 95 %, not currently breastfeeding.  Temperature Temp  Av.1  F (36.7  C)  Min: 97.1  F (36.2  C)  Max: 98.8  F (37.1  C)   I/O last 3 completed shifts:  In: 2480 [I.V.:2480]  Out: 775 [Urine:625; Drains:110; Blood:40]  Constitutional:  Awake, alert, oriented, and in no apparent distress.   Lungs: No increased work of breathing, good air exchange, clear to auscultation bilaterally, and no crackles or wheezing.   Cardiovascular: Regular rate and rhythm, normal S1 and S2, and no murmur noted.   Chest Wound(s): Appropriately tender. Clean, dry, and intact. No erythema or drainage. No palpable seroma/hematoma. ELIZABETH drain(s) intact without leakage, serosanguinous.     Extremities: Non-tender and without significant swelling to upper extremities. No edema or calf tenderness.          Data:   No new labs/imaging.     Lamar Bradley PA-C  [facility]  [phone]  "

## 2021-03-18 NOTE — PLAN OF CARE
A&O x4, VSS, Pain well managed with PRN oxy and ice. ELIZABETH drain x2 to bulb suction, patent with bright red drainage. LR running at 100ml/hr. IV cefazolin administered as ordered. LS clear, BS normoactive. Diet well tolerated, no N/V.  +void, +flatus, -BM. Up SBA.

## 2021-03-18 NOTE — OP NOTE
Procedure Date: 03/17/2021      SURGEON:  Genaro Garcia MD      PREOPERATIVE DIAGNOSES:   1.  Breast cancer.   2.  Acquired absence, bilateral breasts.      POSTOPERATIVE DIAGNOSES:   1.  Breast cancer.   2.  Acquired absence, bilateral breasts.      PROCEDURES:   1.  Immediate first-stage breast reconstruction with tissue expanders.   2.  Implantation of acellular dermal matrix for soft tissue support, bilateral chest wall.   3.  Laser angiography.      TECHNIQUE:  The patient was marked preoperatively.  She was supine on the operating table, having undergone bilateral skin-sparing mastectomy by Dr. Stein.  A timeout was done.  The skin had good thickness.  She was given 10 mg of indocyanine green, and the skin was imaged with an infrared camera.  This showed excellent perfusion throughout.  I felt she was a good candidate for prepectoral reconstruction.  I began on the left side.  I redefined the breast pocket with internal sutures of 2-0 Vicryl and then affixed a10 x 18 cm contour-shaped sheet of DermACELL along the inframammary crease medially and in the lateral breast pocket.  I placed an [employer] style 133FX 650 mL tissue expander.  This was prepared by removing the air and rinsing it in Betadine solution.  It was filled with 400 mL of saline and placed in the prepectoral pocket.  The DermACELL was used to cover the lower pole, and it was affixed to the inner surface of the upper mastectomy flap to allow for a biologic layer between the expander and the closure line.  I de-epithelialized the skin superiorly so that another layer of living, healthy tissue was also placed in the interface between the closure and the expander.  The final closure was done with 3-0 and 4-0 Vicryl.  A drain was placed prior to final closure.  This was sewn in with 2-0 silk.  I turned my attention to the right side.  The same technique was used to redefine the breast pocket and to affix the DermACELL.  An identical style and  size of expander was placed on the right side and placed and prepared in an identical fashion.  I should note that both of the expanders were held in place with internal sutures of 2-0 silk utilizing the suture tabs.  The same closure technique was used, including the posterior fixation of the DermACELL to the upper mastectomy flap and then a portion of de-epithelialized flap tucking underneath the closure line, with final approximation performed using 3-0 and 4-0 Vicryl.  There was good symmetry.  Sterile dressings were applied.  Anesthesia was reversed.  The patient was taken to the recovery room in satisfactory condition.      ESTIMATED BLOOD LOSS:  20 mL.      COUNTS:  Sponge and needle counts correct.      SPECIMENS:  None.      FINDINGS:  Noted above.         YANE MCKEE MD             D: 2021   T: 2021   MT: RICKY      Name:     MODE VIVEROS   MRN:      [id number]        Account:        [id number]   :      1960           Procedure Date: 2021      Document: [id number]

## 2021-03-18 NOTE — PROGRESS NOTES
Pt alert and orientated up with sba. Med with po pain meds with relief,ate,voidind. Dressing changed discharge sheets gone over with teach back as well as jocelyn drain instructions. Given dressings gauze tape and small cups to measure jocelyn drainage. States understands and ready for discontinue. dced with .

## 2021-03-19 ENCOUNTER — TELEPHONE (OUTPATIENT)
Dept: FAMILY MEDICINE | Facility: CLINIC | Age: 61
End: 2021-03-19

## 2021-03-19 NOTE — TELEPHONE ENCOUNTER
Called [phone]     Did patient answer the phone: No, left a message on voicemail to return call to the [facility] [place] at [phone].    SUNNY CaroN,RN  [facility]

## 2021-03-20 ENCOUNTER — IMMUNIZATION (OUTPATIENT)
Dept: FAMILY MEDICINE | Facility: CLINIC | Age: 61
End: 2021-03-20
Payer: COMMERCIAL

## 2021-03-20 PROCEDURE — 0011A PR COVID VAC MODERNA 100 MCG/0.5 ML IM: CPT

## 2021-03-20 PROCEDURE — 91301 PR COVID VAC MODERNA 100 MCG/0.5 ML IM: CPT

## 2021-03-22 ENCOUNTER — TELEPHONE (OUTPATIENT)
Dept: SURGERY | Facility: PHYSICIAN GROUP | Age: 61
End: 2021-03-22

## 2021-03-22 NOTE — TELEPHONE ENCOUNTER
Called patient.    Did patient answer the phone: No, left a message on voicemail to return call to the [facility] at [phone].    SUNNY CaroN,RN  [facility]

## 2021-03-22 NOTE — TELEPHONE ENCOUNTER
I called Laura and discussed her pathology report. It revealed 6cm of high grade DCIS. No invasive component identified. [place] nodes were negative and left breast was benign. Margins all clear on the right. I will see her back for follow up in a couple weeks. She is doing well. Managing pain with tylenol/ibuprofen.     Carmen Stein MD  [facility]  [phone]

## 2021-03-23 LAB — COPATH REPORT: NORMAL

## 2021-03-23 NOTE — TELEPHONE ENCOUNTER
I see patient has 4/8/21 post op visit.    Admission was 3/17-3/18/21 for mastectomy.      I called and spoke to patient, states she is seeing plastic surgeon tomorrow.   Denies fever, chills, or uncontrolled pain.  Using tylenol and/or ibuprofen.    Is on antibiotic per Epic med list.    Denies any desire to see PCP at this time, will call us if having concerns.    Yokasta Fam RN  [facility]

## 2021-03-26 NOTE — DISCHARGE SUMMARY
DISCHARGE SUMMARY    PRINCIPAL DISCHARGE DIAGNOSIS: Breast cancer     Operative procedures: Bilateral mastectomy/tissue expanders     REASON FOR ADMISSION: Breast cancer    HOSPITAL COURSE: The patient was taken to the operating room on her day of admission and tolerated the procedure well. Diet and activity were gradually advanced and she was discharged on POD# 1.     DISCHARGE INSTRUCTIONS:   Diet: Regular   Activity: No heavy strenuous physical activity, 10 lb lifting limit  Wound/Drain Care: Instructions given    [unfilled]    Condition at Discharge: Wounds clean and dry, no sign of infection or hematoma. Normal/stable vitals signs. Pt ambulatory and able to take a regular diet well. Tissues viable. Comfortable on oral meds.

## 2021-04-08 ENCOUNTER — OFFICE VISIT (OUTPATIENT)
Dept: SURGERY | Facility: CLINIC | Age: 61
End: 2021-04-08
Payer: COMMERCIAL

## 2021-04-08 DIAGNOSIS — Z09 SURGICAL FOLLOW-UP CARE: Primary | ICD-10-CM

## 2021-04-08 PROCEDURE — 99024 POSTOP FOLLOW-UP VISIT: CPT | Performed by: SURGERY

## 2021-04-08 NOTE — LETTER
4/8/2021         RE: Laura Ferreira  [address] [facility] [address]        Dear Colleague,    Thank you for referring your patient, Laura Ferreira, to the [facility]. Please see a copy of my visit note below.    [facility] Breast Surgery Postoperative Note    S: Laura is doing well after her surgery. She has some soreness along her right lateral chest wall. She is not taking any pain medications anymore. She had her first fill yesterday at Dr. Garcia's office.     Breasts: bilateral mastectomy incisions are healing well. No areas of concern.     Pathology: reviewed and copy given     A/P  Laura Ferreira is recovering from a bilateral mastectomies with right sentinel lymph node biopsy and immediate reconstruction with Dr. Garcia on 3/17/2021 for 6cm of high grade DCIS.   As this is DCIS and she has had bilateral mastectomies, there is no role for adjuvant radiation or hormone blockade. She will see me back in 6 months for chest wall exam.     Thank you for the opportunity to help in her care.    Carmen Stein MD  [facility]  [phone]    Please route or send letter to:  Primary Care Provider (PCP) and Referring Provider        Again, thank you for allowing me to participate in the care of your patient.        Sincerely,        Carmen Stein MD

## 2021-04-08 NOTE — NURSING NOTE
Laura Ferreira's goals for this visit include:   Chief Complaint   Patient presents with     Surgical Followup     post op mastectomy       She requests these members of her care team be copied on today's visit information: no    PCP: Sarita Shultz    Referring Provider:  Sarita Shultz MD  [address] [place] [address]    [facility] 04/01/2012     Do you need any medication refills at today's visit? No    Magdalene Crawford LPN

## 2021-04-08 NOTE — PROGRESS NOTES
[facility] Breast Surgery Postoperative Note    S: Laura is doing well after her surgery. She has some soreness along her right lateral chest wall. She is not taking any pain medications anymore. She had her first fill yesterday at Dr. Garcia's office.     Breasts: bilateral mastectomy incisions are healing well. No areas of concern.     Pathology: reviewed and copy given     A/P  Laura Ferreira is recovering from a bilateral mastectomies with right sentinel lymph node biopsy and immediate reconstruction with Dr. Garcia on 3/17/2021 for 6cm of high grade DCIS.   As this is DCIS and she has had bilateral mastectomies, there is no role for adjuvant radiation or hormone blockade. She will see me back in 6 months for chest wall exam.     Thank you for the opportunity to help in her care.    Carmen Stein MD  [facility]  [phone]    Please route or send letter to:  Primary Care Provider (PCP) and Referring Provider

## 2021-04-13 ENCOUNTER — TRANSFERRED RECORDS (OUTPATIENT)
Dept: HEALTH INFORMATION MANAGEMENT | Facility: CLINIC | Age: 61
End: 2021-04-13

## 2021-04-17 ENCOUNTER — IMMUNIZATION (OUTPATIENT)
Dept: FAMILY MEDICINE | Facility: CLINIC | Age: 61
End: 2021-04-17
Attending: FAMILY MEDICINE
Payer: COMMERCIAL

## 2021-04-17 PROCEDURE — 0012A PR COVID VAC MODERNA 100 MCG/0.5 ML IM: CPT

## 2021-04-17 PROCEDURE — 91301 PR COVID VAC MODERNA 100 MCG/0.5 ML IM: CPT

## 2021-06-07 ENCOUNTER — TRANSFERRED RECORDS (OUTPATIENT)
Dept: HEALTH INFORMATION MANAGEMENT | Facility: CLINIC | Age: 61
End: 2021-06-07

## 2021-06-08 ENCOUNTER — OFFICE VISIT (OUTPATIENT)
Dept: FAMILY MEDICINE | Facility: CLINIC | Age: 61
End: 2021-06-08
Payer: COMMERCIAL

## 2021-06-08 VITALS
SYSTOLIC BLOOD PRESSURE: 122 MMHG | RESPIRATION RATE: 16 BRPM | WEIGHT: 200.4 LBS | HEART RATE: 78 BPM | DIASTOLIC BLOOD PRESSURE: 74 MMHG | TEMPERATURE: 98.3 F | BODY MASS INDEX: 33.35 KG/M2 | OXYGEN SATURATION: 96 %

## 2021-06-08 DIAGNOSIS — Z90.13 S/P BILATERAL MASTECTOMY: ICD-10-CM

## 2021-06-08 DIAGNOSIS — Z85.3 HISTORY OF LEFT BREAST CANCER: ICD-10-CM

## 2021-06-08 DIAGNOSIS — Z01.818 PREOP GENERAL PHYSICAL EXAM: Primary | ICD-10-CM

## 2021-06-08 LAB
ANION GAP SERPL CALCULATED.3IONS-SCNC: 4 MMOL/L (ref 3–14)
BUN SERPL-MCNC: 17 MG/DL (ref 7–30)
CALCIUM SERPL-MCNC: 9.2 MG/DL (ref 8.5–10.1)
CHLORIDE SERPL-SCNC: 106 MMOL/L (ref 94–109)
CO2 SERPL-SCNC: 30 MMOL/L (ref 20–32)
CREAT SERPL-MCNC: 0.76 MG/DL (ref 0.52–1.04)
ERYTHROCYTE [DISTWIDTH] IN BLOOD BY AUTOMATED COUNT: 12.5 % (ref 10–15)
GFR SERPL CREATININE-BSD FRML MDRD: 84 ML/MIN/{1.73_M2}
GLUCOSE SERPL-MCNC: 92 MG/DL (ref 70–99)
HCT VFR BLD AUTO: 43.1 % (ref 35–47)
HGB BLD-MCNC: 13.9 G/DL (ref 11.7–15.7)
MCH RBC QN AUTO: 30.6 PG (ref 26.5–33)
MCHC RBC AUTO-ENTMCNC: 32.3 G/DL (ref 31.5–36.5)
MCV RBC AUTO: 95 FL (ref 78–100)
PLATELET # BLD AUTO: 211 10E9/L (ref 150–450)
POTASSIUM SERPL-SCNC: 3.8 MMOL/L (ref 3.4–5.3)
RBC # BLD AUTO: 4.54 10E12/L (ref 3.8–5.2)
SODIUM SERPL-SCNC: 140 MMOL/L (ref 133–144)
WBC # BLD AUTO: 7.2 10E9/L (ref 4–11)

## 2021-06-08 PROCEDURE — 36415 COLL VENOUS BLD VENIPUNCTURE: CPT | Performed by: FAMILY MEDICINE

## 2021-06-08 PROCEDURE — 80048 BASIC METABOLIC PNL TOTAL CA: CPT | Performed by: FAMILY MEDICINE

## 2021-06-08 PROCEDURE — 85027 COMPLETE CBC AUTOMATED: CPT | Performed by: FAMILY MEDICINE

## 2021-06-08 PROCEDURE — 99214 OFFICE O/P EST MOD 30 MIN: CPT | Performed by: FAMILY MEDICINE

## 2021-06-08 NOTE — H&P (VIEW-ONLY)
[facility] [place]  [address]  [place] [address]  Phone: [phone]  Primary Provider: Sarita Shultz  Pre-op Performing Provider: SARITA SHULTZ      PREOPERATIVE EVALUATION:  Today's date: 6/8/2021    Laura Ferreira is a 60 year old female who presents for a preoperative evaluation.    Surgical Information:  Surgery/Procedure: bilateral breast implant- reconstruction after double mastectomy   Surgery Location: [facility]  Surgeon:  Dr. Genaro Garcia   Surgery Date: 6/11/21  Time of Surgery: 1pm  Where patient plans to recover: At home with family  Fax number for surgical facility: Fax # [phone]    Type of Anesthesia Anticipated: General    Assessment & Plan     The proposed surgical procedure is considered LOW risk.    Preop general physical exam  - CBC with platelets  - Basic metabolic panel    History of left breast cancerS/P bilateral mastectomy- 3 months ago. Doing well.   Wishes to proceed with a bilateral breast implant- reconstruction after double mastectomy.          Risks and Recommendations:  The patient has the following additional risks and recommendations for perioperative complications:   - No identified additional risk factors other than previously addressed    Medication Instructions:  Patient is to take all scheduled medications on the day of surgery    RECOMMENDATION:  APPROVAL GIVEN to proceed with proposed procedure, without further diagnostic evaluation.    Review of external notes as documented above         Subjective     HPI related to upcoming procedure:       Laura is a 60 year old pleasant female patient of [employer] here for Pre-op exam.     She has a known history of right breast cancer- DCIS ER/ND +. Underwent a successful bilateral mastectomy on 3/17.   Now wishes to proceed with a bilateral breast implant- reconstruction after double mastectomy.     States that she understands the risks and benefits of the procedure and wishes to  proceed.     No recent illnesses. No concerns today.        Preop Questions 6/4/2021   1. Have you ever had a heart attack or stroke? No   2. Have you ever had surgery on your heart or blood vessels, such as a stent placement, a coronary artery bypass, or surgery on an artery in your head, neck, heart, or legs? No   3. Do you have chest pain with activity? No   4. Do you have a history of  heart failure? No   5. Do you currently have a cold, bronchitis or symptoms of other infection? No   6. Do you have a cough, shortness of breath, or wheezing? No   7. Do you or anyone in your family have previous history of blood clots? YES - mother   8. Do you or does anyone in your family have a serious bleeding problem such as prolonged bleeding following surgeries or cuts? No   9. Have you ever had problems with anemia or been told to take iron pills? YES - currently taking iron supplement    10. Have you had any abnormal blood loss such as black, tarry or bloody stools, or abnormal vaginal bleeding? No   11. Have you ever had a blood transfusion? No   12. Are you willing to have a blood transfusion if it is medically needed before, during, or after your surgery? Yes   13. Have you or any of your relatives ever had problems with anesthesia? No   14. Do you have sleep apnea, excessive snoring or daytime drowsiness? No   14a. Do you have a CPAP machine? -   15. Do you have any artifical heart valves or other implanted medical devices like a pacemaker, defibrillator, or continuous glucose monitor? No   16. Do you have artificial joints? No   17. Are you allergic to latex? YES:        Health Care Directive:  Patient does not have a Health Care Directive or Living Will: Discussed advance care planning with patient; however, patient declined at this time.    Preoperative Review of :   reviewed - no record of controlled substances prescribed.      Status of Chronic Conditions:  HYPERLIPIDEMIA - Patient has a long history of  significant Hyperlipidemia requiring medication for treatment with recent good control. Patient reports no problems or side effects with the medication.     HYPOTHYROIDISM - Patient has a longstanding history of chronic Hypothyroidism. Patient has been doing well, noting no tremor, insomnia, hair loss or changes in skin texture. Continues to take medications as directed, without adverse reactions or side effects. Last TSH   Lab Results   Component Value Date    TSH 1.06 12/15/2020   .        Review of Systems  Constitutional, neuro, ENT, endocrine, pulmonary, cardiac, gastrointestinal, genitourinary, musculoskeletal, integument and psychiatric systems are negative, except as otherwise noted.    Patient Active Problem List    Diagnosis Date Noted     Malignant neoplasm of left breast (H) 02/25/2021     Priority: Medium     Added automatically from request for surgery [id number]       Morbid obesity (H) 02/28/2018     Priority: Medium     Postsurgical hypothyroidism 12/05/2017     Priority: Medium     Hyperlipidemia LDL goal <130 12/05/2017     Priority: Medium     Simple endometrial hyperplasia without atypia 08/22/2011     Priority: Medium     Menorrhagia 05/12/2011     Priority: Medium     Iron deficiency anemia 05/12/2011     Priority: Medium     GERD (gastroesophageal reflux disease) 07/02/2010     Priority: Medium      Past Medical History:   Diagnosis Date     Anemia      Leblanc's esophagus     EGD in 2014; recent EGD in 4/2018; repeat EGD in 3 years     Ex-smoker     1 PPD x 28 years, quit 20 years ago     Hematuria 01/23/2007     History of breast biopsy     left     Menopausal symptoms 07/17/2009     Postsurgical hypothyroidism 07/17/2009     Symptomatic menopausal or female climacteric states 07/17/2009     Tear of lateral cartilage or meniscus of knee, current 10/24/2007     Past Surgical History:   Procedure Laterality Date     APPENDECTOMY OPEN       ARTHROSCOPY KNEE RT/LT  11/15/2007    right knee  arthroscopy with arthroscopic lateral meniscectomy     BIOPSY Left     Breast. Benign     CL AFF SURGICAL PATHOLOGY  09/18/2002    endometrial biopsy     COLONOSCOPY       ELBOW SURGERY Right      ESOPHAGOSCOPY, GASTROSCOPY, DUODENOSCOPY (EGD), COMBINED N/A 12/23/2014    Procedure: COMBINED ESOPHAGOSCOPY, GASTROSCOPY, DUODENOSCOPY (EGD), BIOPSY SINGLE OR MULTIPLE;  Surgeon: Paradise Radford MD;  Location: [facility]     HC THYROIDECTOMY      goitre     INSERT TISSUE EXPANDER BREAST Bilateral 3/17/2021    Procedure: bilateral BREAST TISSUE EXPANDER;  Surgeon: Genaro Garcia MD;  Location: [facility] OR     LAMINECTOMY, FUSION LUMBAR ONE LEVEL, COMBINED  10/26/2011    Procedure:COMBINED LAMINECTOMY, FUSION LUMBAR ONE LEVEL; L4-5 Decompression, L4-5 Posterior Lateral Fusion with Madhavi and Local Bone; Surgeon:BARBRA BRIGHT; Location:[facility] OR     MASTECTOMY SIMPLE, SENTINEL NODE, COMBINED Bilateral 3/17/2021    Procedure: BILATERAL SKIN SPARING MASTECTOMY WITH right  SENTINEL LYMPH NODE BIOPSY;  Surgeon: Carmen Stein MD;  Location: [facility] OR     TONSILLECTOMY & ADENOIDECTOMY       Current Outpatient Medications   Medication Sig Dispense Refill     acetaminophen (TYLENOL) 500 MG tablet Take 500-1,000 mg by mouth daily as needed for mild pain       Ferrous Sulfate (IRON PO) Take 1 tablet by mouth daily       levothyroxine (SYNTHROID/LEVOTHROID) 112 MCG tablet Take 112 mcg by mouth daily       Multiple Vitamins-Minerals (MULTIVITAL PO) Take 1 tablet by mouth daily       omeprazole (PRILOSEC) 20 MG DR capsule Take 1 capsule (20 mg) by mouth 2 times daily (before meals) 180 capsule 1     OVER-THE-COUNTER Place 1-2 drops into both eyes every hour as needed       Probiotic Product (PROBIOTIC PO) Take 1 tablet by mouth daily       rosuvastatin (CRESTOR) 20 MG tablet Take 1 tablet (20 mg) by mouth daily 90 tablet 3     senna-docusate (SENOKOT-S/PERICOLACE) 8.6-50 MG tablet Take 1 tablet by mouth 2 times daily 24 tablet 0        Allergies   Allergen Reactions     Latex Anaphylaxis     Cortisone Nausea and Vomiting     oral     Nickel      Vicodin [Acetaminophen] Nausea and Vomiting     Adhesive Tape Rash     Cvs Petroleum Jelly [Eucerin] Rash        Social History     Tobacco Use     Smoking status: Former Smoker     Years: 14.00     Types: Cigarettes     Quit date: 12/15/1998     Years since quittin.4     Smokeless tobacco: Never Used     Tobacco comment: quit 20 years ago   Substance Use Topics     Alcohol use: Yes     Comment: occ     Family History   Problem Relation Age of Onset     Asthma Mother      Diabetes Mother      Hypertension Mother      Arthritis Mother      Circulatory Mother      Obesity Mother      Thyroid Disease Mother      Aneurysm Mother         Brain,  at age 69     Hypertension Father      Alcohol/Drug Father      Arthritis Father      Obesity Father      Breast Cancer Maternal Grandmother      Thyroid Disease Brother      Colon Cancer No family hx of      Anesthesia Reaction No family hx of      History   Drug Use No         Objective     /74   Pulse 78   Temp 98.3  F (36.8  C) (Tympanic)   Resp 16   Wt 90.9 kg (200 lb 6.4 oz)   LMP 2012   SpO2 96%   Breastfeeding No   BMI 33.35 kg/m      Physical Exam    GENERAL APPEARANCE: healthy, alert and no distress     EYES: EOMI, PERRL     HENT: ear canals and TM's normal and nose and mouth without ulcers or lesions     NECK: no adenopathy, no asymmetry, masses, or scars and thyroid normal to palpation     RESP: lungs clear to auscultation - no rales, rhonchi or wheezes     CV: regular rates and rhythm, normal S1 S2, no S3 or S4 and no murmur, click or rub     ABDOMEN:  soft, nontender, no HSM or masses and bowel sounds normal     MS: extremities normal- no gross deformities noted, no evidence of inflammation in joints, FROM in all extremities.     SKIN: no suspicious lesions or rashes     NEURO: Normal strength and tone, sensory exam  grossly normal, mentation intact and speech normal     PSYCH: mentation appears normal. and affect normal/bright     LYMPHATICS: No cervical adenopathy    Recent Labs   Lab Test 03/12/21  1213 12/15/20  1140   HGB 14.2  --      --     141   POTASSIUM 4.1 4.2   CR 0.76 0.72        Diagnostics:  Labs pending at this time.  Results will be reviewed when available.   No EKG required for low risk surgery (cataract, skin procedure, breast biopsy, etc).    Revised Cardiac Risk Index (RCRI):  The patient has the following serious cardiovascular risks for perioperative complications:   - No serious cardiac risks = 0 points     RCRI Interpretation: 0 points: Class I (very low risk - 0.4% complication rate)       Signed Electronically by: Sarita Shultz MD  Copy of this evaluation report is provided to requesting physician.

## 2021-06-08 NOTE — PATIENT INSTRUCTIONS

## 2021-06-08 NOTE — PROGRESS NOTES
[facility] [place]  [address]  [place] [address]  Phone: [phone]  Primary Provider: Sarita Shultz  Pre-op Performing Provider: SARITA SHULTZ      PREOPERATIVE EVALUATION:  Today's date: 6/8/2021    Laura Ferreira is a 60 year old female who presents for a preoperative evaluation.    Surgical Information:  Surgery/Procedure: bilateral breast implant- reconstruction after double mastectomy   Surgery Location: [facility]  Surgeon:  Dr. Genaro Garcia   Surgery Date: 6/11/21  Time of Surgery: 1pm  Where patient plans to recover: At home with family  Fax number for surgical facility: Fax # [phone]    Type of Anesthesia Anticipated: General    Assessment & Plan     The proposed surgical procedure is considered LOW risk.    Preop general physical exam  - CBC with platelets  - Basic metabolic panel    History of left breast cancerS/P bilateral mastectomy- 3 months ago. Doing well.   Wishes to proceed with a bilateral breast implant- reconstruction after double mastectomy.          Risks and Recommendations:  The patient has the following additional risks and recommendations for perioperative complications:   - No identified additional risk factors other than previously addressed    Medication Instructions:  Patient is to take all scheduled medications on the day of surgery    RECOMMENDATION:  APPROVAL GIVEN to proceed with proposed procedure, without further diagnostic evaluation.    Review of external notes as documented above         Subjective     HPI related to upcoming procedure:       Laura is a 60 year old pleasant female patient of [employer] here for Pre-op exam.     She has a known history of right breast cancer- DCIS ER/PA +. Underwent a successful bilateral mastectomy on 3/17.   Now wishes to proceed with a bilateral breast implant- reconstruction after double mastectomy.     States that she understands the risks and benefits of the procedure and wishes to  proceed.     No recent illnesses. No concerns today.        Preop Questions 6/4/2021   1. Have you ever had a heart attack or stroke? No   2. Have you ever had surgery on your heart or blood vessels, such as a stent placement, a coronary artery bypass, or surgery on an artery in your head, neck, heart, or legs? No   3. Do you have chest pain with activity? No   4. Do you have a history of  heart failure? No   5. Do you currently have a cold, bronchitis or symptoms of other infection? No   6. Do you have a cough, shortness of breath, or wheezing? No   7. Do you or anyone in your family have previous history of blood clots? YES - mother   8. Do you or does anyone in your family have a serious bleeding problem such as prolonged bleeding following surgeries or cuts? No   9. Have you ever had problems with anemia or been told to take iron pills? YES - currently taking iron supplement    10. Have you had any abnormal blood loss such as black, tarry or bloody stools, or abnormal vaginal bleeding? No   11. Have you ever had a blood transfusion? No   12. Are you willing to have a blood transfusion if it is medically needed before, during, or after your surgery? Yes   13. Have you or any of your relatives ever had problems with anesthesia? No   14. Do you have sleep apnea, excessive snoring or daytime drowsiness? No   14a. Do you have a CPAP machine? -   15. Do you have any artifical heart valves or other implanted medical devices like a pacemaker, defibrillator, or continuous glucose monitor? No   16. Do you have artificial joints? No   17. Are you allergic to latex? YES:        Health Care Directive:  Patient does not have a Health Care Directive or Living Will: Discussed advance care planning with patient; however, patient declined at this time.    Preoperative Review of :   reviewed - no record of controlled substances prescribed.      Status of Chronic Conditions:  HYPERLIPIDEMIA - Patient has a long history of  significant Hyperlipidemia requiring medication for treatment with recent good control. Patient reports no problems or side effects with the medication.     HYPOTHYROIDISM - Patient has a longstanding history of chronic Hypothyroidism. Patient has been doing well, noting no tremor, insomnia, hair loss or changes in skin texture. Continues to take medications as directed, without adverse reactions or side effects. Last TSH   Lab Results   Component Value Date    TSH 1.06 12/15/2020   .        Review of Systems  Constitutional, neuro, ENT, endocrine, pulmonary, cardiac, gastrointestinal, genitourinary, musculoskeletal, integument and psychiatric systems are negative, except as otherwise noted.    Patient Active Problem List    Diagnosis Date Noted     Malignant neoplasm of left breast (H) 02/25/2021     Priority: Medium     Added automatically from request for surgery [id number]       Morbid obesity (H) 02/28/2018     Priority: Medium     Postsurgical hypothyroidism 12/05/2017     Priority: Medium     Hyperlipidemia LDL goal <130 12/05/2017     Priority: Medium     Simple endometrial hyperplasia without atypia 08/22/2011     Priority: Medium     Menorrhagia 05/12/2011     Priority: Medium     Iron deficiency anemia 05/12/2011     Priority: Medium     GERD (gastroesophageal reflux disease) 07/02/2010     Priority: Medium      Past Medical History:   Diagnosis Date     Anemia      Leblanc's esophagus     EGD in 2014; recent EGD in 4/2018; repeat EGD in 3 years     Ex-smoker     1 PPD x 28 years, quit 20 years ago     Hematuria 01/23/2007     History of breast biopsy     left     Menopausal symptoms 07/17/2009     Postsurgical hypothyroidism 07/17/2009     Symptomatic menopausal or female climacteric states 07/17/2009     Tear of lateral cartilage or meniscus of knee, current 10/24/2007     Past Surgical History:   Procedure Laterality Date     APPENDECTOMY OPEN       ARTHROSCOPY KNEE RT/LT  11/15/2007    right knee  arthroscopy with arthroscopic lateral meniscectomy     BIOPSY Left     Breast. Benign     CL AFF SURGICAL PATHOLOGY  09/18/2002    endometrial biopsy     COLONOSCOPY       ELBOW SURGERY Right      ESOPHAGOSCOPY, GASTROSCOPY, DUODENOSCOPY (EGD), COMBINED N/A 12/23/2014    Procedure: COMBINED ESOPHAGOSCOPY, GASTROSCOPY, DUODENOSCOPY (EGD), BIOPSY SINGLE OR MULTIPLE;  Surgeon: Paradise Radford MD;  Location: [facility]     HC THYROIDECTOMY      goitre     INSERT TISSUE EXPANDER BREAST Bilateral 3/17/2021    Procedure: bilateral BREAST TISSUE EXPANDER;  Surgeon: Genaro Garcia MD;  Location: [facility]     LAMINECTOMY, FUSION LUMBAR ONE LEVEL, COMBINED  10/26/2011    Procedure:COMBINED LAMINECTOMY, FUSION LUMBAR ONE LEVEL; L4-5 Decompression, L4-5 Posterior Lateral Fusion with Madhavi and Local Bone; Surgeon:BARBRA BRIGHT; Location:[facility]     MASTECTOMY SIMPLE, SENTINEL NODE, COMBINED Bilateral 3/17/2021    Procedure: BILATERAL SKIN SPARING MASTECTOMY WITH right  SENTINEL LYMPH NODE BIOPSY;  Surgeon: Carmen Stein MD;  Location: [facility]     TONSILLECTOMY & ADENOIDECTOMY       Current Outpatient Medications   Medication Sig Dispense Refill     acetaminophen (TYLENOL) 500 MG tablet Take 500-1,000 mg by mouth daily as needed for mild pain       Ferrous Sulfate (IRON PO) Take 1 tablet by mouth daily       levothyroxine (SYNTHROID/LEVOTHROID) 112 MCG tablet Take 112 mcg by mouth daily       Multiple Vitamins-Minerals (MULTIVITAL PO) Take 1 tablet by mouth daily       omeprazole (PRILOSEC) 20 MG DR capsule Take 1 capsule (20 mg) by mouth 2 times daily (before meals) 180 capsule 1     OVER-THE-COUNTER Place 1-2 drops into both eyes every hour as needed       Probiotic Product (PROBIOTIC PO) Take 1 tablet by mouth daily       rosuvastatin (CRESTOR) 20 MG tablet Take 1 tablet (20 mg) by mouth daily 90 tablet 3     senna-docusate (SENOKOT-S/PERICOLACE) 8.6-50 MG tablet Take 1 tablet by mouth 2 times daily 24 tablet 0        Allergies   Allergen Reactions     Latex Anaphylaxis     Cortisone Nausea and Vomiting     oral     Nickel      Vicodin [Acetaminophen] Nausea and Vomiting     Adhesive Tape Rash     Cvs Petroleum Jelly [Eucerin] Rash        Social History     Tobacco Use     Smoking status: Former Smoker     Years: 14.00     Types: Cigarettes     Quit date: 12/15/1998     Years since quittin.4     Smokeless tobacco: Never Used     Tobacco comment: quit 20 years ago   Substance Use Topics     Alcohol use: Yes     Comment: occ     Family History   Problem Relation Age of Onset     Asthma Mother      Diabetes Mother      Hypertension Mother      Arthritis Mother      Circulatory Mother      Obesity Mother      Thyroid Disease Mother      Aneurysm Mother         Brain,  at age 69     Hypertension Father      Alcohol/Drug Father      Arthritis Father      Obesity Father      Breast Cancer Maternal Grandmother      Thyroid Disease Brother      Colon Cancer No family hx of      Anesthesia Reaction No family hx of      History   Drug Use No         Objective     /74   Pulse 78   Temp 98.3  F (36.8  C) (Tympanic)   Resp 16   Wt 90.9 kg (200 lb 6.4 oz)   LMP 2012   SpO2 96%   Breastfeeding No   BMI 33.35 kg/m      Physical Exam    GENERAL APPEARANCE: healthy, alert and no distress     EYES: EOMI, PERRL     HENT: ear canals and TM's normal and nose and mouth without ulcers or lesions     NECK: no adenopathy, no asymmetry, masses, or scars and thyroid normal to palpation     RESP: lungs clear to auscultation - no rales, rhonchi or wheezes     CV: regular rates and rhythm, normal S1 S2, no S3 or S4 and no murmur, click or rub     ABDOMEN:  soft, nontender, no HSM or masses and bowel sounds normal     MS: extremities normal- no gross deformities noted, no evidence of inflammation in joints, FROM in all extremities.     SKIN: no suspicious lesions or rashes     NEURO: Normal strength and tone, sensory exam  grossly normal, mentation intact and speech normal     PSYCH: mentation appears normal. and affect normal/bright     LYMPHATICS: No cervical adenopathy    Recent Labs   Lab Test 03/12/21  1213 12/15/20  1140   HGB 14.2  --      --     141   POTASSIUM 4.1 4.2   CR 0.76 0.72        Diagnostics:  Labs pending at this time.  Results will be reviewed when available.   No EKG required for low risk surgery (cataract, skin procedure, breast biopsy, etc).    Revised Cardiac Risk Index (RCRI):  The patient has the following serious cardiovascular risks for perioperative complications:   - No serious cardiac risks = 0 points     RCRI Interpretation: 0 points: Class I (very low risk - 0.4% complication rate)       Signed Electronically by: Sarita Shultz MD  Copy of this evaluation report is provided to requesting physician.

## 2021-06-11 ENCOUNTER — ANESTHESIA (OUTPATIENT)
Dept: SURGERY | Facility: CLINIC | Age: 61
End: 2021-06-11
Payer: COMMERCIAL

## 2021-06-11 ENCOUNTER — HOSPITAL ENCOUNTER (OUTPATIENT)
Facility: CLINIC | Age: 61
Discharge: HOME | End: 2021-06-11
Attending: PLASTIC SURGERY | Admitting: PLASTIC SURGERY
Payer: COMMERCIAL

## 2021-06-11 ENCOUNTER — ANESTHESIA EVENT (OUTPATIENT)
Dept: SURGERY | Facility: CLINIC | Age: 61
End: 2021-06-11
Payer: COMMERCIAL

## 2021-06-11 VITALS
SYSTOLIC BLOOD PRESSURE: 111 MMHG | HEIGHT: 65 IN | TEMPERATURE: 95.5 F | BODY MASS INDEX: 32.65 KG/M2 | OXYGEN SATURATION: 97 % | RESPIRATION RATE: 16 BRPM | DIASTOLIC BLOOD PRESSURE: 66 MMHG | WEIGHT: 196 LBS | HEART RATE: 58 BPM

## 2021-06-11 DIAGNOSIS — Z85.3 HISTORY OF LEFT BREAST CANCER: Primary | ICD-10-CM

## 2021-06-11 PROCEDURE — 710N000009 HC RECOVERY PHASE 1, LEVEL 1, PER MIN: Performed by: PLASTIC SURGERY

## 2021-06-11 PROCEDURE — 999N000141 HC STATISTIC PRE-PROCEDURE NURSING ASSESSMENT: Performed by: PLASTIC SURGERY

## 2021-06-11 PROCEDURE — 250N000025 HC SEVOFLURANE, PER MIN: Performed by: PLASTIC SURGERY

## 2021-06-11 PROCEDURE — 250N000009 HC RX 250: Performed by: PLASTIC SURGERY

## 2021-06-11 PROCEDURE — L8600 IMPLANT BREAST SILICONE/EQ: HCPCS | Performed by: PLASTIC SURGERY

## 2021-06-11 PROCEDURE — 250N000009 HC RX 250: Performed by: NURSE ANESTHETIST, CERTIFIED REGISTERED

## 2021-06-11 PROCEDURE — 370N000017 HC ANESTHESIA TECHNICAL FEE, PER MIN: Performed by: PLASTIC SURGERY

## 2021-06-11 PROCEDURE — 250N000011 HC RX IP 250 OP 636: Performed by: NURSE ANESTHETIST, CERTIFIED REGISTERED

## 2021-06-11 PROCEDURE — 258N000003 HC RX IP 258 OP 636: Performed by: NURSE ANESTHETIST, CERTIFIED REGISTERED

## 2021-06-11 PROCEDURE — 87635 SARS-COV-2 COVID-19 AMP PRB: CPT | Performed by: PLASTIC SURGERY

## 2021-06-11 PROCEDURE — 250N000011 HC RX IP 250 OP 636: Performed by: SURGERY

## 2021-06-11 PROCEDURE — 360N000076 HC SURGERY LEVEL 3, PER MIN: Performed by: PLASTIC SURGERY

## 2021-06-11 PROCEDURE — 250N000011 HC RX IP 250 OP 636: Performed by: PLASTIC SURGERY

## 2021-06-11 PROCEDURE — 272N000001 HC OR GENERAL SUPPLY STERILE: Performed by: PLASTIC SURGERY

## 2021-06-11 PROCEDURE — 710N000012 HC RECOVERY PHASE 2, PER MINUTE: Performed by: PLASTIC SURGERY

## 2021-06-11 DEVICE — SMOOTH ROUND HIGH PROFILE GEL-FILLED BREAST IMPLANT, 600CC  SMOOTH ROUND SILICONE
Type: IMPLANTABLE DEVICE | Site: BREAST | Status: NON-FUNCTIONAL
Brand: MENTOR MEMORYGEL BREAST IMPLANT
Removed: 2021-11-08

## 2021-06-11 RX ORDER — NALOXONE HYDROCHLORIDE 0.4 MG/ML
0.4 INJECTION, SOLUTION INTRAMUSCULAR; INTRAVENOUS; SUBCUTANEOUS
Status: DISCONTINUED | OUTPATIENT
Start: 2021-06-11 | End: 2021-06-11 | Stop reason: HOSPADM

## 2021-06-11 RX ORDER — ONDANSETRON 2 MG/ML
4 INJECTION INTRAMUSCULAR; INTRAVENOUS EVERY 30 MIN PRN
Status: DISCONTINUED | OUTPATIENT
Start: 2021-06-11 | End: 2021-06-11 | Stop reason: HOSPADM

## 2021-06-11 RX ORDER — BUPIVACAINE HYDROCHLORIDE 5 MG/ML
INJECTION, SOLUTION EPIDURAL; INTRACAUDAL
Status: DISCONTINUED
Start: 2021-06-11 | End: 2021-06-11 | Stop reason: HOSPADM

## 2021-06-11 RX ORDER — DEXAMETHASONE SODIUM PHOSPHATE 4 MG/ML
INJECTION, SOLUTION INTRA-ARTICULAR; INTRALESIONAL; INTRAMUSCULAR; INTRAVENOUS; SOFT TISSUE PRN
Status: DISCONTINUED | OUTPATIENT
Start: 2021-06-11 | End: 2021-06-11

## 2021-06-11 RX ORDER — CEFAZOLIN SODIUM 2 G/100ML
2 INJECTION, SOLUTION INTRAVENOUS SEE ADMIN INSTRUCTIONS
Status: DISCONTINUED | OUTPATIENT
Start: 2021-06-11 | End: 2021-06-11 | Stop reason: HOSPADM

## 2021-06-11 RX ORDER — EPHEDRINE SULFATE 50 MG/ML
INJECTION, SOLUTION INTRAMUSCULAR; INTRAVENOUS; SUBCUTANEOUS PRN
Status: DISCONTINUED | OUTPATIENT
Start: 2021-06-11 | End: 2021-06-11

## 2021-06-11 RX ORDER — FENTANYL CITRATE 50 UG/ML
INJECTION, SOLUTION INTRAMUSCULAR; INTRAVENOUS PRN
Status: DISCONTINUED | OUTPATIENT
Start: 2021-06-11 | End: 2021-06-11

## 2021-06-11 RX ORDER — NALOXONE HYDROCHLORIDE 0.4 MG/ML
0.2 INJECTION, SOLUTION INTRAMUSCULAR; INTRAVENOUS; SUBCUTANEOUS
Status: DISCONTINUED | OUTPATIENT
Start: 2021-06-11 | End: 2021-06-11 | Stop reason: HOSPADM

## 2021-06-11 RX ORDER — PROPOFOL 10 MG/ML
INJECTION, EMULSION INTRAVENOUS PRN
Status: DISCONTINUED | OUTPATIENT
Start: 2021-06-11 | End: 2021-06-11

## 2021-06-11 RX ORDER — SODIUM CHLORIDE, SODIUM LACTATE, POTASSIUM CHLORIDE, CALCIUM CHLORIDE 600; 310; 30; 20 MG/100ML; MG/100ML; MG/100ML; MG/100ML
INJECTION, SOLUTION INTRAVENOUS CONTINUOUS PRN
Status: DISCONTINUED | OUTPATIENT
Start: 2021-06-11 | End: 2021-06-11

## 2021-06-11 RX ORDER — FENTANYL CITRATE 50 UG/ML
25-50 INJECTION, SOLUTION INTRAMUSCULAR; INTRAVENOUS
Status: DISCONTINUED | OUTPATIENT
Start: 2021-06-11 | End: 2021-06-11 | Stop reason: HOSPADM

## 2021-06-11 RX ORDER — MEPERIDINE HYDROCHLORIDE 25 MG/ML
12.5 INJECTION INTRAMUSCULAR; INTRAVENOUS; SUBCUTANEOUS
Status: DISCONTINUED | OUTPATIENT
Start: 2021-06-11 | End: 2021-06-11 | Stop reason: HOSPADM

## 2021-06-11 RX ORDER — ONDANSETRON 4 MG/1
4 TABLET, ORALLY DISINTEGRATING ORAL EVERY 30 MIN PRN
Status: DISCONTINUED | OUTPATIENT
Start: 2021-06-11 | End: 2021-06-11 | Stop reason: HOSPADM

## 2021-06-11 RX ORDER — KETOROLAC TROMETHAMINE 30 MG/ML
30 INJECTION, SOLUTION INTRAMUSCULAR; INTRAVENOUS EVERY 6 HOURS PRN
Status: DISCONTINUED | OUTPATIENT
Start: 2021-06-11 | End: 2021-06-11 | Stop reason: HOSPADM

## 2021-06-11 RX ORDER — LABETALOL HYDROCHLORIDE 5 MG/ML
10 INJECTION, SOLUTION INTRAVENOUS
Status: DISCONTINUED | OUTPATIENT
Start: 2021-06-11 | End: 2021-06-11 | Stop reason: HOSPADM

## 2021-06-11 RX ORDER — CEFAZOLIN SODIUM 1 G/3ML
INJECTION, POWDER, FOR SOLUTION INTRAMUSCULAR; INTRAVENOUS
Status: DISCONTINUED
Start: 2021-06-11 | End: 2021-06-11 | Stop reason: HOSPADM

## 2021-06-11 RX ORDER — HYDROMORPHONE HYDROCHLORIDE 1 MG/ML
.3-.5 INJECTION, SOLUTION INTRAMUSCULAR; INTRAVENOUS; SUBCUTANEOUS EVERY 10 MIN PRN
Status: DISCONTINUED | OUTPATIENT
Start: 2021-06-11 | End: 2021-06-11 | Stop reason: HOSPADM

## 2021-06-11 RX ORDER — PROPOFOL 10 MG/ML
INJECTION, EMULSION INTRAVENOUS CONTINUOUS PRN
Status: DISCONTINUED | OUTPATIENT
Start: 2021-06-11 | End: 2021-06-11

## 2021-06-11 RX ORDER — SODIUM CHLORIDE, SODIUM LACTATE, POTASSIUM CHLORIDE, CALCIUM CHLORIDE 600; 310; 30; 20 MG/100ML; MG/100ML; MG/100ML; MG/100ML
INJECTION, SOLUTION INTRAVENOUS CONTINUOUS
Status: DISCONTINUED | OUTPATIENT
Start: 2021-06-11 | End: 2021-06-11 | Stop reason: HOSPADM

## 2021-06-11 RX ORDER — HYDRALAZINE HYDROCHLORIDE 20 MG/ML
2.5-5 INJECTION INTRAMUSCULAR; INTRAVENOUS EVERY 10 MIN PRN
Status: DISCONTINUED | OUTPATIENT
Start: 2021-06-11 | End: 2021-06-11 | Stop reason: HOSPADM

## 2021-06-11 RX ORDER — ONDANSETRON 2 MG/ML
INJECTION INTRAMUSCULAR; INTRAVENOUS PRN
Status: DISCONTINUED | OUTPATIENT
Start: 2021-06-11 | End: 2021-06-11

## 2021-06-11 RX ORDER — CEFAZOLIN SODIUM 2 G/100ML
2 INJECTION, SOLUTION INTRAVENOUS
Status: DISCONTINUED | OUTPATIENT
Start: 2021-06-11 | End: 2021-06-11 | Stop reason: HOSPADM

## 2021-06-11 RX ORDER — LIDOCAINE HYDROCHLORIDE 20 MG/ML
INJECTION, SOLUTION INFILTRATION; PERINEURAL PRN
Status: DISCONTINUED | OUTPATIENT
Start: 2021-06-11 | End: 2021-06-11

## 2021-06-11 RX ORDER — HYDROCODONE BITARTRATE AND ACETAMINOPHEN 5; 325 MG/1; MG/1
1 TABLET ORAL EVERY 6 HOURS PRN
Qty: 10 TABLET | Refills: 0 | Status: SHIPPED | OUTPATIENT
Start: 2021-06-11 | End: 2021-06-14

## 2021-06-11 RX ADMIN — Medication 5 MG: at 14:18

## 2021-06-11 RX ADMIN — LIDOCAINE HYDROCHLORIDE 100 MG: 20 INJECTION, SOLUTION INFILTRATION; PERINEURAL at 13:23

## 2021-06-11 RX ADMIN — FENTANYL CITRATE 50 MCG: 0.05 INJECTION, SOLUTION INTRAMUSCULAR; INTRAVENOUS at 15:27

## 2021-06-11 RX ADMIN — Medication 5 MG: at 14:15

## 2021-06-11 RX ADMIN — Medication 5 MG: at 14:24

## 2021-06-11 RX ADMIN — ONDANSETRON 4 MG: 2 INJECTION INTRAMUSCULAR; INTRAVENOUS at 13:15

## 2021-06-11 RX ADMIN — FENTANYL CITRATE 25 MCG: 50 INJECTION, SOLUTION INTRAMUSCULAR; INTRAVENOUS at 13:34

## 2021-06-11 RX ADMIN — FENTANYL CITRATE 25 MCG: 50 INJECTION, SOLUTION INTRAMUSCULAR; INTRAVENOUS at 13:09

## 2021-06-11 RX ADMIN — SODIUM CHLORIDE, POTASSIUM CHLORIDE, SODIUM LACTATE AND CALCIUM CHLORIDE: 600; 310; 30; 20 INJECTION, SOLUTION INTRAVENOUS at 13:02

## 2021-06-11 RX ADMIN — SODIUM CHLORIDE, POTASSIUM CHLORIDE, SODIUM LACTATE AND CALCIUM CHLORIDE: 600; 310; 30; 20 INJECTION, SOLUTION INTRAVENOUS at 14:29

## 2021-06-11 RX ADMIN — PROPOFOL 200 MG: 10 INJECTION, EMULSION INTRAVENOUS at 13:23

## 2021-06-11 RX ADMIN — DEXAMETHASONE SODIUM PHOSPHATE 4 MG: 4 INJECTION, SOLUTION INTRA-ARTICULAR; INTRALESIONAL; INTRAMUSCULAR; INTRAVENOUS; SOFT TISSUE at 13:15

## 2021-06-11 RX ADMIN — HYDROMORPHONE HYDROCHLORIDE 0.5 MG: 1 INJECTION, SOLUTION INTRAMUSCULAR; INTRAVENOUS; SUBCUTANEOUS at 14:36

## 2021-06-11 RX ADMIN — PROPOFOL 75 MCG/KG/MIN: 10 INJECTION, EMULSION INTRAVENOUS at 13:23

## 2021-06-11 RX ADMIN — PROPOFOL 30 MCG/KG/MIN: 10 INJECTION, EMULSION INTRAVENOUS at 13:58

## 2021-06-11 RX ADMIN — HYDROMORPHONE HYDROCHLORIDE 0.5 MG: 1 INJECTION, SOLUTION INTRAMUSCULAR; INTRAVENOUS; SUBCUTANEOUS at 14:41

## 2021-06-11 RX ADMIN — MIDAZOLAM 2 MG: 1 INJECTION INTRAMUSCULAR; INTRAVENOUS at 13:03

## 2021-06-11 RX ADMIN — FENTANYL CITRATE 50 MCG: 50 INJECTION, SOLUTION INTRAMUSCULAR; INTRAVENOUS at 14:02

## 2021-06-11 RX ADMIN — CEFAZOLIN SODIUM 2 G: 2 INJECTION, SOLUTION INTRAVENOUS at 13:25

## 2021-06-11 ASSESSMENT — LIFESTYLE VARIABLES: TOBACCO_USE: 1

## 2021-06-11 ASSESSMENT — MIFFLIN-ST. JEOR: SCORE: 1449.93

## 2021-06-11 NOTE — OP NOTE
Procedure Date: 06/11/2021    SURGEON:  Genaro Garcia MD    PREOPERATIVE DIAGNOSES:  1.  Breast cancer.   2.  Acquired absence of bilateral breasts.    POSTOPERATIVE DIAGNOSES:  1.  Breast cancer.   2.  Acquired absence of bilateral breasts.    PROCEDURE:  Bilateral second-stage breast reconstruction with removal of tissue expanders and placement of silicone gel implants, including significant capsular modification and shape adjustments.    DESCRIPTION OF PROCEDURE:  The patient was marked preoperatively.  She has a previous history of bilateral mastectomy and tissue expander placement in a prepectoral position.  She presents now for second-stage reconstruction.  We discussed the planned adjustments, including capsule modifications and lateral inferior axillary liposuction.  She was agreeable with the plan.  She was placed supine on the procedure table under general LMA anesthesia administered by the Anesthesia Department.  The breasts were prepped and draped in a sterile fashion, and a timeout was done.  I began on the left side.  I excised the lateral aspect of the mastectomy scar and removed the expander.  I performed a significant lateral capsulorrhaphy to bring this pocket more medially using a multilayered technique, sewing 2-0 Vicryl suture, both interrupted and running.  I then tested that with various shapes and sizes of implants and found that the best fill of the pocket and shape was achieved with a 600 mL high-profile device.  I removed the sizer.  I irrigated with antibiotic solution, injected 10 mL of 0.25% Marcaine, 1:200,000 epinephrine.  I placed a [place] smooth, round, high profile silicone gel implant 600 mL size with serial number [id number].  I then closed the capsule with 2-0 Vicryl and the skin with 3-0 and 4-0 Vicryl.  I turned my attention to the right side.  I used the same technique to remove the expander on this side, and even more significant capsulorrhaphy was needed, including  redraping and refixation of the acellular dermal matrix.  This was also done with multilayered sutures of 2-0 Vicryl.  Once the pocket was repositioned, I tested with a sizer and found that the same volume and shape would achieve the best match.  I removed the sizer, prepared the pocket in the same fashion, placed in identical style and size of implant on the right side with serial number [id number].  The same closure was performed, including capsule and skin.  I then treated the infraaxillary areas on both sides to improve the lateral breast shape.  The left side greater than right had infraaxillary fullness, which I tapered to blend in with her breast reconstruction, removing approximately 100 mL from the right side and 150 mL from the left side.  There was good symmetry in the sitting position.  Sterile dressings were applied.  Anesthesia was reversed.  The patient was taken to the recovery room in satisfactory condition.    ESTIMATED BLOOD LOSS:  20 mL.    SPONGE AND NEEDLE COUNTS:  Correct.    SPECIMENS:  None.    FINDINGS:  Noted above.    Genaro Garcia MD        D: 2021   T: 2021   MT: JAXON    Name:     MODE VIVEROS  MRN:      [id number]        Account:        [id number]   :      1960           Procedure Date: 2021     Document: [id number]

## 2021-06-11 NOTE — ANESTHESIA POSTPROCEDURE EVALUATION
Patient: Laura Ferreira    Procedure(s):  BILATERAL SECOND STAGE BREAST RECONSTRUCTION WITH SILICONE IMPLANTS    Diagnosis:History of bilateral mastectomy [Z90.13]  Diagnosis Additional Information: No value filed.    Anesthesia Type:  General    Note:  Disposition: Outpatient   Postop Pain Control: Uneventful            Sign Out: Well controlled pain   PONV: No   Neuro/Psych: Uneventful            Sign Out: Acceptable/Baseline neuro status   Airway/Respiratory: Uneventful            Sign Out: Acceptable/Baseline resp. status   CV/Hemodynamics: Uneventful            Sign Out: Acceptable CV status   Other NRE: NONE   DID A NON-ROUTINE EVENT OCCUR? No           Last vitals:  Vitals:    06/11/21 1508 06/11/21 1515 06/11/21 1530   BP: (!) 144/74 136/69 130/71   Pulse: 82 77 74   Resp: 13 13 11   Temp: 34.7  C (94.5  F) 34.7  C (94.5  F) 35  C (95  F)   SpO2: 100% 100% 98%       Last vitals prior to Anesthesia Care Transfer:  CRNA VITALS  6/11/2021 1432 - 6/11/2021 1532      6/11/2021             Pulse:  80    SpO2:  100 %    Resp Rate (observed):  (!) 1    Resp Rate (set):  10          Electronically Signed By: Derek Randolph MD  June 11, 2021  3:49 PM

## 2021-06-11 NOTE — ANESTHESIA PREPROCEDURE EVALUATION
Anesthesia Pre-Procedure Evaluation    Patient: Laura Ferreira   MRN: [id number] : 1960        Preoperative Diagnosis: History of bilateral mastectomy [Z90.13]   Procedure : Procedure(s):  BILATERAL SECOND STAGE BREAST RECONSTRUCTION WITH SILICONE IMPLANTS     Past Medical History:   Diagnosis Date     Anemia      Leblanc's esophagus     EGD in ; recent EGD in 2018; repeat EGD in 3 years     Ex-smoker     1 PPD x 28 years, quit 20 years ago     Hematuria 2007     History of breast biopsy     left     Menopausal symptoms 2009     Motion sickness      Postsurgical hypothyroidism 2009     Symptomatic menopausal or female climacteric states 2009     Tear of lateral cartilage or meniscus of knee, current 10/24/2007      Past Surgical History:   Procedure Laterality Date     APPENDECTOMY OPEN       ARTHROSCOPY KNEE RT/LT  11/15/2007    right knee arthroscopy with arthroscopic lateral meniscectomy     BIOPSY Left     Breast. Benign     CL AFF SURGICAL PATHOLOGY  2002    endometrial biopsy     COLONOSCOPY       ELBOW SURGERY Right      ESOPHAGOSCOPY, GASTROSCOPY, DUODENOSCOPY (EGD), COMBINED N/A 2014    Procedure: COMBINED ESOPHAGOSCOPY, GASTROSCOPY, DUODENOSCOPY (EGD), BIOPSY SINGLE OR MULTIPLE;  Surgeon: Paradise Radford MD;  Location: [facility] OR     HC THYROIDECTOMY      goitre     INSERT TISSUE EXPANDER BREAST Bilateral 3/17/2021    Procedure: bilateral BREAST TISSUE EXPANDER;  Surgeon: Genaro Garcia MD;  Location:  OR     LAMINECTOMY, FUSION LUMBAR ONE LEVEL, COMBINED  10/26/2011    Procedure:COMBINED LAMINECTOMY, FUSION LUMBAR ONE LEVEL; L4-5 Decompression, L4-5 Posterior Lateral Fusion with Madhavi and Local Bone; Surgeon:BARBRA BRIGHT; Location: OR     MASTECTOMY SIMPLE, SENTINEL NODE, COMBINED Bilateral 3/17/2021    Procedure: BILATERAL SKIN SPARING MASTECTOMY WITH right  SENTINEL LYMPH NODE BIOPSY;  Surgeon: Carmen Stein MD;  Location:  OR      TONSILLECTOMY & ADENOIDECTOMY        Allergies   Allergen Reactions     Latex Anaphylaxis     Cortisone Nausea and Vomiting     oral     Nickel      Vicodin [Acetaminophen] Nausea and Vomiting     Adhesive Tape Rash     Cvs Petroleum Jelly [Eucerin] Rash      Social History     Tobacco Use     Smoking status: Former Smoker     Years: 14.00     Types: Cigarettes     Quit date: 12/15/1998     Years since quittin.5     Smokeless tobacco: Never Used     Tobacco comment: quit 20 years ago   Substance Use Topics     Alcohol use: Yes     Comment: occ      Wt Readings from Last 1 Encounters:   21 90.9 kg (200 lb 6.4 oz)        Anesthesia Evaluation   Pt has had prior anesthetic.     History of anesthetic complications  - motion sickness.      ROS/MED HX  ENT/Pulmonary:     (+) tobacco use, Past use,     Neurologic:       Cardiovascular:     (+) Dyslipidemia -----    METS/Exercise Tolerance:     Hematologic:     (+) anemia,     Musculoskeletal:       GI/Hepatic:     (+) GERD,     Renal/Genitourinary:       Endo:     (+) thyroid problem, hypothyroidism, Obesity,     Psychiatric/Substance Use:       Infectious Disease:       Malignancy:   (+) Malignancy, History of Breast.Breast CA Active status post.        Other:            Physical Exam    Airway        Mallampati: I   TM distance: > 3 FB   Neck ROM: full   Mouth opening: > 3 cm    Respiratory Devices and Support         Dental  no notable dental history         Cardiovascular   cardiovascular exam normal          Pulmonary   pulmonary exam normal                OUTSIDE LABS:  CBC:   Lab Results   Component Value Date    WBC 7.2 2021    WBC 5.7 2021    HGB 13.9 2021    HGB 14.2 2021    HCT 43.1 2021    HCT 44.6 2021     2021     2021     BMP:   Lab Results   Component Value Date     2021     2021    POTASSIUM 3.8 2021    POTASSIUM 4.1 2021    CHLORIDE 106 2021     CHLORIDE 105 03/12/2021    CO2 30 06/08/2021    CO2 30 03/12/2021    BUN 17 06/08/2021    BUN 11 03/12/2021    CR 0.76 06/08/2021    CR 0.76 03/12/2021    GLC 92 06/08/2021    GLC 88 03/12/2021     COAGS: No results found for: PTT, INR, FIBR  POC:   Lab Results   Component Value Date     (H) 03/18/2021     HEPATIC:   Lab Results   Component Value Date    ALBUMIN 4.0 12/15/2020    PROTTOTAL 7.3 12/15/2020    ALT 35 12/15/2020    AST 19 12/15/2020    GGT 35 10/22/2013    ALKPHOS 65 12/15/2020    BILITOTAL 0.4 12/15/2020     OTHER:   Lab Results   Component Value Date    CYNDI 9.2 06/08/2021    PHOS 4.3 02/15/2013    MAG 2.0 02/15/2013    TSH 1.06 12/15/2020    T4 1.06 05/16/2017       Anesthesia Plan    ASA Status:  2   NPO Status:  NPO Appropriate    Anesthesia Type: General.     - Airway: LMA   Induction: Intravenous, Propofol.   Maintenance: Balanced.        Consents    Anesthesia Plan(s) and associated risks, benefits, and realistic alternatives discussed. Questions answered and patient/representative(s) expressed understanding.     - Discussed with:  Patient         Postoperative Care    Pain management: IV analgesics, Multi-modal analgesia.   PONV prophylaxis: Ondansetron (or other 5HT-3), Dexamethasone or Solumedrol, Background Propofol Infusion     Comments:                Derek Randolph MD

## 2021-06-11 NOTE — DISCHARGE INSTRUCTIONS
Same Day Surgery Discharge Instructions for  Sedation and General Anesthesia       It's not unusual to feel dizzy, light-headed or faint for up to 24 hours after surgery or while taking pain medication.  If you have these symptoms: sit for a few minutes before standing and have someone assist you when you get up to walk or use the bathroom.      You should rest and relax for the next 24 hours. We recommend you make arrangements to have an adult stay with you for at least 24 hours after your discharge.  Avoid hazardous and strenuous activity.      DO NOT DRIVE any vehicle or operate mechanical equipment for 24 hours following the end of your surgery.  Even though you may feel normal, your reactions may be affected by the medication you have received.      Do not drink alcoholic beverages for 24 hours following surgery.       Slowly progress to your regular diet as you feel able. It's not unusual to feel nauseated and/or vomit after receiving anesthesia.  If you develop these symptoms, drink clear liquids (apple juice, ginger ale, broth, 7-up, etc. ) until you feel better.  If your nausea and vomiting persists for 24 hours, please notify your surgeon.        All narcotic pain medications, along with inactivity and anesthesia, can cause constipation. Drinking plenty of liquids and increasing fiber intake will help.      For any questions of a medical nature, call your surgeon.      Do not make important decisions for 24 hours.      If you had general anesthesia, you may have a sore throat for a couple of days related to the breathing tube used during surgery.  You may use Cepacol lozenges to help with this discomfort.  If it worsens or if you develop a fever, contact your surgeon.       If you feel your pain is not well managed with the pain medications prescribed by your surgeon, please contact your surgeon's office to let them know so they can address your concerns.       CoVid 19 Information    We want to give you  information regarding Covid. Please consult your primary care provider with any questions you might have.     Patient who have symptoms (cough, fever, or shortness of breath), need to isolate for 7 days from when symptoms started OR 72 hours after fever resolves (without fever reducing medications) AND improvement of respiratory symptoms (whichever is longer).      Isolate yourself at home (in own room/own bathroom if possible)    Do Not allow any visitors    Do Not go to work or school    Do Not go to Rastafari,  centers, shopping, or other public places.    Do Not shake hands.    Avoid close and intimate contact with others (hugging, kissing).    Follow CDC recommendations for household cleaning of frequently touched services.     After the initial 7 days, continue to isolate yourself from household members as much as possible. To continue decrease the risk of community spread and exposure, you and any members of your household should limit activities in public for 14 days after starting home isolation.     You can reference the following CDC link for helpful home isolation/care tips:  https://www.cdc.gov/coronavirus/2019-ncov/downloads/10Things.pdf    Protect Others:    Cover Your Mouth and Nose with a mask, disposable tissue or wash cloth to avoid spreading germs to others.    Wash your hands and face frequently with soap and water    Call Your Primary Doctor If: Breathing difficulty develops or you become worse.    For more information about COVID19 and options for caring for yourself at home, please visit the CDC website at https://www.cdc.gov/coronavirus/2019-ncov/about/steps-when-sick.html  For more options for care at [facility], please visit our website at [email or web address]    Discharge Instructions following Breast Surgery  [facility] Same Day Surgery    Diet:   Resume diet as tolerated.  Drink plenty of fluids to prevent  constipation.    Activity:   Gentle rotation & stretching of your arms/shoulders will prevent stiffness in joints   Increase activity gradually   No heavy lifting greater than 10-15 pounds & no strenuous activity until  approved by surgeon    Bathing/Incision Care:   You may shower as directed by surgeon   Pat incisions dry.  No lotions, powders or perfumes to incisions   Tape dressings (steri strips) will fall off in 7-10 days (if present)    What to expect:   A tingly or itchy sensation around the incision is a normal sign of healing   Some clear, pink drainage from incisions is normal.      Notify your surgeon for the following signs & symptoms:   Redness, warmth, or swelling of the incision    Foul smelling or increased drainage   Chills or temperature greater than 101 F   Pain not controlled by pain medications

## 2021-06-11 NOTE — ANESTHESIA CARE TRANSFER NOTE
Patient: Laura Ferreira    Procedure(s):  BILATERAL SECOND STAGE BREAST RECONSTRUCTION WITH SILICONE IMPLANTS    Diagnosis: History of bilateral mastectomy [Z90.13]  Diagnosis Additional Information: No value filed.    Anesthesia Type:   General     Note:    Oropharynx: oropharynx clear of all foreign objects and spontaneously breathing  Level of Consciousness: drowsy  Oxygen Supplementation: face mask  Level of Supplemental Oxygen (L/min / FiO2): 8  Independent Airway: airway patency satisfactory and stable  Dentition: dentition unchanged  Vital Signs Stable: post-procedure vital signs reviewed and stable  Report to RN Given: handoff report given  Patient transferred to: PACU    Handoff Report: Identifed the Patient, Identified the Reponsible Provider, Reviewed the pertinent medical history, Discussed the surgical course, Reviewed Intra-OP anesthesia mangement and issues during anesthesia, Set expectations for post-procedure period and Allowed opportunity for questions and acknowledgement of understanding      Vitals: (Last set prior to Anesthesia Care Transfer)  CRNA VITALS  6/11/2021 1432 - 6/11/2021 1508      6/11/2021             Pulse:  80    SpO2:  100 %    Resp Rate (observed): 12    Resp Rate (set):  10        Electronically Signed By: HAIR Combs CRNA  June 11, 2021  3:08 PM

## 2021-06-15 DIAGNOSIS — K22.70 BARRETT'S ESOPHAGUS WITH ESOPHAGITIS: ICD-10-CM

## 2021-06-15 DIAGNOSIS — K20.90 BARRETT'S ESOPHAGUS WITH ESOPHAGITIS: ICD-10-CM

## 2021-06-15 NOTE — TELEPHONE ENCOUNTER
"Requested Prescriptions   Pending Prescriptions Disp Refills     omeprazole (PRILOSEC) 20 MG DR capsule [Pharmacy Med Name: OMEPRAZOLE DR 20 MG CAPSULE] 180 capsule 1     Sig: TAKE 1 CAPSULE BY MOUTH TWICE A DAY (BEFORE A MEAL)       PPI Protocol Passed - 6/15/2021  8:28 AM        Passed - Not on Clopidogrel (unless Pantoprazole ordered)        Passed - No diagnosis of osteoporosis on record        Passed - Recent (12 mo) or future (30 days) visit within the authorizing provider's specialty     Patient has had an office visit with the authorizing provider or a provider within the authorizing providers department within the previous 12 mos or has a future within next 30 days. See \"Patient Info\" tab in inbasket, or \"Choose Columns\" in Meds & Orders section of the refill encounter.              Passed - Medication is active on med list        Passed - Patient is age 18 or older        Passed - No active pregnacy on record        Passed - No positive pregnancy test in past 12 months           Prescription approved per [facility] Refill Protocol.  Lynnette CORREAN-RN  Triage Nurse  [facility]: [facility]      "

## 2021-09-18 ENCOUNTER — HEALTH MAINTENANCE LETTER (OUTPATIENT)
Age: 61
End: 2021-09-18

## 2021-10-07 ENCOUNTER — OFFICE VISIT (OUTPATIENT)
Dept: SURGERY | Facility: CLINIC | Age: 61
End: 2021-10-07
Payer: COMMERCIAL

## 2021-10-07 DIAGNOSIS — D05.11 DUCTAL CARCINOMA IN SITU (DCIS) OF RIGHT BREAST: Primary | ICD-10-CM

## 2021-10-07 PROCEDURE — 99213 OFFICE O/P EST LOW 20 MIN: CPT | Performed by: SURGERY

## 2021-10-07 NOTE — NURSING NOTE
Laura Ferreira's goals for this visit include:   Chief Complaint   Patient presents with     RECHECK     history of breast cancer, post mastectomy       She requests these members of her care team be copied on today's visit information: no    PCP: Sarita Shultz    Referring Provider:  No referring provider defined for this encounter.    LMP 04/01/2012     Do you need any medication refills at today's visit? No    Magdalene Crawford LPN

## 2021-10-07 NOTE — PROGRESS NOTES
[facility] Follow Up Note    CHIEF COMPLAINT:  6 month follow up right breast DCIS    HISTORY OF PRESENT ILLNESS:  Laura Ferreira is a 60 year old female who is seen in follow up for right breast cancer.    She underwent bilateral mastectomies with right sentinel lymph node biopsy and immediate reconstruction with myself and Dr. Garcia on 3/17/2021 for 6cm of high grade DCIS. She had her 2nd stage reconstruction on 6/11/2021.     She reports she is doing well overall. She is not quite satisfied with her reconstruction. She feels the left is larger than the right and has indentation on the lateral right. She is scheduled for fat grafting with Dr. Garcia in November. She felt that he maybe did not hear her concerns at her last appt. She occasionally has phantom nipple pain on the right.     REVIEW OF SYSTEMS:  Constitutional:  Negative for chills, fatigue, fever and weight change.  Eyes:  Negative for blurred vision, eye pain and photophobia.  ENT:  Negative for hearing problems, ENT pain, congestion, rhinorrhea, epistaxis, hoarseness and dental problems.  Cardiovascular:  Negative for chest pain, palpitations, tachycardia, orthopnea and edema.  Respiratory:  Negative for cough, dyspnea and hemoptysis.  Gastrointestinal:  Negative for abdominal pain, heartburn, constipation, diarrhea and stool changes.  Musculoskeletal:  Negative for arthralgias, back pain and myalgias.  Integumentary/Breast:  See HPI.    Past Medical History:   Diagnosis Date     Anemia      Leblanc's esophagus     EGD in 2014; recent EGD in 4/2018; repeat EGD in 3 years     Ex-smoker     1 PPD x 28 years, quit 20 years ago     Hematuria 01/23/2007     History of breast biopsy     left     Menopausal symptoms 07/17/2009     Motion sickness      Postsurgical hypothyroidism 07/17/2009     Symptomatic menopausal or female climacteric states 07/17/2009     Tear of lateral cartilage or meniscus of knee, current 10/24/2007       Past  Surgical History:   Procedure Laterality Date     APPENDECTOMY OPEN       ARTHROSCOPY KNEE RT/LT  11/15/2007    right knee arthroscopy with arthroscopic lateral meniscectomy     BIOPSY Left     Breast. Benign     CL AFF SURGICAL PATHOLOGY  2002    endometrial biopsy     COLONOSCOPY       ELBOW SURGERY Right      ESOPHAGOSCOPY, GASTROSCOPY, DUODENOSCOPY (EGD), COMBINED N/A 2014    Procedure: COMBINED ESOPHAGOSCOPY, GASTROSCOPY, DUODENOSCOPY (EGD), BIOPSY SINGLE OR MULTIPLE;  Surgeon: Paradise Radford MD;  Location: [facility]     HC THYROIDECTOMY      goitre     INSERT TISSUE EXPANDER BREAST Bilateral 3/17/2021    Procedure: bilateral BREAST TISSUE EXPANDER;  Surgeon: Genaro Garcia MD;  Location: [facility] OR     LAMINECTOMY, FUSION LUMBAR ONE LEVEL, COMBINED  10/26/2011    Procedure:COMBINED LAMINECTOMY, FUSION LUMBAR ONE LEVEL; L4-5 Decompression, L4-5 Posterior Lateral Fusion with Madhavi and Local Bone; Surgeon:BARBRA BRIGHT; Location:[facility]     MASTECTOMY SIMPLE, SENTINEL NODE, COMBINED Bilateral 3/17/2021    Procedure: BILATERAL SKIN SPARING MASTECTOMY WITH right  SENTINEL LYMPH NODE BIOPSY;  Surgeon: Carmen Stein MD;  Location: [facility]     RECONSTRUCT BREAST BILATERAL, IMPLANT PROSTHESIS BILATERAL, COMBINED Bilateral 2021    Procedure: BILATERAL SECOND STAGE BREAST RECONSTRUCTION WITH SILICONE IMPLANTS;  Surgeon: Genaro Garcia MD;  Location: [facility]     TONSILLECTOMY & ADENOIDECTOMY         Family History   Problem Relation Age of Onset     Asthma Mother      Diabetes Mother      Hypertension Mother      Arthritis Mother      Circulatory Mother      Obesity Mother      Thyroid Disease Mother      Aneurysm Mother         Brain,  at age 69     Hypertension Father      Alcohol/Drug Father      Arthritis Father      Obesity Father      Breast Cancer Maternal Grandmother      Thyroid Disease Brother      Colon Cancer No family hx of      Anesthesia Reaction No family hx of         Social History     Tobacco Use     Smoking status: Former Smoker     Years: 14.00     Types: Cigarettes     Quit date: 12/15/1998     Years since quittin.8     Smokeless tobacco: Never Used     Tobacco comment: quit 20 years ago   Substance Use Topics     Alcohol use: Yes     Comment: occ       Patient Active Problem List   Diagnosis     GERD (gastroesophageal reflux disease)     Menorrhagia     Iron deficiency anemia     Simple endometrial hyperplasia without atypia     Postsurgical hypothyroidism     Hyperlipidemia LDL goal <130     Morbid obesity (H)     Malignant neoplasm of left breast (H)     Allergies   Allergen Reactions     Latex Anaphylaxis     Cortisone Nausea and Vomiting     oral     Nickel      Vicodin [Acetaminophen] Nausea and Vomiting     Adhesive Tape Rash     Cvs Petroleum Jelly [Eucerin] Rash     Current Outpatient Medications   Medication Sig Dispense Refill     acetaminophen (TYLENOL) 500 MG tablet Take 500-1,000 mg by mouth daily as needed for mild pain       Ferrous Sulfate (IRON PO) Take 1 tablet by mouth daily       levothyroxine (SYNTHROID/LEVOTHROID) 112 MCG tablet Take 112 mcg by mouth daily       Multiple Vitamins-Minerals (MULTIVITAL PO) Take 1 tablet by mouth daily       omeprazole (PRILOSEC) 20 MG DR capsule TAKE 1 CAPSULE BY MOUTH TWICE A DAY (BEFORE A MEAL) 180 capsule 1     OVER-THE-COUNTER Place 1-2 drops into both eyes every hour as needed       Probiotic Product (PROBIOTIC PO) Take 1 tablet by mouth daily       rosuvastatin (CRESTOR) 20 MG tablet Take 1 tablet (20 mg) by mouth daily 90 tablet 3     senna-docusate (SENOKOT-S/PERICOLACE) 8.6-50 MG tablet Take 1 tablet by mouth 2 times daily 24 tablet 0     Vitals: LMP 2012   BMI= There is no height or weight on file to calculate BMI.    EXAM:  GENERAL: healthy, alert and no distress   BREAST: breasts are surgically absent. Implants are in place. There are no palpable masses or skin concerns.   There is no  axillary or supraclavicular lymphadenopathy.  CARDIOVASCULAR:  RRR  RESPIRATORY: nonlabored breathing  NECK: Neck supple. No adenopathy. Thyroid symmetric, normal size,, Carotids without bruits.  SKIN: No suspicious lesions or rashes  LYMPH: Normal cervical lymph nodes      ASSESSMENT/PLAN:  Laura Ferreira is a 60yof who is s/p bilateral mastectomies with right sentinel lymph node biopsy and immediate reconstruction with myself and Dr. Garcia on 3/17/2021 for 6cm of high grade DCIS.     Her clinical exam is benign. I would like to see her back in 6 months.     I will route my note to Dr. Garcia to review. She would like a larger implant on the right potentially along with fat grafting in November. We discussed she could see him for another clinic visit to discuss this further but she felt comfortable discussing in preop and with me sending this note his way.           Carmen Stein MD  [facility]  [phone]        Please route or send letter to:  Primary Care Provider (PCP) and Referring Provider

## 2021-10-07 NOTE — LETTER
10/7/2021         RE: Laura Ferreira  [address] [place] [id number]        Dear Colleague,    Thank you for referring your patient, Laura Ferreira, to the [facility]. Please see a copy of my visit note below.    [facility] Follow Up Note    CHIEF COMPLAINT:  6 month follow up right breast DCIS    HISTORY OF PRESENT ILLNESS:  Laura Ferreira is a 60 year old female who is seen in follow up for right breast cancer.    She underwent bilateral mastectomies with right sentinel lymph node biopsy and immediate reconstruction with myself and Dr. Garcia on 3/17/2021 for 6cm of high grade DCIS. She had her 2nd stage reconstruction on 6/11/2021.     She reports she is doing well overall. She is not quite satisfied with her reconstruction. She feels the left is larger than the right and has indentation on the lateral right. She is scheduled for fat grafting with Dr. Garcia in November. She felt that he maybe did not hear her concerns at her last appt. She occasionally has phantom nipple pain on the right.     REVIEW OF SYSTEMS:  Constitutional:  Negative for chills, fatigue, fever and weight change.  Eyes:  Negative for blurred vision, eye pain and photophobia.  ENT:  Negative for hearing problems, ENT pain, congestion, rhinorrhea, epistaxis, hoarseness and dental problems.  Cardiovascular:  Negative for chest pain, palpitations, tachycardia, orthopnea and edema.  Respiratory:  Negative for cough, dyspnea and hemoptysis.  Gastrointestinal:  Negative for abdominal pain, heartburn, constipation, diarrhea and stool changes.  Musculoskeletal:  Negative for arthralgias, back pain and myalgias.  Integumentary/Breast:  See HPI.    Past Medical History:   Diagnosis Date     Anemia      Leblanc's esophagus     EGD in 2014; recent EGD in 4/2018; repeat EGD in 3 years     Ex-smoker     1 PPD x 28 years, quit 20 years ago     Hematuria 01/23/2007     History of breast biopsy      left     Menopausal symptoms 07/17/2009     Motion sickness      Postsurgical hypothyroidism 07/17/2009     Symptomatic menopausal or female climacteric states 07/17/2009     Tear of lateral cartilage or meniscus of knee, current 10/24/2007       Past Surgical History:   Procedure Laterality Date     APPENDECTOMY OPEN       ARTHROSCOPY KNEE RT/LT  11/15/2007    right knee arthroscopy with arthroscopic lateral meniscectomy     BIOPSY Left     Breast. Benign     CL AFF SURGICAL PATHOLOGY  09/18/2002    endometrial biopsy     COLONOSCOPY       ELBOW SURGERY Right      ESOPHAGOSCOPY, GASTROSCOPY, DUODENOSCOPY (EGD), COMBINED N/A 12/23/2014    Procedure: COMBINED ESOPHAGOSCOPY, GASTROSCOPY, DUODENOSCOPY (EGD), BIOPSY SINGLE OR MULTIPLE;  Surgeon: Paradise Radford MD;  Location: [facility]     HC THYROIDECTOMY      goitre     INSERT TISSUE EXPANDER BREAST Bilateral 3/17/2021    Procedure: bilateral BREAST TISSUE EXPANDER;  Surgeon: Genaro Garcia MD;  Location: [facility]     LAMINECTOMY, FUSION LUMBAR ONE LEVEL, COMBINED  10/26/2011    Procedure:COMBINED LAMINECTOMY, FUSION LUMBAR ONE LEVEL; L4-5 Decompression, L4-5 Posterior Lateral Fusion with Madhavi and Local Bone; Surgeon:BARBRA BRIGHT; Location:[facility]     MASTECTOMY SIMPLE, SENTINEL NODE, COMBINED Bilateral 3/17/2021    Procedure: BILATERAL SKIN SPARING MASTECTOMY WITH right  SENTINEL LYMPH NODE BIOPSY;  Surgeon: Carmen Stein MD;  Location: [facility]     RECONSTRUCT BREAST BILATERAL, IMPLANT PROSTHESIS BILATERAL, COMBINED Bilateral 6/11/2021    Procedure: BILATERAL SECOND STAGE BREAST RECONSTRUCTION WITH SILICONE IMPLANTS;  Surgeon: Genaro Garcia MD;  Location: [facility]     TONSILLECTOMY & ADENOIDECTOMY         Family History   Problem Relation Age of Onset     Asthma Mother      Diabetes Mother      Hypertension Mother      Arthritis Mother      Circulatory Mother      Obesity Mother      Thyroid Disease Mother      Aneurysm Mother         Brain,   at age 69     Hypertension Father      Alcohol/Drug Father      Arthritis Father      Obesity Father      Breast Cancer Maternal Grandmother      Thyroid Disease Brother      Colon Cancer No family hx of      Anesthesia Reaction No family hx of        Social History     Tobacco Use     Smoking status: Former Smoker     Years: 14.00     Types: Cigarettes     Quit date: 12/15/1998     Years since quittin.8     Smokeless tobacco: Never Used     Tobacco comment: quit 20 years ago   Substance Use Topics     Alcohol use: Yes     Comment: occ       Patient Active Problem List   Diagnosis     GERD (gastroesophageal reflux disease)     Menorrhagia     Iron deficiency anemia     Simple endometrial hyperplasia without atypia     Postsurgical hypothyroidism     Hyperlipidemia LDL goal <130     Morbid obesity (H)     Malignant neoplasm of left breast (H)     Allergies   Allergen Reactions     Latex Anaphylaxis     Cortisone Nausea and Vomiting     oral     Nickel      Vicodin [Acetaminophen] Nausea and Vomiting     Adhesive Tape Rash     Cvs Petroleum Jelly [Eucerin] Rash     Current Outpatient Medications   Medication Sig Dispense Refill     acetaminophen (TYLENOL) 500 MG tablet Take 500-1,000 mg by mouth daily as needed for mild pain       Ferrous Sulfate (IRON PO) Take 1 tablet by mouth daily       levothyroxine (SYNTHROID/LEVOTHROID) 112 MCG tablet Take 112 mcg by mouth daily       Multiple Vitamins-Minerals (MULTIVITAL PO) Take 1 tablet by mouth daily       omeprazole (PRILOSEC) 20 MG DR capsule TAKE 1 CAPSULE BY MOUTH TWICE A DAY (BEFORE A MEAL) 180 capsule 1     OVER-THE-COUNTER Place 1-2 drops into both eyes every hour as needed       Probiotic Product (PROBIOTIC PO) Take 1 tablet by mouth daily       rosuvastatin (CRESTOR) 20 MG tablet Take 1 tablet (20 mg) by mouth daily 90 tablet 3     senna-docusate (SENOKOT-S/PERICOLACE) 8.6-50 MG tablet Take 1 tablet by mouth 2 times daily 24 tablet 0     Vitals: LMP  04/01/2012   BMI= There is no height or weight on file to calculate BMI.    EXAM:  GENERAL: healthy, alert and no distress   BREAST: breasts are surgically absent. Implants are in place. There are no palpable masses or skin concerns.   There is no axillary or supraclavicular lymphadenopathy.  CARDIOVASCULAR:  RRR  RESPIRATORY: nonlabored breathing  NECK: Neck supple. No adenopathy. Thyroid symmetric, normal size,, Carotids without bruits.  SKIN: No suspicious lesions or rashes  LYMPH: Normal cervical lymph nodes      ASSESSMENT/PLAN:  Laura Ferreira is a 60yof who is s/p bilateral mastectomies with right sentinel lymph node biopsy and immediate reconstruction with myself and Dr. Garcia on 3/17/2021 for 6cm of high grade DCIS.     Her clinical exam is benign. I would like to see her back in 6 months.     I will route my note to Dr. Garcia to review. She would like a larger implant on the right potentially along with fat grafting in November. We discussed she could see him for another clinic visit to discuss this further but she felt comfortable discussing in preop and with me sending this note his way.           Carmen Stein MD  [facility]  [phone]        Please route or send letter to:  Primary Care Provider (PCP) and Referring Provider          Again, thank you for allowing me to participate in the care of your patient.        Sincerely,        Carmen Stein MD

## 2021-10-08 DIAGNOSIS — Z11.59 ENCOUNTER FOR SCREENING FOR OTHER VIRAL DISEASES: ICD-10-CM

## 2021-10-21 ENCOUNTER — TRANSFERRED RECORDS (OUTPATIENT)
Dept: HEALTH INFORMATION MANAGEMENT | Facility: CLINIC | Age: 61
End: 2021-10-21
Payer: COMMERCIAL

## 2021-10-26 ENCOUNTER — OFFICE VISIT (OUTPATIENT)
Dept: FAMILY MEDICINE | Facility: CLINIC | Age: 61
End: 2021-10-26
Payer: COMMERCIAL

## 2021-10-26 VITALS
RESPIRATION RATE: 16 BRPM | SYSTOLIC BLOOD PRESSURE: 135 MMHG | TEMPERATURE: 97.6 F | HEART RATE: 63 BPM | WEIGHT: 207.2 LBS | DIASTOLIC BLOOD PRESSURE: 79 MMHG | BODY MASS INDEX: 34.48 KG/M2 | OXYGEN SATURATION: 96 %

## 2021-10-26 DIAGNOSIS — E89.0 POSTSURGICAL HYPOTHYROIDISM: ICD-10-CM

## 2021-10-26 DIAGNOSIS — E78.5 HYPERLIPIDEMIA LDL GOAL <100: ICD-10-CM

## 2021-10-26 DIAGNOSIS — Z01.818 PREOP GENERAL PHYSICAL EXAM: Primary | ICD-10-CM

## 2021-10-26 DIAGNOSIS — Z90.13 S/P BILATERAL MASTECTOMY: ICD-10-CM

## 2021-10-26 DIAGNOSIS — D05.11 DUCTAL CARCINOMA IN SITU (DCIS) OF RIGHT BREAST: ICD-10-CM

## 2021-10-26 PROCEDURE — 99214 OFFICE O/P EST MOD 30 MIN: CPT | Performed by: FAMILY MEDICINE

## 2021-10-26 NOTE — H&P (VIEW-ONLY)
[facility]  [address]  [place] [address]  Phone: [phone]  Primary Provider: Sarita Shultz  Pre-op Performing Provider: SARITA SHULTZ      PREOPERATIVE EVALUATION:  Today's date: 10/26/2021    Laura Ferreira is a 61 year old female who presents for a preoperative evaluation.    Surgical Information:  Surgery/Procedure: REVISION OF RIGHT BREAST RECONSTRUCTION WITH REMOVAL AND REPLACEMENT OF IMPLANT AND FAT GRAFTING GROM ABDOMEN  Surgery Location: [facility]  Surgeon: Radha  Surgery Date: 11/8/21  Time of Surgery: 1300  Where patient plans to recover: At home with family  Fax number for surgical facility: Note does not need to be faxed, will be available electronically in Epic.    Type of Anesthesia Anticipated: General      Assessment & Plan     The proposed surgical procedure is considered LOW risk.    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    Postsurgical hypothyroidism, on Levothyroxine.  - TSH with free T4 reflex-Future    Hyperlipidemia LDL goal <100, on Crestor  - Lipid panel reflex to direct LDL Fasting-Future  - AST-Future  - ALT-Future       Risks and Recommendations:  The patient has the following additional risks and recommendations for perioperative complications:   - No identified additional risk factors other than previously addressed    Medication Instructions:  Patient is to take all scheduled medications on the day of surgery    RECOMMENDATION:  APPROVAL GIVEN to proceed with proposed procedure, without further diagnostic evaluation.        Subjective     HPI related to upcoming procedure:     61 year old pleasant female patient of mine here for a pre-op exam. She has a known history of DCIS of the right breast s/p bilateral mastectomies with  right sentinel lymph node biopsy and immediate reconstruction for 6 cm of high-grade DCIS.  She had her second stage reconstruction on 6/11/2021.  She was not quite satisfied with her reconstruction and and is now scheduled for a revision of the right breast reconstruction with removal and replacement of implant.    Patient states that she is aware of the risks and benefits of the procedure and wishes to proceed.    Denies having any concerns today.    States that she was recently seen by her ophthalmologist and was found to have worsening vision changes in her left eye and was told to follow-up with her PCP for further evaluation of a systemic illness like diabetes.  Has no recent records from her eye care provider.  VIDAL signed.     Recent labs revealed a normal blood glucose.     Preop Questions 10/23/2021   1. Have you ever had a heart attack or stroke? No   2. Have you ever had surgery on your heart or blood vessels, such as a stent placement, a coronary artery bypass, or surgery on an artery in your head, neck, heart, or legs? No   3. Do you have chest pain with activity? No   4. Do you have a history of  heart failure? No   5. Do you currently have a cold, bronchitis or symptoms of other infection? No   6. Do you have a cough, shortness of breath, or wheezing? No   7. Do you or anyone in your family have previous history of blood clots? YES - mom   8. Do you or does anyone in your family have a serious bleeding problem such as prolonged bleeding following surgeries or cuts? No   9. Have you ever had problems with anemia or been told to take iron pills? YES - currently   10. Have you had any abnormal blood loss such as black, tarry or bloody stools, or abnormal vaginal bleeding? No   11. Have you ever had a blood transfusion? No   12. Are you willing to have a blood transfusion if it is medically needed before, during, or after your surgery? Yes   13. Have you or any of your relatives ever had problems with  anesthesia? No   14. Do you have sleep apnea, excessive snoring or daytime drowsiness? YES - daytime drowsiness   14a. Do you have a CPAP machine? No   15. Do you have any artifical heart valves or other implanted medical devices like a pacemaker, defibrillator, or continuous glucose monitor? No   16. Do you have artificial joints? No   17. Are you allergic to latex? YES:       Health Care Directive:  Patient does not have a Health Care Directive or Living Will: Patient states has Advance Directive and will bring in a copy to clinic.    Preoperative Review of :   reviewed - no record of controlled substances prescribed.      Status of Chronic Conditions:  Has underlying known history of postsurgical hypothyroidism and hyperlipidemia.  Due for an annual physical in December with a TSH and a lipid panel done wishes to have these labs done in the near future prior to her appointment.    HYPERLIPIDEMIA - Patient has a long history of significant Hyperlipidemia requiring medication for treatment with recent good control. Patient reports no problems or side effects with the medication.     HYPOTHYROIDISM - Patient has a longstanding history of chronic Hypothyroidism. Patient has been doing well, noting no tremor, insomnia, hair loss or changes in skin texture. Continues to take medications as directed, without adverse reactions or side effects. Last TSH   Lab Results   Component Value Date    TSH 1.06 12/15/2020     Due     Review of Systems  Constitutional, neuro, ENT, endocrine, pulmonary, cardiac, gastrointestinal, genitourinary, musculoskeletal, integument and psychiatric systems are negative, except as otherwise noted.    Patient Active Problem List    Diagnosis Date Noted     Ductal carcinoma in situ (DCIS) of right breast 10/26/2021     Priority: Medium     Malignant neoplasm of left breast (H) 02/25/2021     Priority: Medium     Check 10.21 for f/u Sarita  Added automatically from request for surgery  [id number]       Morbid obesity (H) 02/28/2018     Priority: Medium     Postsurgical hypothyroidism 12/05/2017     Priority: Medium     Hyperlipidemia LDL goal <130 12/05/2017     Priority: Medium     Simple endometrial hyperplasia without atypia 08/22/2011     Priority: Medium     Menorrhagia 05/12/2011     Priority: Medium     Iron deficiency anemia 05/12/2011     Priority: Medium     GERD (gastroesophageal reflux disease) 07/02/2010     Priority: Medium      Past Medical History:   Diagnosis Date     Anemia      Leblacn's esophagus     EGD in 2014; recent EGD in 4/2018; repeat EGD in 3 years     Ex-smoker     1 PPD x 28 years, quit 20 years ago     Hematuria 01/23/2007     History of breast biopsy     left     Menopausal symptoms 07/17/2009     Motion sickness      Postsurgical hypothyroidism 07/17/2009     Symptomatic menopausal or female climacteric states 07/17/2009     Tear of lateral cartilage or meniscus of knee, current 10/24/2007     Past Surgical History:   Procedure Laterality Date     APPENDECTOMY OPEN       ARTHROSCOPY KNEE RT/LT  11/15/2007    right knee arthroscopy with arthroscopic lateral meniscectomy     BIOPSY Left     Breast. Benign     CL AFF SURGICAL PATHOLOGY  09/18/2002    endometrial biopsy     COLONOSCOPY       ELBOW SURGERY Right      ESOPHAGOSCOPY, GASTROSCOPY, DUODENOSCOPY (EGD), COMBINED N/A 12/23/2014    Procedure: COMBINED ESOPHAGOSCOPY, GASTROSCOPY, DUODENOSCOPY (EGD), BIOPSY SINGLE OR MULTIPLE;  Surgeon: Paradise Radford MD;  Location: [facility]     HC THYROIDECTOMY      goitre     INSERT TISSUE EXPANDER BREAST Bilateral 3/17/2021    Procedure: bilateral BREAST TISSUE EXPANDER;  Surgeon: Genaro Garcia MD;  Location: [facility]     LAMINECTOMY, FUSION LUMBAR ONE LEVEL, COMBINED  10/26/2011    Procedure:COMBINED LAMINECTOMY, FUSION LUMBAR ONE LEVEL; L4-5 Decompression, L4-5 Posterior Lateral Fusion with Madhavi and Local Bone; Surgeon:BARBRA BRIGHT; Location:[facility]     MASTECTOMY  SIMPLE, SENTINEL NODE, COMBINED Bilateral 3/17/2021    Procedure: BILATERAL SKIN SPARING MASTECTOMY WITH right  SENTINEL LYMPH NODE BIOPSY;  Surgeon: Carmen Stein MD;  Location: [facility]     RECONSTRUCT BREAST BILATERAL, IMPLANT PROSTHESIS BILATERAL, COMBINED Bilateral 2021    Procedure: BILATERAL SECOND STAGE BREAST RECONSTRUCTION WITH SILICONE IMPLANTS;  Surgeon: Genaro Garcia MD;  Location: [facility]     TONSILLECTOMY & ADENOIDECTOMY       Current Outpatient Medications   Medication Sig Dispense Refill     acetaminophen (TYLENOL) 500 MG tablet Take 500-1,000 mg by mouth daily as needed for mild pain       Ferrous Sulfate (IRON PO) Take 1 tablet by mouth daily       levothyroxine (SYNTHROID/LEVOTHROID) 112 MCG tablet Take 112 mcg by mouth daily       Multiple Vitamins-Minerals (MULTIVITAL PO) Take 1 tablet by mouth daily       omeprazole (PRILOSEC) 20 MG DR capsule TAKE 1 CAPSULE BY MOUTH TWICE A DAY (BEFORE A MEAL) 180 capsule 1     OVER-THE-COUNTER Place 1-2 drops into both eyes every hour as needed       Probiotic Product (PROBIOTIC PO) Take 1 tablet by mouth daily        rosuvastatin (CRESTOR) 20 MG tablet Take 1 tablet (20 mg) by mouth daily 90 tablet 3       Allergies   Allergen Reactions     Latex Anaphylaxis     Cortisone Nausea and Vomiting     oral     Nickel      Vicodin [Acetaminophen] Nausea and Vomiting     Adhesive Tape Rash     Cvs Petroleum Jelly [Eucerin] Rash        Social History     Tobacco Use     Smoking status: Former Smoker     Years: 14.00     Types: Cigarettes     Quit date: 12/15/1998     Years since quittin.8     Smokeless tobacco: Never Used     Tobacco comment: quit 20 years ago   Substance Use Topics     Alcohol use: Yes     Comment: occ     Family History   Problem Relation Age of Onset     Asthma Mother      Diabetes Mother      Hypertension Mother      Arthritis Mother      Circulatory Mother      Obesity Mother      Thyroid Disease Mother      Aneurysm Mother          Brain,  at age 69     Hypertension Father      Alcohol/Drug Father      Arthritis Father      Obesity Father      Breast Cancer Maternal Grandmother      Thyroid Disease Brother      Colon Cancer No family hx of      Anesthesia Reaction No family hx of      History   Drug Use No         Objective     /79   Pulse 63   Temp 97.6  F (36.4  C) (Tympanic)   Resp 16   Wt 94 kg (207 lb 3.2 oz)   LMP 2012   SpO2 96%   Breastfeeding No   BMI 34.48 kg/m      Physical Exam    GENERAL APPEARANCE: healthy, alert and no distress     EYES: EOMI, PERRL     HENT: ear canals and TM's normal and nose and mouth without ulcers or lesions     NECK: no adenopathy, no asymmetry, masses, or scars and thyroid normal to palpation     RESP: lungs clear to auscultation - no rales, rhonchi or wheezes     CV: regular rates and rhythm, normal S1 S2, no S3 or S4 and no murmur, click or rub     ABDOMEN:  soft, nontender, no HSM or masses and bowel sounds normal     MS: extremities normal- no gross deformities noted, no evidence of inflammation in joints, FROM in all extremities.     SKIN: no suspicious lesions or rashes     NEURO: Normal strength and tone, sensory exam grossly normal, mentation intact and speech normal     PSYCH: mentation appears normal. and affect normal/bright     LYMPHATICS: No cervical adenopathy    Recent Labs   Lab Test 21  1616 21  1213   HGB 13.9 14.2    223    140   POTASSIUM 3.8 4.1   CR 0.76 0.76        Diagnostics:  Labs pending at this time.  Results will be reviewed when available.   No EKG required for low risk surgery (cataract, skin procedure, breast biopsy, etc).    Revised Cardiac Risk Index (RCRI):  The patient has the following serious cardiovascular risks for perioperative complications:   - No serious cardiac risks = 0 points     RCRI Interpretation: 0 points: Class I (very low risk - 0.4% complication rate)         Signed Electronically by: Sarita  MD Lexii  Copy of this evaluation report is provided to requesting physician.

## 2021-10-26 NOTE — PROGRESS NOTES
[facility]  [address]  [place] [address]  Phone: [phone]  Primary Provider: Sarita Shultz  Pre-op Performing Provider: SARITA SHULTZ      PREOPERATIVE EVALUATION:  Today's date: 10/26/2021    Laura Ferreira is a 61 year old female who presents for a preoperative evaluation.    Surgical Information:  Surgery/Procedure: REVISION OF RIGHT BREAST RECONSTRUCTION WITH REMOVAL AND REPLACEMENT OF IMPLANT AND FAT GRAFTING GROM ABDOMEN  Surgery Location: [facility]  Surgeon: Radha  Surgery Date: 11/8/21  Time of Surgery: 1300  Where patient plans to recover: At home with family  Fax number for surgical facility: Note does not need to be faxed, will be available electronically in Epic.    Type of Anesthesia Anticipated: General      Assessment & Plan     The proposed surgical procedure is considered LOW risk.    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    Postsurgical hypothyroidism, on Levothyroxine.  - TSH with free T4 reflex-Future    Hyperlipidemia LDL goal <100, on Crestor  - Lipid panel reflex to direct LDL Fasting-Future  - AST-Future  - ALT-Future       Risks and Recommendations:  The patient has the following additional risks and recommendations for perioperative complications:   - No identified additional risk factors other than previously addressed    Medication Instructions:  Patient is to take all scheduled medications on the day of surgery    RECOMMENDATION:  APPROVAL GIVEN to proceed with proposed procedure, without further diagnostic evaluation.        Subjective     HPI related to upcoming procedure:     61 year old pleasant female patient of mine here for a pre-op exam. She has a known history of DCIS of the right breast s/p bilateral mastectomies with  right sentinel lymph node biopsy and immediate reconstruction for 6 cm of high-grade DCIS.  She had her second stage reconstruction on 6/11/2021.  She was not quite satisfied with her reconstruction and and is now scheduled for a revision of the right breast reconstruction with removal and replacement of implant.    Patient states that she is aware of the risks and benefits of the procedure and wishes to proceed.    Denies having any concerns today.    States that she was recently seen by her ophthalmologist and was found to have worsening vision changes in her left eye and was told to follow-up with her PCP for further evaluation of a systemic illness like diabetes.  Has no recent records from her eye care provider.  VIDAL signed.     Recent labs revealed a normal blood glucose.     Preop Questions 10/23/2021   1. Have you ever had a heart attack or stroke? No   2. Have you ever had surgery on your heart or blood vessels, such as a stent placement, a coronary artery bypass, or surgery on an artery in your head, neck, heart, or legs? No   3. Do you have chest pain with activity? No   4. Do you have a history of  heart failure? No   5. Do you currently have a cold, bronchitis or symptoms of other infection? No   6. Do you have a cough, shortness of breath, or wheezing? No   7. Do you or anyone in your family have previous history of blood clots? YES - mom   8. Do you or does anyone in your family have a serious bleeding problem such as prolonged bleeding following surgeries or cuts? No   9. Have you ever had problems with anemia or been told to take iron pills? YES - currently   10. Have you had any abnormal blood loss such as black, tarry or bloody stools, or abnormal vaginal bleeding? No   11. Have you ever had a blood transfusion? No   12. Are you willing to have a blood transfusion if it is medically needed before, during, or after your surgery? Yes   13. Have you or any of your relatives ever had problems with  anesthesia? No   14. Do you have sleep apnea, excessive snoring or daytime drowsiness? YES - daytime drowsiness   14a. Do you have a CPAP machine? No   15. Do you have any artifical heart valves or other implanted medical devices like a pacemaker, defibrillator, or continuous glucose monitor? No   16. Do you have artificial joints? No   17. Are you allergic to latex? YES:       Health Care Directive:  Patient does not have a Health Care Directive or Living Will: Patient states has Advance Directive and will bring in a copy to clinic.    Preoperative Review of :   reviewed - no record of controlled substances prescribed.      Status of Chronic Conditions:  Has underlying known history of postsurgical hypothyroidism and hyperlipidemia.  Due for an annual physical in December with a TSH and a lipid panel done wishes to have these labs done in the near future prior to her appointment.    HYPERLIPIDEMIA - Patient has a long history of significant Hyperlipidemia requiring medication for treatment with recent good control. Patient reports no problems or side effects with the medication.     HYPOTHYROIDISM - Patient has a longstanding history of chronic Hypothyroidism. Patient has been doing well, noting no tremor, insomnia, hair loss or changes in skin texture. Continues to take medications as directed, without adverse reactions or side effects. Last TSH   Lab Results   Component Value Date    TSH 1.06 12/15/2020     Due     Review of Systems  Constitutional, neuro, ENT, endocrine, pulmonary, cardiac, gastrointestinal, genitourinary, musculoskeletal, integument and psychiatric systems are negative, except as otherwise noted.    Patient Active Problem List    Diagnosis Date Noted     Ductal carcinoma in situ (DCIS) of right breast 10/26/2021     Priority: Medium     Malignant neoplasm of left breast (H) 02/25/2021     Priority: Medium     Check 10.21 for f/u Sarita  Added automatically from request for surgery  [id number]       Morbid obesity (H) 02/28/2018     Priority: Medium     Postsurgical hypothyroidism 12/05/2017     Priority: Medium     Hyperlipidemia LDL goal <130 12/05/2017     Priority: Medium     Simple endometrial hyperplasia without atypia 08/22/2011     Priority: Medium     Menorrhagia 05/12/2011     Priority: Medium     Iron deficiency anemia 05/12/2011     Priority: Medium     GERD (gastroesophageal reflux disease) 07/02/2010     Priority: Medium      Past Medical History:   Diagnosis Date     Anemia      Leblanc's esophagus     EGD in 2014; recent EGD in 4/2018; repeat EGD in 3 years     Ex-smoker     1 PPD x 28 years, quit 20 years ago     Hematuria 01/23/2007     History of breast biopsy     left     Menopausal symptoms 07/17/2009     Motion sickness      Postsurgical hypothyroidism 07/17/2009     Symptomatic menopausal or female climacteric states 07/17/2009     Tear of lateral cartilage or meniscus of knee, current 10/24/2007     Past Surgical History:   Procedure Laterality Date     APPENDECTOMY OPEN       ARTHROSCOPY KNEE RT/LT  11/15/2007    right knee arthroscopy with arthroscopic lateral meniscectomy     BIOPSY Left     Breast. Benign     CL AFF SURGICAL PATHOLOGY  09/18/2002    endometrial biopsy     COLONOSCOPY       ELBOW SURGERY Right      ESOPHAGOSCOPY, GASTROSCOPY, DUODENOSCOPY (EGD), COMBINED N/A 12/23/2014    Procedure: COMBINED ESOPHAGOSCOPY, GASTROSCOPY, DUODENOSCOPY (EGD), BIOPSY SINGLE OR MULTIPLE;  Surgeon: Paradise Radford MD;  Location: [facility]     HC THYROIDECTOMY      goitre     INSERT TISSUE EXPANDER BREAST Bilateral 3/17/2021    Procedure: bilateral BREAST TISSUE EXPANDER;  Surgeon: Genaro Garcia MD;  Location: [facility]     LAMINECTOMY, FUSION LUMBAR ONE LEVEL, COMBINED  10/26/2011    Procedure:COMBINED LAMINECTOMY, FUSION LUMBAR ONE LEVEL; L4-5 Decompression, L4-5 Posterior Lateral Fusion with Madhavi and Local Bone; Surgeon:BARBRA BRIGHT; Location:[facility]     MASTECTOMY  SIMPLE, SENTINEL NODE, COMBINED Bilateral 3/17/2021    Procedure: BILATERAL SKIN SPARING MASTECTOMY WITH right  SENTINEL LYMPH NODE BIOPSY;  Surgeon: Carmen Stein MD;  Location: [facility]     RECONSTRUCT BREAST BILATERAL, IMPLANT PROSTHESIS BILATERAL, COMBINED Bilateral 2021    Procedure: BILATERAL SECOND STAGE BREAST RECONSTRUCTION WITH SILICONE IMPLANTS;  Surgeon: Genaro Garcia MD;  Location: [facility]     TONSILLECTOMY & ADENOIDECTOMY       Current Outpatient Medications   Medication Sig Dispense Refill     acetaminophen (TYLENOL) 500 MG tablet Take 500-1,000 mg by mouth daily as needed for mild pain       Ferrous Sulfate (IRON PO) Take 1 tablet by mouth daily       levothyroxine (SYNTHROID/LEVOTHROID) 112 MCG tablet Take 112 mcg by mouth daily       Multiple Vitamins-Minerals (MULTIVITAL PO) Take 1 tablet by mouth daily       omeprazole (PRILOSEC) 20 MG DR capsule TAKE 1 CAPSULE BY MOUTH TWICE A DAY (BEFORE A MEAL) 180 capsule 1     OVER-THE-COUNTER Place 1-2 drops into both eyes every hour as needed       Probiotic Product (PROBIOTIC PO) Take 1 tablet by mouth daily        rosuvastatin (CRESTOR) 20 MG tablet Take 1 tablet (20 mg) by mouth daily 90 tablet 3       Allergies   Allergen Reactions     Latex Anaphylaxis     Cortisone Nausea and Vomiting     oral     Nickel      Vicodin [Acetaminophen] Nausea and Vomiting     Adhesive Tape Rash     Cvs Petroleum Jelly [Eucerin] Rash        Social History     Tobacco Use     Smoking status: Former Smoker     Years: 14.00     Types: Cigarettes     Quit date: 12/15/1998     Years since quittin.8     Smokeless tobacco: Never Used     Tobacco comment: quit 20 years ago   Substance Use Topics     Alcohol use: Yes     Comment: occ     Family History   Problem Relation Age of Onset     Asthma Mother      Diabetes Mother      Hypertension Mother      Arthritis Mother      Circulatory Mother      Obesity Mother      Thyroid Disease Mother      Aneurysm Mother          Brain,  at age 69     Hypertension Father      Alcohol/Drug Father      Arthritis Father      Obesity Father      Breast Cancer Maternal Grandmother      Thyroid Disease Brother      Colon Cancer No family hx of      Anesthesia Reaction No family hx of      History   Drug Use No         Objective     /79   Pulse 63   Temp 97.6  F (36.4  C) (Tympanic)   Resp 16   Wt 94 kg (207 lb 3.2 oz)   LMP 2012   SpO2 96%   Breastfeeding No   BMI 34.48 kg/m      Physical Exam    GENERAL APPEARANCE: healthy, alert and no distress     EYES: EOMI, PERRL     HENT: ear canals and TM's normal and nose and mouth without ulcers or lesions     NECK: no adenopathy, no asymmetry, masses, or scars and thyroid normal to palpation     RESP: lungs clear to auscultation - no rales, rhonchi or wheezes     CV: regular rates and rhythm, normal S1 S2, no S3 or S4 and no murmur, click or rub     ABDOMEN:  soft, nontender, no HSM or masses and bowel sounds normal     MS: extremities normal- no gross deformities noted, no evidence of inflammation in joints, FROM in all extremities.     SKIN: no suspicious lesions or rashes     NEURO: Normal strength and tone, sensory exam grossly normal, mentation intact and speech normal     PSYCH: mentation appears normal. and affect normal/bright     LYMPHATICS: No cervical adenopathy    Recent Labs   Lab Test 21  1616 21  1213   HGB 13.9 14.2    223    140   POTASSIUM 3.8 4.1   CR 0.76 0.76        Diagnostics:  Labs pending at this time.  Results will be reviewed when available.   No EKG required for low risk surgery (cataract, skin procedure, breast biopsy, etc).    Revised Cardiac Risk Index (RCRI):  The patient has the following serious cardiovascular risks for perioperative complications:   - No serious cardiac risks = 0 points     RCRI Interpretation: 0 points: Class I (very low risk - 0.4% complication rate)         Signed Electronically by: Sarita  MD Lexii  Copy of this evaluation report is provided to requesting physician.

## 2021-10-26 NOTE — PATIENT INSTRUCTIONS

## 2021-10-28 ENCOUNTER — MYC MEDICAL ADVICE (OUTPATIENT)
Dept: CARE COORDINATION | Facility: CLINIC | Age: 61
End: 2021-10-28

## 2021-10-28 NOTE — TELEPHONE ENCOUNTER
SURV care plan sent to [facility] for patient to review.   Will follow up with any questions and provide any resources needed.    Marylu MCCONNELL RN 10/28/2021 9:25 AM

## 2021-11-04 ENCOUNTER — LAB (OUTPATIENT)
Dept: LAB | Facility: CLINIC | Age: 61
End: 2021-11-04
Attending: PLASTIC SURGERY
Payer: COMMERCIAL

## 2021-11-04 DIAGNOSIS — Z11.59 ENCOUNTER FOR SCREENING FOR OTHER VIRAL DISEASES: ICD-10-CM

## 2021-11-04 PROCEDURE — U0003 INFECTIOUS AGENT DETECTION BY NUCLEIC ACID (DNA OR RNA); SEVERE ACUTE RESPIRATORY SYNDROME CORONAVIRUS 2 (SARS-COV-2) (CORONAVIRUS DISEASE [COVID-19]), AMPLIFIED PROBE TECHNIQUE, MAKING USE OF HIGH THROUGHPUT TECHNOLOGIES AS DESCRIBED BY CMS-2020-01-R: HCPCS

## 2021-11-04 PROCEDURE — U0005 INFEC AGEN DETEC AMPLI PROBE: HCPCS

## 2021-11-05 LAB — SARS-COV-2 RNA RESP QL NAA+PROBE: NEGATIVE

## 2021-11-05 NOTE — TELEPHONE ENCOUNTER
Phone number in chart incorrect or invalid.   No message left.   Marylu Aguilar RN on 11/5/2021 at 12:37 PM

## 2021-11-08 ENCOUNTER — HOSPITAL ENCOUNTER (OUTPATIENT)
Facility: CLINIC | Age: 61
Discharge: HOME | End: 2021-11-08
Attending: PLASTIC SURGERY | Admitting: PLASTIC SURGERY
Payer: COMMERCIAL

## 2021-11-08 ENCOUNTER — ANESTHESIA (OUTPATIENT)
Dept: SURGERY | Facility: CLINIC | Age: 61
End: 2021-11-08
Payer: COMMERCIAL

## 2021-11-08 ENCOUNTER — ANESTHESIA EVENT (OUTPATIENT)
Dept: SURGERY | Facility: CLINIC | Age: 61
End: 2021-11-08
Payer: COMMERCIAL

## 2021-11-08 VITALS
HEIGHT: 65 IN | BODY MASS INDEX: 34.52 KG/M2 | RESPIRATION RATE: 11 BRPM | SYSTOLIC BLOOD PRESSURE: 135 MMHG | TEMPERATURE: 97 F | HEART RATE: 59 BPM | OXYGEN SATURATION: 97 % | DIASTOLIC BLOOD PRESSURE: 82 MMHG | WEIGHT: 207.2 LBS

## 2021-11-08 DIAGNOSIS — D05.11 DUCTAL CARCINOMA IN SITU (DCIS) OF RIGHT BREAST: Primary | ICD-10-CM

## 2021-11-08 PROCEDURE — 258N000003 HC RX IP 258 OP 636: Performed by: ANESTHESIOLOGY

## 2021-11-08 PROCEDURE — 250N000011 HC RX IP 250 OP 636: Performed by: NURSE ANESTHETIST, CERTIFIED REGISTERED

## 2021-11-08 PROCEDURE — 710N000009 HC RECOVERY PHASE 1, LEVEL 1, PER MIN: Performed by: PLASTIC SURGERY

## 2021-11-08 PROCEDURE — 370N000017 HC ANESTHESIA TECHNICAL FEE, PER MIN: Performed by: PLASTIC SURGERY

## 2021-11-08 PROCEDURE — 360N000076 HC SURGERY LEVEL 3, PER MIN: Performed by: PLASTIC SURGERY

## 2021-11-08 PROCEDURE — 272N000001 HC OR GENERAL SUPPLY STERILE: Performed by: PLASTIC SURGERY

## 2021-11-08 PROCEDURE — 250N000013 HC RX MED GY IP 250 OP 250 PS 637: Performed by: PLASTIC SURGERY

## 2021-11-08 PROCEDURE — 250N000009 HC RX 250: Performed by: NURSE ANESTHETIST, CERTIFIED REGISTERED

## 2021-11-08 PROCEDURE — L8600 IMPLANT BREAST SILICONE/EQ: HCPCS | Performed by: PLASTIC SURGERY

## 2021-11-08 PROCEDURE — 250N000011 HC RX IP 250 OP 636: Performed by: ANESTHESIOLOGY

## 2021-11-08 PROCEDURE — 999N000141 HC STATISTIC PRE-PROCEDURE NURSING ASSESSMENT: Performed by: PLASTIC SURGERY

## 2021-11-08 PROCEDURE — 250N000009 HC RX 250: Performed by: PLASTIC SURGERY

## 2021-11-08 PROCEDURE — 250N000025 HC SEVOFLURANE, PER MIN: Performed by: PLASTIC SURGERY

## 2021-11-08 PROCEDURE — 250N000011 HC RX IP 250 OP 636: Performed by: PLASTIC SURGERY

## 2021-11-08 PROCEDURE — 88300 SURGICAL PATH GROSS: CPT | Mod: TC | Performed by: PLASTIC SURGERY

## 2021-11-08 PROCEDURE — 710N000012 HC RECOVERY PHASE 2, PER MINUTE: Performed by: PLASTIC SURGERY

## 2021-11-08 DEVICE — SMOOTH ROUND ULTRA HIGH PROFILE SILICONE GEL-FILLED BREAST IMPLANT, 650CC  SMOOTH ROUND SILICONE
Type: IMPLANTABLE DEVICE | Site: BREAST | Status: FUNCTIONAL
Brand: MENTOR MEMORYGEL BREAST IMPLANT

## 2021-11-08 RX ORDER — ONDANSETRON 2 MG/ML
INJECTION INTRAMUSCULAR; INTRAVENOUS PRN
Status: DISCONTINUED | OUTPATIENT
Start: 2021-11-08 | End: 2021-11-08

## 2021-11-08 RX ORDER — PROPOFOL 10 MG/ML
INJECTION, EMULSION INTRAVENOUS CONTINUOUS PRN
Status: DISCONTINUED | OUTPATIENT
Start: 2021-11-08 | End: 2021-11-08

## 2021-11-08 RX ORDER — CEFAZOLIN SODIUM 2 G/100ML
2 INJECTION, SOLUTION INTRAVENOUS
Status: COMPLETED | OUTPATIENT
Start: 2021-11-08 | End: 2021-11-08

## 2021-11-08 RX ORDER — LIDOCAINE 40 MG/G
CREAM TOPICAL
Status: DISCONTINUED | OUTPATIENT
Start: 2021-11-08 | End: 2021-11-08 | Stop reason: HOSPADM

## 2021-11-08 RX ORDER — MEPERIDINE HYDROCHLORIDE 25 MG/ML
12.5 INJECTION INTRAMUSCULAR; INTRAVENOUS; SUBCUTANEOUS
Status: DISCONTINUED | OUTPATIENT
Start: 2021-11-08 | End: 2021-11-08 | Stop reason: HOSPADM

## 2021-11-08 RX ORDER — BUPIVACAINE HYDROCHLORIDE AND EPINEPHRINE 2.5; 5 MG/ML; UG/ML
INJECTION, SOLUTION INFILTRATION; PERINEURAL PRN
Status: DISCONTINUED | OUTPATIENT
Start: 2021-11-08 | End: 2021-11-08 | Stop reason: HOSPADM

## 2021-11-08 RX ORDER — HYDROMORPHONE HCL IN WATER/PF 6 MG/30 ML
0.2 PATIENT CONTROLLED ANALGESIA SYRINGE INTRAVENOUS EVERY 5 MIN PRN
Status: DISCONTINUED | OUTPATIENT
Start: 2021-11-08 | End: 2021-11-08 | Stop reason: HOSPADM

## 2021-11-08 RX ORDER — SODIUM CHLORIDE, SODIUM LACTATE, POTASSIUM CHLORIDE, CALCIUM CHLORIDE 600; 310; 30; 20 MG/100ML; MG/100ML; MG/100ML; MG/100ML
INJECTION, SOLUTION INTRAVENOUS CONTINUOUS
Status: DISCONTINUED | OUTPATIENT
Start: 2021-11-08 | End: 2021-11-08 | Stop reason: HOSPADM

## 2021-11-08 RX ORDER — CEFAZOLIN SODIUM 2 G/100ML
2 INJECTION, SOLUTION INTRAVENOUS SEE ADMIN INSTRUCTIONS
Status: DISCONTINUED | OUTPATIENT
Start: 2021-11-08 | End: 2021-11-08 | Stop reason: HOSPADM

## 2021-11-08 RX ORDER — PROPOFOL 10 MG/ML
INJECTION, EMULSION INTRAVENOUS PRN
Status: DISCONTINUED | OUTPATIENT
Start: 2021-11-08 | End: 2021-11-08

## 2021-11-08 RX ORDER — FENTANYL CITRATE 50 UG/ML
25 INJECTION, SOLUTION INTRAMUSCULAR; INTRAVENOUS
Status: DISCONTINUED | OUTPATIENT
Start: 2021-11-08 | End: 2021-11-08 | Stop reason: HOSPADM

## 2021-11-08 RX ORDER — FENTANYL CITRATE 50 UG/ML
INJECTION, SOLUTION INTRAMUSCULAR; INTRAVENOUS PRN
Status: DISCONTINUED | OUTPATIENT
Start: 2021-11-08 | End: 2021-11-08

## 2021-11-08 RX ORDER — MAGNESIUM HYDROXIDE 1200 MG/15ML
LIQUID ORAL PRN
Status: DISCONTINUED | OUTPATIENT
Start: 2021-11-08 | End: 2021-11-08 | Stop reason: HOSPADM

## 2021-11-08 RX ORDER — ONDANSETRON 2 MG/ML
4 INJECTION INTRAMUSCULAR; INTRAVENOUS EVERY 30 MIN PRN
Status: DISCONTINUED | OUTPATIENT
Start: 2021-11-08 | End: 2021-11-08 | Stop reason: HOSPADM

## 2021-11-08 RX ORDER — FENTANYL CITRATE 50 UG/ML
25 INJECTION, SOLUTION INTRAMUSCULAR; INTRAVENOUS EVERY 5 MIN PRN
Status: DISCONTINUED | OUTPATIENT
Start: 2021-11-08 | End: 2021-11-08 | Stop reason: HOSPADM

## 2021-11-08 RX ORDER — OXYCODONE HYDROCHLORIDE 5 MG/1
5 TABLET ORAL EVERY 4 HOURS PRN
Status: DISCONTINUED | OUTPATIENT
Start: 2021-11-08 | End: 2021-11-08 | Stop reason: HOSPADM

## 2021-11-08 RX ORDER — OXYCODONE HYDROCHLORIDE 5 MG/1
5 TABLET ORAL EVERY 6 HOURS PRN
Qty: 12 TABLET | Refills: 0 | Status: SHIPPED | OUTPATIENT
Start: 2021-11-08 | End: 2021-11-11

## 2021-11-08 RX ORDER — EPHEDRINE SULFATE 50 MG/ML
INJECTION, SOLUTION INTRAMUSCULAR; INTRAVENOUS; SUBCUTANEOUS PRN
Status: DISCONTINUED | OUTPATIENT
Start: 2021-11-08 | End: 2021-11-08

## 2021-11-08 RX ORDER — DEXAMETHASONE SODIUM PHOSPHATE 4 MG/ML
INJECTION, SOLUTION INTRA-ARTICULAR; INTRALESIONAL; INTRAMUSCULAR; INTRAVENOUS; SOFT TISSUE PRN
Status: DISCONTINUED | OUTPATIENT
Start: 2021-11-08 | End: 2021-11-08

## 2021-11-08 RX ORDER — ONDANSETRON 4 MG/1
4 TABLET, ORALLY DISINTEGRATING ORAL EVERY 30 MIN PRN
Status: DISCONTINUED | OUTPATIENT
Start: 2021-11-08 | End: 2021-11-08 | Stop reason: HOSPADM

## 2021-11-08 RX ORDER — CEFAZOLIN SODIUM 1 G/3ML
INJECTION, POWDER, FOR SOLUTION INTRAMUSCULAR; INTRAVENOUS
Status: DISCONTINUED
Start: 2021-11-08 | End: 2021-11-08 | Stop reason: HOSPADM

## 2021-11-08 RX ORDER — BUPIVACAINE HYDROCHLORIDE 5 MG/ML
INJECTION, SOLUTION EPIDURAL; INTRACAUDAL
Status: DISCONTINUED
Start: 2021-11-08 | End: 2021-11-08 | Stop reason: HOSPADM

## 2021-11-08 RX ORDER — LIDOCAINE HYDROCHLORIDE 20 MG/ML
INJECTION, SOLUTION INFILTRATION; PERINEURAL PRN
Status: DISCONTINUED | OUTPATIENT
Start: 2021-11-08 | End: 2021-11-08

## 2021-11-08 RX ORDER — IBUPROFEN 600 MG/1
600 TABLET, FILM COATED ORAL EVERY 6 HOURS PRN
COMMUNITY
End: 2024-04-23

## 2021-11-08 RX ORDER — ACETAMINOPHEN 325 MG/1
650 TABLET ORAL ONCE
Status: COMPLETED | OUTPATIENT
Start: 2021-11-08 | End: 2021-11-08

## 2021-11-08 RX ADMIN — FENTANYL CITRATE 50 MCG: 50 INJECTION, SOLUTION INTRAMUSCULAR; INTRAVENOUS at 11:56

## 2021-11-08 RX ADMIN — ONDANSETRON 4 MG: 2 INJECTION INTRAMUSCULAR; INTRAVENOUS at 12:55

## 2021-11-08 RX ADMIN — PROPOFOL 75 MCG/KG/MIN: 10 INJECTION, EMULSION INTRAVENOUS at 11:58

## 2021-11-08 RX ADMIN — LIDOCAINE HYDROCHLORIDE 60 MG: 20 INJECTION, SOLUTION INFILTRATION; PERINEURAL at 11:56

## 2021-11-08 RX ADMIN — SODIUM CHLORIDE, POTASSIUM CHLORIDE, SODIUM LACTATE AND CALCIUM CHLORIDE: 600; 310; 30; 20 INJECTION, SOLUTION INTRAVENOUS at 13:11

## 2021-11-08 RX ADMIN — ONDANSETRON 4 MG: 2 INJECTION INTRAMUSCULAR; INTRAVENOUS at 13:44

## 2021-11-08 RX ADMIN — Medication 5 MG: at 12:52

## 2021-11-08 RX ADMIN — DEXAMETHASONE SODIUM PHOSPHATE 4 MG: 4 INJECTION, SOLUTION INTRA-ARTICULAR; INTRALESIONAL; INTRAMUSCULAR; INTRAVENOUS; SOFT TISSUE at 12:04

## 2021-11-08 RX ADMIN — Medication 5 MG: at 12:34

## 2021-11-08 RX ADMIN — FENTANYL CITRATE 50 MCG: 50 INJECTION, SOLUTION INTRAMUSCULAR; INTRAVENOUS at 12:06

## 2021-11-08 RX ADMIN — MIDAZOLAM 2 MG: 1 INJECTION INTRAMUSCULAR; INTRAVENOUS at 11:54

## 2021-11-08 RX ADMIN — PROPOFOL 200 MG: 10 INJECTION, EMULSION INTRAVENOUS at 11:56

## 2021-11-08 RX ADMIN — HYDROMORPHONE HYDROCHLORIDE 0.5 MG: 1 INJECTION, SOLUTION INTRAMUSCULAR; INTRAVENOUS; SUBCUTANEOUS at 12:09

## 2021-11-08 RX ADMIN — CEFAZOLIN SODIUM 2 G: 2 INJECTION, SOLUTION INTRAVENOUS at 11:54

## 2021-11-08 RX ADMIN — SODIUM CHLORIDE, POTASSIUM CHLORIDE, SODIUM LACTATE AND CALCIUM CHLORIDE: 600; 310; 30; 20 INJECTION, SOLUTION INTRAVENOUS at 10:42

## 2021-11-08 RX ADMIN — ACETAMINOPHEN 650 MG: 325 TABLET, FILM COATED ORAL at 14:37

## 2021-11-08 ASSESSMENT — LIFESTYLE VARIABLES: TOBACCO_USE: 1

## 2021-11-08 ASSESSMENT — MIFFLIN-ST. JEOR: SCORE: 1495.73

## 2021-11-08 ASSESSMENT — ENCOUNTER SYMPTOMS: SEIZURES: 0

## 2021-11-08 NOTE — ANESTHESIA CARE TRANSFER NOTE
Patient: Laura Ferreira    Procedure: Procedure(s):  REVISION OF BILATERAL BREAST RECONSTRUCTION WITH REMOVE AND REPLACE BILATERAL BREAST IMPLANT  AND FAT GRAFTING FROM ABDOMEN       Diagnosis: Breast cancer (H) [C50.919]  Diagnosis Additional Information: No value filed.    Anesthesia Type:   General     Note:    Oropharynx: spontaneously breathing  Level of Consciousness: awake  Oxygen Supplementation: face mask  Level of Supplemental Oxygen (L/min / FiO2): 6  Independent Airway: airway patency satisfactory and stable  Dentition: dentition unchanged  Vital Signs Stable: post-procedure vital signs reviewed and stable  Report to RN Given: handoff report given  Patient transferred to: PACU  Comments: At end of procedure, spontaneous respirations, adequate tidal volumes, LMA removed atraumatically, oropharynx suctioned, airway patent after LMA removal. Oxygen via facemask at 6 liters per minute to PACU. Oxygen tubing connected to wall O2 in PACU, SpO2, NiBP, and EKG monitors and alarms on and functioning, Iban Hugger warmer connected to patient gown, report on patient's clinical status given to PACU RN, RN questions answered.  Handoff Report: Identifed the Patient, Identified the Reponsible Provider, Reviewed the pertinent medical history, Discussed the surgical course, Reviewed Intra-OP anesthesia mangement and issues during anesthesia, Set expectations for post-procedure period and Allowed opportunity for questions and acknowledgement of understanding      Vitals:  Vitals Value Taken Time   BP     Temp     Pulse     Resp     SpO2         Electronically Signed By: HAIR Lau CRNA  November 8, 2021  1:28 PM

## 2021-11-08 NOTE — ANESTHESIA PREPROCEDURE EVALUATION
Anesthesia Pre-Procedure Evaluation    Patient: Laura Ferreira   MRN: [id number] : 1960        Preoperative Diagnosis: History of bilateral mastectomy [Z90.13]   Procedure : Procedure(s):  BILATERAL SECOND STAGE BREAST RECONSTRUCTION WITH SILICONE IMPLANTS     Past Medical History:   Diagnosis Date     Anemia      Leblanc's esophagus     EGD in ; recent EGD in 2018; repeat EGD in 3 years     Breast cancer (H)      Ex-smoker     1 PPD x 28 years, quit 20 years ago     Gastroesophageal reflux disease      Hematuria 2007     History of breast biopsy     left     Menopausal symptoms 2009     Motion sickness      Obese      Postsurgical hypothyroidism 2009     Symptomatic menopausal or female climacteric states 2009     Tear of lateral cartilage or meniscus of knee, current 10/24/2007      Past Surgical History:   Procedure Laterality Date     APPENDECTOMY OPEN       ARTHROSCOPY KNEE RT/LT  11/15/2007    right knee arthroscopy with arthroscopic lateral meniscectomy     BIOPSY Left     Breast. Benign     CL AFF SURGICAL PATHOLOGY  2002    endometrial biopsy     COLONOSCOPY       ELBOW SURGERY Right      ESOPHAGOSCOPY, GASTROSCOPY, DUODENOSCOPY (EGD), COMBINED N/A 2014    Procedure: COMBINED ESOPHAGOSCOPY, GASTROSCOPY, DUODENOSCOPY (EGD), BIOPSY SINGLE OR MULTIPLE;  Surgeon: Paradise Radford MD;  Location: [facility] OR     HC THYROIDECTOMY      goitre     INSERT TISSUE EXPANDER BREAST Bilateral 3/17/2021    Procedure: bilateral BREAST TISSUE EXPANDER;  Surgeon: Genaro Garcia MD;  Location: [facility] OR     LAMINECTOMY, FUSION LUMBAR ONE LEVEL, COMBINED  10/26/2011    Procedure:COMBINED LAMINECTOMY, FUSION LUMBAR ONE LEVEL; L4-5 Decompression, L4-5 Posterior Lateral Fusion with Madhavi and Local Bone; Surgeon:BARBRA BRIGHT; Location:[facility] OR     MASTECTOMY SIMPLE, SENTINEL NODE, COMBINED Bilateral 3/17/2021    Procedure: BILATERAL SKIN SPARING MASTECTOMY WITH right  SENTINEL  LYMPH NODE BIOPSY;  Surgeon: Caremn Stein MD;  Location: [facility] OR     RECONSTRUCT BREAST BILATERAL, IMPLANT PROSTHESIS BILATERAL, COMBINED Bilateral 2021    Procedure: BILATERAL SECOND STAGE BREAST RECONSTRUCTION WITH SILICONE IMPLANTS;  Surgeon: Genaro Garcia MD;  Location: [facility] OR     TONSILLECTOMY & ADENOIDECTOMY        Allergies   Allergen Reactions     Latex Anaphylaxis     Cortisone Nausea and Vomiting     oral     Nickel      Vicodin [Acetaminophen] Nausea and Vomiting     Adhesive Tape Rash     Cvs Petroleum Jelly [Eucerin] Rash      Social History     Tobacco Use     Smoking status: Former Smoker     Years: 14.00     Types: Cigarettes     Quit date: 12/15/1998     Years since quittin.9     Smokeless tobacco: Never Used     Tobacco comment: quit 20 years ago   Substance Use Topics     Alcohol use: Yes     Comment: occ      Wt Readings from Last 1 Encounters:   21 94 kg (207 lb 3.2 oz)        Anesthesia Evaluation   Pt has had prior anesthetic.         ROS/MED HX  ENT/Pulmonary:     (+) tobacco use, Past use,  (-) sleep apnea   Neurologic:    (-) no seizures and migraines   Cardiovascular:     (+) Dyslipidemia -----    METS/Exercise Tolerance:     Hematologic:     (+) anemia,     Musculoskeletal:       GI/Hepatic:     (+) GERD, Asymptomatic on medication,     Renal/Genitourinary:       Endo:     (+) thyroid problem, hypothyroidism, Obesity,     Psychiatric/Substance Use:       Infectious Disease:       Malignancy:   (+) Malignancy, History of Breast.Breast CA Active status post.        Other:            Physical Exam    Airway        Mallampati: I   TM distance: > 3 FB   Neck ROM: full   Mouth opening: > 3 cm    Respiratory Devices and Support         Dental  no notable dental history         Cardiovascular   cardiovascular exam normal          Pulmonary   pulmonary exam normal                OUTSIDE LABS:  CBC:   Lab Results   Component Value Date    WBC 7.2 2021    WBC 5.7  03/12/2021    HGB 13.9 06/08/2021    HGB 14.2 03/12/2021    HCT 43.1 06/08/2021    HCT 44.6 03/12/2021     06/08/2021     03/12/2021     BMP:   Lab Results   Component Value Date     06/08/2021     03/12/2021    POTASSIUM 3.8 06/08/2021    POTASSIUM 4.1 03/12/2021    CHLORIDE 106 06/08/2021    CHLORIDE 105 03/12/2021    CO2 30 06/08/2021    CO2 30 03/12/2021    BUN 17 06/08/2021    BUN 11 03/12/2021    CR 0.76 06/08/2021    CR 0.76 03/12/2021    GLC 92 06/08/2021    GLC 88 03/12/2021     COAGS: No results found for: PTT, INR, FIBR  POC:   Lab Results   Component Value Date     (H) 03/18/2021     HEPATIC:   Lab Results   Component Value Date    ALBUMIN 4.0 12/15/2020    PROTTOTAL 7.3 12/15/2020    ALT 35 12/15/2020    AST 19 12/15/2020    GGT 35 10/22/2013    ALKPHOS 65 12/15/2020    BILITOTAL 0.4 12/15/2020     OTHER:   Lab Results   Component Value Date    CYNDI 9.2 06/08/2021    PHOS 4.3 02/15/2013    MAG 2.0 02/15/2013    TSH 1.06 12/15/2020    T4 1.06 05/16/2017       Anesthesia Plan    ASA Status:  2   NPO Status:  NPO Appropriate    Anesthesia Type: General.     - Airway: LMA   Induction: Intravenous, Propofol.   Maintenance: Balanced.        Consents    Anesthesia Plan(s) and associated risks, benefits, and realistic alternatives discussed. Questions answered and patient/representative(s) expressed understanding.     - Discussed with:  Patient         Postoperative Care    Pain management: IV analgesics, Multi-modal analgesia.   PONV prophylaxis: Ondansetron (or other 5HT-3), Dexamethasone or Solumedrol, Background Propofol Infusion     Comments:                    Albina Canales

## 2021-11-08 NOTE — DISCHARGE INSTRUCTIONS
Discharge Instructions following Breast Surgery  [facility] Same Day Surgery    Diet:   Resume diet as tolerated.  Drink plenty of fluids to prevent constipation.    Activity:   Gentle rotation & stretching of your arms/shoulders will prevent stiffness in joints   Increase activity gradually   No heavy lifting greater than 10-15 pounds & no strenuous activity until  approved by surgeon    Bathing/Incision Care:   You may shower tomorrow (11/9/21)   Pat incisions dry.  No lotions, powders or perfumes to incisions   Tape dressings (steri strips) will fall off in 7-10 days (if present)    What to expect:   A tingly or itchy sensation around the incision is a normal sign of healing   Some clear, pink drainage from incisions is normal.      Notify your surgeon for the following signs & symptoms:   Redness, warmth, or swelling of the incision    Foul smelling or increased drainage   Chills or temperature greater than 101    Pain not controlled by pain medications    Same Day Surgery Discharge Instructions for  Sedation and General Anesthesia       It's not unusual to feel dizzy, light-headed or faint for up to 24 hours after surgery or while taking pain medication.  If you have these symptoms: sit for a few minutes before standing and have someone assist you when you get up to walk or use the bathroom.      You should rest and relax for the next 24 hours. We recommend you make arrangements to have an adult stay with you for at least 24 hours after your discharge.  Avoid hazardous and strenuous activity.      DO NOT DRIVE any vehicle or operate mechanical equipment for 24 hours following the end of your surgery.  Even though you may feel normal, your reactions may be affected by the medication you have received.      Do not drink alcoholic beverages for 24 hours following surgery.       Slowly progress to your regular diet as you feel able. It's not unusual to feel nauseated and/or vomit after receiving anesthesia.   If you develop these symptoms, drink clear liquids (apple juice, ginger ale, broth, 7-up, etc. ) until you feel better.  If your nausea and vomiting persists for 24 hours, please notify your surgeon.        All narcotic pain medications, along with inactivity and anesthesia, can cause constipation. Drinking plenty of liquids and increasing fiber intake will help.      For any questions of a medical nature, call your surgeon.      Do not make important decisions for 24 hours.      If you had general anesthesia, you may have a sore throat for a couple of days related to the breathing tube used during surgery.  You may use Cepacol lozenges to help with this discomfort.  If it worsens or if you develop a fever, contact your surgeon.       If you feel your pain is not well managed with the pain medications prescribed by your surgeon, please contact your surgeon's office to let them know so they can address your concerns.       CoVid 19 Information    We want to give you information regarding Covid. Please consult your primary care provider with any questions you might have.     Patient who have symptoms (cough, fever, or shortness of breath), need to isolate for 7 days from when symptoms started OR 72 hours after fever resolves (without fever reducing medications) AND improvement of respiratory symptoms (whichever is longer).      Isolate yourself at home (in own room/own bathroom if possible)    Do Not allow any visitors    Do Not go to work or school    Do Not go to [employer],  centers, shopping, or other public places.    Do Not shake hands.    Avoid close and intimate contact with others (hugging, kissing).    Follow CDC recommendations for household cleaning of frequently touched services.     After the initial 7 days, continue to isolate yourself from household members as much as possible. To continue decrease the risk of community spread and exposure, you and any members of your household should limit  activities in public for 14 days after starting home isolation.     You can reference the following CDC link for helpful home isolation/care tips:  https://www.cdc.gov/coronavirus/2019-ncov/downloads/10Things.pdf    Protect Others:    Cover Your Mouth and Nose with a mask, disposable tissue or wash cloth to avoid spreading germs to others.    Wash your hands and face frequently with soap and water    Call Your Primary Doctor If: Breathing difficulty develops or you become worse.    For more information about COVID19 and options for caring for yourself at home, please visit the CDC website at https://www.cdc.gov/coronavirus/2019-ncov/about/steps-when-sick.html  For more options for care at [facility], please visit our website at [email or web address]    **If you have questions or concerns about your procedure,   call Dr Garcia at [phone]**

## 2021-11-08 NOTE — ANESTHESIA POSTPROCEDURE EVALUATION
Patient: Laura Ferreira    Procedure: Procedure(s):  REVISION OF BILATERAL BREAST RECONSTRUCTION WITH REMOVE AND REPLACE BILATERAL BREAST IMPLANT  AND FAT GRAFTING FROM ABDOMEN       Diagnosis:Breast cancer (H) [C50.919]  Diagnosis Additional Information: No value filed.    Anesthesia Type:  General    Note:  Disposition: Outpatient   Postop Pain Control: Uneventful            Sign Out: Well controlled pain   PONV: No   Neuro/Psych: Uneventful            Sign Out: Acceptable/Baseline neuro status   Airway/Respiratory: Uneventful            Sign Out: Acceptable/Baseline resp. status   CV/Hemodynamics: Uneventful            Sign Out: Acceptable CV status   Other NRE:    DID A NON-ROUTINE EVENT OCCUR? No           Last vitals:  Vitals Value Taken Time   /80 11/08/21 1430   Temp 36.1  C (97  F) 11/08/21 1327   Pulse 63 11/08/21 1440   Resp 11 11/08/21 1441   SpO2 98 % 11/08/21 1440   Vitals shown include unvalidated device data.    Electronically Signed By: Albina Canales  November 8, 2021  3:25 PM

## 2021-11-08 NOTE — OP NOTE
Procedure Date: 11/08/2021    SURGEON:  Genaro Garcia MD    PREOPERATIVE DIAGNOSES:    1.  History of breast cancer with acquired absence of bilateral breasts.  2.  Disproportion of reconstructed breasts.    POSTOPERATIVE DIAGNOSES:    1.  History of breast cancer with acquired absence of bilateral breasts.  2.  Disproportion of reconstructed breasts.    PROCEDURE:  Bilateral revision of breast reconstruction with removal and replacement of implants and fat grafting from abdomen.    DESCRIPTION OF PROCEDURE:  The patient was marked preoperatively.  She had a lack of anterior fullness and some asymmetry of the breast shape and size.  Our plan today is to take up some excess infraaxillary tissue on the left side, suction and release of tethered area on the right infraaxillary, remove and replace implants and place fat grafting anteriorly to improve anterior projection.  Risks and benefits were discussed and she was agreeable with the plan.      She was placed supine on the procedure table under general LMA anesthesia administered by the Anesthesia Department.  The were prepped and draped in a sterile fashion and a timeout was done.  I began on the right side.  I excised the lateral aspect of the mastectomy scar and removed the implant.  It was intact.  The capsule was normal appearing.  I performed a lateral capsulorrhaphy to bring the pocket a bit more medially.  I tested with a sizer and found that a 650 mL ultra-high profile device achieved an improved shape and volume on the right side.  I removed the sizer and irrigated the pocket with Ancef saline.  I placed a [place] smooth, round, ultra-high profile 650 mL silicone gel implant and closed the capsule with 2-0 Vicryl and the skin with 3-0 and 4-0 Vicryl.      I turned my attention to the left side.  I used a similar technique to open the lateral capsule but, on this side, I removed a large amount of axillary bulk in the lateral aspect of the reconstruction.   This was discarded.  On this side, I found that I needed an improved anterior projection as well and an identical style and size of implant was placed on this side and a nice symmetry was seen in shape, volume and projection.  I closed the capsule and skin in the same fashion.      I then harvested fat from the abdomen, which was brought into a Tushar-Cell collection device.  It was transferred into 10 mL syringes and then injected with a total of 60 mL injected on the right side and 50 mL on the left side just in a concentrated area of anterior projection in the area of planned nipple reconstruction.  A nice correction was seen.  There was good symmetry in the sitting position.      Sterile dressings were applied after closing the harvest site with 4-0 Vicryl.  Anesthesia was reversed and the patient was taken to the recovery room in satisfactory condition.    ESTIMATED BLOOD LOSS:  20 mL    COUNTS:  Sponge and needle counts were correct.    SPECIMENS:  None.    FINDINGS:  Noted above.    Genaro Garcia MD        D: 2021   T: 2021   MT: VANNESA    Name:     MODE VIVEROSKalee  MRN:      [id number]        Account:        [id number]   :      1960           Procedure Date: 2021     Document: [id number]

## 2021-11-08 NOTE — ANESTHESIA PROCEDURE NOTES
Airway       Patient location during procedure: OR ([facility] - Operating Room or Procedural Area)       Procedure Start/Stop Times: 11/8/2021 12:17 PM  Staff -        Anesthesiologist:  Albina Canales       CRNA: Maile Trujillo APRN CRNA       Performed By: CRNAIndications and Patient Condition       Indications for airway management: tani-procedural       Induction type:intravenous       Mask difficulty assessment: 0 - not attempted    Final Airway Details       Final airway type: supraglottic airway    Supraglottic Airway Details        Type: LMA       Brand: Ambu AuraGain       LMA size: 4    Post intubation assessment        Number of attempts at approach: 1       Number of other approaches attempted: 0       Secured with: commercial tube slater and pink tape       Ease of procedure: easy       Dentition: Intact and Unchanged

## 2021-11-09 LAB
PATH REPORT.COMMENTS IMP SPEC: NORMAL
PATH REPORT.COMMENTS IMP SPEC: NORMAL
PATH REPORT.FINAL DX SPEC: NORMAL
PATH REPORT.GROSS SPEC: NORMAL
PHOTO IMAGE: NORMAL

## 2021-11-09 PROCEDURE — 88300 SURGICAL PATH GROSS: CPT | Mod: 26 | Performed by: PATHOLOGY

## 2021-11-18 ENCOUNTER — LAB (OUTPATIENT)
Dept: LAB | Facility: CLINIC | Age: 61
End: 2021-11-18
Payer: COMMERCIAL

## 2021-11-18 DIAGNOSIS — E78.5 HYPERLIPIDEMIA LDL GOAL <100: ICD-10-CM

## 2021-11-18 DIAGNOSIS — E89.0 POSTSURGICAL HYPOTHYROIDISM: ICD-10-CM

## 2021-11-18 LAB
ALT SERPL W P-5'-P-CCNC: 39 U/L (ref 0–50)
AST SERPL W P-5'-P-CCNC: 20 U/L (ref 0–45)
CHOLEST SERPL-MCNC: 145 MG/DL
FASTING STATUS PATIENT QL REPORTED: YES
HDLC SERPL-MCNC: 67 MG/DL
LDLC SERPL CALC-MCNC: 67 MG/DL
NONHDLC SERPL-MCNC: 78 MG/DL
TRIGL SERPL-MCNC: 56 MG/DL
TSH SERPL DL<=0.005 MIU/L-ACNC: 3.07 MU/L (ref 0.4–4)

## 2021-11-18 PROCEDURE — 84450 TRANSFERASE (AST) (SGOT): CPT

## 2021-11-18 PROCEDURE — 36415 COLL VENOUS BLD VENIPUNCTURE: CPT

## 2021-11-18 PROCEDURE — 84443 ASSAY THYROID STIM HORMONE: CPT

## 2021-11-18 PROCEDURE — 84460 ALANINE AMINO (ALT) (SGPT): CPT

## 2021-11-18 PROCEDURE — 80061 LIPID PANEL: CPT

## 2021-11-21 DIAGNOSIS — E89.0 POSTSURGICAL HYPOTHYROIDISM: Primary | ICD-10-CM

## 2021-11-21 DIAGNOSIS — E78.5 HYPERLIPIDEMIA LDL GOAL <130: ICD-10-CM

## 2021-11-21 RX ORDER — ROSUVASTATIN CALCIUM 20 MG/1
20 TABLET, COATED ORAL DAILY
Qty: 90 TABLET | Refills: 3 | Status: SHIPPED | OUTPATIENT
Start: 2021-11-21 | End: 2022-12-16

## 2021-11-21 RX ORDER — LEVOTHYROXINE SODIUM 112 UG/1
112 TABLET ORAL DAILY
Qty: 90 TABLET | Refills: 3 | Status: SHIPPED | OUTPATIENT
Start: 2021-11-21 | End: 2022-12-16

## 2022-02-09 NOTE — TELEPHONE ENCOUNTER
Pt's last annual 6/2021. Pt requesting refill on OCP. Medication pended. Pharmacy up to date. Please advise.   RN called imaging and confirmed order has been placed so patient can now schedule. Imaging will now call patient to schedule appointment.     Please advise on patients questions regarding if she is supposed to be taking the Sucralfate 1G ?    Shawna Rothman RN, BSN, PHN

## 2022-03-02 DIAGNOSIS — K20.90 BARRETT'S ESOPHAGUS WITH ESOPHAGITIS: ICD-10-CM

## 2022-03-02 DIAGNOSIS — K22.70 BARRETT'S ESOPHAGUS WITH ESOPHAGITIS: ICD-10-CM

## 2022-03-04 NOTE — TELEPHONE ENCOUNTER
"Prescription approved per [facility] Refill Protocol.  Requested Prescriptions   Pending Prescriptions Disp Refills     omeprazole (PRILOSEC) 20 MG DR capsule [Pharmacy Med Name: OMEPRAZOLE DR 20 MG CAPSULE] 180 capsule 0     Sig: TAKE 1 CAPSULE BY MOUTH TWICE A DAY (BEFORE A MEAL)       PPI Protocol Passed - 3/2/2022 12:20 AM        Passed - Not on Clopidogrel (unless Pantoprazole ordered)        Passed - No diagnosis of osteoporosis on record        Passed - Recent (12 mo) or future (30 days) visit within the authorizing provider's specialty     Patient has had an office visit with the authorizing provider or a provider within the authorizing providers department within the previous 12 mos or has a future within next 30 days. See \"Patient Info\" tab in inbasket, or \"Choose Columns\" in Meds & Orders section of the refill encounter.              Passed - Medication is active on med list        Passed - Patient is age 18 or older        Passed - No active pregnacy on record        Passed - No positive pregnancy test in past 12 months             "

## 2022-03-05 ENCOUNTER — HEALTH MAINTENANCE LETTER (OUTPATIENT)
Age: 62
End: 2022-03-05

## 2022-04-21 ENCOUNTER — OFFICE VISIT (OUTPATIENT)
Dept: SURGERY | Facility: CLINIC | Age: 62
End: 2022-04-21
Payer: COMMERCIAL

## 2022-04-21 ENCOUNTER — TELEPHONE (OUTPATIENT)
Dept: SURGERY | Facility: CLINIC | Age: 62
End: 2022-04-21

## 2022-04-21 DIAGNOSIS — N63.10 BREAST MASS, RIGHT: Primary | ICD-10-CM

## 2022-04-21 PROCEDURE — 99212 OFFICE O/P EST SF 10 MIN: CPT | Performed by: SURGERY

## 2022-04-21 NOTE — PROGRESS NOTES
"[facility] Follow Up Note    CHIEF COMPLAINT:  H/o right breast cancer    HISTORY OF PRESENT ILLNESS:  Laura Ferreira is a 61 year old female who is seen in follow up for right breast cancer.    She underwent bilateral mastectomies with right sentinel lymph node biopsy and immediate reconstruction with myself and Dr. Garcia on 3/17/2021 for 6cm of high grade DCIS. She had her 2nd stage reconstruction on 6/11/2021. She had replacement of her implants and fat grafting in October 2021. She is more satisfied with cosmesis after this but still notices asymmetry with the right not feeling as \"normal\" as the left.     She has noted an area on the right which is more firm and feels like a lump. It is not particularly tender. She has no other areas of concern.     REVIEW OF SYSTEMS:  Constitutional:  Negative for chills, fatigue, fever and weight change.  Eyes:  Negative for blurred vision, eye pain and photophobia.  ENT:  Negative for hearing problems, ENT pain, congestion, rhinorrhea, epistaxis, hoarseness and dental problems.  Cardiovascular:  Negative for chest pain, palpitations, tachycardia, orthopnea and edema.  Respiratory:  Negative for cough, dyspnea and hemoptysis.  Gastrointestinal:  Negative for abdominal pain, heartburn, constipation, diarrhea and stool changes.  Musculoskeletal:  Negative for arthralgias, back pain and myalgias.  Integumentary/Breast:  See HPI.    Past Medical History:   Diagnosis Date     Anemia      Leblanc's esophagus     EGD in 2014; recent EGD in 4/2018; repeat EGD in 3 years     Breast cancer (H)      Ex-smoker     1 PPD x 28 years, quit 20 years ago     Gastroesophageal reflux disease      Hematuria 01/23/2007     History of breast biopsy     left     Menopausal symptoms 07/17/2009     Motion sickness      Obese      Postsurgical hypothyroidism 07/17/2009     Symptomatic menopausal or female climacteric states 07/17/2009     Tear of lateral cartilage or meniscus of " knee, current 10/24/2007       Past Surgical History:   Procedure Laterality Date     APPENDECTOMY OPEN       ARTHROSCOPY KNEE RT/LT  11/15/2007    right knee arthroscopy with arthroscopic lateral meniscectomy     BIOPSY Left     Breast. Benign     CL AFF SURGICAL PATHOLOGY  09/18/2002    endometrial biopsy     COLONOSCOPY       ELBOW SURGERY Right      ESOPHAGOSCOPY, GASTROSCOPY, DUODENOSCOPY (EGD), COMBINED N/A 12/23/2014    Procedure: COMBINED ESOPHAGOSCOPY, GASTROSCOPY, DUODENOSCOPY (EGD), BIOPSY SINGLE OR MULTIPLE;  Surgeon: Paradise Radford MD;  Location: [facility]     GRAFT FAT TO BREAST Right 11/8/2021    Procedure: AND FAT GRAFTING FROM ABDOMEN;  Surgeon: Genaro Garcia MD;  Location: [facility] OR     HC THYROIDECTOMY      goitre     INSERT TISSUE EXPANDER BREAST Bilateral 3/17/2021    Procedure: bilateral BREAST TISSUE EXPANDER;  Surgeon: Genaro Garcia MD;  Location: [facility]     LAMINECTOMY, FUSION LUMBAR ONE LEVEL, COMBINED  10/26/2011    Procedure:COMBINED LAMINECTOMY, FUSION LUMBAR ONE LEVEL; L4-5 Decompression, L4-5 Posterior Lateral Fusion with Madhavi and Local Bone; Surgeon:BARBRA BRIGHT; Location:[facility] OR     MASTECTOMY SIMPLE, SENTINEL NODE, COMBINED Bilateral 3/17/2021    Procedure: BILATERAL SKIN SPARING MASTECTOMY WITH right  SENTINEL LYMPH NODE BIOPSY;  Surgeon: Carmen Stein MD;  Location: [facility]     RECONSTRUCT BREAST Bilateral 11/8/2021    Procedure: REVISION OF BILATERAL BREAST RECONSTRUCTION WITH REMOVE AND REPLACE BILATERAL BREAST IMPLANT;  Surgeon: Genaro Garcia MD;  [facility]: [facility]     RECONSTRUCT BREAST BILATERAL, IMPLANT PROSTHESIS BILATERAL, COMBINED Bilateral 6/11/2021    Procedure: BILATERAL SECOND STAGE BREAST RECONSTRUCTION WITH SILICONE IMPLANTS;  Surgeon: Genaro Garcia MD;  Location: [facility]     TONSILLECTOMY & ADENOIDECTOMY         Family History   Problem Relation Age of Onset     Asthma Mother      Diabetes Mother      Hypertension Mother       Arthritis Mother      Circulatory Mother      Obesity Mother      Thyroid Disease Mother      Aneurysm Mother         Brain,  at age 69     Hypertension Father      Alcohol/Drug Father      Arthritis Father      Obesity Father      Breast Cancer Maternal Grandmother      Thyroid Disease Brother      Colon Cancer No family hx of      Anesthesia Reaction No family hx of        Social History     Tobacco Use     Smoking status: Former Smoker     Years: 14.00     Types: Cigarettes     Quit date: 12/15/1998     Years since quittin.3     Smokeless tobacco: Never Used     Tobacco comment: quit 20 years ago   Substance Use Topics     Alcohol use: Yes     Comment: occ       Patient Active Problem List   Diagnosis     GERD (gastroesophageal reflux disease)     Menorrhagia     Iron deficiency anemia     Simple endometrial hyperplasia without atypia     Postsurgical hypothyroidism     Hyperlipidemia LDL goal <130     Morbid obesity (H)     Malignant neoplasm of left breast (H)     Ductal carcinoma in situ (DCIS) of right breast     Allergies   Allergen Reactions     Latex Anaphylaxis     Cortisone Nausea and Vomiting     oral     Nickel      Vicodin [Acetaminophen] Nausea and Vomiting     Adhesive Tape Rash     Cvs Petroleum Jelly [Eucerin] Rash     Current Outpatient Medications   Medication Sig Dispense Refill     acetaminophen (TYLENOL) 500 MG tablet Take 500-1,000 mg by mouth daily as needed for mild pain       Ferrous Sulfate (IRON PO) Take 1 tablet by mouth daily       ibuprofen (ADVIL/MOTRIN) 600 MG tablet Take 600 mg by mouth every 6 hours as needed for moderate pain       levothyroxine (SYNTHROID/LEVOTHROID) 112 MCG tablet Take 1 tablet (112 mcg) by mouth daily 90 tablet 3     omeprazole (PRILOSEC) 20 MG DR capsule TAKE 1 CAPSULE BY MOUTH TWICE A DAY (BEFORE A MEAL) 180 capsule 0     OVER-THE-COUNTER Place 1-2 drops into both eyes every hour as needed       rosuvastatin (CRESTOR) 20 MG tablet Take 1 tablet  (20 mg) by mouth daily 90 tablet 3     Multiple Vitamins-Minerals (MULTIVITAL PO) Take 1 tablet by mouth daily       Probiotic Product (PROBIOTIC PO) Take 1 tablet by mouth daily        Vitals: LMP 04/01/2012   BMI= There is no height or weight on file to calculate BMI.    EXAM:  GENERAL: healthy, alert and no distress   BREAST:  Breasts are surgically absent. Implants are in place. Mild asymmetry present. Along the medial aspect of the right mastectomy scar is an area of skin thickening with questionable underlying mass/fullness. Clinically seems consistent with possibly underlying fat necrosis. No other areas of concern.   There is no axillary or supraclavicular lymphadenopathy.  CARDIOVASCULAR:  RRR  RESPIRATORY: nonlabored breathing  NECK: Neck supple. No adenopathy. Thyroid symmetric, normal size,, Carotids without bruits.  SKIN: No suspicious lesions or rashes  LYMPH: Normal cervical lymph nodes      ASSESSMENT/PLAN:  Laura Ferreira is a 61-year-old female who is status post bilateral skin sparing mastectomies with right sentinel lymph node biopsy and immediate reconstruction on March 17, 2021.  She has an area on the right breast skin which is slightly more thickened with an underlying fullness noted recommend an ultrasound to further evaluate this area.  As above it is likely related to fat necrosis and scarring but an ultrasound will rule out any underlying mass.  The remainder of her exam is benign and she should continue with annual chest wall exams going forward for screening.  She would like to see me for this exam and for ongoing annual follow-up.          Carmen Stein MD  [facility]  [phone]        Please route or send letter to:  Primary Care Provider (PCP) and Referring Provider

## 2022-04-21 NOTE — NURSING NOTE
Laura Ferreira's goals for this visit include:   Chief Complaint   Patient presents with     RECHECK     6 month exam, hx of DCIS       She requests these members of her care team be copied on today's visit information: no    PCP: Sarita Shultz    Referring Provider:  No referring provider defined for this encounter.    LMP 04/01/2012     Do you need any medication refills at today's visit? No    Magdalene Crawford LPN       October 22, 2019      Kirstie Whiting MD  [address]  [facility] [address]           [facility]  [address]  [facility] [address]  Phone: [phone]  Fax: [phone]          Patient: Lashell Gonzalez   MR Number: [id number]   YOB: 1977   Date of Visit: 10/22/2019       Dear Dr. Kirstie Whiting:    Thank you for referring Lashell Gonzalez to me for evaluation. Attached you will find relevant portions of my assessment and plan of care.    If you have questions, please do not hesitate to call me. I look forward to following Lashell Gonzalez along with you.    Sincerely,    BRITNEY Morelos Jr., MD    Enclosure  CC:  No Recipients    If you would like to receive this communication electronically, please contact [email or web address] or [phone] to request more information on TxtFeedback Link access.    For providers and/or their staff who would like to refer a patient to [facility], please contact us through our one-stop-shop provider referral line, [facility], at [phone].    If you feel you have received this communication in error or would no longer like to receive these types of communications, please e-mail [email or web address]

## 2022-04-21 NOTE — LETTER
"    4/21/2022         RE: Laura Ferreira  [address] [facility] [address]        Dear Colleague,    Thank you for referring your patient, Laura Ferreira, to the [facility]. Please see a copy of my visit note below.    [facility] Follow Up Note    CHIEF COMPLAINT:  H/o right breast cancer    HISTORY OF PRESENT ILLNESS:  Laura Ferreira is a 61 year old female who is seen in follow up for right breast cancer.    She underwent bilateral mastectomies with right sentinel lymph node biopsy and immediate reconstruction with myself and Dr. Garcia on 3/17/2021 for 6cm of high grade DCIS. She had her 2nd stage reconstruction on 6/11/2021. She had replacement of her implants and fat grafting in October 2021. She is more satisfied with cosmesis after this but still notices asymmetry with the right not feeling as \"normal\" as the left.     She has noted an area on the right which is more firm and feels like a lump. It is not particularly tender. She has no other areas of concern.     REVIEW OF SYSTEMS:  Constitutional:  Negative for chills, fatigue, fever and weight change.  Eyes:  Negative for blurred vision, eye pain and photophobia.  ENT:  Negative for hearing problems, ENT pain, congestion, rhinorrhea, epistaxis, hoarseness and dental problems.  Cardiovascular:  Negative for chest pain, palpitations, tachycardia, orthopnea and edema.  Respiratory:  Negative for cough, dyspnea and hemoptysis.  Gastrointestinal:  Negative for abdominal pain, heartburn, constipation, diarrhea and stool changes.  Musculoskeletal:  Negative for arthralgias, back pain and myalgias.  Integumentary/Breast:  See HPI.    Past Medical History:   Diagnosis Date     Anemia      Leblanc's esophagus     EGD in 2014; recent EGD in 4/2018; repeat EGD in 3 years     Breast cancer (H)      Ex-smoker     1 PPD x 28 years, quit 20 years ago     Gastroesophageal reflux disease      Hematuria 01/23/2007     " History of breast biopsy     left     Menopausal symptoms 07/17/2009     Motion sickness      Obese      Postsurgical hypothyroidism 07/17/2009     Symptomatic menopausal or female climacteric states 07/17/2009     Tear of lateral cartilage or meniscus of knee, current 10/24/2007       Past Surgical History:   Procedure Laterality Date     APPENDECTOMY OPEN       ARTHROSCOPY KNEE RT/LT  11/15/2007    right knee arthroscopy with arthroscopic lateral meniscectomy     BIOPSY Left     Breast. Benign     CL AFF SURGICAL PATHOLOGY  09/18/2002    endometrial biopsy     COLONOSCOPY       ELBOW SURGERY Right      ESOPHAGOSCOPY, GASTROSCOPY, DUODENOSCOPY (EGD), COMBINED N/A 12/23/2014    Procedure: COMBINED ESOPHAGOSCOPY, GASTROSCOPY, DUODENOSCOPY (EGD), BIOPSY SINGLE OR MULTIPLE;  Surgeon: Paradise Radford MD;  Location: [facility]     GRAFT FAT TO BREAST Right 11/8/2021    Procedure: AND FAT GRAFTING FROM ABDOMEN;  Surgeon: Genaro Garcia MD;  Location: [facility]     HC THYROIDECTOMY      goitre     INSERT TISSUE EXPANDER BREAST Bilateral 3/17/2021    Procedure: bilateral BREAST TISSUE EXPANDER;  Surgeon: Genaro Garcia MD;  Location: [facility]     LAMINECTOMY, FUSION LUMBAR ONE LEVEL, COMBINED  10/26/2011    Procedure:COMBINED LAMINECTOMY, FUSION LUMBAR ONE LEVEL; L4-5 Decompression, L4-5 Posterior Lateral Fusion with Madhavi and Local Bone; Surgeon:BARBRA BRIGHT; Location:[facility]     MASTECTOMY SIMPLE, SENTINEL NODE, COMBINED Bilateral 3/17/2021    Procedure: BILATERAL SKIN SPARING MASTECTOMY WITH right  SENTINEL LYMPH NODE BIOPSY;  Surgeon: Carmen Stein MD;  Location: [facility]     RECONSTRUCT BREAST Bilateral 11/8/2021    Procedure: REVISION OF BILATERAL BREAST RECONSTRUCTION WITH REMOVE AND REPLACE BILATERAL BREAST IMPLANT;  Surgeon: Genaro Garcia MD;  [facility]: [facility]     RECONSTRUCT BREAST BILATERAL, IMPLANT PROSTHESIS BILATERAL, COMBINED Bilateral 6/11/2021    Procedure: BILATERAL SECOND STAGE  BREAST RECONSTRUCTION WITH SILICONE IMPLANTS;  Surgeon: Genaro Garcia MD;  Location: [facility]     TONSILLECTOMY & ADENOIDECTOMY         Family History   Problem Relation Age of Onset     Asthma Mother      Diabetes Mother      Hypertension Mother      Arthritis Mother      Circulatory Mother      Obesity Mother      Thyroid Disease Mother      Aneurysm Mother         Brain,  at age 69     Hypertension Father      Alcohol/Drug Father      Arthritis Father      Obesity Father      Breast Cancer Maternal Grandmother      Thyroid Disease Brother      Colon Cancer No family hx of      Anesthesia Reaction No family hx of        Social History     Tobacco Use     Smoking status: Former Smoker     Years: 14.00     Types: Cigarettes     Quit date: 12/15/1998     Years since quittin.3     Smokeless tobacco: Never Used     Tobacco comment: quit 20 years ago   Substance Use Topics     Alcohol use: Yes     Comment: occ       Patient Active Problem List   Diagnosis     GERD (gastroesophageal reflux disease)     Menorrhagia     Iron deficiency anemia     Simple endometrial hyperplasia without atypia     Postsurgical hypothyroidism     Hyperlipidemia LDL goal <130     Morbid obesity (H)     Malignant neoplasm of left breast (H)     Ductal carcinoma in situ (DCIS) of right breast     Allergies   Allergen Reactions     Latex Anaphylaxis     Cortisone Nausea and Vomiting     oral     Nickel      Vicodin [Acetaminophen] Nausea and Vomiting     Adhesive Tape Rash     Cvs Petroleum Jelly [Eucerin] Rash     Current Outpatient Medications   Medication Sig Dispense Refill     acetaminophen (TYLENOL) 500 MG tablet Take 500-1,000 mg by mouth daily as needed for mild pain       Ferrous Sulfate (IRON PO) Take 1 tablet by mouth daily       ibuprofen (ADVIL/MOTRIN) 600 MG tablet Take 600 mg by mouth every 6 hours as needed for moderate pain       levothyroxine (SYNTHROID/LEVOTHROID) 112 MCG tablet Take 1 tablet (112 mcg) by  mouth daily 90 tablet 3     omeprazole (PRILOSEC) 20 MG DR capsule TAKE 1 CAPSULE BY MOUTH TWICE A DAY (BEFORE A MEAL) 180 capsule 0     OVER-THE-COUNTER Place 1-2 drops into both eyes every hour as needed       rosuvastatin (CRESTOR) 20 MG tablet Take 1 tablet (20 mg) by mouth daily 90 tablet 3     Multiple Vitamins-Minerals (MULTIVITAL PO) Take 1 tablet by mouth daily       Probiotic Product (PROBIOTIC PO) Take 1 tablet by mouth daily        Vitals: [facility] 04/01/2012   BMI= There is no height or weight on file to calculate BMI.    EXAM:  GENERAL: healthy, alert and no distress   BREAST:  Breasts are surgically absent. Implants are in place. Mild asymmetry present. Along the medial aspect of the right mastectomy scar is an area of skin thickening with questionable underlying mass/fullness. Clinically seems consistent with possibly underlying fat necrosis. No other areas of concern.   There is no axillary or supraclavicular lymphadenopathy.  CARDIOVASCULAR:  RRR  RESPIRATORY: nonlabored breathing  NECK: Neck supple. No adenopathy. Thyroid symmetric, normal size,, Carotids without bruits.  SKIN: No suspicious lesions or rashes  LYMPH: Normal cervical lymph nodes      ASSESSMENT/PLAN:  Laura Ferreira is a 61-year-old female who is status post bilateral skin sparing mastectomies with right sentinel lymph node biopsy and immediate reconstruction on March 17, 2021.  She has an area on the right breast skin which is slightly more thickened with an underlying fullness noted recommend an ultrasound to further evaluate this area.  As above it is likely related to fat necrosis and scarring but an ultrasound will rule out any underlying mass.  The remainder of her exam is benign and she should continue with annual chest wall exams going forward for screening.  She would like to see me for this exam and for ongoing annual follow-up.          Carmen Stein MD  [facility]  [phone]        Please route or send  letter to:  Primary Care Provider (PCP) and Referring Provider        Again, thank you for allowing me to participate in the care of your patient.        Sincerely,        Carmen Stein MD

## 2022-05-27 DIAGNOSIS — K20.90 BARRETT'S ESOPHAGUS WITH ESOPHAGITIS: ICD-10-CM

## 2022-05-27 DIAGNOSIS — K22.70 BARRETT'S ESOPHAGUS WITH ESOPHAGITIS: ICD-10-CM

## 2022-05-29 NOTE — TELEPHONE ENCOUNTER
Routing refill request to provider for review/approval because:  Kita given x1 and patient did not follow up, please advise

## 2022-10-05 ENCOUNTER — TRANSFERRED RECORDS (OUTPATIENT)
Dept: HEALTH INFORMATION MANAGEMENT | Facility: CLINIC | Age: 62
End: 2022-10-05

## 2022-11-20 ENCOUNTER — HEALTH MAINTENANCE LETTER (OUTPATIENT)
Age: 62
End: 2022-11-20

## 2022-12-08 DIAGNOSIS — K22.70 BARRETT'S ESOPHAGUS WITH ESOPHAGITIS: ICD-10-CM

## 2022-12-08 DIAGNOSIS — K20.90 BARRETT'S ESOPHAGUS WITH ESOPHAGITIS: ICD-10-CM

## 2023-02-01 ASSESSMENT — ENCOUNTER SYMPTOMS
NERVOUS/ANXIOUS: 1
BREAST MASS: 0
DIZZINESS: 1
COUGH: 1

## 2023-02-07 ENCOUNTER — OFFICE VISIT (OUTPATIENT)
Dept: FAMILY MEDICINE | Facility: CLINIC | Age: 63
End: 2023-02-07
Payer: COMMERCIAL

## 2023-02-07 ENCOUNTER — ANCILLARY PROCEDURE (OUTPATIENT)
Dept: GENERAL RADIOLOGY | Facility: CLINIC | Age: 63
End: 2023-02-07
Attending: FAMILY MEDICINE
Payer: COMMERCIAL

## 2023-02-07 VITALS
BODY MASS INDEX: 36.42 KG/M2 | HEIGHT: 65 IN | WEIGHT: 218.6 LBS | SYSTOLIC BLOOD PRESSURE: 134 MMHG | TEMPERATURE: 98.2 F | HEART RATE: 75 BPM | OXYGEN SATURATION: 94 % | DIASTOLIC BLOOD PRESSURE: 76 MMHG

## 2023-02-07 DIAGNOSIS — E66.01 MORBID OBESITY (H): ICD-10-CM

## 2023-02-07 DIAGNOSIS — Z17.0 MALIGNANT NEOPLASM OF LEFT BREAST IN FEMALE, ESTROGEN RECEPTOR POSITIVE, UNSPECIFIED SITE OF BREAST (H): ICD-10-CM

## 2023-02-07 DIAGNOSIS — E89.0 POSTSURGICAL HYPOTHYROIDISM: ICD-10-CM

## 2023-02-07 DIAGNOSIS — Z13.220 LIPID SCREENING: ICD-10-CM

## 2023-02-07 DIAGNOSIS — R05.3 PERSISTENT COUGH FOR 3 WEEKS OR LONGER: ICD-10-CM

## 2023-02-07 DIAGNOSIS — C50.912 MALIGNANT NEOPLASM OF LEFT BREAST IN FEMALE, ESTROGEN RECEPTOR POSITIVE, UNSPECIFIED SITE OF BREAST (H): ICD-10-CM

## 2023-02-07 DIAGNOSIS — Z00.00 ROUTINE GENERAL MEDICAL EXAMINATION AT A HEALTH CARE FACILITY: Primary | ICD-10-CM

## 2023-02-07 PROCEDURE — 99396 PREV VISIT EST AGE 40-64: CPT | Performed by: FAMILY MEDICINE

## 2023-02-07 PROCEDURE — 99214 OFFICE O/P EST MOD 30 MIN: CPT | Mod: 25 | Performed by: FAMILY MEDICINE

## 2023-02-07 PROCEDURE — 71046 X-RAY EXAM CHEST 2 VIEWS: CPT | Mod: TC | Performed by: RADIOLOGY

## 2023-02-07 ASSESSMENT — ENCOUNTER SYMPTOMS
BREAST MASS: 0
COUGH: 1
DIZZINESS: 1
NERVOUS/ANXIOUS: 1

## 2023-02-07 ASSESSMENT — PAIN SCALES - GENERAL: PAINLEVEL: NO PAIN (0)

## 2023-02-07 NOTE — PROGRESS NOTES
SUBJECTIVE:   CC: Laura is an 62 year old who presents for preventive health visit.   Patient has been advised of split billing requirements and indicates understanding: Yes  Healthy Habits:     Getting at least 3 servings of Calcium per day:  Yes    Bi-annual eye exam:  Yes    Dental care twice a year:  Yes    Sleep apnea or symptoms of sleep apnea:  Daytime drowsiness    Diet:  Other    Frequency of exercise:  2-3 days/week    Duration of exercise:  15-30 minutes    Taking medications regularly:  Yes    Medication side effects:  None    PHQ-2 Total Score: 0    Additional concerns today:  Yes      PROBLEMS TO ADD ON...    HYPOTHYROIDISM - Patient has a longstanding history of chronic Hypothyroidism. Patient has been doing well, noting no tremor, insomnia, hair loss or changes in skin texture. Continues to take medications as directed, without adverse reactions or side effects. Last TSH   Lab Results   Component Value Date    TSH 3.07 11/18/2021   .      History of Breast cancer s/p mastectomy in 2021. No recurrences.     Persistent cough-  Reports that over the past month- she's had a persistent cough following cold like symptoms with bilateral frontal pain in the diaphragm/rib area. States that it's slightly productive of whitish sputum especially in the morning. States that she noticed some shortness of breath while climbing stairs, attributed it to poor exercise tolerance. No fever or chills. No unintentional weight loss or weight gain.  Patient is concerned as  was recently diagnosed with lung cancer with superimposed pneumonia.     HEALTH CARE MAINTENANCE: Due for labs      Today's PHQ-2 Score:   PHQ-2 ( 1999 [employer]) 2/1/2023   Q1: Little interest or pleasure in doing things 0   Q2: Feeling down, depressed or hopeless 0   PHQ-2 Score 0   PHQ-2 Total Score (12-17 Years)- Positive if 3 or more points; Administer PHQ-A if positive -   Q1: Little interest or pleasure in doing things Not at all   Q2:  Feeling down, depressed or hopeless Not at all   PHQ-2 Score 0       Have you ever done Advance Care Planning? (For example, a Health Directive, POLST, or a discussion with a medical provider or your loved ones about your wishes): Yes, advance care planning is on file.    Social History     Tobacco Use     Smoking status: Former     Packs/day: 1.00     Years: 19.00     Pack years: 19.00     Types: Cigarettes     Start date: 1971     Quit date: 12/15/1998     Years since quittin.1     Smokeless tobacco: Never     Tobacco comments:     quit 20 years ago   Substance Use Topics     Alcohol use: Yes     Comment: once in a while     If you drink alcohol do you typically have >3 drinks per day or >7 drinks per week? No    Alcohol Use 2023   Prescreen: >3 drinks/day or >7 drinks/week? -   Prescreen: >3 drinks/day or >7 drinks/week? No       Reviewed orders with patient.  Reviewed health maintenance and updated orders accordingly - Yes  Lab work is in process  Labs reviewed in EPIC  BP Readings from Last 3 Encounters:   23 134/76   21 135/82   10/26/21 135/79    Wt Readings from Last 3 Encounters:   23 99.2 kg (218 lb 9.6 oz)   21 94 kg (207 lb 3.2 oz)   10/26/21 94 kg (207 lb 3.2 oz)                  Patient Active Problem List   Diagnosis     GERD (gastroesophageal reflux disease)     Menorrhagia     Iron deficiency anemia     Simple endometrial hyperplasia without atypia     Postsurgical hypothyroidism     Hyperlipidemia LDL goal <130     Morbid obesity (H)     Malignant neoplasm of left breast (H)     Ductal carcinoma in situ (DCIS) of right breast     Past Surgical History:   Procedure Laterality Date     ABDOMEN SURGERY  1974    appendix     APPENDECTOMY OPEN       ARTHROSCOPY KNEE RT/LT  11/15/2007    right knee arthroscopy with arthroscopic lateral meniscectomy     BIOPSY Left     Breast. Benign     CL AFF SURGICAL PATHOLOGY  2002    endometrial biopsy     COLONOSCOPY        ELBOW SURGERY Right      ESOPHAGOSCOPY, GASTROSCOPY, DUODENOSCOPY (EGD), COMBINED N/A 2014    Procedure: COMBINED ESOPHAGOSCOPY, GASTROSCOPY, DUODENOSCOPY (EGD), BIOPSY SINGLE OR MULTIPLE;  Surgeon: Paradise Radford MD;  Location: [facility]     GRAFT FAT TO BREAST Right 2021    Procedure: AND FAT GRAFTING FROM ABDOMEN;  Surgeon: Genaro Garcia MD;  Location: [facility]     HC THYROIDECTOMY      goitre     INSERT TISSUE EXPANDER BREAST Bilateral 2021    Procedure: bilateral BREAST TISSUE EXPANDER;  Surgeon: Genaro Garcia MD;  Location: [facility]     LAMINECTOMY, FUSION LUMBAR ONE LEVEL, COMBINED  10/26/2011    Procedure:COMBINED LAMINECTOMY, FUSION LUMBAR ONE LEVEL; L4-5 Decompression, L4-5 Posterior Lateral Fusion with Madhavi and Local Bone; Surgeon:BARBRA BRIGHT; Location:[facility]     MASTECTOMY SIMPLE, SENTINEL NODE, COMBINED Bilateral 2021    Procedure: BILATERAL SKIN SPARING MASTECTOMY WITH right  SENTINEL LYMPH NODE BIOPSY;  Surgeon: Carmen Stein MD;  Location: [facility]     RECONSTRUCT BREAST Bilateral 2021    Procedure: REVISION OF BILATERAL BREAST RECONSTRUCTION WITH REMOVE AND REPLACE BILATERAL BREAST IMPLANT;  Surgeon: Genaro Garcia MD;  [facility]: [facility]     RECONSTRUCT BREAST BILATERAL, IMPLANT PROSTHESIS BILATERAL, COMBINED Bilateral 2021    Procedure: BILATERAL SECOND STAGE BREAST RECONSTRUCTION WITH SILICONE IMPLANTS;  Surgeon: Genaro Garcia MD;  [facility]: [facility]     TONSILLECTOMY & ADENOIDECTOMY         Social History     Tobacco Use     Smoking status: Former     Packs/day: 1.00     Years: 19.00     Pack years: 19.00     Types: Cigarettes     Start date: 1971     Quit date: 12/15/1998     Years since quittin.1     Smokeless tobacco: Never     Tobacco comments:     quit 20 years ago   Substance Use Topics     Alcohol use: Yes     Comment: once in a while     Family History   Problem Relation Age of Onset     Asthma Mother       Diabetes Mother      Hypertension Mother      Arthritis Mother      Circulatory Mother      Obesity Mother      Thyroid Disease Mother      Aneurysm Mother         Brain,  at age 69     Hypertension Father      Alcohol/Drug Father      Arthritis Father      Obesity Father      Breast Cancer Maternal Grandmother      Breast Cancer Paternal Grandmother         My fathers mother     Thyroid Disease Brother      Thyroid Disease Brother      Colon Cancer No family hx of      Anesthesia Reaction No family hx of          Current Outpatient Medications   Medication Sig Dispense Refill     acetaminophen (TYLENOL) 500 MG tablet Take 500-1,000 mg by mouth daily as needed for mild pain       Ferrous Sulfate (IRON PO) Take 1 tablet by mouth daily       ibuprofen (ADVIL/MOTRIN) 600 MG tablet Take 600 mg by mouth every 6 hours as needed for moderate pain       levothyroxine (SYNTHROID/LEVOTHROID) 112 MCG tablet TAKE 1 TABLET BY MOUTH DAILY 90 tablet 0     Multiple Vitamins-Minerals (MULTIVITAL PO) Take 1 tablet by mouth daily       omeprazole (PRILOSEC) 20 MG DR capsule TAKE 1 CAPSULE BY MOUTH TWICE A DAY (BEFORE A MEAL) 180 capsule 0     OVER-THE-COUNTER Place 1-2 drops into both eyes every hour as needed       rosuvastatin (CRESTOR) 20 MG tablet TAKE 1 TABLET BY MOUTH EVERY DAY 90 tablet 0     Probiotic Product (PROBIOTIC PO) Take 1 tablet by mouth daily        Allergies   Allergen Reactions     Latex Anaphylaxis     Cortisone Nausea and Vomiting     oral     Nickel      Vicodin [Acetaminophen] Nausea and Vomiting     Adhesive Tape Rash     Cvs Petroleum Jelly [Eucerin] Rash       Breast Cancer Screening:    FHS-7: No flowsheet data found.      History of abnormal Pap smear: NO - age 30-65 PAP every 5 years with negative HPV co-testing recommended  PAP / HPV Latest Ref Rng & Units 12/15/2020 2017 2014   PAP (Historical) - NIL NIL NIL   HPV16 NEG:Negative Negative Negative -   HPV18 NEG:Negative Negative  Negative -   HRHPV NEG:Negative Negative Negative -     Reviewed and updated as needed this visit by clinical staff   Tobacco  Allergies  Meds   Med Hx  Surg Hx  Fam Hx          Reviewed and updated as needed this visit by Provider   Tobacco     Med Hx  Surg Hx  Fam Hx         Past Medical History:   Diagnosis Date     Anemia      Leblanc's esophagus     EGD in 2014; recent EGD in 4/2018; repeat EGD in 3 years     Breast cancer (H)      Ex-smoker     1 PPD x 28 years, quit 20 years ago     Gastroesophageal reflux disease      Hematuria 01/23/2007     History of breast biopsy     left     Menopausal symptoms 07/17/2009     Motion sickness      Obese      Postsurgical hypothyroidism 07/17/2009     Symptomatic menopausal or female climacteric states 07/17/2009     Tear of lateral cartilage or meniscus of knee, current 10/24/2007      Past Surgical History:   Procedure Laterality Date     ABDOMEN SURGERY  1974    appendix     APPENDECTOMY OPEN       ARTHROSCOPY KNEE RT/LT  11/15/2007    right knee arthroscopy with arthroscopic lateral meniscectomy     BIOPSY Left     Breast. Benign     CL AFF SURGICAL PATHOLOGY  09/18/2002    endometrial biopsy     COLONOSCOPY       ELBOW SURGERY Right      ESOPHAGOSCOPY, GASTROSCOPY, DUODENOSCOPY (EGD), COMBINED N/A 12/23/2014    Procedure: COMBINED ESOPHAGOSCOPY, GASTROSCOPY, DUODENOSCOPY (EGD), BIOPSY SINGLE OR MULTIPLE;  Surgeon: Paradise Radford MD;  Location: [facility]     GRAFT FAT TO BREAST Right 11/08/2021    Procedure: AND FAT GRAFTING FROM ABDOMEN;  Surgeon: Genaro Garcia MD;  [facility]: [facility]     HC THYROIDECTOMY      goitre     INSERT TISSUE EXPANDER BREAST Bilateral 03/17/2021    Procedure: bilateral BREAST TISSUE EXPANDER;  Surgeon: Genaro Garcia MD;  [facility]     LAMINECTOMY, FUSION LUMBAR ONE LEVEL, COMBINED  10/26/2011    Procedure:COMBINED LAMINECTOMY, FUSION LUMBAR ONE LEVEL; L4-5 Decompression, L4-5 Posterior Lateral Fusion with  "Madhavi and Local Bone; Surgeon:BARBRA BRIGHT; Location:[facility]     MASTECTOMY SIMPLE, SENTINEL NODE, COMBINED Bilateral 2021    Procedure: BILATERAL SKIN SPARING MASTECTOMY WITH right  SENTINEL LYMPH NODE BIOPSY;  Surgeon: Carmen Stein MD;  [facility]     RECONSTRUCT BREAST Bilateral 2021    Procedure: REVISION OF BILATERAL BREAST RECONSTRUCTION WITH REMOVE AND REPLACE BILATERAL BREAST IMPLANT;  Surgeon: Genaro Garcia MD;  [facility]     RECONSTRUCT BREAST BILATERAL, IMPLANT PROSTHESIS BILATERAL, COMBINED Bilateral 2021    Procedure: BILATERAL SECOND STAGE BREAST RECONSTRUCTION WITH SILICONE IMPLANTS;  Surgeon: Genaro Garcia MD;  [facility]     TONSILLECTOMY & ADENOIDECTOMY       OB History    Para Term  AB Living   2 2 2 0 0 2   SAB IAB Ectopic Multiple Live Births   0 0 0 0 2      # Outcome Date GA Lbr Arthur/2nd Weight Sex Delivery Anes PTL Lv   2 Term 79 40w0d   F Vag-Spont   ADAM   1 Term 76 40w0d   F Vag-Spont   ADAM       Review of Systems   Respiratory: Positive for cough.    Cardiovascular: Positive for chest pain.   Breasts:  Negative for tenderness, breast mass and discharge.   Genitourinary: Negative for pelvic pain, vaginal bleeding and vaginal discharge.   Neurological: Positive for dizziness.   Psychiatric/Behavioral: The patient is nervous/anxious.           OBJECTIVE:   /76 (BP Location: Right arm, Patient Position: Chair, Cuff Size: Adult Large)   Pulse 75   Temp 98.2  F (36.8  C) (Oral)   Ht 1.651 m (5' 5\")   Wt 99.2 kg (218 lb 9.6 oz)   LMP 2012   SpO2 94%   Breastfeeding No   BMI 36.38 kg/m    Physical Exam  GENERAL APPEARANCE: healthy, alert and no distress  EYES: Eyes grossly normal to inspection, PERRL and conjunctivae and sclerae normal  HENT: ear canals and TM's normal, nose and mouth without ulcers or lesions, oropharynx clear and oral mucous membranes moist  NECK: no adenopathy, no " asymmetry, masses, or scars and thyroid normal to palpation  RESP: lungs clear to auscultation - no rales, rhonchi or wheezes  BREAST: normal without masses, tenderness or nipple discharge and no palpable axillary masses or adenopathy  CV: regular rate and rhythm, normal S1 S2, no S3 or S4, no murmur, click or rub, no peripheral edema and peripheral pulses strong  ABDOMEN: soft, nontender, no hepatosplenomegaly, no masses and bowel sounds normal  MS: no musculoskeletal defects are noted and gait is age appropriate without ataxia  SKIN: no suspicious lesions or rashes  NEURO: Normal strength and tone, sensory exam grossly normal, mentation intact and speech normal  PSYCH: mentation appears normal and affect normal/bright    Diagnostic Test Results:  Previous Labs reviewed in Epic    ASSESSMENT/PLAN:   Laura was seen today for physical and health maintenance.    Diagnoses and all orders for this visit:    Routine general medical examination at a health care facility  -     REVIEW OF HEALTH MAINTENANCE PROTOCOL ORDERS  -     Comprehensive metabolic panel (BMP + Alb, Alk Phos, ALT, AST, Total. Bili, TP); Future  -     CBC with platelets; Future    Lipid screening  -     Lipid panel reflex to direct LDL Fasting; Future    Postsurgical hypothyroidism- on Levothyroxine.  -     TSH WITH FREE T4 REFLEX; Future  -      Refill Levothyroxine after labs     Persistent cough for 3 weeks or longer- possibly postnasal drip versus lung infection  -     XR Chest 2 Views    Malignant neoplasm of left breast in female, estrogen receptor positive, unspecified site of breast (H)       - s/p mastectomy. No recurrences.    Morbid obesity (H)       - Weight management plan: Discussed healthy diet and exercise guidelines        Patient has been advised of split billing requirements and indicates understanding: Yes      COUNSELING:  Reviewed preventive health counseling, as reflected in patient instructions       Regular exercise       Healthy  diet/nutrition        She reports that she quit smoking about 24 years ago. Her smoking use included cigarettes. She started smoking about 52 years ago. She has a 19.00 pack-year smoking history. She has never used smokeless tobacco.    Follow up annually and as needed thoughout the year.    Sarita Shultz MD  [facility]

## 2023-02-07 NOTE — PATIENT INSTRUCTIONS
Will notify you with results.         Preventive Health Recommendations  Female Ages 50 - 64    Yearly exam: See your health care provider every year in order to  Review health changes.   Discuss preventive care.    Review your medicines if your doctor has prescribed any.    Get a Pap test every three years (unless you have an abnormal result and your provider advises testing more often).  If you get Pap tests with HPV test, you only need to test every 5 years, unless you have an abnormal result.   You do not need a Pap test if your uterus was removed (hysterectomy) and you have not had cancer.  You should be tested each year for STDs (sexually transmitted diseases) if you're at risk.   Have a mammogram every 1 to 2 years.  Have a colonoscopy at age 50, or have a yearly FIT test (stool test). These exams screen for colon cancer.    Have a cholesterol test every 5 years, or more often if advised.  Have a diabetes test (fasting glucose) every three years. If you are at risk for diabetes, you should have this test more often.   If you are at risk for osteoporosis (brittle bone disease), think about having a bone density scan (DEXA).    Shots: Get a flu shot each year. Get a tetanus shot every 10 years.    Nutrition:   Eat at least 5 servings of fruits and vegetables each day.  Eat whole-grain bread, whole-wheat pasta and brown rice instead of white grains and rice.  Get adequate Calcium and Vitamin D.     Lifestyle  Exercise at least 150 minutes a week (30 minutes a day, 5 days a week). This will help you control your weight and prevent disease.  Limit alcohol to one drink per day.  No smoking.   Wear sunscreen to prevent skin cancer.   See your dentist every six months for an exam and cleaning.  See your eye doctor every 1 to 2 years.

## 2023-02-15 ENCOUNTER — LAB (OUTPATIENT)
Dept: LAB | Facility: CLINIC | Age: 63
End: 2023-02-15
Payer: COMMERCIAL

## 2023-02-15 DIAGNOSIS — Z00.00 ROUTINE GENERAL MEDICAL EXAMINATION AT A HEALTH CARE FACILITY: ICD-10-CM

## 2023-02-15 DIAGNOSIS — Z13.220 LIPID SCREENING: ICD-10-CM

## 2023-02-15 DIAGNOSIS — E89.0 POSTSURGICAL HYPOTHYROIDISM: ICD-10-CM

## 2023-02-15 LAB
ALBUMIN SERPL-MCNC: 3.9 G/DL (ref 3.4–5)
ALP SERPL-CCNC: 59 U/L (ref 40–150)
ALT SERPL W P-5'-P-CCNC: 49 U/L (ref 0–50)
ANION GAP SERPL CALCULATED.3IONS-SCNC: 2 MMOL/L (ref 3–14)
AST SERPL W P-5'-P-CCNC: 25 U/L (ref 0–45)
BILIRUB SERPL-MCNC: 0.4 MG/DL (ref 0.2–1.3)
BUN SERPL-MCNC: 19 MG/DL (ref 7–30)
CALCIUM SERPL-MCNC: 9.5 MG/DL (ref 8.5–10.1)
CHLORIDE BLD-SCNC: 105 MMOL/L (ref 94–109)
CHOLEST SERPL-MCNC: 174 MG/DL
CO2 SERPL-SCNC: 33 MMOL/L (ref 20–32)
CREAT SERPL-MCNC: 0.69 MG/DL (ref 0.52–1.04)
ERYTHROCYTE [DISTWIDTH] IN BLOOD BY AUTOMATED COUNT: 12.2 % (ref 10–15)
FASTING STATUS PATIENT QL REPORTED: YES
GFR SERPL CREATININE-BSD FRML MDRD: >90 ML/MIN/1.73M2
GLUCOSE BLD-MCNC: 101 MG/DL (ref 70–99)
HCT VFR BLD AUTO: 44.5 % (ref 35–47)
HDLC SERPL-MCNC: 71 MG/DL
HGB BLD-MCNC: 14.3 G/DL (ref 11.7–15.7)
LDLC SERPL CALC-MCNC: 86 MG/DL
MCH RBC QN AUTO: 31.1 PG (ref 26.5–33)
MCHC RBC AUTO-ENTMCNC: 32.1 G/DL (ref 31.5–36.5)
MCV RBC AUTO: 97 FL (ref 78–100)
NONHDLC SERPL-MCNC: 103 MG/DL
PLATELET # BLD AUTO: 218 10E3/UL (ref 150–450)
POTASSIUM BLD-SCNC: 4.6 MMOL/L (ref 3.4–5.3)
PROT SERPL-MCNC: 7.2 G/DL (ref 6.8–8.8)
RBC # BLD AUTO: 4.6 10E6/UL (ref 3.8–5.2)
SODIUM SERPL-SCNC: 140 MMOL/L (ref 133–144)
TRIGL SERPL-MCNC: 86 MG/DL
TSH SERPL DL<=0.005 MIU/L-ACNC: 1.21 MU/L (ref 0.4–4)
WBC # BLD AUTO: 5.6 10E3/UL (ref 4–11)

## 2023-02-15 PROCEDURE — 80061 LIPID PANEL: CPT

## 2023-02-15 PROCEDURE — 36415 COLL VENOUS BLD VENIPUNCTURE: CPT

## 2023-02-15 PROCEDURE — 84443 ASSAY THYROID STIM HORMONE: CPT

## 2023-02-15 PROCEDURE — 80053 COMPREHEN METABOLIC PANEL: CPT

## 2023-02-15 PROCEDURE — 85027 COMPLETE CBC AUTOMATED: CPT

## 2023-03-12 DIAGNOSIS — K20.90 BARRETT'S ESOPHAGUS WITH ESOPHAGITIS: ICD-10-CM

## 2023-03-12 DIAGNOSIS — K22.70 BARRETT'S ESOPHAGUS WITH ESOPHAGITIS: ICD-10-CM

## 2023-03-13 ENCOUNTER — MYC REFILL (OUTPATIENT)
Dept: FAMILY MEDICINE | Facility: CLINIC | Age: 63
End: 2023-03-13

## 2023-03-13 ENCOUNTER — HOSPITAL ENCOUNTER (OUTPATIENT)
Dept: ULTRASOUND IMAGING | Facility: CLINIC | Age: 63
Discharge: HOME | End: 2023-03-13
Attending: SURGERY | Admitting: SURGERY
Payer: COMMERCIAL

## 2023-03-13 DIAGNOSIS — K20.90 BARRETT'S ESOPHAGUS WITH ESOPHAGITIS: ICD-10-CM

## 2023-03-13 DIAGNOSIS — E78.5 HYPERLIPIDEMIA LDL GOAL <130: ICD-10-CM

## 2023-03-13 DIAGNOSIS — E89.0 POSTSURGICAL HYPOTHYROIDISM: ICD-10-CM

## 2023-03-13 DIAGNOSIS — N63.10 BREAST MASS, RIGHT: ICD-10-CM

## 2023-03-13 DIAGNOSIS — K22.70 BARRETT'S ESOPHAGUS WITH ESOPHAGITIS: ICD-10-CM

## 2023-03-13 PROCEDURE — 76642 ULTRASOUND BREAST LIMITED: CPT | Mod: RT

## 2023-03-13 RX ORDER — ROSUVASTATIN CALCIUM 20 MG/1
20 TABLET, COATED ORAL DAILY
Qty: 90 TABLET | Refills: 0 | Status: SHIPPED | OUTPATIENT
Start: 2023-03-13 | End: 2023-06-12

## 2023-03-13 RX ORDER — LEVOTHYROXINE SODIUM 112 UG/1
112 TABLET ORAL DAILY
Qty: 90 TABLET | Refills: 0 | Status: SHIPPED | OUTPATIENT
Start: 2023-03-13 | End: 2023-06-12

## 2023-03-17 DIAGNOSIS — E89.0 POSTSURGICAL HYPOTHYROIDISM: ICD-10-CM

## 2023-03-17 DIAGNOSIS — E78.5 HYPERLIPIDEMIA LDL GOAL <130: ICD-10-CM

## 2023-03-17 NOTE — TELEPHONE ENCOUNTER
[facility] [id number] [place] [facility]     Request now from a different [facility].     My Chart message sent to patient for clarification.     Ginger Rodriguez RN BSN  [facility]

## 2023-03-20 RX ORDER — ROSUVASTATIN CALCIUM 20 MG/1
TABLET, COATED ORAL
Qty: 90 TABLET | Refills: 0 | OUTPATIENT
Start: 2023-03-20

## 2023-03-20 RX ORDER — LEVOTHYROXINE SODIUM 112 UG/1
TABLET ORAL
Qty: 90 TABLET | Refills: 0 | OUTPATIENT
Start: 2023-03-20

## 2023-03-20 NOTE — TELEPHONE ENCOUNTER
Spoke with patient regarding clarification. Patient prefers [facility] pharmacy in [place], therefore no further needs. Patient unsure why [facility] in [place] requesting refills as she did not request these.    Closing encounter.     SUNNY KaurN RN  [facility]

## 2023-03-20 NOTE — TELEPHONE ENCOUNTER
Ratify not read yet, attempted to call and left vm asking call back or check Vatler message and respond.     Thanks,  IAIN Ramirez  [facility]

## 2023-04-27 ENCOUNTER — OFFICE VISIT (OUTPATIENT)
Dept: SURGERY | Facility: CLINIC | Age: 63
End: 2023-04-27
Payer: COMMERCIAL

## 2023-04-27 DIAGNOSIS — Z90.13 STATUS POST BILATERAL MASTECTOMY: Primary | ICD-10-CM

## 2023-04-27 PROCEDURE — 99212 OFFICE O/P EST SF 10 MIN: CPT | Performed by: SURGERY

## 2023-04-27 NOTE — LETTER
"    4/27/2023         RE: Laura Ferreira  [address] [facility] [address]        Dear Colleague,    Thank you for referring your patient, Laura Ferreira, to the [facility] [place]. Please see a copy of my visit note below.    [facility] Follow Up Note    CHIEF COMPLAINT:  History of right breast cancer    HISTORY OF PRESENT ILLNESS:  Laura Ferreira is a 62 year old female who is seen in follow up for right breast cancer.     She underwent bilateral mastectomies with right sentinel lymph node biopsy and immediate reconstruction with myself and Dr. Garcia on 3/17/2021 for 6cm of high grade DCIS. She had her 2nd stage reconstruction on 6/11/2021. She had replacement of her implants and fat grafting in October 2021. She is more satisfied with cosmesis after this but still notices asymmetry with the right not feeling as \"normal\" as the left.     I had seen her last in April 2022 and at that time had ordered an ultrasound on the right chest wall due to an area that was more firm and she had this on 3/13/2023.  The ultrasound revealed a subtle increased echogenicity seen just deep to the skin at the site of the palpable finding.  Was felt to represent likely mild scarring and is thought to be benign prior.  A follow-up 3-month ultrasound was recommended for close interval evaluation.    She reports she still has some tenderness on this right side and that it feels a little thicker to her.  She has otherwise been doing well.  Her  was recently diagnosed with non-small cell lung cancer and is undergoing chemotherapy.  This has been very stressful for them.  She reports she has gained weight which she is frustrated about as well and attributes this to stress.  Her sister and great      REVIEW OF SYSTEMS:  Constitutional:  Negative for chills, fatigue, fever and weight change.  Eyes:  Negative for blurred vision, eye pain and photophobia.  ENT:  Negative for hearing problems, ENT " pain, congestion, rhinorrhea, epistaxis, hoarseness and dental problems.  Cardiovascular:  Negative for chest pain, palpitations, tachycardia, orthopnea and edema.  Respiratory:  Negative for cough, dyspnea and hemoptysis.  Gastrointestinal:  Negative for abdominal pain, heartburn, constipation, diarrhea and stool changes.  Musculoskeletal:  Negative for arthralgias, back pain and myalgias.  Integumentary/Breast:  See HPI.    Past Medical History:   Diagnosis Date     Anemia      Leblanc's esophagus     EGD in 2014; recent EGD in 4/2018; repeat EGD in 3 years     Breast cancer (H)      Ex-smoker     1 PPD x 28 years, quit 20 years ago     Gastroesophageal reflux disease      Hematuria 01/23/2007     History of breast biopsy     left     Menopausal symptoms 07/17/2009     Motion sickness      Obese      Postsurgical hypothyroidism 07/17/2009     Symptomatic menopausal or female climacteric states 07/17/2009     Tear of lateral cartilage or meniscus of knee, current 10/24/2007       Past Surgical History:   Procedure Laterality Date     ABDOMEN SURGERY  1974    appendix     APPENDECTOMY OPEN       ARTHROSCOPY KNEE RT/LT  11/15/2007    right knee arthroscopy with arthroscopic lateral meniscectomy     BIOPSY Left     Breast. Benign     CL AFF SURGICAL PATHOLOGY  09/18/2002    endometrial biopsy     COLONOSCOPY       ELBOW SURGERY Right      ESOPHAGOSCOPY, GASTROSCOPY, DUODENOSCOPY (EGD), COMBINED N/A 12/23/2014    Procedure: COMBINED ESOPHAGOSCOPY, GASTROSCOPY, DUODENOSCOPY (EGD), BIOPSY SINGLE OR MULTIPLE;  Surgeon: Paradise Radford MD;  Location: [facility]     GRAFT FAT TO BREAST Right 11/08/2021    Procedure: AND FAT GRAFTING FROM ABDOMEN;  Surgeon: Genaro Garcia MD;  Location: [facility] OR     HC THYROIDECTOMY      goitre     INSERT TISSUE EXPANDER BREAST Bilateral 03/17/2021    Procedure: bilateral BREAST TISSUE EXPANDER;  Surgeon: Genaro Garcia MD;  Location: [facility]     LAMINECTOMY, FUSION LUMBAR ONE  LEVEL, COMBINED  10/26/2011    Procedure:COMBINED LAMINECTOMY, FUSION LUMBAR ONE LEVEL; L4-5 Decompression, L4-5 Posterior Lateral Fusion with Madhavi and Local Bone; Surgeon:BARBRA BRIGHT; Location:[facility]     MASTECTOMY SIMPLE, SENTINEL NODE, COMBINED Bilateral 2021    Procedure: BILATERAL SKIN SPARING MASTECTOMY WITH right  SENTINEL LYMPH NODE BIOPSY;  Surgeon: Carmen Stein MD;  Location: [facility]     RECONSTRUCT BREAST Bilateral 2021    Procedure: REVISION OF BILATERAL BREAST RECONSTRUCTION WITH REMOVE AND REPLACE BILATERAL BREAST IMPLANT;  Surgeon: Genaro Garcia MD;  Location: [facility]     RECONSTRUCT BREAST BILATERAL, IMPLANT PROSTHESIS BILATERAL, COMBINED Bilateral 2021    Procedure: BILATERAL SECOND STAGE BREAST RECONSTRUCTION WITH SILICONE IMPLANTS;  Surgeon: Genaro Garcia MD;  Location: [facility]     TONSILLECTOMY & ADENOIDECTOMY         Family History   Problem Relation Age of Onset     Asthma Mother      Diabetes Mother      Hypertension Mother      Arthritis Mother      Circulatory Mother      Obesity Mother      Thyroid Disease Mother      Aneurysm Mother         Brain,  at age 69     Hypertension Father      Alcohol/Drug Father      Arthritis Father      Obesity Father      Breast Cancer Maternal Grandmother      Breast Cancer Paternal Grandmother         My fathers mother     Thyroid Disease Brother      Thyroid Disease Brother      Colon Cancer No family hx of      Anesthesia Reaction No family hx of        Social History     Tobacco Use     Smoking status: Former     Packs/day: 1.00     Years: 19.00     Pack years: 19.00     Types: Cigarettes     Start date: 1971     Quit date: 12/15/1998     Years since quittin.3     Smokeless tobacco: Never     Tobacco comments:     quit 20 years ago   Vaping Use     Vaping status: Never Used   Substance Use Topics     Alcohol use: Yes     Comment: once in a while       Patient Active Problem List   Diagnosis      GERD (gastroesophageal reflux disease)     Menorrhagia     Iron deficiency anemia     Simple endometrial hyperplasia without atypia     Postsurgical hypothyroidism     Hyperlipidemia LDL goal <130     Morbid obesity (H)     Malignant neoplasm of left breast (H)     Ductal carcinoma in situ (DCIS) of right breast     Allergies   Allergen Reactions     Latex Anaphylaxis     Cortisone Nausea and Vomiting     oral     Nickel      Vicodin [Acetaminophen] Nausea and Vomiting     Adhesive Tape Rash     Cvs Petroleum Jelly [Basis Facial Moisturizer] Rash     Current Outpatient Medications   Medication Sig Dispense Refill     acetaminophen (TYLENOL) 500 MG tablet Take 500-1,000 mg by mouth daily as needed for mild pain       Ferrous Sulfate (IRON PO) Take 1 tablet by mouth daily       ibuprofen (ADVIL/MOTRIN) 600 MG tablet Take 600 mg by mouth every 6 hours as needed for moderate pain       levothyroxine (SYNTHROID/LEVOTHROID) 112 MCG tablet Take 1 tablet (112 mcg) by mouth daily 90 tablet 0     Multiple Vitamins-Minerals (MULTIVITAL PO) Take 1 tablet by mouth daily       omeprazole (PRILOSEC) 20 MG DR capsule TAKE 1 CAPSULE BY MOUTH TWICE A DAY (BEFORE A MEAL) Strength: 20 mg 180 capsule 0     OVER-THE-COUNTER Place 1-2 drops into both eyes every hour as needed       rosuvastatin (CRESTOR) 20 MG tablet Take 1 tablet (20 mg) by mouth daily 90 tablet 0     Vitals: LMP 04/01/2012   BMI= There is no height or weight on file to calculate BMI.    EXAM:  GENERAL: healthy, alert and no distress   BREAST:  The breasts are surgically absent.  Implants are in place.  On the right medial skin flap there is slight increase in density to the skin itself which feels improved to me compared to last exam.  The skin is otherwise normal across her chest with scars well-healed.  There is no axillary or supraclavicular lymphadenopathy.  CARDIOVASCULAR:  RRR  RESPIRATORY: nonlabored breathing  NECK: Neck supple. No adenopathy. Thyroid symmetric,  normal size  SKIN: No suspicious lesions or rashes  LYMPH: Normal cervical lymph nodes      ASSESSMENT/PLAN:  Laura Ferreira is now 2 years status post bilateral mastectomies and here for chest wall exam.  We reviewed her ultrasound results at her visit today and I agree with a 3-month follow-up ultrasound and orders were placed for this.  I will continue to see her for annual chest wall exam going forward.          Carmen Stein MD  [facility]  [phone]        Please route or send letter to:  Primary Care Provider (PCP) and Referring Provider      Again, thank you for allowing me to participate in the care of your patient.        Sincerely,        Carmen Stein MD

## 2023-04-27 NOTE — NURSING NOTE
Laura Ferreira's goals for this visit include:   Chief Complaint   Patient presents with     RECHECK     Exam, hx of breast cancer       She requests these members of her care team be copied on today's visit information: no    PCP: Sarita Shultz    Referring Provider:  No referring provider defined for this encounter.    LMP 04/01/2012     Do you need any medication refills at today's visit? No    Magdalene Crawford LPN

## 2023-04-27 NOTE — PROGRESS NOTES
"[facility] Follow Up Note    CHIEF COMPLAINT:  History of right breast cancer    HISTORY OF PRESENT ILLNESS:  aLura Ferreira is a 62 year old female who is seen in follow up for right breast cancer.     She underwent bilateral mastectomies with right sentinel lymph node biopsy and immediate reconstruction with myself and Dr. Garcia on 3/17/2021 for 6cm of high grade DCIS. She had her 2nd stage reconstruction on 6/11/2021. She had replacement of her implants and fat grafting in October 2021. She is more satisfied with cosmesis after this but still notices asymmetry with the right not feeling as \"normal\" as the left.     I had seen her last in April 2022 and at that time had ordered an ultrasound on the right chest wall due to an area that was more firm and she had this on 3/13/2023.  The ultrasound revealed a subtle increased echogenicity seen just deep to the skin at the site of the palpable finding.  Was felt to represent likely mild scarring and is thought to be benign prior.  A follow-up 3-month ultrasound was recommended for close interval evaluation.    She reports she still has some tenderness on this right side and that it feels a little thicker to her.  She has otherwise been doing well.  Her  was recently diagnosed with non-small cell lung cancer and is undergoing chemotherapy.  This has been very stressful for them.  She reports she has gained weight which she is frustrated about as well and attributes this to stress.  Her sister and great      REVIEW OF SYSTEMS:  Constitutional:  Negative for chills, fatigue, fever and weight change.  Eyes:  Negative for blurred vision, eye pain and photophobia.  ENT:  Negative for hearing problems, ENT pain, congestion, rhinorrhea, epistaxis, hoarseness and dental problems.  Cardiovascular:  Negative for chest pain, palpitations, tachycardia, orthopnea and edema.  Respiratory:  Negative for cough, dyspnea and hemoptysis.  Gastrointestinal:  " Negative for abdominal pain, heartburn, constipation, diarrhea and stool changes.  Musculoskeletal:  Negative for arthralgias, back pain and myalgias.  Integumentary/Breast:  See HPI.    Past Medical History:   Diagnosis Date     Anemia      Leblanc's esophagus     EGD in 2014; recent EGD in 4/2018; repeat EGD in 3 years     Breast cancer (H)      Ex-smoker     1 PPD x 28 years, quit 20 years ago     Gastroesophageal reflux disease      Hematuria 01/23/2007     History of breast biopsy     left     Menopausal symptoms 07/17/2009     Motion sickness      Obese      Postsurgical hypothyroidism 07/17/2009     Symptomatic menopausal or female climacteric states 07/17/2009     Tear of lateral cartilage or meniscus of knee, current 10/24/2007       Past Surgical History:   Procedure Laterality Date     ABDOMEN SURGERY  1974    appendix     APPENDECTOMY OPEN       ARTHROSCOPY KNEE RT/LT  11/15/2007    right knee arthroscopy with arthroscopic lateral meniscectomy     BIOPSY Left     Breast. Benign     CL AFF SURGICAL PATHOLOGY  09/18/2002    endometrial biopsy     COLONOSCOPY       ELBOW SURGERY Right      ESOPHAGOSCOPY, GASTROSCOPY, DUODENOSCOPY (EGD), COMBINED N/A 12/23/2014    Procedure: COMBINED ESOPHAGOSCOPY, GASTROSCOPY, DUODENOSCOPY (EGD), BIOPSY SINGLE OR MULTIPLE;  Surgeon: Paradise Radford MD;  Location: [facility]     GRAFT FAT TO BREAST Right 11/08/2021    Procedure: AND FAT GRAFTING FROM ABDOMEN;  Surgeon: Genaro Garcia MD;  Location: [facility]     HC THYROIDECTOMY      goitre     INSERT TISSUE EXPANDER BREAST Bilateral 03/17/2021    Procedure: bilateral BREAST TISSUE EXPANDER;  Surgeon: Genaro Garcia MD;  Location: [facility]     LAMINECTOMY, FUSION LUMBAR ONE LEVEL, COMBINED  10/26/2011    Procedure:COMBINED LAMINECTOMY, FUSION LUMBAR ONE LEVEL; L4-5 Decompression, L4-5 Posterior Lateral Fusion with Madhavi and Local Bone; Surgeon:BARBRA BRIGHT; Location:[facility]     MASTECTOMY SIMPLE, SENTINEL NODE,  COMBINED Bilateral 2021    Procedure: BILATERAL SKIN SPARING MASTECTOMY WITH right  SENTINEL LYMPH NODE BIOPSY;  Surgeon: Carmen Stein MD;  Location: [facility]     RECONSTRUCT BREAST Bilateral 2021    Procedure: REVISION OF BILATERAL BREAST RECONSTRUCTION WITH REMOVE AND REPLACE BILATERAL BREAST IMPLANT;  Surgeon: Genaro Garcia MD;  Location: [facility]     RECONSTRUCT BREAST BILATERAL, IMPLANT PROSTHESIS BILATERAL, COMBINED Bilateral 2021    Procedure: BILATERAL SECOND STAGE BREAST RECONSTRUCTION WITH SILICONE IMPLANTS;  Surgeon: Genaro Garcia MD;  Location: [facility]     TONSILLECTOMY & ADENOIDECTOMY         Family History   Problem Relation Age of Onset     Asthma Mother      Diabetes Mother      Hypertension Mother      Arthritis Mother      Circulatory Mother      Obesity Mother      Thyroid Disease Mother      Aneurysm Mother         Brain,  at age 69     Hypertension Father      Alcohol/Drug Father      Arthritis Father      Obesity Father      Breast Cancer Maternal Grandmother      Breast Cancer Paternal Grandmother         My fathers mother     Thyroid Disease Brother      Thyroid Disease Brother      Colon Cancer No family hx of      Anesthesia Reaction No family hx of        Social History     Tobacco Use     Smoking status: Former     Packs/day: 1.00     Years: 19.00     Pack years: 19.00     Types: Cigarettes     Start date: 1971     Quit date: 12/15/1998     Years since quittin.3     Smokeless tobacco: Never     Tobacco comments:     quit 20 years ago   Vaping Use     Vaping status: Never Used   Substance Use Topics     Alcohol use: Yes     Comment: once in a while       Patient Active Problem List   Diagnosis     GERD (gastroesophageal reflux disease)     Menorrhagia     Iron deficiency anemia     Simple endometrial hyperplasia without atypia     Postsurgical hypothyroidism     Hyperlipidemia LDL goal <130     Morbid obesity (H)     Malignant neoplasm  of left breast (H)     Ductal carcinoma in situ (DCIS) of right breast     Allergies   Allergen Reactions     Latex Anaphylaxis     Cortisone Nausea and Vomiting     oral     Nickel      Vicodin [Acetaminophen] Nausea and Vomiting     Adhesive Tape Rash     Cvs Petroleum Jelly [Basis Facial Moisturizer] Rash     Current Outpatient Medications   Medication Sig Dispense Refill     acetaminophen (TYLENOL) 500 MG tablet Take 500-1,000 mg by mouth daily as needed for mild pain       Ferrous Sulfate (IRON PO) Take 1 tablet by mouth daily       ibuprofen (ADVIL/MOTRIN) 600 MG tablet Take 600 mg by mouth every 6 hours as needed for moderate pain       levothyroxine (SYNTHROID/LEVOTHROID) 112 MCG tablet Take 1 tablet (112 mcg) by mouth daily 90 tablet 0     Multiple Vitamins-Minerals (MULTIVITAL PO) Take 1 tablet by mouth daily       omeprazole (PRILOSEC) 20 MG DR capsule TAKE 1 CAPSULE BY MOUTH TWICE A DAY (BEFORE A MEAL) Strength: 20 mg 180 capsule 0     OVER-THE-COUNTER Place 1-2 drops into both eyes every hour as needed       rosuvastatin (CRESTOR) 20 MG tablet Take 1 tablet (20 mg) by mouth daily 90 tablet 0     Vitals: LMP 04/01/2012   BMI= There is no height or weight on file to calculate BMI.    EXAM:  GENERAL: healthy, alert and no distress   BREAST:  The breasts are surgically absent.  Implants are in place.  On the right medial skin flap there is slight increase in density to the skin itself which feels improved to me compared to last exam.  The skin is otherwise normal across her chest with scars well-healed.  There is no axillary or supraclavicular lymphadenopathy.  CARDIOVASCULAR:  RRR  RESPIRATORY: nonlabored breathing  NECK: Neck supple. No adenopathy. Thyroid symmetric, normal size  SKIN: No suspicious lesions or rashes  LYMPH: Normal cervical lymph nodes      ASSESSMENT/PLAN:  Laura Ferreira is now 2 years status post bilateral mastectomies and here for chest wall exam.  We reviewed her ultrasound results at  her visit today and I agree with a 3-month follow-up ultrasound and orders were placed for this.  I will continue to see her for annual chest wall exam going forward.          Carmen Stein MD  [facility]  [phone]        Please route or send letter to:  Primary Care Provider (PCP) and Referring Provider

## 2023-04-27 NOTE — LETTER
"April 27, 2023        Sarita Shultz MD        RE:   Laura Ferreira 1960      Dear Colleague,    Thank you for referring your patient, Laura Ferreira, to [facility] at [facility]. Please see a copy of my visit note below.     Laura Ferreira is a 62 year old female who is seen in follow up for right breast cancer.     She underwent bilateral mastectomies with right sentinel lymph node biopsy and immediate reconstruction with myself and Dr. Garcia on 3/17/2021 for 6cm of high grade DCIS. She had her 2nd stage reconstruction on 6/11/2021. She had replacement of her implants and fat grafting in October 2021. She is more satisfied with cosmesis after this but still notices asymmetry with the right not feeling as \"normal\" as the left.      I had seen her last in April 2022 and at that time had ordered an ultrasound on the right chest wall due to an area that was more firm and she had this on 3/13/2023.  The ultrasound revealed a subtle increased echogenicity seen just deep to the skin at the site of the palpable finding.  Was felt to represent likely mild scarring and is thought to be benign prior.  A follow-up 3-month ultrasound was recommended for close interval evaluation.     She reports she still has some tenderness on this right side and that it feels a little thicker to her.  She has otherwise been doing well.  Her  was recently diagnosed with non-small cell lung cancer and is undergoing chemotherapy.  This has been very stressful for them.  She reports she has gained weight which she is frustrated about as well and attributes this to stress.  Her sister and great        REVIEW OF SYSTEMS:  Constitutional:  Negative for chills, fatigue, fever and weight change.  Eyes:  Negative for blurred vision, eye pain and photophobia.  ENT:  Negative for hearing problems, ENT pain, congestion, rhinorrhea, epistaxis, hoarseness and dental problems.  Cardiovascular:  Negative for chest pain, " palpitations, tachycardia, orthopnea and edema.  Respiratory:  Negative for cough, dyspnea and hemoptysis.  Gastrointestinal:  Negative for abdominal pain, heartburn, constipation, diarrhea and stool changes.  Musculoskeletal:  Negative for arthralgias, back pain and myalgias.  Integumentary/Breast:  See HPI.     Past Medical History        Past Medical History:   Diagnosis Date     Anemia       Leblanc's esophagus       EGD in 2014; recent EGD in 4/2018; repeat EGD in 3 years     Breast cancer (H)       Ex-smoker       1 PPD x 28 years, quit 20 years ago     Gastroesophageal reflux disease       Hematuria 01/23/2007     History of breast biopsy       left     Menopausal symptoms 07/17/2009     Motion sickness       Obese       Postsurgical hypothyroidism 07/17/2009     Symptomatic menopausal or female climacteric states 07/17/2009     Tear of lateral cartilage or meniscus of knee, current 10/24/2007            Past Surgical History         Past Surgical History:   Procedure Laterality Date     ABDOMEN SURGERY   1974     appendix     APPENDECTOMY OPEN         ARTHROSCOPY KNEE RT/LT   11/15/2007     right knee arthroscopy with arthroscopic lateral meniscectomy     BIOPSY Left       Breast. Benign     CL AFF SURGICAL PATHOLOGY   09/18/2002     endometrial biopsy     COLONOSCOPY         ELBOW SURGERY Right       ESOPHAGOSCOPY, GASTROSCOPY, DUODENOSCOPY (EGD), COMBINED N/A 12/23/2014     Procedure: COMBINED ESOPHAGOSCOPY, GASTROSCOPY, DUODENOSCOPY (EGD), BIOPSY SINGLE OR MULTIPLE;  Surgeon: Paradise Radford MD;  Location: [facility] OR     GRAFT FAT TO BREAST Right 11/08/2021     Procedure: AND FAT GRAFTING FROM ABDOMEN;  Surgeon: Genaro Garcia MD;  Location: [facility] OR     HC THYROIDECTOMY         goitre     INSERT TISSUE EXPANDER BREAST Bilateral 03/17/2021     Procedure: bilateral BREAST TISSUE EXPANDER;  Surgeon: Genaro Garcia MD;  Location: [facility] OR     LAMINECTOMY, FUSION LUMBAR ONE LEVEL, COMBINED    10/26/2011     Procedure:COMBINED LAMINECTOMY, FUSION LUMBAR ONE LEVEL; L4-5 Decompression, L4-5 Posterior Lateral Fusion with Madhavi and Local Bone; Surgeon:BARBRA BRIGHT; Location:[facility] OR     MASTECTOMY SIMPLE, SENTINEL NODE, COMBINED Bilateral 2021     Procedure: BILATERAL SKIN SPARING MASTECTOMY WITH right  SENTINEL LYMPH NODE BIOPSY;  Surgeon: Carmen Stein MD;  Location: [facility] OR     RECONSTRUCT BREAST Bilateral 2021     Procedure: REVISION OF BILATERAL BREAST RECONSTRUCTION WITH REMOVE AND REPLACE BILATERAL BREAST IMPLANT;  Surgeon: Genaro Garcia MD;  Location: [facility] OR     RECONSTRUCT BREAST BILATERAL, IMPLANT PROSTHESIS BILATERAL, COMBINED Bilateral 2021     Procedure: BILATERAL SECOND STAGE BREAST RECONSTRUCTION WITH SILICONE IMPLANTS;  Surgeon: Genaro Garcia MD;  Location: [facility] OR     TONSILLECTOMY & ADENOIDECTOMY                Family History         Family History   Problem Relation Age of Onset     Asthma Mother       Diabetes Mother       Hypertension Mother       Arthritis Mother       Circulatory Mother       Obesity Mother       Thyroid Disease Mother       Aneurysm Mother           Brain,  at age 69     Hypertension Father       Alcohol/Drug Father       Arthritis Father       Obesity Father       Breast Cancer Maternal Grandmother       Breast Cancer Paternal Grandmother           My fathers mother     Thyroid Disease Brother       Thyroid Disease Brother       Colon Cancer No family hx of       Anesthesia Reaction No family hx of              Social History            Tobacco Use     Smoking status: Former       Packs/day: 1.00       Years: 19.00       Pack years: 19.00       Types: Cigarettes       Start date: 1971       Quit date: 12/15/1998       Years since quittin.3     Smokeless tobacco: Never     Tobacco comments:       quit 20 years ago   Vaping Use     Vaping status: Never Used   Substance Use Topics     Alcohol use: Yes        Comment: once in a while             Patient Active Problem List   Diagnosis     GERD (gastroesophageal reflux disease)     Menorrhagia     Iron deficiency anemia     Simple endometrial hyperplasia without atypia     Postsurgical hypothyroidism     Hyperlipidemia LDL goal <130     Morbid obesity (H)     Malignant neoplasm of left breast (H)     Ductal carcinoma in situ (DCIS) of right breast            Allergies   Allergen Reactions     Latex Anaphylaxis     Cortisone Nausea and Vomiting       oral     Nickel       Vicodin [Acetaminophen] Nausea and Vomiting     Adhesive Tape Rash     Cvs Petroleum Jelly [Basis Facial Moisturizer] Rash      Current Outpatient Prescriptions          Current Outpatient Medications   Medication Sig Dispense Refill     acetaminophen (TYLENOL) 500 MG tablet Take 500-1,000 mg by mouth daily as needed for mild pain         Ferrous Sulfate (IRON PO) Take 1 tablet by mouth daily         ibuprofen (ADVIL/MOTRIN) 600 MG tablet Take 600 mg by mouth every 6 hours as needed for moderate pain         levothyroxine (SYNTHROID/LEVOTHROID) 112 MCG tablet Take 1 tablet (112 mcg) by mouth daily 90 tablet 0     Multiple Vitamins-Minerals (MULTIVITAL PO) Take 1 tablet by mouth daily         omeprazole (PRILOSEC) 20 MG DR capsule TAKE 1 CAPSULE BY MOUTH TWICE A DAY (BEFORE A MEAL) Strength: 20 mg 180 capsule 0     OVER-THE-COUNTER Place 1-2 drops into both eyes every hour as needed         rosuvastatin (CRESTOR) 20 MG tablet Take 1 tablet (20 mg) by mouth daily 90 tablet 0         Vitals: LMP 04/01/2012   BMI= There is no height or weight on file to calculate BMI.     EXAM:  GENERAL: healthy, alert and no distress   BREAST:  The breasts are surgically absent.  Implants are in place.  On the right medial skin flap there is slight increase in density to the skin itself which feels improved to me compared to last exam.  The skin is otherwise normal across her chest with scars well-healed.  There is no  axillary or supraclavicular lymphadenopathy.  CARDIOVASCULAR:  RRR  RESPIRATORY: nonlabored breathing  NECK: Neck supple. No adenopathy. Thyroid symmetric, normal size  SKIN: No suspicious lesions or rashes  LYMPH: Normal cervical lymph nodes        ASSESSMENT/PLAN:  Laura Ferreira is now 2 years status post bilateral mastectomies and here for chest wall exam.  We reviewed her ultrasound results at her visit today and I agree with a 3-month follow-up ultrasound and orders were placed for this.  I will continue to see her for annual chest wall exam going forward.          Again, thank you for allowing me to participate in the care of your patient.      Sincerely,      Carmen Stein MD

## 2023-06-12 DIAGNOSIS — E89.0 POSTSURGICAL HYPOTHYROIDISM: ICD-10-CM

## 2023-06-12 DIAGNOSIS — E78.5 HYPERLIPIDEMIA LDL GOAL <130: ICD-10-CM

## 2023-06-12 RX ORDER — ROSUVASTATIN CALCIUM 20 MG/1
TABLET, COATED ORAL
Qty: 90 TABLET | Refills: 2 | Status: SHIPPED | OUTPATIENT
Start: 2023-06-12 | End: 2024-03-05

## 2023-06-12 RX ORDER — LEVOTHYROXINE SODIUM 112 UG/1
TABLET ORAL
Qty: 90 TABLET | Refills: 2 | Status: SHIPPED | OUTPATIENT
Start: 2023-06-12 | End: 2024-03-05

## 2023-07-25 ENCOUNTER — OFFICE VISIT (OUTPATIENT)
Dept: ORTHOPEDICS | Facility: CLINIC | Age: 63
End: 2023-07-25
Payer: COMMERCIAL

## 2023-07-25 ENCOUNTER — ANCILLARY PROCEDURE (OUTPATIENT)
Dept: GENERAL RADIOLOGY | Facility: CLINIC | Age: 63
End: 2023-07-25
Attending: PEDIATRICS
Payer: COMMERCIAL

## 2023-07-25 VITALS
HEIGHT: 65 IN | BODY MASS INDEX: 36.32 KG/M2 | DIASTOLIC BLOOD PRESSURE: 81 MMHG | SYSTOLIC BLOOD PRESSURE: 129 MMHG | WEIGHT: 218 LBS
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
## 2023-07-25 DIAGNOSIS — M25.531 RIGHT WRIST PAIN: ICD-10-CM

## 2023-07-25 DIAGNOSIS — M79.631 RIGHT FOREARM PAIN: Primary | ICD-10-CM

## 2023-07-25 PROCEDURE — 99203 OFFICE O/P NEW LOW 30 MIN: CPT | Performed by: PEDIATRICS

## 2023-07-25 PROCEDURE — 73110 X-RAY EXAM OF WRIST: CPT | Mod: TC | Performed by: RADIOLOGY

## 2023-07-25 NOTE — PROGRESS NOTES
ASSESSMENT & PLAN    Diagnoses and all orders for this visit:    Right forearm pain  -     Hand Therapy Referral; Future    Right wrist pain  -     XR Wrist Right G/E 3 Views; Future  -     Hand Therapy Referral; Future    Overuse injury  -     Hand Therapy Referral; Future          See Patient Instructions  Patient Instructions   Overall pattern in the right upper extremity fits best with repetitive motion/overuse.  Some tenderness and soreness in the forearm most consistent with muscular source.  Discomfort in the right wrist may be related to repetitive motion as well, along with potential for small underlying ganglion cyst at the radial and volar aspect.  Additionally, we discussed distribution of the numbness, consistent with ulnar nerve, likely from the elbow.  For next steps, we discussed considerations around imaging (elbow x-ray versus MRI, wrist MRI), referral to hand therapy, use of support options for the right upper extremity (including tennis elbow strap, overnight use of towel roll, and wrist brace).  We also discussed potential for electrodiagnostic testing (EMG).  Following discussion, hand therapy referral placed.  Also reviewed use of forearm strap and wrist brace, for comfort with activities.  Would advise use of towel roll overnight with sleep.  Plan to monitor course with hand therapy 1 month to begin.  If not improving during that time, tentatively recheck.  Contact clinic sooner if needed.    If you have any further questions for your physician or physician s care team you can contact them thru MyChart or by calling [phone].      Alexis Han [facility]    -----  No chief complaint on file.      SUBJECTIVE  Laura Ferreira is a/an 62 year old female who is seen as a self referral for evaluation of right wrist/hand.     The patient is seen by themselves.  The patient is Right handed    Onset: 1.5 month(s) ago. Reports insidious onset without  "acute precipitating event. Notes that she has increased activity with quirino.  Location of Pain: right wrist  Worsened by: Gripping thins, typing on a phone, fine motor and strength  Better with: nothing  Treatments tried: ice, heat, Tylenol, ibuprofen, and various creams  Associated symptoms: numbness and tingling in the wrist and into the fingers, especially with pressure on the forearm (resting it on a chair arm or desk)    Orthopedic/Surgical history: YES - Date: 25 years ago, did surgery in the forearm, noting a tendon reattachment but specific with which. Notes previous ganglion cysts in right wrist.  Social History/Occupation: Retired    **  Above information per rooming staff.  Additional history:  Recalls onset of symptoms around finishing basement. Was doing a lot of lifting, including cabinets. Was also doing crocheting at that time.  Thinks began with repetitive motions, lifting.  Initially noted pain right wrist. Noted lumps in radial wrist, also volar aspect.  About a week lateral noted pain right volar forearm. Difficult to lift.    Notes numbness in right small finger, especially with direct pressure through ulnar forearm.      REVIEW OF SYSTEMS:  Review of Systems    OBJECTIVE:  /81   Ht 1.651 m (5' 5\")   Wt 98.9 kg (218 lb)   LMP 04/01/2012   BMI 36.28 kg/m               Right Elbow/forearm exam:    Inspection:     no ecchymosis       no edema or effusion  Well healed surgical scars lateral elbow    ROM:     flexion grossly intact       extension lacking few deg       forearm supination mild limitation       forearm pronation full  Pain limits motion above    Strength: pain with resisted forearm rotation, also wrist extension, long finger extension    Tender:     lateral epicondyle       Dorsal forearm  Volar forearm            Tenderness and some radiating symptoms with pressure at cubital tunnel, but tinel no change            Hand/wrist (right):    Inspection:  No deformity noted.  " Min radial swelling. No ecchymosis.    Motion:  Wrist flexion mild limitation   Wrist extension mild limitation   Digit motion grossly full    Radial pulses normal, +2/4, capillary refill brisk.    Palpation:  Tender mild radial wrist        RADIOLOGY:  Final results and radiologist's interpretation, available in the [facility] health record.  Images were reviewed with the patient in the office today.  My personal interpretation of the performed imaging: no acute bony abnormality noted. Mild underlying degenerative change.        Recent Results (from the past 24 hour(s))   XR Wrist Right G/E 3 Views    Narrative    WRIST RIGHT THREE OR MORE VIEWS   7/25/2023 2:53 PM     HISTORY: Right wrist pain.    COMPARISON: None.      Impression    IMPRESSION: Mild multifocal osteoarthrosis including the STT joint,  first CMC joint, first MCP joint and the interphalangeal joint of the  thumb. No evidence of fracture. No erosions or other signs of  inflammatory arthropathy.    YANE VELASCO MD         SYSTEM ID:  [id number]

## 2023-07-25 NOTE — LETTER
7/25/2023         RE: Laura Ferreira  [address]  [place] [address]        Dear Colleague,    Thank you for referring your patient, Laura Ferreira, to the [facility]. Please see a copy of my visit note below.    ASSESSMENT & PLAN    Diagnoses and all orders for this visit:    Right forearm pain  -     Hand Therapy Referral; Future    Right wrist pain  -     XR Wrist Right G/E 3 Views; Future  -     Hand Therapy Referral; Future    Overuse injury  -     Hand Therapy Referral; Future          See Patient Instructions  Patient Instructions   Overall pattern in the right upper extremity fits best with repetitive motion/overuse.  Some tenderness and soreness in the forearm most consistent with muscular source.  Discomfort in the right wrist may be related to repetitive motion as well, along with potential for small underlying ganglion cyst at the radial and volar aspect.  Additionally, we discussed distribution of the numbness, consistent with ulnar nerve, likely from the elbow.  For next steps, we discussed considerations around imaging (elbow x-ray versus MRI, wrist MRI), referral to hand therapy, use of support options for the right upper extremity (including tennis elbow strap, overnight use of towel roll, and wrist brace).  We also discussed potential for electrodiagnostic testing (EMG).  Following discussion, hand therapy referral placed.  Also reviewed use of forearm strap and wrist brace, for comfort with activities.  Would advise use of towel roll overnight with sleep.  Plan to monitor course with hand therapy 1 month to begin.  If not improving during that time, tentatively recheck.  Contact clinic sooner if needed.    If you have any further questions for your physician or physician s care team you can contact them thru MyChart or by calling [phone].      Alexis Han,   [facility]    -----  No chief complaint on  "file.      SUBJECTIVE  Laura Ferreira is a/an 62 year old female who is seen as a self referral for evaluation of right wrist/hand.     The patient is seen by themselves.  The patient is Right handed    Onset: 1.5 month(s) ago. Reports insidious onset without acute precipitating event. Notes that she has increased activity with quirino.  Location of Pain: right wrist  Worsened by: Gripping thins, typing on a phone, fine motor and strength  Better with: nothing  Treatments tried: ice, heat, Tylenol, ibuprofen, and various creams  Associated symptoms: numbness and tingling in the wrist and into the fingers, especially with pressure on the forearm (resting it on a chair arm or desk)    Orthopedic/Surgical history: YES - Date: 25 years ago, did surgery in the forearm, noting a tendon reattachment but specific with which. Notes previous ganglion cysts in right wrist.  Social History/Occupation: Retired    **  Above information per rooming staff.  Additional history:  Recalls onset of symptoms around finishing basement. Was doing a lot of lifting, including cabinets. Was also doing crocheting at that time.  Thinks began with repetitive motions, lifting.  Initially noted pain right wrist. Noted lumps in radial wrist, also volar aspect.  About a week lateral noted pain right volar forearm. Difficult to lift.    Notes numbness in right small finger, especially with direct pressure through ulnar forearm.      REVIEW OF SYSTEMS:  Review of Systems    OBJECTIVE:  /81   Ht 1.651 m (5' 5\")   Wt 98.9 kg (218 lb)   LMP 04/01/2012   BMI 36.28 kg/m               Right Elbow/forearm exam:    Inspection:     no ecchymosis       no edema or effusion  Well healed surgical scars lateral elbow    ROM:     flexion grossly intact       extension lacking few deg       forearm supination mild limitation       forearm pronation full  Pain limits motion above    Strength: pain with resisted forearm rotation, also wrist extension, long " finger extension    Tender:     lateral epicondyle       Dorsal forearm  Volar forearm            Tenderness and some radiating symptoms with pressure at cubital tunnel, but tinel no change            Hand/wrist (right):    Inspection:  No deformity noted.  Min radial swelling. No ecchymosis.    Motion:  Wrist flexion mild limitation   Wrist extension mild limitation   Digit motion grossly full    Radial pulses normal, +2/4, capillary refill brisk.    Palpation:  Tender mild radial wrist        RADIOLOGY:  Final results and radiologist's interpretation, available in the [facility] health record.  Images were reviewed with the patient in the office today.  My personal interpretation of the performed imaging: no acute bony abnormality noted. Mild underlying degenerative change.        Recent Results (from the past 24 hour(s))   XR Wrist Right G/E 3 Views    Narrative    WRIST RIGHT THREE OR MORE VIEWS   7/25/2023 2:53 PM     HISTORY: Right wrist pain.    COMPARISON: None.      Impression    IMPRESSION: Mild multifocal osteoarthrosis including the STT joint,  first CMC joint, first MCP joint and the interphalangeal joint of the  thumb. No evidence of fracture. No erosions or other signs of  inflammatory arthropathy.    YANE VELASCO MD         SYSTEM ID:  [id number]                      Again, thank you for allowing me to participate in the care of your patient.        Sincerely,        Alexis Han DO

## 2023-07-31 ENCOUNTER — ANCILLARY PROCEDURE (OUTPATIENT)
Dept: ULTRASOUND IMAGING | Facility: CLINIC | Age: 63
End: 2023-07-31
Attending: SURGERY
Payer: COMMERCIAL

## 2023-07-31 DIAGNOSIS — Z90.13 STATUS POST BILATERAL MASTECTOMY: ICD-10-CM

## 2023-07-31 PROCEDURE — 76642 ULTRASOUND BREAST LIMITED: CPT | Mod: RT

## 2023-08-02 ENCOUNTER — TELEPHONE (OUTPATIENT)
Dept: SURGERY | Facility: CLINIC | Age: 63
End: 2023-08-02

## 2023-08-02 NOTE — TELEPHONE ENCOUNTER
8/2 Called patient and left voicemail. Provided patient with [phone] to schedule annual visit with Dr. Stein in April of 2024.     [facility] Procedure   Dermatology, Surgery, Urology  [facility]       ----- Message from Ayde Cook RN sent at 8/2/2023 10:23 AM CDT -----  Please schedule annual exam with  which I believe should be April of 2024.       Thank you     Ayde

## 2023-08-03 ENCOUNTER — THERAPY VISIT (OUTPATIENT)
Dept: OCCUPATIONAL THERAPY | Facility: CLINIC | Age: 63
End: 2023-08-03
Attending: PEDIATRICS
Payer: COMMERCIAL

## 2023-08-03 DIAGNOSIS — M79.631 RIGHT FOREARM PAIN: Primary | ICD-10-CM

## 2023-08-03 DIAGNOSIS — M25.531 RIGHT WRIST PAIN: ICD-10-CM
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
## 2023-08-03 PROCEDURE — 97535 SELF CARE MNGMENT TRAINING: CPT | Mod: GO | Performed by: OCCUPATIONAL THERAPIST

## 2023-08-03 PROCEDURE — 97165 OT EVAL LOW COMPLEX 30 MIN: CPT | Mod: GO | Performed by: OCCUPATIONAL THERAPIST

## 2023-08-03 PROCEDURE — 97112 NEUROMUSCULAR REEDUCATION: CPT | Mod: GO | Performed by: OCCUPATIONAL THERAPIST

## 2023-08-03 PROCEDURE — 97110 THERAPEUTIC EXERCISES: CPT | Mod: GO | Performed by: OCCUPATIONAL THERAPIST

## 2023-08-03 NOTE — PROGRESS NOTES
OCCUPATIONAL THERAPY EVALUATION  Type of Visit: Evaluation    See electronic medical record for Abuse and Falls Screening details.    Subjective      Presenting condition or subjective complaint: Pain in the right arm  Date of onset: 07/25/23 (therapy referral)    Relevant medical history: Arthritis; Bladder or bowel problems; Cancer; Menopause; Neck injury; Numbness or tingling in perianal area; Overweight; Pain at night or rest; Thyroid problems; Vision problems   Dates & types of surgery: This is ib my chart    Prior diagnostic imaging/testing results: X-ray     Prior therapy history for the same diagnosis, illness or injury: No      Prior Level of Function  ADL: Independent    Living Environment  Social support: With a significant other or spouse   Type of home: 1 level; Basement   Stairs to enter the home: Yes 2 Is there a railing: No   Ramp: No   Stairs inside the home: Yes 17 Is there a railing: Yes   Help at home: None    Employment: Not Applicable    Hobbies/Interests: Crafts stain glass painting    Patient goals for therapy: Use my arm without pain     Objective   ADDITIONAL HISTORY:  Right hand dominant  Patient reports symptoms of pain, stiffness/loss of motion, weakness/loss of strength, and numbness onset over past 1-2 months due to 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  Transportation: drives    Functional Outcome Measure:   Functional Outcome Measure:  Upper Extremity Functional Index  SCORE:   Column Totals: 40/80  (A lower score indicates greater disability.)    Pain Level (Scale 0-10)   8/3/2023   At Rest 0   With Use 8     Pain Description  Date 8/3/2023   Location elbow, wrist, and forearm   Pain Quality Sharp   Frequency intermittent     Pain is worst  daytime or nighttime   Exacerbated by  Everything, using arm   Relieved by NSAIDs and OTC wrist splint and arm band   Progression Somewhat better     Edema  None    Sensation  Decreased intermittently in Ulnar Nerve distribution  per pt report    ROM   WNL elbow wrist and hand, pain with elbow flexion and pronation.     Special Tests   Pain Report:  - none    + mild    ++ moderate    +++ severe    8/3/2023   WHAT Test +   Finkelsteins -   Thumb CMC Adduction Stress Test + slight   Thumb CMC Extension Stress Test -     Special Tests   - none    + mild    ++ moderate    +++ severe       8/3/2023   Oscar Test NT   Elbow Flexion Test -   Ulnar Nerve Subluxation at Ebow -   Tinels Cubital Tunnel -   Tinels [place] -   Median Nerve Compression at Pronator -   Carpal-Compression Test -   Tinels at Carpal Tunnel -   Phalens -     Resisted Testing  Pain Report:  - none    + mild    ++ moderate    +++ severe    8/3/2023   Elbow Extension -   Elbow Flexion + slight   Supination  +   Pronation -   Wrist Ext with RD, Elbow Ext -   Wrist Ext with UD, Elbow Ext +   Wrist Flex with RD, Elbow Ext -   Wrist Flex with UD, Elbow Ext + slight   EDC with Elbow Ext +   Long Finger Test -   EPL -   EPB -   APL -     Strength   (Measured in pounds)  Pain Report: - none  + mild    ++ moderate    +++ severe    8/3/2023 8/3/2023   Trials Left Right   1  2  3 64  63  59 43+  48+  46+   Average 62 46       8/3/2023 8/3/2023    Left Right   Elbow Ext 69 66+     Lat Pinch 8/3/2023 8/3/2023   Trials Left Right   1  2  3 20  19  19 18+  18+  17+   Average 19 18     3 Pt Pinch 8/3/2023 8/3/2023   Trials Left Right   1  2  3 19  19  19 14++  1 trial due to pain   Average 19 14     Palpation  Pain Report:  - none    + mild    ++ moderate    +++ severe    8/3/2023   Pec Major + slight   Proximal Triceps -   Spiral Groove -   Distal Triceps -   Anconeus -   ECRB at LEP + slight   ECU at LEP -   EDC at LEP +   Radial Head +   Extensor Wad +   PIN Site +   Biceps Muscle Wad + slight   Distal Biceps -   MEP -   Flexors -   1st dc -   Thumb CMC -     Assessment & Plan   CLINICAL IMPRESSIONS  Medical Diagnosis: Right wrist and forearm pain; overuse    Treatment Diagnosis:  Right forearm pain    Impression/Assessment: Pt is a 62 year old female presenting to Occupational Therapy due to right elbow and forearm pain.  Patient's limitations or Problem List includes: Pain, Weakness, Sensory disturbance, Decreased , and Tightness in musculature of the right elbow and forearm  which interferes with the patient's ability to perform Self Care Tasks (dressing, eating, bathing), Work Tasks, Sleep Patterns, Recreational Activities, Household Chores, and Driving  as compared to previous level of function.    Clinical Decision Making (Complexity):  Assessment of Occupational Performance: 5 or more Performance Deficits  Occupational Performance Limitations: bathing/showering, dressing, hygiene and grooming, driving and community mobility, home establishment and management, meal preparation and cleanup, sleep, work, and leisure activities  Clinical Decision Making (Complexity): Low complexity    PLAN OF CARE  Treatment Interventions:  Modalities:  US  Therapeutic Exercise:  AROM, PROM, Tendon Gliding, Isotonics, and Isometrics  Neuromuscular re-education:  Nerve Gliding  Manual Techniques:  Friction massage and Myofascial release  Orthotic Fabrication:  Static Forearm based  Self Care:  Self Care Tasks and Ergonomic Considerations    Long Term Goals   OT Goal 1  Goal Identifier: gripping  Goal Description: Pt will report decreased pain to be able to open a tight jar with mild difficulty, pain 3/10 or less  Rationale:  (for safety and independence with ADL's)  Goal Progress: pain 8/10, quite a bit of difficulty 1/4 on UEFI  Date Met: 10/01/23      Frequency of Treatment: 1 x per week  Duration of Treatment: 8 weeks     Recommended Referrals to Other Professionals:  NA  Education Assessment: Learner/Method: Patient;Demonstration;Pictures/Video  Education Comments: printed PTRx     Risks and benefits of evaluation/treatment have been explained.   Patient/Family/caregiver agrees with Plan of Care.      Evaluation Time:    OT Eval, Low Complexity Minutes (97666): 20   Present: Not applicable     Signing Clinician: Savita Camp OT

## 2023-08-04 ENCOUNTER — TELEPHONE (OUTPATIENT)
Dept: SURGERY | Facility: CLINIC | Age: 63
End: 2023-08-04

## 2023-08-04 NOTE — TELEPHONE ENCOUNTER
RN Noted pt is scheduled on 4/24/24..Ayde Mcnamara RN, RN  P [facility] Procedure Coordinator; P [facility] Adult [place]  Please schedule annual exam with  which I believe should be April of 2024.      Thank you    Ayde

## 2023-08-09 ENCOUNTER — THERAPY VISIT (OUTPATIENT)
Dept: OCCUPATIONAL THERAPY | Facility: CLINIC | Age: 63
End: 2023-08-09
Attending: PEDIATRICS
Payer: COMMERCIAL
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
## 2023-08-09 DIAGNOSIS — M79.631 RIGHT FOREARM PAIN: Primary | ICD-10-CM

## 2023-08-09 PROCEDURE — 97140 MANUAL THERAPY 1/> REGIONS: CPT | Mod: GO | Performed by: OCCUPATIONAL THERAPIST

## 2023-08-09 PROCEDURE — 97112 NEUROMUSCULAR REEDUCATION: CPT | Mod: GO | Performed by: OCCUPATIONAL THERAPIST

## 2023-08-09 PROCEDURE — 97110 THERAPEUTIC EXERCISES: CPT | Mod: GO | Performed by: OCCUPATIONAL THERAPIST

## 2023-08-09 NOTE — PROGRESS NOTES
Objective Information for 8/9/2023:    Pain Level (Scale 0-10)   8/3/2023 8/9/2023   At Rest 0 0   With Use 8 8 (less frequent)     Palpation  Pain Report:  - none    + mild    ++ moderate    +++ severe    8/3/2023 8/9/2023   Pec Major + slight + slight   Proximal Triceps - -   Spiral Groove - -   Distal Triceps - -   Anconeus - -   ECRB at LEP + slight -   ECU at LEP - +   EDC at LEP + +   Radial Head + -   Extensor Wad + + slight   PIN Site + +   Biceps Muscle Wad + slight -   Distal Biceps - -   MEP - -   Flexors - -   1st dc - -   Thumb CMC - -

## 2023-08-16 ENCOUNTER — THERAPY VISIT (OUTPATIENT)
Dept: OCCUPATIONAL THERAPY | Facility: CLINIC | Age: 63
End: 2023-08-16
Payer: COMMERCIAL
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
## 2023-08-16 DIAGNOSIS — M79.631 RIGHT FOREARM PAIN: Primary | ICD-10-CM

## 2023-08-16 PROCEDURE — 97140 MANUAL THERAPY 1/> REGIONS: CPT | Mod: GO | Performed by: OCCUPATIONAL THERAPIST

## 2023-08-16 PROCEDURE — 97110 THERAPEUTIC EXERCISES: CPT | Mod: GO | Performed by: OCCUPATIONAL THERAPIST

## 2023-08-16 NOTE — PROGRESS NOTES
Objective Information for 8/16/2023:    Pain Level (Scale 0-10)   8/3/2023 8/9/2023 8/16/2023   At Rest 0 0    With Use 8 8 (less frequent) 7     Palpation  Pain Report:  - none    + mild    ++ moderate    +++ severe    8/3/2023 8/9/2023 8/16/2023   Pec Major + slight + slight -   Proximal Triceps - - -   Spiral Groove - - -   Distal Triceps - - -   Anconeus - - -   ECRB at LEP + slight - -   ECU at LEP - + +   EDC at LEP + + +   Radial Head + - -   Extensor Wad + + slight +   PIN Site + + ++   Biceps Muscle Wad + slight - -   Distal Biceps - - -   MEP - - -   Flexors - - -   1st dc - - -   Thumb CMC - - -

## 2023-08-31 ENCOUNTER — THERAPY VISIT (OUTPATIENT)
Dept: OCCUPATIONAL THERAPY | Facility: CLINIC | Age: 63
End: 2023-08-31
Payer: COMMERCIAL

## 2023-08-31 DIAGNOSIS — M79.631 RIGHT FOREARM PAIN: Primary | ICD-10-CM
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
## 2023-08-31 PROCEDURE — 97110 THERAPEUTIC EXERCISES: CPT | Mod: GO | Performed by: OCCUPATIONAL THERAPIST

## 2023-08-31 PROCEDURE — 97140 MANUAL THERAPY 1/> REGIONS: CPT | Mod: GO | Performed by: OCCUPATIONAL THERAPIST

## 2023-08-31 NOTE — PROGRESS NOTES
08/31/23 0500   Appointment Info   Treating Provider Savita Camp, OTR/IVANIA, CHT   Total/Authorized Visits 8   Visits Used 4   Medical Diagnosis Right wrist and forearm pain; overuse   OT Tx Diagnosis Right forearm pain   Progress Note/Certification   Onset of Illness/Injury or Date of Surgery 07/25/23  (therapy referral)   Therapy Frequency 1 x per week   Predicted Duration 8 weeks   Progress Note Completed Date 08/03/23   OT Goal 1   Goal Identifier gripping   Goal Description Pt will report decreased pain to be able to open a tight jar with mild difficulty, pain 3/10 or less   Rationale   (for safety and independence with ADL's)   Goal Progress pain increased now 10/10; rec MD f/u for further eval   Date Met 10/01/23   Subjective Report   Subjective Report The massage helps some but the arm has been quite sore and painful.   Objective Measures   Objective Measures Pain Level (Scale 0-10)   8/3/2023 8/9/2023 8/16/2023 8/31/2023   At Rest 0 0     With Use 8 8 (less frequent) 7 10     Palpation  Pain Report:  - none    + mild    ++ moderate    +++ severe    8/3/2023 8/9/2023 8/16/2023 8/31/2023   Pec Major + slight + slight - -   Proximal Triceps - - - -   Spiral Groove - - - -   Distal Triceps - - - + slight   Anconeus - - - +   ECRB at LEP + slight - - +   ECU at LEP - + + +   EDC at LEP + + + ++   Radial Head + - - + slight   Extensor Wad + + slight + +   PIN Site + + ++ + slight   Biceps Muscle Wad + slight - - -   Distal Biceps - - - -   MEP - - - -   Flexors - - - -   1st dc - - - -   Thumb CMC - - - -      Treatment Interventions (OT)   Interventions Therapeutic Procedure/Exercise;Manual Therapy;Neuromuscular Re-education   Self Care/Home Management   Self Care 1 HEP   PTRx Self Care 1 Warmth   PTRx Self Care 1 - Details 10 mins for stiffness/muscle tightness   PTRx Self Care 2 Icing   PTRx Self Care 2 - Details after activity for pain    PTRx Self Care 3 Friction Massage   PTRx Self Care 3 - Details 5 mins  massage tender point at elbow in one direction   PTRx Self Care 4 Ball Massage to Extensors   PTRx Self Care 4 - Details 5 mins massage forearm muscles between wrist and elbow avoid nerve   PTRx Self Care 5 Education Sheet Elbow   PTRx Self Care 5 - Details - avoid reaching to lift with palm down- keep elbows at side and lift with palms up- limit use of arm band use for reaching/lifting- wear wrist splint sleeping and as needed day- limit prolonged elbow bent position (hug pillow sleeping)- avoid leaning on elbow   PTRx Self Care 6 Self Elbow Mobilization Trigger Point Massage Over Radial Head   PTRx Self Care 6 - Details 3-5 mins   Skilled Intervention pt education   Patient Response/Progress verbalized understanding   Neuromuscular Re-education   Neuromuscular Re-ed Minutes (32660) 5  (brief/no charge)   Neuro Re-ed 1 K-tape   Neuro Re-ed 1 - Details wrist to LEP to fac extensors   PTRx Neuro Re-ed 1 Nerve Gliding Proximal Radial   PTRx Neuro Re-ed 1 - Details 10 reps   Skilled Intervention to increase mobility   Patient Response/Progress good   Therapeutic Procedure/Exercise   Therapeutic Procedure: strength, endurance, ROM, flexibillity minutes (20993) 10   PTRx Ther Proc 1 Forearm Passive Range of Motion Flexor Stretch   PTRx Ther Proc 1 - Details modify to avoid pain; start with elbow at side 5 reps hold 10-20 seconds   PTRx Ther Proc 2 Forearm Passive Range of Motion Extensor Stretch   PTRx Ther Proc 2 - Details modify to avoid pain; start with elbow at side 5 reps hold 10-20 seconds   PTRx Ther Proc 3 Pec Stretch Doorway   PTRx Ther Proc 3 - Details 5 reps hold 20 secs   PTRx Ther Proc 4 Cervical Retraction   PTRx Ther Proc 4 - Details 10 reps   PTRx Ther Proc 5 Scapular Retraction/Depression   PTRx Ther Proc 5 - Details 10 reps   PTRx Ther Proc 6 Wrist Strengthening Eccentric Extension with Dumb [place]ado   PTRx Ther Proc 6 - Details d/c due to increase pain   Skilled Intervention to increase mobility and  strength   Patient Response/Progress good   Manual Therapy   Manual Therapy Minutes (30817) 10   Manual Therapy Manual Therapy 2   Manual Therapy 1 MFR   Manual Therapy 1 - Details forearm extensors, light to moderate pressure; avoid PIN site   Manual Therapy 2 TFM   Manual Therapy 2 - Details cross friction to LEP and dorsal wrist   Skilled Intervention to increase mobility and decrease pain   Patient Response/Progress fair, more tender and painful today   Education   Learner/Method Patient;Demonstration;Pictures/Video   Education Comments printed PTRx   Plan   Home program d/c eccentric strengthening, modify HEP to avoid increased pain   Plan for next session hold on further therapy due to increase pain, rec MD f/u for further evaluation   Total Session Time   Timed Code Treatment Minutes 25   Total Treatment Time (sum of timed and untimed services) 25       PLAN  Hold on further therapy due to increase pain, recommend MD follow up for further evaluation     Beginning/End Dates of Progress Note Reporting Period:   8/3/2023 to 08/31/2023    Referring Provider:  Alexis Han

## 2023-09-12 NOTE — PROGRESS NOTES
ASSESSMENT & PLAN    Laura was seen today for follow up, follow up and follow up.    Diagnoses and all orders for this visit:    Right elbow pain  -     diclofenac (VOLTAREN) 75 MG EC tablet; Take 1 tablet (75 mg) by mouth 2 times daily as needed for moderate pain  -     MR Elbow Right w/o Contrast; Future    Right forearm pain  -     diclofenac (VOLTAREN) 75 MG EC tablet; Take 1 tablet (75 mg) by mouth 2 times daily as needed for moderate pain  -     MR Elbow Right w/o Contrast; Future      Pertinent potential adverse effect profile of prescribed medication(s) was discussed with the patient, who expressed understanding.  Plan imaging next, focus on right elbow to begin, though we also discussed potential for MRI of forearm, wrist, given some symptoms there as well.  Depending on imaging results, also consider Edx testing.  Questions answered. Discussed signs and symptoms that may indicate more serious issues; the patient was instructed to seek appropriate care if noted. Laura indicates understanding of these issues and agrees with the plan.      See Patient Instructions  Patient Instructions   Reviewed nature of the ongoing symptoms in the right upper extremity.  Symptoms appear more focused around the elbow.  Reviewed course with hand therapy.  With ongoing symptoms, we discussed use of medication for symptoms, additional imaging, and potential for electrodiagnostic testing.  Following discussion, plan MRI of the right elbow next.  Also provided prescription for diclofenac.  Plan to contact you with MRI results.  With upcoming insurance changes, if needing other resources for further care, you can contact clinic and we can try to assist, or contact your insurance company for options.    Advanced imaging is done by appointment. Please call [facility] ([facility]/[place]) [phone] , [facility] ([facility]/[place]) [phone] , or [facility]  ([place]/[facility]) [phone]  to schedule your MRI.     Some insurance companies may require a prior authorization to be completed which can delay the time until you are able to schedule your appointment.       If you are active on [employer], you may have access to your test results before your provider is able to review the study and advise on next steps.      The clinic will contact you with results. If you have not heard from the clinic within 2-3 days following your MRI, please contact us at the number listed below.      If you have any further questions for your physician or physician s care team you can contact them thru [employer] or by calling [phone].      [facility]    SUBJECTIVE- Interim History September 12, 2023    No chief complaint on file.      Laura Ferreira is a 62 year old female who is seen in f/u up for Data Unavailable. Since last visit on 7/25/23 patient has felt like the pain has gotten worse in the wrist. Notes that she had stopped doing exercises due to increased pain in the wrist and notes that she now has increased pain along lateral extensor elbow. Notes that she still gets numbness and tingling, but not as often. States that fine motor increases numbness and tingling. Notes that she has not been using a brace regularly except with work and activity.    4 OT visits completed.    The patient is seen by themselves.  The patient is Right handed    Orthopedic/Surgical history: YES - Date: 25 years ago, did surgery in the forearm, noting a tendon reattachment but specific with which. Notes previous ganglion cysts in right wrist.  Social History/Occupation: Retired      **  Above information per rooming staff.  Additional history:  Pain is mostly in right elbow area. Reduced wrist exercises have allowed wrist to calm down a bit.  Symptoms come and go, may depend on position or use of arm. Pain currently more  in upper arm, distal biceps area.    No longer wrist bracing at night, due to wrist discomfort/ache.  No current N/T. When present is more on radial or ulnar hand.          REVIEW OF SYSTEMS:  Review of Systems    OBJECTIVE:  LMP 04/01/2012          Spurling to left with tightness right neck/upper trap; to right no change to right UE    Some tenderness posterior elbow, lateral elbow, dorsal forearm right        RADIOLOGY:  None new today.

## 2023-09-13 ENCOUNTER — OFFICE VISIT (OUTPATIENT)
Dept: ORTHOPEDICS | Facility: CLINIC | Age: 63
End: 2023-09-13
Payer: COMMERCIAL

## 2023-09-13 VITALS — WEIGHT: 218 LBS | HEIGHT: 65 IN | BODY MASS INDEX: 36.32 KG/M2

## 2023-09-13 DIAGNOSIS — M25.521 RIGHT ELBOW PAIN: Primary | ICD-10-CM

## 2023-09-13 DIAGNOSIS — M79.631 RIGHT FOREARM PAIN: ICD-10-CM

## 2023-09-13 PROCEDURE — 99214 OFFICE O/P EST MOD 30 MIN: CPT | Performed by: PEDIATRICS

## 2023-09-13 RX ORDER — DICLOFENAC SODIUM 75 MG/1
75 TABLET, DELAYED RELEASE ORAL 2 TIMES DAILY PRN
Qty: 60 TABLET | Refills: 0 | Status: SHIPPED | OUTPATIENT
Start: 2023-09-13 | End: 2023-10-13

## 2023-09-13 NOTE — PATIENT INSTRUCTIONS
Reviewed nature of the ongoing symptoms in the right upper extremity.  Symptoms appear more focused around the elbow.  Reviewed course with hand therapy.  With ongoing symptoms, we discussed use of medication for symptoms, additional imaging, and potential for electrodiagnostic testing.  Following discussion, plan MRI of the right elbow next.  Also provided prescription for diclofenac.  Plan to contact you with MRI results.  With upcoming insurance changes, if needing other resources for further care, you can contact clinic and we can try to assist, or contact your insurance company for options.    Advanced imaging is done by appointment. Please call [facility] ([facility]) [phone] , [facility] ([facility]/[place]) [phone] , or [facility] ([facility]) [phone]  to schedule your MRI.     Some insurance companies may require a prior authorization to be completed which can delay the time until you are able to schedule your appointment.       If you are active on [employer], you may have access to your test results before your provider is able to review the study and advise on next steps.      The clinic will contact you with results. If you have not heard from the clinic within 2-3 days following your MRI, please contact us at the number listed below.      If you have any further questions for your physician or physician s care team you can contact them thru [employer] or by calling [phone].

## 2023-09-13 NOTE — Clinical Note
9/13/2023         RE: Laura Ferreira  [address] [address]        Dear Colleague,    Thank you for referring your patient, Laura Ferreira, to the [facility]. Please see a copy of my visit note below.    ASSESSMENT & PLAN    Laura was seen today for follow up, follow up and follow up.    Diagnoses and all orders for this visit:    Right elbow pain  -     diclofenac (VOLTAREN) 75 MG EC tablet; Take 1 tablet (75 mg) by mouth 2 times daily as needed for moderate pain  -     MR Elbow Right w/o Contrast; Future    Right forearm pain  -     diclofenac (VOLTAREN) 75 MG EC tablet; Take 1 tablet (75 mg) by mouth 2 times daily as needed for moderate pain  -     MR Elbow Right w/o Contrast; Future      Pertinent potential adverse effect profile of prescribed medication(s) was discussed with the patient, who expressed understanding.  Plan imaging next, focus on right elbow to begin, though we also discussed potential for MRI of forearm, wrist, given some symptoms there as well.  Depending on imaging results, also consider Edx testing.  Questions answered. Discussed signs and symptoms that may indicate more serious issues; the patient was instructed to seek appropriate care if noted. Laura indicates understanding of these issues and agrees with the plan.      See Patient Instructions  Patient Instructions   Reviewed nature of the ongoing symptoms in the right upper extremity.  Symptoms appear more focused around the elbow.  Reviewed course with hand therapy.  With ongoing symptoms, we discussed use of medication for symptoms, additional imaging, and potential for electrodiagnostic testing.  Following discussion, plan MRI of the right elbow next.  Also provided prescription for diclofenac.  Plan to contact you with MRI results.  With upcoming insurance changes, if needing other resources for further care, you can contact clinic and we can try to assist, or contact your insurance  company for options.    Advanced imaging is done by appointment. Please call [facility] ([facility]/[place]) [phone] , [facility] ([facility]) [phone] , or [facility] ([facility]) [phone]  to schedule your MRI.     Some insurance companies may require a prior authorization to be completed which can delay the time until you are able to schedule your appointment.       If you are active on [employer], you may have access to your test results before your provider is able to review the study and advise on next steps.      The clinic will contact you with results. If you have not heard from the clinic within 2-3 days following your MRI, please contact us at the number listed below.      If you have any further questions for your physician or physician s care team you can contact them thru [employer] or by calling [phone].      Alexis Han [facility]    SUBJECTIVE- Interim History September 12, 2023    No chief complaint on file.      Laura Ferreira is a 62 year old female who is seen in f/u up for Data Unavailable. Since last visit on 7/25/23 patient has felt like the pain has gotten worse in the wrist. Notes that she had stopped doing exercises due to increased pain in the wrist and notes that she now has increased pain along lateral extensor elbow. Notes that she still gets numbness and tingling, but not as often. States that fine motor increases numbness and tingling. Notes that she has not been using a brace regularly except with work and activity.    4 OT visits completed.    The patient is seen by themselves.  The patient is Right handed    Orthopedic/Surgical history: YES - Date: 25 years ago, did surgery in the forearm, noting a tendon reattachment but specific with which. Notes previous ganglion cysts in right wrist.  Social History/Occupation:  Retired      **  Above information per rooming staff.  Additional history:  Pain is mostly in right elbow area. Reduced wrist exercises have allowed wrist to calm down a bit.  Symptoms come and go, may depend on position or use of arm. Pain currently more in upper arm, distal biceps area.    No longer wrist bracing at night, due to wrist discomfort/ache.  No current N/T. When present is more on radial or ulnar hand.          REVIEW OF SYSTEMS:  Review of Systems    OBJECTIVE:  LMP 04/01/2012          Spurling to left with tightness right neck/upper trap; to right no change to right UE    Some tenderness posterior elbow, lateral elbow, dorsal forearm right        RADIOLOGY:  None new today.               Again, thank you for allowing me to participate in the care of your patient.        Sincerely,        Alexis Han DO

## 2023-09-18 ENCOUNTER — IMMUNIZATION (OUTPATIENT)
Dept: FAMILY MEDICINE | Facility: CLINIC | Age: 63
End: 2023-09-18
Payer: COMMERCIAL

## 2023-09-18 DIAGNOSIS — Z23 NEED FOR PROPHYLACTIC VACCINATION AND INOCULATION AGAINST INFLUENZA: Primary | ICD-10-CM

## 2023-09-18 PROCEDURE — 90471 IMMUNIZATION ADMIN: CPT

## 2023-09-18 PROCEDURE — 90682 RIV4 VACC RECOMBINANT DNA IM: CPT

## 2023-09-18 PROCEDURE — 99207 PR NO CHARGE NURSE ONLY: CPT

## 2023-09-24 ENCOUNTER — HOSPITAL ENCOUNTER (OUTPATIENT)
Dept: MRI IMAGING | Facility: HOSPITAL | Age: 63
Discharge: HOME | End: 2023-09-24
Attending: PEDIATRICS | Admitting: PEDIATRICS
Payer: COMMERCIAL

## 2023-09-24 DIAGNOSIS — M25.521 RIGHT ELBOW PAIN: ICD-10-CM

## 2023-09-24 DIAGNOSIS — M79.631 RIGHT FOREARM PAIN: ICD-10-CM

## 2023-09-24 PROCEDURE — 73221 MRI JOINT UPR EXTREM W/O DYE: CPT | Mod: RT
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
## 2023-10-09 PROBLEM — M79.631 RIGHT FOREARM PAIN: Status: RESOLVED | Noted: 2023-08-03 | Resolved: 2023-10-09

## 2023-10-13 DIAGNOSIS — M79.631 RIGHT FOREARM PAIN: ICD-10-CM

## 2023-10-13 DIAGNOSIS — M25.521 RIGHT ELBOW PAIN: ICD-10-CM

## 2023-10-13 RX ORDER — DICLOFENAC SODIUM 75 MG/1
75 TABLET, DELAYED RELEASE ORAL 2 TIMES DAILY PRN
Qty: 60 TABLET | Refills: 0 | Status: SHIPPED | OUTPATIENT
Start: 2023-10-13 | End: 2023-12-28

## 2023-10-13 NOTE — TELEPHONE ENCOUNTER
Called Laura about prescription refill request received from [facility].  Laura states that she is doing a lot better and is doing OT exercises, but notes that when she does not take the diclofenac (trying to go a couple of days without it every now and then) she feels like the elbow acts up.  Laura would like a refill of the diclofenac prescription.    Julio Smalls, LAT, ATC

## 2023-12-27 ENCOUNTER — NURSE TRIAGE (OUTPATIENT)
Dept: FAMILY MEDICINE | Facility: CLINIC | Age: 63
End: 2023-12-27
Payer: COMMERCIAL

## 2023-12-27 NOTE — TELEPHONE ENCOUNTER
Reason for Disposition   Age > 50 and no history of prior similar back pain    Additional Information   Negative: Passed out (i.e., fainted, collapsed and was not responding)   Negative: Shock suspected (e.g., cold/pale/clammy skin, too weak to stand, low BP, rapid pulse)   Negative: Sounds like a life-threatening emergency to the triager   Negative: Major injury to the back (e.g., MVA, fall > 10 feet or 3 meters, penetrating injury, etc.)   Negative: Pain in the upper back over the ribs (rib cage) that radiates (travels) into the chest   Negative: Pain in the upper back over the ribs (rib cage) and worsened by coughing (or clearly increases with breathing)   Negative: Back pain during pregnancy   Negative: SEVERE back pain of sudden onset and age > 60 years   Negative: SEVERE abdominal pain (e.g., excruciating)   Negative: Abdominal pain and age > 60 years   Negative: Unable to urinate (or only a few drops) and bladder feels very full   Negative: Loss of bladder or bowel control (urine or bowel incontinence; wetting self, leaking stool) of new-onset   Negative: Numbness (loss of sensation) in groin or rectal area   Negative: Pain radiates into groin, scrotum   Negative: Blood in urine (red, pink, or tea-colored)   Negative: Vomiting and pain over lower ribs of back (i.e., flank - kidney area)   Negative: Weakness of a leg or foot (e.g., unable to bear weight, dragging foot)   Negative: Patient sounds very sick or weak to the triager   Negative: Fever > 100.4 F (38.0 C) and flank pain   Negative: Pain or burning with passing urine (urination)   Negative: SEVERE back pain (e.g., excruciating, unable to do any normal activities) and not improved after pain medicine and CARE ADVICE   Negative: Numbness in an arm or hand (i.e., loss of sensation) and upper back pain   Negative: Numbness in a leg or foot (i.e., loss of sensation)   Negative: High-risk adult (e.g., history of cancer, history of HIV, or history of IV Drug  "Use)   Negative: Soft tissue infection (e.g., abscess, cellulitis) or other serious infection (e.g., bacteremia) in last 2 weeks   Negative: Painful rash with multiple small blisters grouped together (i.e., dermatomal distribution or 'band' or 'stripe')   Negative: Pain radiates into the thigh or further down the leg, and in both legs    Answer Assessment - Initial Assessment Questions  1. ONSET: \"When did the pain begin?\"       About a week ago, but the pain is slowly getting worse.  2. LOCATION: \"Where does it hurt?\" (upper, mid or lower back)      Lower back area  3. SEVERITY: \"How bad is the pain?\"  (e.g., Scale 1-10; mild, moderate, or severe)    - MILD (1-3): Doesn't interfere with normal activities.     - MODERATE (4-7): Interferes with normal activities or awakens from sleep.     - SEVERE (8-10): Excruciating pain, unable to do any normal activities.       about a 5/10, but can go up to a 6/10  4. PATTERN: \"Is the pain constant?\" (e.g., yes, no; constant, intermittent)       Pain is constant  5. RADIATION: \"Does the pain shoot into your legs or somewhere else?\"      denies  6. CAUSE:  \"What do you think is causing the back pain?\"       No idea  7. BACK OVERUSE:  \"Any recent lifting of heavy objects, strenuous work or exercise?\"      no  8. MEDICINES: \"What have you taken so far for the pain?\" (e.g., nothing, acetaminophen, NSAIDS)      I have tried NSAIDs and Tylenol, and they really do not work at all  9. NEUROLOGIC SYMPTOMS: \"Do you have any weakness, numbness, or problems with bowel/bladder control?\"      denies  10. OTHER SYMPTOMS: \"Do you have any other symptoms?\" (e.g., fever, abdomen pain, burning with urination, blood in urine)        denies    SEE IN OFFICE TODAY OR TOMORROW:   * You need to be examined. Let me give you an appointment.      Appointment made with Paradise Chapin PA-C for tomorrow at 0720.    To UC/ER if pain is worse today, or any of the red flag to ER symptoms per protocol.    Patient " stated understanding and agreeable with the plan of care.     Lynnette CORREAN RN  Triage Nurse  [facility]    Protocols used: Back Pain-A-[place]

## 2023-12-28 ENCOUNTER — OFFICE VISIT (OUTPATIENT)
Dept: FAMILY MEDICINE | Facility: CLINIC | Age: 63
End: 2023-12-28
Payer: COMMERCIAL

## 2023-12-28 VITALS
RESPIRATION RATE: 21 BRPM | BODY MASS INDEX: 36.42 KG/M2 | DIASTOLIC BLOOD PRESSURE: 86 MMHG | SYSTOLIC BLOOD PRESSURE: 132 MMHG | OXYGEN SATURATION: 96 % | WEIGHT: 218.6 LBS | TEMPERATURE: 97.6 F | HEIGHT: 65 IN | HEART RATE: 63 BPM

## 2023-12-28 DIAGNOSIS — M54.16 LUMBAR RADICULOPATHY: Primary | ICD-10-CM

## 2023-12-28 DIAGNOSIS — Z98.1 HISTORY OF LUMBAR FUSION: ICD-10-CM

## 2023-12-28 DIAGNOSIS — R29.898 LEG WEAKNESS, BILATERAL: ICD-10-CM

## 2023-12-28 PROCEDURE — 99213 OFFICE O/P EST LOW 20 MIN: CPT | Performed by: PHYSICIAN ASSISTANT

## 2023-12-28 RX ORDER — DICLOFENAC SODIUM 75 MG/1
75 TABLET, DELAYED RELEASE ORAL 2 TIMES DAILY PRN
Qty: 60 TABLET | Refills: 0 | Status: SHIPPED | OUTPATIENT
Start: 2023-12-28 | End: 2024-01-29

## 2023-12-28 ASSESSMENT — ENCOUNTER SYMPTOMS
PARESTHESIAS: 1
FEVER: 0
NERVOUS/ANXIOUS: 0
CHILLS: 0
DIAPHORESIS: 0
NUMBNESS: 1
BACK PAIN: 1
WEAKNESS: 1

## 2023-12-28 ASSESSMENT — PATIENT HEALTH QUESTIONNAIRE - PHQ9
SUM OF ALL RESPONSES TO PHQ QUESTIONS 1-9: 6
SUM OF ALL RESPONSES TO PHQ QUESTIONS 1-9: 6
10. IF YOU CHECKED OFF ANY PROBLEMS, HOW DIFFICULT HAVE THESE PROBLEMS MADE IT FOR YOU TO DO YOUR WORK, TAKE CARE OF THINGS AT HOME, OR GET ALONG WITH OTHER PEOPLE: NOT DIFFICULT AT ALL

## 2023-12-28 ASSESSMENT — PAIN SCALES - GENERAL: PAINLEVEL: EXTREME PAIN (8)

## 2023-12-28 NOTE — PATIENT INSTRUCTIONS
Your symptoms are concerning for possible nerve compression in your low back.  For further evaluation we are completing an MRI.  To schedule please call [phone].  We will send your MRI report and next steps via "[employer].  Please reach out with questions or concerns along the way.  See the attached handouts for additional information and when to follow-up urgently.    You have also been provided with a refill of diclofenac to help with discomfort.  You can also use heat/ice and Tylenol as well as gentle activity.

## 2023-12-28 NOTE — PROGRESS NOTES
Assessment & Plan     Lumbar radiculopathy  History of lumbar fusion  Leg weakness, bilateral  Patient is a 63-year-old female with past medical history of laminectomy and 1 level lumbar fusion in 2011 who presents to clinic due to low back pain with bilateral radiculopathy and lower extremity paresthesias as well as weakness starting approximately 1 month ago that did not improve with home exercises.  Vital signs normal.  Physical exam without acute abnormalities.  Given symptoms and history, concerns for nerve compression.  Will proceed with MRI for further evaluation.  Discussed management with Tylenol, prescribed diclofenac, heat, ice.  Return/urgent follow-up precautions provided.  - MR Lumbar Spine w/o & w Contrast; Future  - diclofenac (VOLTAREN) 75 MG EC tablet; Take 1 tablet (75 mg) by mouth 2 times daily as needed for moderate pain        See Patient Instructions    Paradise Chapin PA-C  [facility] [place] is a 63 year old, presenting for the following health issues:  Back Pain        12/28/2023     7:20 AM   Additional Questions   Roomed by Laurie BACH LPN   Accompanied by Self       History of Present Illness       Back Pain:  She presents for follow up of back pain. Patient's back pain is a new problem.    Original cause of back pain: not sure  First noticed back pain: 1-4 weeks ago  Patient feels back pain: constantlyLocation of back pain:  Right lower back, left lower back, right buttock, left buttock, right hip and left hip  Description of back pain: burning and cramping  Back pain spreads: right buttocks, left buttocks, right thigh, left thigh, right knee, left knee, right foot and left foot    Since patient first noticed back pain, pain is: gradually worsening  Does back pain interfere with her job:  Not applicable  On a scale of 1-10 (10 being the worst), patient describes pain as:  8  What makes back pain worse: bending, certain positions, sitting, standing and  "twisting   Acupuncture: not tried  Acetaminophen: not tried  Activity or exercise: not helpful  Chiropractor:  Not tried  Cold: not helpful  Heat: helpful  Massage: helpful  Muscle relaxants: not tried  NSAIDS: helpful  Opioids: not tried  Physical Therapy: not tried  Rest: helpful  Steroid Injection: not tried  Stretching: not helpful  Surgery: not tried  TENS unit: not tried  Topical pain relievers: helpful  Other healthcare providers patient is seeing for back pain: None    She eats 2-3 servings of fruits and vegetables daily.She consumes 1 sweetened beverage(s) daily.She exercises with enough effort to increase her heart rate 10 to 19 minutes per day.  She exercises with enough effort to increase her heart rate 3 or less days per week.   She is taking medications regularly.       Patient notes that around thanksgiving she developed low back pain that radiated to buttocks and back of legs bilaterally. She did some pilates stretches, but symptoms worsened. Legs feel weak and will sometimes give out, but no falls. Intermittent crawling feeling at the lateral aspect of bilateral legs. Symptoms are not better or worse with anything specific. Low back surgery: 2011 Laminectomy, fusion lumbar one level,combined. Diclofenac and Tylenol used for pain relief.   No new injury. No loss of bowel/bladder function or groin numbness.     Patient has valium to use for MRI anxiety from prior MRI.       Review of Systems   Constitutional:  Negative for chills, diaphoresis and fever.   Musculoskeletal:  Positive for back pain.   Neurological:  Positive for weakness, numbness and paresthesias.   Psychiatric/Behavioral:  The patient is not nervous/anxious.             Objective    /86   Pulse 63   Temp 97.6  F (36.4  C) (Temporal)   Resp 21   Ht 1.651 m (5' 5\")   Wt 99.2 kg (218 lb 9.6 oz)   LMP 04/01/2012   SpO2 96%   BMI 36.38 kg/m    Body mass index is 36.38 kg/m .  Physical Exam  Vitals and nursing note reviewed. "   Constitutional:       General: She is not in acute distress.     Appearance: Normal appearance.   HENT:      Head: Normocephalic and atraumatic.   Eyes:      Extraocular Movements: Extraocular movements intact.      Pupils: Pupils are equal, round, and reactive to light.   Cardiovascular:      Rate and Rhythm: Normal rate.   Pulmonary:      Effort: Pulmonary effort is normal.   Musculoskeletal:         General: Normal range of motion.      Cervical back: Normal range of motion.      Comments: Cautious gait.  Tenderness to palpation of lumbar spine and bilateral lumbar paraspinal musculature.  Sensation intact and equal to bilateral lower extremities.  Plantarflexion/dorsiflexion 5/5 bilaterally.   Skin:     General: Skin is warm and dry.   Neurological:      General: No focal deficit present.      Mental Status: She is alert.   Psychiatric:         Mood and Affect: Mood normal.         Behavior: Behavior normal.

## 2024-01-09 ENCOUNTER — PATIENT OUTREACH (OUTPATIENT)
Dept: CARE COORDINATION | Facility: CLINIC | Age: 64
End: 2024-01-09
Payer: COMMERCIAL

## 2024-01-11 ENCOUNTER — HOSPITAL ENCOUNTER (OUTPATIENT)
Dept: MRI IMAGING | Facility: HOSPITAL | Age: 64
Discharge: HOME | End: 2024-01-11
Attending: PHYSICIAN ASSISTANT | Admitting: PHYSICIAN ASSISTANT
Payer: COMMERCIAL

## 2024-01-11 DIAGNOSIS — Z98.1 HISTORY OF LUMBAR FUSION: ICD-10-CM

## 2024-01-11 DIAGNOSIS — R29.898 LEG WEAKNESS, BILATERAL: ICD-10-CM

## 2024-01-11 DIAGNOSIS — M54.16 LUMBAR RADICULOPATHY: ICD-10-CM

## 2024-01-11 PROCEDURE — A9585 GADOBUTROL INJECTION: HCPCS | Performed by: PHYSICIAN ASSISTANT

## 2024-01-11 PROCEDURE — 255N000002 HC RX 255 OP 636: Performed by: PHYSICIAN ASSISTANT

## 2024-01-11 PROCEDURE — 72158 MRI LUMBAR SPINE W/O & W/DYE: CPT

## 2024-01-11 RX ORDER — GADOBUTROL 604.72 MG/ML
0.1 INJECTION INTRAVENOUS ONCE
Status: COMPLETED | OUTPATIENT
Start: 2024-01-11 | End: 2024-01-11

## 2024-01-11 RX ADMIN — GADOBUTROL 10 ML: 604.72 INJECTION INTRAVENOUS at 16:21

## 2024-01-12 DIAGNOSIS — R29.898 LEG WEAKNESS, BILATERAL: ICD-10-CM

## 2024-01-12 DIAGNOSIS — Z98.1 HISTORY OF LUMBAR FUSION: ICD-10-CM

## 2024-01-12 DIAGNOSIS — M54.16 LUMBAR RADICULOPATHY: Primary | ICD-10-CM

## 2024-01-26 ENCOUNTER — TRANSFERRED RECORDS (OUTPATIENT)
Dept: HEALTH INFORMATION MANAGEMENT | Facility: CLINIC | Age: 64
End: 2024-01-26
Payer: COMMERCIAL

## 2024-01-29 DIAGNOSIS — M54.16 LUMBAR RADICULOPATHY: ICD-10-CM

## 2024-01-31 DIAGNOSIS — M54.16 LUMBAR RADICULOPATHY: ICD-10-CM

## 2024-02-01 RX ORDER — DICLOFENAC SODIUM 75 MG/1
75 TABLET, DELAYED RELEASE ORAL 2 TIMES DAILY PRN
Qty: 60 TABLET | Refills: 0 | Status: SHIPPED | OUTPATIENT
Start: 2024-02-01 | End: 2024-03-04

## 2024-02-01 RX ORDER — DICLOFENAC SODIUM 75 MG/1
75 TABLET, DELAYED RELEASE ORAL 2 TIMES DAILY PRN
Qty: 60 TABLET | Refills: 0 | OUTPATIENT
Start: 2024-02-01

## 2024-02-02 ENCOUNTER — TRANSFERRED RECORDS (OUTPATIENT)
Dept: HEALTH INFORMATION MANAGEMENT | Facility: CLINIC | Age: 64
End: 2024-02-02

## 2024-02-16 ENCOUNTER — LAB (OUTPATIENT)
Dept: LAB | Facility: CLINIC | Age: 64
End: 2024-02-16
Payer: COMMERCIAL

## 2024-02-16 ENCOUNTER — TELEPHONE (OUTPATIENT)
Dept: FAMILY MEDICINE | Facility: CLINIC | Age: 64
End: 2024-02-16

## 2024-02-16 ENCOUNTER — VIRTUAL VISIT (OUTPATIENT)
Dept: FAMILY MEDICINE | Facility: CLINIC | Age: 64
End: 2024-02-16
Payer: COMMERCIAL

## 2024-02-16 DIAGNOSIS — C50.912 MALIGNANT NEOPLASM OF LEFT BREAST IN FEMALE, ESTROGEN RECEPTOR POSITIVE, UNSPECIFIED SITE OF BREAST (H): ICD-10-CM

## 2024-02-16 DIAGNOSIS — R31.0 GROSS HEMATURIA: Primary | ICD-10-CM

## 2024-02-16 DIAGNOSIS — R31.0 GROSS HEMATURIA: ICD-10-CM

## 2024-02-16 DIAGNOSIS — Z17.0 MALIGNANT NEOPLASM OF LEFT BREAST IN FEMALE, ESTROGEN RECEPTOR POSITIVE, UNSPECIFIED SITE OF BREAST (H): ICD-10-CM

## 2024-02-16 PROBLEM — E66.01 MORBID OBESITY (H): Status: RESOLVED | Noted: 2018-02-28 | Resolved: 2024-02-16

## 2024-02-16 LAB
ALBUMIN UR-MCNC: NEGATIVE MG/DL
APPEARANCE UR: CLEAR
BACTERIA #/AREA URNS HPF: ABNORMAL /HPF
BILIRUB UR QL STRIP: NEGATIVE
COLOR UR AUTO: YELLOW
GLUCOSE UR STRIP-MCNC: NEGATIVE MG/DL
HGB UR QL STRIP: ABNORMAL
KETONES UR STRIP-MCNC: NEGATIVE MG/DL
LEUKOCYTE ESTERASE UR QL STRIP: NEGATIVE
MUCOUS THREADS #/AREA URNS LPF: PRESENT /LPF
NITRATE UR QL: NEGATIVE
PH UR STRIP: 6.5 [PH] (ref 5–7)
RBC #/AREA URNS AUTO: ABNORMAL /HPF
RENAL EPI CELLS #/AREA URNS HPF: ABNORMAL /HPF
SP GR UR STRIP: 1.02 (ref 1–1.03)
SQUAMOUS #/AREA URNS AUTO: ABNORMAL /LPF
UROBILINOGEN UR STRIP-ACNC: 0.2 E.U./DL
WBC #/AREA URNS AUTO: ABNORMAL /HPF

## 2024-02-16 PROCEDURE — 81001 URINALYSIS AUTO W/SCOPE: CPT

## 2024-02-16 PROCEDURE — 99441 PR PHYSICIAN TELEPHONE EVALUATION 5-10 MIN: CPT | Mod: 93 | Performed by: STUDENT IN AN ORGANIZED HEALTH CARE EDUCATION/TRAINING PROGRAM

## 2024-02-16 ASSESSMENT — PATIENT HEALTH QUESTIONNAIRE - PHQ9
10. IF YOU CHECKED OFF ANY PROBLEMS, HOW DIFFICULT HAVE THESE PROBLEMS MADE IT FOR YOU TO DO YOUR WORK, TAKE CARE OF THINGS AT HOME, OR GET ALONG WITH OTHER PEOPLE: NOT DIFFICULT AT ALL
SUM OF ALL RESPONSES TO PHQ QUESTIONS 1-9: 10
SUM OF ALL RESPONSES TO PHQ QUESTIONS 1-9: 10

## 2024-02-16 NOTE — PROGRESS NOTES
Laura is a 63 year old who is being evaluated via a billable telephone visit.      What phone number would you like to be contacted at? [phone]  How would you like to obtain your AVS? Sonido    Distant Location (provider location):  On-site    Assessment & Plan     Gross hematuria  RBC 5-10 hpf present in the urine. Differential diagnosis was reviewed with the patient. Specific etiology unknown at this time. She reports no new onset of back pain aside from her chronic lower back pain, for which she received a steroid injection last week. There are no indications of a urinary tract infection, and she ceased smoking years ago. I advised the patient to seek emergency care if she experiences pain to rule out kidney stones or other kidney-related issues.I recommend stopping Diclofenac. I suggest a repeat urinalysis within the next week. If symptoms persist, a CT urogram may be considered. Referral to urology is recommended.  - Adult Urology  Referral; Future  - UA with Microscopic reflex to Culture - lab collect; Future  - Basic metabolic panel  (Ca, Cl, CO2, Creat, Gluc, K, Na, BUN); Future    Malignant neoplasm of left breast in female, estrogen receptor positive, unspecified site of breast (H)  No concern at this time.            Depression Screening Follow Up        2/16/2024    11:46 AM   PHQ   PHQ-9 Total Score 10   Q9: Thoughts of better off dead/self-harm past 2 weeks Not at all         2/16/2024    11:46 AM   Last PHQ-9   1.  Little interest or pleasure in doing things 2   2.  Feeling down, depressed, or hopeless 2   3.  Trouble falling or staying asleep, or sleeping too much 1   4.  Feeling tired or having little energy 1   5.  Poor appetite or overeating 2   6.  Feeling bad about yourself 1   7.  Trouble concentrating 1   8.  Moving slowly or restless 0   Q9: Thoughts of better off dead/self-harm past 2 weeks 0   PHQ-9 Total Score 10       Follow Up Actions Taken  Crisis resource information  provided in After Visit Summary           Kinsey Sanchez is a 63 year old, presenting for the following health issues:  UTI        2/16/2024    11:52 AM   Additional Questions   Roomed by Maricel (questions completed via [employer])   Accompanied by n/a     UTI    History of Present Illness       Reason for visit:  Blood in urian  Symptom onset:  3-7 days ago  Symptoms include:  Blood in urain  Symptom intensity:  Moderate  Symptom progression:  Staying the same  Had these symptoms before:  No  What makes it worse:  Not that i know of  What makes it better:  Nope    She eats 0-1 servings of fruits and vegetables daily.She consumes 2 sweetened beverage(s) daily.She exercises with enough effort to increase her heart rate 10 to 19 minutes per day.  She exercises with enough effort to increase her heart rate 3 or less days per week.   She is taking medications regularly.  Patient denies any UTI symptoms or back pain, fever, nausea or trauma in the pelvic area.  She Is menopausal as well. There is no blood when she wipes are vagina area.  Denies history of kidney stone.  Has good urinary output she has been taking diclofenac off and on for sometime.  She had a steroid injection in the lower back last week.    Patient with a history of breast cancer.      Review of Systems  Constitutional, HEENT, cardiovascular, pulmonary, gi and gu systems are negative, except as otherwise noted.      Objective           Vitals:  No vitals were obtained today due to virtual visit.    Physical Exam   General: Alert and no distress //Respiratory: No audible wheeze, cough, or shortness of breath // Psychiatric:  Appropriate affect, tone, and pace of words            Phone call duration: 10 minutes  Signed Electronically by: Deepti Calvert MD

## 2024-02-16 NOTE — TELEPHONE ENCOUNTER
Patient notified and voiced understanding and agreement.    Laura plans to present to the [facility] to have the UA collected.     Lab visit scheduled.     Ginger Rodriguez RN BSN  [facility]

## 2024-02-16 NOTE — LETTER
My Depression Action Plan  Name: Laura Ferreira   Date of Birth 1960  Date: 2/16/2024    My doctor: Sarita Shultz   My clinic: [facility] [place]  [address]  [place] [id number] [phone]            GREEN    ZONE   Good Control    What it looks like:   Things are going generally well. You have normal ups and downs. You may even feel depressed from time to time, but bad moods usually last less than a day.   What you need to do:  Continue to care for yourself (see self care plan)  Check your depression survival kit and update it as needed  Follow your physician s recommendations including any medication.  Do not stop taking medication unless you consult with your physician first.             YELLOW         ZONE Getting Worse    What it looks like:   Depression is starting to interfere with your life.   It may be hard to get out of bed; you may be starting to isolate yourself from others.  Symptoms of depression are starting to last most all day and this has happened for several days.   You may have suicidal thoughts but they are not constant.   What you need to do:     Call your care team. Your response to treatment will improve if you keep your care team informed of your progress. Yellow periods are signs an adjustment may need to be made.     Continue your self-care.  Just get dressed and ready for the day.  Don't give yourself time to talk yourself out of it.    Talk to someone in your support network.    Open up your Depression Self-Care Plan/Wellness Kit.             RED    ZONE Medical Alert - Get Help    What it looks like:   Depression is seriously interfering with your life.   You may experience these or other symptoms: You can t get out of bed most days, can t work or engage in other necessary activities, you have trouble taking care of basic hygiene, or basic responsibilities, thoughts of suicide or death that will not go away, self-injurious behavior.      What you need to do:  Call your care team and request a same-day appointment. If they are not available (weekends or after hours) call your local crisis line, emergency room or 911.          Depression Self-Care Plan / Wellness Kit    Many people find that medication and therapy are helpful treatments for managing depression. In addition, making small changes to your everyday life can help to boost your mood and improve your wellbeing. Below are some tips for you to consider. Be sure to talk with your medical provider and/or behavioral health consultant if your symptoms are worsening or not improving.     Sleep   Sleep hygiene  means all of the habits that support good, restful sleep. It includes maintaining a consistent bedtime and wake time, using your bedroom only for sleeping or sex, and keeping the bedroom dark and free of distractions like a computer, smartphone, or television.     Develop a Healthy Routine  Maintain good hygiene. Get out of bed in the morning, make your bed, brush your teeth, take a shower, and get dressed. Don t spend too much time viewing media that makes you feel stressed. Find time to relax each day.    Exercise  Get some form of exercise every day. This will help reduce pain and release endorphins, the  feel good  chemicals in your brain. It can be as simple as just going for a walk or doing some gardening, anything that will get you moving.      Diet  Strive to eat healthy foods, including fruits and vegetables. Drink plenty of water. Avoid excessive sugar, caffeine, alcohol, and other mood-altering substances.     Stay Connected with Others  Stay in touch with friends and family members.    Manage Your Mood  Try deep breathing, massage therapy, biofeedback, or meditation. Take part in fun activities when you can. Try to find something to smile about each day.     Psychotherapy  Be open to working with a therapist if your provider recommends it.     Medication  Be sure to take your  medication as prescribed. Most anti-depressants need to be taken every day. It usually takes several weeks for medications to work. Not all medicines work for all people. It is important to follow-up with your provider to make sure you have a treatment plan that is working for you. Do not stop your medication abruptly without first discussing it with your provider.    Crisis Resources   These hotlines are for both adults and children. They and are open 24 hours a day, 7 days a week unless noted otherwise.    National Suicide Prevention Lifeline   988 or 6-906-297-ESQC (7956)    Crisis Text Line    www.crisistextline.org  Text HOME to 902481 from anywhere in the [place], anytime, about any type of crisis. A live, trained crisis counselor will receive the text and respond quickly.    [employer] Lifeline for LGBTQ Youth  A national crisis intervention and suicide lifeline for LGBTQ youth under 25. Provides a safe place to talk without judgement. Call 1-119.703.1483; text START to 168334 or visit www.the[facility].org to talk to a trained counselor.    For [facility] crisis numbers, visit the [facility] website at:  https://[place].gov/dhs/people-we-serve/adults/health-care/mental-health/resources/crisis-contacts.jsp

## 2024-02-16 NOTE — PATIENT INSTRUCTIONS
Kishore Sanchez,    Thank you for allowing [facility] to manage your care.    Please schedule lab to be done next week      I made a referral, they will be calling in approximately 1 week to set up your appointment.  If you do not hear from them, please call the specialty number on your after visit.     For your convenience, test results are released as soon as they are available  Please allow 1-2 business days for me to send you a comment about your results.  If not done so, I encourage you to login into [employer] ([email or web address] to review your results in real time.     If you have any questions or concerns, please feel free to call us at [phone].    Sincerely,    Dr. Calvert    Did you know?      You can schedule a video visit for follow-up appointments as well as future appointments for certain conditions.  Please see the below link.     [email or web address]    If you have not already done so,  I encourage you to sign up for [employer] ([email or web address]).  This will allow you to review your results, securely communicate with a provider, and schedule virtual visits as well.

## 2024-02-16 NOTE — TELEPHONE ENCOUNTER
Patient reports that she has seen blood on the tissue when she wipes (about twice a day) over the last week.     She notices that if she voids after 3 hours, the blood is more common.     She complains of some pressure, however denies burning, frequency, urgency or flank pain. No fever.     Patient is unable to drive at night. She wonders if she can go  in to the  [facility] clinic to leave a Urine sample sooner in the day.     She is scheduled for a Virtual Visit with Dr. Minaya later this afternoon.     Please review and advise.     Ginger Rodriguez RN BSN  [facility]

## 2024-02-19 ENCOUNTER — LAB (OUTPATIENT)
Dept: LAB | Facility: CLINIC | Age: 64
End: 2024-02-19
Payer: COMMERCIAL

## 2024-02-19 DIAGNOSIS — R31.0 GROSS HEMATURIA: ICD-10-CM

## 2024-02-19 LAB
ALBUMIN UR-MCNC: NEGATIVE MG/DL
ANION GAP SERPL CALCULATED.3IONS-SCNC: 8 MMOL/L (ref 7–15)
APPEARANCE UR: CLEAR
BACTERIA #/AREA URNS HPF: ABNORMAL /HPF
BILIRUB UR QL STRIP: NEGATIVE
BUN SERPL-MCNC: 20.1 MG/DL (ref 8–23)
CALCIUM SERPL-MCNC: 9.5 MG/DL (ref 8.8–10.2)
CHLORIDE SERPL-SCNC: 103 MMOL/L (ref 98–107)
COLOR UR AUTO: YELLOW
CREAT SERPL-MCNC: 0.63 MG/DL (ref 0.51–0.95)
DEPRECATED HCO3 PLAS-SCNC: 31 MMOL/L (ref 22–29)
EGFRCR SERPLBLD CKD-EPI 2021: >90 ML/MIN/1.73M2
GLUCOSE SERPL-MCNC: 127 MG/DL (ref 70–99)
GLUCOSE UR STRIP-MCNC: NEGATIVE MG/DL
HGB UR QL STRIP: ABNORMAL
KETONES UR STRIP-MCNC: NEGATIVE MG/DL
LEUKOCYTE ESTERASE UR QL STRIP: NEGATIVE
NITRATE UR QL: NEGATIVE
PH UR STRIP: 6.5 [PH] (ref 5–7)
POTASSIUM SERPL-SCNC: 4.3 MMOL/L (ref 3.4–5.3)
RBC #/AREA URNS AUTO: ABNORMAL /HPF
SODIUM SERPL-SCNC: 142 MMOL/L (ref 135–145)
SP GR UR STRIP: 1.02 (ref 1–1.03)
SQUAMOUS #/AREA URNS AUTO: ABNORMAL /LPF
UROBILINOGEN UR STRIP-ACNC: 0.2 E.U./DL
WBC #/AREA URNS AUTO: ABNORMAL /HPF

## 2024-02-19 PROCEDURE — 36415 COLL VENOUS BLD VENIPUNCTURE: CPT

## 2024-02-19 PROCEDURE — 81001 URINALYSIS AUTO W/SCOPE: CPT

## 2024-02-19 PROCEDURE — 80048 BASIC METABOLIC PNL TOTAL CA: CPT

## 2024-02-20 DIAGNOSIS — R31.0 GROSS HEMATURIA: Primary | ICD-10-CM

## 2024-02-27 ENCOUNTER — ANCILLARY PROCEDURE (OUTPATIENT)
Dept: CT IMAGING | Facility: CLINIC | Age: 64
End: 2024-02-27
Attending: STUDENT IN AN ORGANIZED HEALTH CARE EDUCATION/TRAINING PROGRAM
Payer: COMMERCIAL

## 2024-02-27 DIAGNOSIS — R31.0 GROSS HEMATURIA: ICD-10-CM

## 2024-02-27 PROCEDURE — 74178 CT ABD&PLV WO CNTR FLWD CNTR: CPT | Mod: TC | Performed by: RADIOLOGY

## 2024-02-27 RX ORDER — IOPAMIDOL 755 MG/ML
100 INJECTION, SOLUTION INTRAVASCULAR ONCE
Status: COMPLETED | OUTPATIENT
Start: 2024-02-27 | End: 2024-02-27

## 2024-02-27 RX ADMIN — IOPAMIDOL 100 ML: 755 INJECTION, SOLUTION INTRAVASCULAR at 15:57

## 2024-02-28 DIAGNOSIS — E27.9 ADRENAL NODULE (H): Primary | ICD-10-CM

## 2024-02-28 DIAGNOSIS — R91.8 PULMONARY NODULES: ICD-10-CM

## 2024-03-01 ENCOUNTER — OFFICE VISIT (OUTPATIENT)
Dept: UROLOGY | Facility: CLINIC | Age: 64
End: 2024-03-01
Payer: COMMERCIAL

## 2024-03-01 VITALS
OXYGEN SATURATION: 95 % | DIASTOLIC BLOOD PRESSURE: 82 MMHG | HEART RATE: 66 BPM | TEMPERATURE: 98.2 F | HEIGHT: 65 IN | SYSTOLIC BLOOD PRESSURE: 133 MMHG | BODY MASS INDEX: 36.32 KG/M2 | WEIGHT: 218 LBS

## 2024-03-01 DIAGNOSIS — R31.0 GROSS HEMATURIA: ICD-10-CM

## 2024-03-01 LAB
ALBUMIN UR-MCNC: NEGATIVE MG/DL
APPEARANCE UR: CLEAR
BACTERIA #/AREA URNS HPF: ABNORMAL /HPF
BILIRUB UR QL STRIP: NEGATIVE
COLOR UR AUTO: YELLOW
GLUCOSE UR STRIP-MCNC: NEGATIVE MG/DL
HGB UR QL STRIP: ABNORMAL
KETONES UR STRIP-MCNC: NEGATIVE MG/DL
LEUKOCYTE ESTERASE UR QL STRIP: NEGATIVE
MUCOUS THREADS #/AREA URNS LPF: PRESENT /LPF
NITRATE UR QL: NEGATIVE
PH UR STRIP: 6.5 [PH] (ref 5–7)
RBC #/AREA URNS AUTO: ABNORMAL /HPF
SP GR UR STRIP: 1.02 (ref 1–1.03)
SQUAMOUS #/AREA URNS AUTO: ABNORMAL /LPF
UROBILINOGEN UR STRIP-ACNC: 0.2 E.U./DL
WBC #/AREA URNS AUTO: ABNORMAL /HPF

## 2024-03-01 PROCEDURE — 81001 URINALYSIS AUTO W/SCOPE: CPT | Performed by: STUDENT IN AN ORGANIZED HEALTH CARE EDUCATION/TRAINING PROGRAM

## 2024-03-01 PROCEDURE — 88112 CYTOPATH CELL ENHANCE TECH: CPT | Performed by: PATHOLOGY

## 2024-03-01 PROCEDURE — 99204 OFFICE O/P NEW MOD 45 MIN: CPT | Performed by: STUDENT IN AN ORGANIZED HEALTH CARE EDUCATION/TRAINING PROGRAM

## 2024-03-01 PROCEDURE — 87086 URINE CULTURE/COLONY COUNT: CPT | Performed by: STUDENT IN AN ORGANIZED HEALTH CARE EDUCATION/TRAINING PROGRAM

## 2024-03-01 ASSESSMENT — PAIN SCALES - GENERAL: PAINLEVEL: NO PAIN (0)

## 2024-03-01 NOTE — PROGRESS NOTES
Chief Complaint:   Gross hematuria         History of Present Illness:   Laura Ferreira is a 63 year old female with a history of GERD, hypothyroidism, HLD, and breast cancer who presents for evaluation of gross hematuria.     The patient reports gross hematuria over the last two weeks. She reports an episode of hematuria at least once per day. She denies seeing clots in her urine.     CT urogram on 2/27/2024 was unremarkable.     She currently denies any dysuria, pyuria, hesitancy, intermittency, feelings of incomplete emptying, or any recent history of urinary tract infections or stones.    She quit smoking about 30 years ago. She smoked for 20 years before quitting. She denies a history of pelvic radiation.          Past Medical History:     Past Medical History:   Diagnosis Date    Anemia     Leblanc's esophagus     EGD in 2014; recent EGD in 4/2018; repeat EGD in 3 years    Breast cancer (H)     Ex-smoker     1 PPD x 28 years, quit 20 years ago    Gastroesophageal reflux disease     Hematuria 01/23/2007    History of breast biopsy     left    Menopausal symptoms 07/17/2009    Motion sickness     Obese     Postsurgical hypothyroidism 07/17/2009    Symptomatic menopausal or female climacteric states 07/17/2009    Tear of lateral cartilage or meniscus of knee, current 10/24/2007            Past Surgical History:     Past Surgical History:   Procedure Laterality Date    ABDOMEN SURGERY  1974    appendix    APPENDECTOMY OPEN      ARTHROSCOPY KNEE RT/LT  11/15/2007    right knee arthroscopy with arthroscopic lateral meniscectomy    BIOPSY Left     Breast. Benign    CL AFF SURGICAL PATHOLOGY  09/18/2002    endometrial biopsy    COLONOSCOPY      ELBOW SURGERY Right     ESOPHAGOSCOPY, GASTROSCOPY, DUODENOSCOPY (EGD), COMBINED N/A 12/23/2014    Procedure: COMBINED ESOPHAGOSCOPY, GASTROSCOPY, DUODENOSCOPY (EGD), BIOPSY SINGLE OR MULTIPLE;  Surgeon: Paradise Radford MD;  Location: [facility]    GRAFT FAT TO BREAST Right  11/08/2021    Procedure: AND FAT GRAFTING FROM ABDOMEN;  Surgeon: Genaro Garcia MD;  Location: [facility]    HC THYROIDECTOMY      goitre    INSERT TISSUE EXPANDER BREAST Bilateral 03/17/2021    Procedure: bilateral BREAST TISSUE EXPANDER;  Surgeon: Genaro Garcia MD;  Location: [facility]    LAMINECTOMY, FUSION LUMBAR ONE LEVEL, COMBINED  10/26/2011    Procedure:COMBINED LAMINECTOMY, FUSION LUMBAR ONE LEVEL; L4-5 Decompression, L4-5 Posterior Lateral Fusion with Madhavi and Local Bone; Surgeon:BARBRA BRIGHT; Location:[facility]    MASTECTOMY SIMPLE, SENTINEL NODE, COMBINED Bilateral 03/17/2021    Procedure: BILATERAL SKIN SPARING MASTECTOMY WITH right  SENTINEL LYMPH NODE BIOPSY;  Surgeon: Carmen Stein MD;  Location: [facility]    RECONSTRUCT BREAST Bilateral 11/08/2021    Procedure: REVISION OF BILATERAL BREAST RECONSTRUCTION WITH REMOVE AND REPLACE BILATERAL BREAST IMPLANT;  Surgeon: Genaro Garcia MD;  Location: [facility]    RECONSTRUCT BREAST BILATERAL, IMPLANT PROSTHESIS BILATERAL, COMBINED Bilateral 06/11/2021    Procedure: BILATERAL SECOND STAGE BREAST RECONSTRUCTION WITH SILICONE IMPLANTS;  Surgeon: Genaro Garcia MD;  Location: [facility]    TONSILLECTOMY & ADENOIDECTOMY              Medications     Current Outpatient Medications   Medication    acetaminophen (TYLENOL) 500 MG tablet    diclofenac (VOLTAREN) 75 MG EC tablet    Ferrous Sulfate (IRON PO)    ibuprofen (ADVIL/MOTRIN) 600 MG tablet    levothyroxine (SYNTHROID/LEVOTHROID) 112 MCG tablet    Multiple Vitamins-Minerals (MULTIVITAL PO)    omeprazole (PRILOSEC) 20 MG DR capsule    OVER-THE-COUNTER    rosuvastatin (CRESTOR) 20 MG tablet     No current facility-administered medications for this visit.            Allergies:   Latex, Acetaminophen, Cortisone, Hydrocodone, Nickel, Vicodin [acetaminophen], Adhesive tape, Cvs petroleum jelly [basis facial moisturizer], Other drug allergy (see comments), and Petrolatum         Review of  "Systems:  From intake questionnaire   Negative 14 system review except as noted on HPI, nurse's note.         Physical Exam:   Patient is a 63 year old  female   Vitals: Blood pressure 133/82, pulse 66, temperature 98.2  F (36.8  C), temperature source Tympanic, height 1.651 m (5' 5\"), weight 98.9 kg (218 lb), last menstrual period 04/01/2012, SpO2 95%, not currently breastfeeding.  General Appearance Adult: Alert, no acute distress, oriented.  Lungs: Non-labored breathing.  Heart: No obvious jugular venous distension present.  Neuro: Alert, oriented, speech and mentation normal      Labs and Pathology:    I personally reviewed all applicable laboratory data and went over findings with patient  Significant for:    CBC RESULTS:  Recent Labs   Lab Test 02/15/23  1110 06/08/21  1616 03/12/21  1213 02/28/18  1727   WBC 5.6 7.2 5.7 6.4   HGB 14.3 13.9 14.2 13.7    211 223 225        BMP RESULTS:  Recent Labs   Lab Test 02/19/24  1433 02/15/23  1110 06/08/21  1616 03/12/21  1213 12/15/20  1140 12/06/18  1120    140 140 140 141 142   POTASSIUM 4.3 4.6 3.8 4.1 4.2 4.4   CHLORIDE 103 105 106 105 106 108   CO2 31* 33* 30 30 29 25   ANIONGAP 8 2* 4 5 6 9   * 101* 92 88 91 97   BUN 20.1 19 17 11 15 17   CR 0.63 0.69 0.76 0.76 0.72 0.67   GFRESTIMATED >90 >90 84 83 89 >90   GFRESTBLACK  --   --  >90 >90 >90 >90   CYNDI 9.5 9.5 9.2 9.7 9.2 8.9       UA RESULTS:   Recent Labs   Lab Test 02/19/24  1433 02/16/24  1355 02/22/19  1117   SG 1.025 1.025 1.010   URINEPH 6.5 6.5 7.0   NITRITE Negative Negative Negative   RBCU 5-10* 5-10*  --    WBCU 0-5 0-5  --          Imaging:    I personally reviewed all applicable imaging and went over findings with patient.  Significant for:    Results for orders placed or performed in visit on 02/27/24   CT Urogram wo & w Contrast    Narrative    CT UROGRAM WITH AND WITHOUT CONTRAST February 27, 2024 4:25 PM    CLINICAL HISTORY: Gross hematuria.    TECHNIQUE: CT scan of the abdomen " and pelvis using urogram technique  with pre contrast, post contrast, and delayed images. Multiplanar  reformats were obtained. Dose reduction techniques were used.  CONTRAST: 100mL Isovue-370.    COMPARISON: Ultrasound 3/9/2019.    FINDINGS:   RIGHT KIDNEY: No urolithiasis or hydronephrosis. No worrisome right  renal parenchymal lesion. No collecting system filling defect  identified.    LEFT KIDNEY: No urolithiasis or hydronephrosis. No worrisome left  renal parenchymal lesion. No collecting system filling defect  identified.    BLADDER: No bladder stone identified. No focal bladder lesion  identified.    LOWER CHEST: 10 mm ground-glass nodule at the lingula series 9 image  14. Solid nodule posterior left lower lobe measuring 4 mm series 9  image 14.    HEPATOBILIARY: No acute abnormality. No gallstones. A few tiny cyst  suggested within the liver.    PANCREAS: Normal.    SPLEEN: Normal.    ADRENAL GLANDS: Normal right adrenal. Left adrenal nodule is 1.5 cm  and is technically nonspecific series 9 image 63.    BOWEL: Normal.    LYMPH NODES: Normal.    VASCULATURE: Unremarkable.    PELVIC ORGANS: Normal.    MUSCULOSKELETAL: No aggressive bone lesion identified. L4-L5 fusion.      Impression    IMPRESSION:   1.  No specific finding to account for hematuria. No visible  urolithiasis, hydronephrosis, collecting system or bladder abnormality  can be seen.  2.  Indeterminate pulmonary nodules. See below for follow up.  3.  Technically indeterminate left adrenal nodule. If relevant this  could be further evaluated with adrenal protocol CT.    Nodule Type:  Solitary pure GGN = or > 6mm: CT at 6-12 months to confirm  persistence, then CT every 2 years until 5 years.    *GGN = Ground glass nodule (subsolid nodule).  *Recommendations based on Guidelines for the Management of Incidental  Pulmonary Nodules Detected at CT: From the Fleischner Society 2017,  Radiology 2017.    ALEJANDRO GIL MD         SYSTEM ID:  [id number]             Assessment and Plan:     Assessment: Ms. Laura Ferreira is a pleasant 63 year old female with gross hematuria that has been ongoing for the last two weeks. CT urogram on 2/27/2024 was unremarkable. She is a former smoker.     Plan:  At this time, recommend proceeding with comprehensive hematuria evaluation to include:  - Urinalysis and urine culture to rule out an acute urinary tract infection.   - Urine cytology to look for cells concerning for malignancy.  - Cystoscopy to evaluate the interior of the bladder. Follow up for hematuria as recommended by urologist performing cystoscopic evaluation.      RAMOS KOVACS PA-C  Department of Urology

## 2024-03-01 NOTE — NURSING NOTE
"Initial /82   Pulse 66   Temp 98.2  F (36.8  C) (Tympanic)   Ht 1.651 m (5' 5\")   Wt 98.9 kg (218 lb)   LMP 04/01/2012   SpO2 95%   BMI 36.28 kg/m   Estimated body mass index is 36.28 kg/m  as calculated from the following:    Height as of this encounter: 1.651 m (5' 5\").    Weight as of this encounter: 98.9 kg (218 lb). .  .natsign  "

## 2024-03-02 LAB — BACTERIA UR CULT: NORMAL

## 2024-03-03 DIAGNOSIS — M54.16 LUMBAR RADICULOPATHY: ICD-10-CM

## 2024-03-04 RX ORDER — DICLOFENAC SODIUM 75 MG/1
TABLET, DELAYED RELEASE ORAL
Qty: 60 TABLET | Refills: 0 | Status: SHIPPED | OUTPATIENT
Start: 2024-03-04 | End: 2024-04-04

## 2024-03-05 ENCOUNTER — OFFICE VISIT (OUTPATIENT)
Dept: UROLOGY | Facility: CLINIC | Age: 64
End: 2024-03-05
Payer: COMMERCIAL

## 2024-03-05 VITALS — DIASTOLIC BLOOD PRESSURE: 83 MMHG | SYSTOLIC BLOOD PRESSURE: 149 MMHG | RESPIRATION RATE: 16 BRPM | HEART RATE: 67 BPM

## 2024-03-05 DIAGNOSIS — E27.9 ADRENAL NODULE (H): Primary | ICD-10-CM

## 2024-03-05 DIAGNOSIS — R31.0 GROSS HEMATURIA: ICD-10-CM

## 2024-03-05 DIAGNOSIS — K22.70 BARRETT'S ESOPHAGUS WITH ESOPHAGITIS: ICD-10-CM

## 2024-03-05 DIAGNOSIS — E78.5 HYPERLIPIDEMIA LDL GOAL <130: ICD-10-CM

## 2024-03-05 DIAGNOSIS — E89.0 POSTSURGICAL HYPOTHYROIDISM: ICD-10-CM

## 2024-03-05 DIAGNOSIS — K20.90 BARRETT'S ESOPHAGUS WITH ESOPHAGITIS: ICD-10-CM

## 2024-03-05 LAB
PATH REPORT.COMMENTS IMP SPEC: NORMAL
PATH REPORT.FINAL DX SPEC: NORMAL
PATH REPORT.GROSS SPEC: NORMAL
PATH REPORT.MICROSCOPIC SPEC OTHER STN: NORMAL
PATH REPORT.RELEVANT HX SPEC: NORMAL

## 2024-03-05 PROCEDURE — 52000 CYSTOURETHROSCOPY: CPT | Performed by: STUDENT IN AN ORGANIZED HEALTH CARE EDUCATION/TRAINING PROGRAM

## 2024-03-05 RX ORDER — LEVOTHYROXINE SODIUM 112 UG/1
112 TABLET ORAL DAILY
Qty: 90 TABLET | Refills: 0 | Status: SHIPPED | OUTPATIENT
Start: 2024-03-05 | End: 2024-04-18

## 2024-03-05 RX ORDER — DEXAMETHASONE 1 MG
1 TABLET ORAL ONCE
Qty: 1 TABLET | Refills: 0 | Status: SHIPPED | OUTPATIENT
Start: 2024-03-05 | End: 2024-04-10

## 2024-03-05 RX ORDER — ROSUVASTATIN CALCIUM 20 MG/1
TABLET, COATED ORAL
Qty: 90 TABLET | Refills: 0 | Status: SHIPPED | OUTPATIENT
Start: 2024-03-05 | End: 2024-04-10

## 2024-03-05 NOTE — NURSING NOTE
"Initial BP (!) 149/83 (BP Location: Left arm, Patient Position: Chair, Cuff Size: Adult Regular)   Pulse 67   Resp 16   LMP 04/01/2012  Estimated body mass index is 36.28 kg/m  as calculated from the following:    Height as of 3/1/24: 1.651 m (5' 5\").    Weight as of 3/1/24: 98.9 kg (218 lb). .  Patient is here for cysto.  zuly malhotra LPN    "

## 2024-03-05 NOTE — PROGRESS NOTES
Reason for cystoscopy:    Brief History:  63-year-old female with history of acid reflux, hypothyroidism, hyperlipidemia, breast cancer who presents for gross hematuria.    She was noted to have gross hematuria over the last 2 weeks.    She had CT urogram on February 27, 2024 that was unremarkable.    She is a former smoker.    The following distinct labs were reviewed   Most Recent 3 CBC's:  Recent Labs   Lab Test 02/15/23  1110 06/08/21  1616 03/12/21  1213   WBC 5.6 7.2 5.7   HGB 14.3 13.9 14.2   MCV 97 95 97    211 223     Most Recent 3 BMP's:  Recent Labs   Lab Test 02/19/24  1433 02/15/23  1110 06/08/21  1616    140 140   POTASSIUM 4.3 4.6 3.8   CHLORIDE 103 105 106   CO2 31* 33* 30   BUN 20.1 19 17   CR 0.63 0.69 0.76   ANIONGAP 8 2* 4   CYNDI 9.5 9.5 9.2   * 101* 92     Most Recent Urinalysis:  Recent Labs   Lab Test 03/01/24  0901   COLOR Yellow   APPEARANCE Clear   URINEGLC Negative   URINEBILI Negative   URINEKETONE Negative   SG 1.020   UBLD Trace*   URINEPH 6.5   PROTEIN Negative   UROBILINOGEN 0.2   NITRITE Negative   LEUKEST Negative   RBCU 2-5*   WBCU 0-5     Current Outpatient Medications   Medication    acetaminophen (TYLENOL) 500 MG tablet    diclofenac (VOLTAREN) 75 MG EC tablet    Ferrous Sulfate (IRON PO)    ibuprofen (ADVIL/MOTRIN) 600 MG tablet    levothyroxine (SYNTHROID/LEVOTHROID) 112 MCG tablet    Multiple Vitamins-Minerals (MULTIVITAL PO)    omeprazole (PRILOSEC) 20 MG DR capsule    OVER-THE-COUNTER    rosuvastatin (CRESTOR) 20 MG tablet     No current facility-administered medications for this visit.       Imaging  CT urogram  IMPRESSION:   1.  No specific finding to account for hematuria. No visible  urolithiasis, hydronephrosis, collecting system or bladder abnormality  can be seen.  2.  Indeterminate pulmonary nodules. See below for follow up.  3.  Technically indeterminate left adrenal nodule. If relevant this  could be further evaluated with adrenal protocol  CT.        CYSTOSCOPY  After informed consent was obtained, the patient was brought to the procedure room where she was placed in the lithotomy position with all pressure points well padded.  She was prepped and draped in a sterile fashion. A flexible cystoscope was introduced through a well-lubricated urethra.  The bladder was examined carefully and systematically. The scope was retroflexed. There were no tumors or stones present in the bladder. The bladder demonstrated no trabeculation. The scope was removed slowly to examine the urethra which was normal.     PLAN:  Gross hematuria  Showed a normal cystoscopy and CT urogram and negative cytology.  Will repeat workup in 3 to 5 years if she has persistent hematuria.    #2 adrenal nodule  1.5 cm left adrenal nodule.  We will evaluate for functionality.  Will repeat a CT abdomen with and without contrast to evaluate for washout in a year and also assess for growth.

## 2024-03-05 NOTE — PATIENT INSTRUCTIONS
Instructions for Adrenal Hormonal Testing    You have been diagnosed with a lesion in your adrenal gland.     Usually these are not harmful in any way, but sometimes they can make hormones which can have unwanted effects on your body.     We can test for these with a simple blood draw, but it is very important that you are properly prepared for the day of the blood draw    Steps   the medication that your doctor prescribed (dexamethasone 1mg) -- it is just one pill!   Your doctor may also prescribe a potassium supplement, which you should start taking as soon as you pick it up    4 weeks before the blood draw: stop taking the following medications  Spironolactone, eplerenone  Amiloride, triamterene    1 week before the blood draw  If you are on a low-salt diet, return to a normal salt intake  Do not use chewing tobacco or eat licorice  Stop the following medications  Tylenol  Amitriptyline, doxepin, imipramine, nortriptyline or other  tricyclic  antidepressants -- talk to the doctor who prescribes these to ensure you can stop these safely. If stopping these medications will be unsafe or harmful, alternative forms of testing may need to be considered  Phenoxybenzamine - ensure your endocrinologist is aware of and approves of this plan  Oral contraceptive pills (OCPs)- if you are taking these, ask your doctor if you should stop taking them. If you do stop taking these, you should make sure you have a plan for alternative contraception until you can take them again long enough for them to be effective.    The day before the blood draw  Do not drink caffeine  At 11pm take the dexamethasone pill    The day of the blood draw  Do not eat or drink anything after midnight  Try to arrive early to your appointment and relax in a seated or lying down position for at least 20 minutes before your blood is drawn  Ensure your blood is drawn before 9:30am    AFTER YOUR CYSTOSCOPY        You have just completed a cystoscopy,  "or \"cysto\", which allowed your physician to learn more about your bladder (or to remove a stent placed after surgery). We suggest that you continue to avoid caffeine, fruit juice, and alcohol for the next 24 hours, however, you are encouraged to return to your normal activities.         A few things that are considered normal after your cystoscopy:     * Small amount of bleeding (or spotting) that clears within the next 24 hours     * Slight burning sensation with urination     * Sensation to of needing to avoid more frequently     * The feeling of \"air\" in your urine     * Mild discomfort that is relieved with Tylenol        Please contact our office promptly if you:     * Develop a fever above 101 degrees     * Are unable to urinate     * Develop bright red blood that does not stop     * Severe pain or swelling         Please contact our office with any concerns or questions @[email or web address].  "

## 2024-03-11 ENCOUNTER — LAB (OUTPATIENT)
Dept: LAB | Facility: CLINIC | Age: 64
End: 2024-03-11
Payer: COMMERCIAL

## 2024-03-11 DIAGNOSIS — E27.9 ADRENAL NODULE (H): ICD-10-CM

## 2024-03-11 LAB — CORTIS SERPL-MCNC: 1.5 UG/DL

## 2024-03-11 PROCEDURE — 84244 ASSAY OF RENIN: CPT | Mod: 90

## 2024-03-11 PROCEDURE — 36415 COLL VENOUS BLD VENIPUNCTURE: CPT

## 2024-03-11 PROCEDURE — 82533 TOTAL CORTISOL: CPT

## 2024-03-11 PROCEDURE — 82088 ASSAY OF ALDOSTERONE: CPT

## 2024-03-11 PROCEDURE — 99000 SPECIMEN HANDLING OFFICE-LAB: CPT

## 2024-03-11 PROCEDURE — 83835 ASSAY OF METANEPHRINES: CPT | Mod: 90

## 2024-03-12 ENCOUNTER — TRANSFERRED RECORDS (OUTPATIENT)
Dept: HEALTH INFORMATION MANAGEMENT | Facility: CLINIC | Age: 64
End: 2024-03-12

## 2024-03-12 LAB — ALDOST SERPL-MCNC: 7.1 NG/DL (ref 0–31)

## 2024-03-13 LAB — RENIN PLAS-CCNC: 3 NG/ML/HR

## 2024-03-14 ENCOUNTER — ANCILLARY PROCEDURE (OUTPATIENT)
Dept: MRI IMAGING | Facility: CLINIC | Age: 64
End: 2024-03-14
Attending: STUDENT IN AN ORGANIZED HEALTH CARE EDUCATION/TRAINING PROGRAM
Payer: COMMERCIAL

## 2024-03-14 DIAGNOSIS — E27.9 ADRENAL NODULE (H): ICD-10-CM

## 2024-03-14 LAB
ANNOTATION COMMENT IMP: NORMAL
METANEPHS SERPL-SCNC: <0.1 NMOL/L
NORMETANEPHRINE SERPL-SCNC: 0.53 NMOL/L

## 2024-03-14 PROCEDURE — 74183 MRI ABD W/O CNTR FLWD CNTR: CPT | Mod: TC | Performed by: STUDENT IN AN ORGANIZED HEALTH CARE EDUCATION/TRAINING PROGRAM

## 2024-03-14 PROCEDURE — A9585 GADOBUTROL INJECTION: HCPCS | Performed by: STUDENT IN AN ORGANIZED HEALTH CARE EDUCATION/TRAINING PROGRAM

## 2024-03-14 RX ORDER — GADOBUTROL 604.72 MG/ML
10 INJECTION INTRAVENOUS ONCE
Status: COMPLETED | OUTPATIENT
Start: 2024-03-14 | End: 2024-03-14

## 2024-03-14 RX ADMIN — GADOBUTROL 10 ML: 604.72 INJECTION INTRAVENOUS at 08:15

## 2024-03-15 DIAGNOSIS — E27.9 ADRENAL NODULE (H): Primary | ICD-10-CM

## 2024-03-16 LAB — ALDOST/RENIN PLAS-RTO: 2.4 {RATIO} (ref 0–25)

## 2024-03-22 ENCOUNTER — TRANSFERRED RECORDS (OUTPATIENT)
Dept: HEALTH INFORMATION MANAGEMENT | Facility: CLINIC | Age: 64
End: 2024-03-22

## 2024-03-25 ENCOUNTER — TRANSFERRED RECORDS (OUTPATIENT)
Dept: HEALTH INFORMATION MANAGEMENT | Facility: CLINIC | Age: 64
End: 2024-03-25
Payer: COMMERCIAL

## 2024-04-02 DIAGNOSIS — M54.16 LUMBAR RADICULOPATHY: ICD-10-CM

## 2024-04-03 SDOH — HEALTH STABILITY: PHYSICAL HEALTH: ON AVERAGE, HOW MANY DAYS PER WEEK DO YOU ENGAGE IN MODERATE TO STRENUOUS EXERCISE (LIKE A BRISK WALK)?: 0 DAYS

## 2024-04-03 SDOH — HEALTH STABILITY: PHYSICAL HEALTH: ON AVERAGE, HOW MANY MINUTES DO YOU ENGAGE IN EXERCISE AT THIS LEVEL?: 0 MIN

## 2024-04-03 ASSESSMENT — SOCIAL DETERMINANTS OF HEALTH (SDOH): HOW OFTEN DO YOU GET TOGETHER WITH FRIENDS OR RELATIVES?: TWICE A WEEK

## 2024-04-03 NOTE — COMMUNITY RESOURCES LIST (ENGLISH)
April 3, 2024           YOUR PERSONALIZED LIST OF SERVICES & PROGRAMS           & RECREATION    [employer]      [facility] [place] - Sports clubs and recreational activities - [employer] [facility] [employer]  [address] [place] [address] (Distance: 5.0 miles)  Language: English  Fee: Self pay, Sliding scale      [facility] - Adult Enrichment  Phone: [phone]) [phone]  Website: [email or web address]/adults-seniors/adult-enrichment/  Language: English  Hours: Mon 7:30 AM - 4:00 PM Tue 7:30 AM - 4:00 PM Wed 7:30 AM - 4:00 PM Thu 7:30 AM - 4:00 PM Fri 7:30 AM - 4:00 PM      [employer] LITTLE LE[employer] BASEBALL AND SOFTBALL  Website: http://www.[employer] Gamesleague.org    Classes/Groups      [employer] [place] [employer] - [facility]  [address] [place] [address] (Distance: 17.7 miles)  Phone: [phone]) [phone]  Website: https://www.[employer]/ADS.html  Language: English  Fee: Free  Accessibility: Ada accessible, Translation services  Transportation Options: Free transportation      [employer] [place] [facility] [employer]  [address] [place] [address] (Distance: 17.7 miles)  Phone: [phone]  Website: https://www.[employer].html  Language: English  Fee: Free  Accessibility: Ada accessible, Translation services  Transportation Options: Free transportation      - Online and Local Fitness Classes  Phone: [phone]) [phone]  Website: https://[employer]/Search/OnlineClasses  Language: English  Fee: Free               IMPORTANT NUMBERS & WEBSITES        Emergency Services  911  .   United Way  211 http://211unitedway.org  .   Poison Control  (570) 120-6539 http://[place]poison.org http://[place]poison.org  .     Suicide and Crisis Lifeline  988 http://988lifeline.org  .   Childhelp National Child Abuse Hotline  547.940.4379 http://Childhelphotline.org   .   National Sexual Assault  Hotline  (554) 268-8980 (HOPE) http://[employer].Rx Network   .     National Runaway Safeline  (757) 911-2066 (RUNAWAY) http://oroeco.Rx Network  .   Pregnancy & Postpartum Support  Call/text 113-883-5144  [place]: http://ppsupportmn.org  [place]: http://psichapters.com/wi  .   Substance Abuse National Helpline ([facility])  260-495-HELP (5318) http://Findtreatment.gov   .                DISCLAIMER: These resources have been generated via the [employer] Platform. [employer] does not endorse any service providers mentioned in this resource list. [employer] does not guarantee that the services mentioned in this resource list will be available to you or will improve your health or wellness.    [facility]

## 2024-04-04 RX ORDER — DICLOFENAC SODIUM 75 MG/1
TABLET, DELAYED RELEASE ORAL
Qty: 60 TABLET | Refills: 0 | Status: SHIPPED | OUTPATIENT
Start: 2024-04-04 | End: 2024-04-23

## 2024-04-10 ENCOUNTER — OFFICE VISIT (OUTPATIENT)
Dept: FAMILY MEDICINE | Facility: CLINIC | Age: 64
End: 2024-04-10
Payer: COMMERCIAL

## 2024-04-10 VITALS
RESPIRATION RATE: 18 BRPM | DIASTOLIC BLOOD PRESSURE: 86 MMHG | BODY MASS INDEX: 36.5 KG/M2 | SYSTOLIC BLOOD PRESSURE: 132 MMHG | OXYGEN SATURATION: 98 % | TEMPERATURE: 97.7 F | HEIGHT: 64 IN | WEIGHT: 213.8 LBS | HEART RATE: 80 BPM

## 2024-04-10 DIAGNOSIS — E89.0 POSTSURGICAL HYPOTHYROIDISM: ICD-10-CM

## 2024-04-10 DIAGNOSIS — R73.03 PREDIABETES: ICD-10-CM

## 2024-04-10 DIAGNOSIS — E66.01 CLASS 2 SEVERE OBESITY DUE TO EXCESS CALORIES WITH SERIOUS COMORBIDITY AND BODY MASS INDEX (BMI) OF 36.0 TO 36.9 IN ADULT (H): ICD-10-CM

## 2024-04-10 DIAGNOSIS — R31.0 GROSS HEMATURIA: ICD-10-CM

## 2024-04-10 DIAGNOSIS — E78.5 HYPERLIPIDEMIA LDL GOAL <130: ICD-10-CM

## 2024-04-10 DIAGNOSIS — Z78.0 POSTMENOPAUSAL ESTROGEN DEFICIENCY: ICD-10-CM

## 2024-04-10 DIAGNOSIS — Z00.00 ENCOUNTER FOR MEDICARE ANNUAL WELLNESS EXAM: Primary | ICD-10-CM

## 2024-04-10 DIAGNOSIS — Z23 NEED FOR VACCINATION: ICD-10-CM

## 2024-04-10 DIAGNOSIS — E66.812 CLASS 2 SEVERE OBESITY DUE TO EXCESS CALORIES WITH SERIOUS COMORBIDITY AND BODY MASS INDEX (BMI) OF 36.0 TO 36.9 IN ADULT (H): ICD-10-CM

## 2024-04-10 PROCEDURE — G0402 INITIAL PREVENTIVE EXAM: HCPCS | Performed by: FAMILY MEDICINE

## 2024-04-10 PROCEDURE — 99173 VISUAL ACUITY SCREEN: CPT | Mod: 59 | Performed by: FAMILY MEDICINE

## 2024-04-10 PROCEDURE — 90677 PCV20 VACCINE IM: CPT | Performed by: FAMILY MEDICINE

## 2024-04-10 PROCEDURE — G0009 ADMIN PNEUMOCOCCAL VACCINE: HCPCS | Performed by: FAMILY MEDICINE

## 2024-04-10 PROCEDURE — 99214 OFFICE O/P EST MOD 30 MIN: CPT | Mod: 25 | Performed by: FAMILY MEDICINE

## 2024-04-10 RX ORDER — ROSUVASTATIN CALCIUM 20 MG/1
20 TABLET, COATED ORAL DAILY
Qty: 90 TABLET | Refills: 3 | Status: SHIPPED | OUTPATIENT
Start: 2024-04-10

## 2024-04-10 ASSESSMENT — ACTIVITIES OF DAILY LIVING (ADL): CURRENT_FUNCTION: NO ASSISTANCE NEEDED

## 2024-04-10 ASSESSMENT — PAIN SCALES - GENERAL: PAINLEVEL: SEVERE PAIN (6)

## 2024-04-10 NOTE — PATIENT INSTRUCTIONS
Preventive Care Advice   This is general advice given by our system to help you stay healthy. However, your care team may have specific advice just for you. Please talk to your care team about your preventive care needs.  Nutrition  Eat 5 or more servings of fruits and vegetables each day.  Try wheat bread, brown rice and whole grain pasta (instead of white bread, rice, and pasta).  Get enough calcium and vitamin D. Check the label on foods and aim for 100% of the RDA (recommended daily allowance).  Lifestyle  Exercise at least 150 minutes each week   (30 minutes a day, 5 days a week).  Do muscle strengthening activities 2 days a week. These help control your weight and prevent disease.  No smoking.  Wear sunscreen to prevent skin cancer.  Have a dental exam and cleaning every 6 months.  Yearly exams  See your health care team every year to talk about:  Any changes in your health.  Any medicines your care team has prescribed.  Preventive care, family planning, and ways to prevent chronic diseases.  Shots (vaccines)   HPV shots (up to age 26), if you've never had them before.  Hepatitis B shots (up to age 59), if you've never had them before.  COVID-19 shot: Get this shot when it's due.  Flu shot: Get a flu shot every year.  Tetanus shot: Get a tetanus shot every 10 years.  Pneumococcal, hepatitis A, and RSV shots: Ask your care team if you need these based on your risk.  Shingles shot (for age 50 and up).  General health tests  Diabetes screening:  Starting at age 35, Get screened for diabetes at least every 3 years.  If you are younger than age 35, ask your care team if you should be screened for diabetes.  Cholesterol test: At age 39, start having a cholesterol test every 5 years, or more often if advised.  Bone density scan (DEXA): At age 50, ask your care team if you should have this scan for osteoporosis (brittle bones).  Hepatitis C: Get tested at least once in your life.  STIs (sexually transmitted  infections)  Before age 24: Ask your care team if you should be screened for STIs.  After age 24: Get screened for STIs if you're at risk. You are at risk for STIs (including HIV) if:  You are sexually active with more than one person.  You don't use condoms every time.  You or a partner was diagnosed with a sexually transmitted infection.  If you are at risk for HIV, ask about PrEP medicine to prevent HIV.  Get tested for HIV at least once in your life, whether you are at risk for HIV or not.  Cancer screening tests  Cervical cancer screening: If you have a cervix, begin getting regular cervical cancer screening tests at age 21. Most people who have regular screenings with normal results can stop after age 65. Talk about this with your provider.  Breast cancer scan (mammogram): If you've ever had breasts, begin having regular mammograms starting at age 40. This is a scan to check for breast cancer.  Colon cancer screening: It is important to start screening for colon cancer at age 45.  Have a colonoscopy test every 10 years (or more often if you're at risk) Or, ask your provider about stool tests like a FIT test every year or Cologuard test every 3 years.  To learn more about your testing options, visit: [email or web address]://[email or web address].  For help making a decision, visit: https://bit.ly/yg85061.  Prostate cancer screening test: If you have a prostate and are age 55 to 69, ask your provider if you would benefit from a yearly prostate cancer screening test.  Lung cancer screening: If you are a current or former smoker age 50 to 80, ask your care team if ongoing lung cancer screenings are right for you.  For informational purposes only. Not to replace the advice of your health care provider. Copyright   2023 [employer]. All rights reserved. Clinically reviewed by the [facility]. Register My InfoÂ® 287321 - REV 01/24.    Learning About Stress  What is stress?     Stress is your  body's response to a hard situation. Your body can have a physical, emotional, or mental response. Stress is a fact of life for most people, and it affects everyone differently. What causes stress for you may not be stressful for someone else.  A lot of things can cause stress. You may feel stress when you go on a job interview, take a test, or run a race. This kind of short-term stress is normal and even useful. It can help you if you need to work hard or react quickly. For example, stress can help you finish an important job on time.  Long-term stress is caused by ongoing stressful situations or events. Examples of long-term stress include long-term health problems, ongoing problems at work, or conflicts in your family. Long-term stress can harm your health.  How does stress affect your health?  When you are stressed, your body responds as though you are in danger. It makes hormones that speed up your heart, make you breathe faster, and give you a burst of energy. This is called the fight-or-flight stress response. If the stress is over quickly, your body goes back to normal and no harm is done.  But if stress happens too often or lasts too long, it can have bad effects. Long-term stress can make you more likely to get sick, and it can make symptoms of some diseases worse. If you tense up when you are stressed, you may develop neck, shoulder, or low back pain. Stress is linked to high blood pressure and heart disease.  Stress also harms your emotional health. It can make you leon, tense, or depressed. Your relationships may suffer, and you may not do well at work or school.  What can you do to manage stress?  You can try these things to help manage stress:   Do something active. Exercise or activity can help reduce stress. Walking is a great way to get started. Even everyday activities such as housecleaning or yard work can help.  Try yoga or karin chi. These techniques combine exercise and meditation. You may need  some training at first to learn them.  Do something you enjoy. For example, listen to music or go to a movie. Practice your hobby or do volunteer work.  Meditate. This can help you relax, because you are not worrying about what happened before or what may happen in the future.  Do guided imagery. Imagine yourself in any setting that helps you feel calm. You can use online videos, books, or a teacher to guide you.  Do breathing exercises. For example:  From a standing position, bend forward from the waist with your knees slightly bent. Let your arms dangle close to the floor.  Breathe in slowly and deeply as you return to a standing position. Roll up slowly and lift your head last.  Hold your breath for just a few seconds in the standing position.  Breathe out slowly and bend forward from the waist.  Let your feelings out. Talk, laugh, cry, and express anger when you need to. Talking with supportive friends or family, a counselor, or a kristin leader about your feelings is a healthy way to relieve stress. Avoid discussing your feelings with people who make you feel worse.  Write. It may help to write about things that are bothering you. This helps you find out how much stress you feel and what is causing it. When you know this, you can find better ways to cope.  What can you do to prevent stress?  You might try some of these things to help prevent stress:  Manage your time. This helps you find time to do the things you want and need to do.  Get enough sleep. Your body recovers from the stresses of the day while you are sleeping.  Get support. Your family, friends, and community can make a difference in how you experience stress.  Limit your news feed. Avoid or limit time on social media or news that may make you feel stressed.  Do something active. Exercise or activity can help reduce stress. Walking is a great way to get started.  Where can you learn more?  Go to https://www.healthwise.net/patiented  Enter N032 in the  "search box to learn more about \"Learning About Stress.\"  Current as of: October 24, 2023               Content Version: 14.0    0479-2460 [employer].   Care instructions adapted under license by your healthcare professional. If you have questions about a medical condition or this instruction, always ask your healthcare professional. [employer] disclaims any warranty or liability for your use of this information.      Learning About Sleeping Well  What does sleeping well mean?     Sleeping well means getting enough sleep to feel good and stay healthy. How much sleep is enough varies among people.  The number of hours you sleep and how you feel when you wake up are both important. If you do not feel refreshed, you probably need more sleep. Another sign of not getting enough sleep is feeling tired during the day.  Experts recommend that adults get at least 7 or more hours of sleep per day. Children and older adults need more sleep.  Why is getting enough sleep important?  Getting enough quality sleep is a basic part of good health. When your sleep suffers, your physical health, mood, and your thoughts can suffer too. You may find yourself feeling more grumpy or stressed. Not getting enough sleep also can lead to serious problems, including injury, accidents, anxiety, and depression.  What might cause poor sleeping?  Many things can cause sleep problems, including:  Changes to your sleep schedule.  Stress. Stress can be caused by fear about a single event, such as giving a speech. Or you may have ongoing stress, such as worry about work or school.  Depression, anxiety, and other mental or emotional conditions.  Changes in your sleep habits or surroundings. This includes changes that happen where you sleep, such as noise, light, or sleeping in a different bed. It also includes changes in your sleep pattern, such as having jet lag or working a late shift.  Health problems, such as pain, " "breathing problems, and restless legs syndrome.  Lack of regular exercise.  Using alcohol, nicotine, or caffeine before bed.  How can you help yourself?  Here are some tips that may help you sleep more soundly and wake up feeling more refreshed.  Your sleeping area   Use your bedroom only for sleeping and sex. A bit of light reading may help you fall asleep. But if it doesn't, do your reading elsewhere in the house. Try not to use your TV, computer, smartphone, or tablet while you are in bed.  Be sure your bed is big enough to stretch out comfortably, especially if you have a sleep partner.  Keep your bedroom quiet, dark, and cool. Use curtains, blinds, or a sleep mask to block out light. To block out noise, use earplugs, soothing music, or a \"white noise\" machine.  Your evening and bedtime routine   Create a relaxing bedtime routine. You might want to take a warm shower or bath, or listen to soothing music.  Go to bed at the same time every night. And get up at the same time every morning, even if you feel tired.  What to avoid   Limit caffeine (coffee, tea, caffeinated sodas) during the day, and don't have any for at least 6 hours before bedtime.  Avoid drinking alcohol before bedtime. Alcohol can cause you to wake up more often during the night.  Try not to smoke or use tobacco, especially in the evening. Nicotine can keep you awake.  Limit naps during the day, especially close to bedtime.  Avoid lying in bed awake for too long. If you can't fall asleep or if you wake up in the middle of the night and can't get back to sleep within about 20 minutes, get out of bed and go to another room until you feel sleepy.  Avoid taking medicine right before bed that may keep you awake or make you feel hyper or energized. Your doctor can tell you if your medicine may do this and if you can take it earlier in the day.  If you can't sleep   Imagine yourself in a peaceful, pleasant scene. Focus on the details and feelings of " "being in a place that is relaxing.  Get up and do a quiet or boring activity until you feel sleepy.  Avoid drinking any liquids before going to bed to help prevent waking up often to use the bathroom.  Where can you learn more?  Go to https://www.[employer].net/patiented  Enter J942 in the search box to learn more about \"Learning About Sleeping Well.\"  Current as of: July 10, 2023               Content Version: 14.0    9398-9021 [employer].   Care instructions adapted under license by your healthcare professional. If you have questions about a medical condition or this instruction, always ask your healthcare professional. [employer] disclaims any warranty or liability for your use of this information.      "

## 2024-04-10 NOTE — PROGRESS NOTES
"Preventive Care Visit  [facility] PRIYA Shultz MD, Family Medicine  Apr 10, 2024      Assessment & Plan     Laura was seen today for wellness visit.    Diagnoses and all orders for this visit:    Encounter for Medicare annual wellness exam  -     REVIEW OF HEALTH MAINTENANCE PROTOCOL ORDERS  -     PRIMARY CARE FOLLOW-UP SCHEDULING; Future    Hyperlipidemia LDL goal <130  -     Refill: rosuvastatin (CRESTOR) 20 MG tablet; Take 1 tablet (20 mg) by mouth daily  -     Lipid panel reflex to direct LDL Fasting; Future    Postmenopausal estrogen deficiency  -     DEXA HIP/PELVIS/SPINE - Future; Future    Postsurgical hypothyroidism, on levothyroxine.  -     TSH with free T4 reflex; Future  -     Refill levothyroxine after labs.    Gross hematuria        -     Workup to include urinalysis, CT urogram and cystoscopy were unremarkable.        -      Seen and evaluated by urology.  Recommended to repeat workup in 3 to 5 years if hematuria persists.    Prediabetes  -     Hemoglobin A1c; Future    Class 2 severe obesity due to excess calories with serious comorbidity and body mass index (BMI) of 36.0 to 36.9 in adult (H)        -      Weight management plan: Discussed healthy diet and exercise guidelines      Need for vaccination  -     Pneumococcal 20 Valent Conjugate (PCV20)        Patient has been advised of split billing requirements and indicates understanding: Yes      BMI  Estimated body mass index is 36.7 kg/m  as calculated from the following:    Height as of this encounter: 1.626 m (5' 4\").    Weight as of this encounter: 97 kg (213 lb 12.8 oz).   Weight management plan: Discussed healthy diet and exercise guidelines    Counseling  Appropriate preventive services were discussed with this patient, including applicable screening as appropriate for fall prevention, nutrition, physical activity, Tobacco-use cessation, weight loss and cognition.  Checklist reviewing preventive services available " "has been given to the patient.      Return in about 13 days (around 4/23/2024) for Follow up- Pre-op .      Kinsey Sanchez is a 63 year old, presenting for the following:  Wellness Visit        4/10/2024    11:46 AM   Additional Questions   Roomed by Dolores          Healthy Habits:     In general, how would you rate your overall health?  Fair    Frequency of exercise:  None    Duration of exercise:  Less than 15 minutes    Do you usually eat at least 4 servings of fruit and vegetables a day, include whole grains    & fiber and avoid regularly eating high fat or \"junk\" foods?  Yes    Taking medications regularly:  Yes    Barriers to taking medications:  None    Medication side effects:  None    Ability to successfully perform activities of daily living:  No assistance needed    Home Safety:  No safety concerns identified    Hearing Impairment:  No hearing concerns    In the past 6 months, have you been bothered by leaking of urine?  No    In general, how would you rate your overall mental or emotional health?  Fair (, Santosh passed away in 1/2024)    Additional concerns today:  Yes     passed away in 1/2024- lung cancer.   Patient is grieving his loss but doing ok otherwise.   Has supportive family.     HYPERLIPIDEMIA - Patient has a long history of significant Hyperlipidemia requiring medication for treatment with recent good control. Patient reports no problems or side effects with the medication- Rosuvastatin.   Last lipid panel was -   Recent Labs   Lab Test 02/15/23  1110 11/18/21  0810   CHOL 174 145   HDL 71 67   LDL 86 67   TRIG 86 56           HYPOTHYROIDISM - Patient has a longstanding history of chronic postsurgical Hypothyroidism. Patient has been doing well, noting no tremor, insomnia, hair loss or changes in skin texture. Continues to take medications as directed, without adverse reactions or side effects. Last TSH   Lab Results   Component Value Date    TSH 1.21 02/15/2023       Gross " hematuria-  Patient is currently following with Urology for further work up on hematuria.   CT Urogram was unremarkable.  Urine cytology was negative for high-grade urothelial carcinoma.  Had a normal cystoscopy on 3/5/2024.  Plan is to repeat the workup in 3 to 5 years if she has persistent hematuria.    Incidental finding of left adrenal nodule-  She was noted to have a 1.5 cm left adrenal nodule on recent CT Urogram on 2/27/24- see Epic for details.  MRI of the adrenals with without contrast was completed-recommended a short-term MRI follow-up in 3 months and follow-up with general surgery.    Patient has a known history of right breast cancer.  Underwent bilateral mastectomies with right sentinel lymph node biopsy in 2021.  Has been doing well.  Has a follow-up appointment with Dr. Alvarez on 4/17/2024.    HEALTH CARE MAINTENANCE: Due for labs, pneumonia vaccine and screening bone density.        4/3/2024   General Health   How would you rate your overall physical health? Good   Feel stress (tense, anxious, or unable to sleep) Only a little   (!) STRESS CONCERN      4/3/2024   Nutrition   Diet: Regular (no restrictions)         4/3/2024   Exercise   Days per week of moderate/strenous exercise 0 days   Average minutes spent exercising at this level 0 min   (!) EXERCISE CONCERN      4/3/2024   Social Factors   Frequency of gathering with friends or relatives Twice a week   Worry food won't last until get money to buy more No   Food not last or not have enough money for food? No   Do you have housing?  Yes   Are you worried about losing your housing? No   Lack of transportation? No   Unable to get utilities (heat,electricity)? No         4/3/2024   Fall Risk   Fallen 2 or more times in the past year? No   Trouble with walking or balance? No          4/3/2024   Activities of Daily Living- Home Safety   Needs help with the following daily activites None of the above   Safety concerns in the home None of the above          4/3/2024   Dental   Dentist two times every year? Yes         4/3/2024   Hearing Screening   Hearing concerns? None of the above         4/3/2024   Driving Risk Screening   Patient/family members have concerns about driving No         4/3/2024   General Alertness/Fatigue Screening   Have you been more tired than usual lately? (!) YES         4/3/2024   Urinary Incontinence Screening   Bothered by leaking urine in past 6 months No         4/3/2024   TB Screening   Were you born outside of the [place]? No         Today's PHQ-2 Score:       2024    12:44 PM   PHQ-2 (  [employer])   Q1: Little interest or pleasure in doing things 1   Q2: Feeling down, depressed or hopeless 1   PHQ-2 Score 2   Q1: Little interest or pleasure in doing things Several days   Q2: Feeling down, depressed or hopeless Several days   PHQ-2 Score 2           4/3/2024   Substance Use   Alcohol more than 3/day or more than 7/wk No   Do you have a current opioid prescription? No   How severe/bad is pain from 1 to 10? 8/10   Do you use any other substances recreationally? No     Social History     Tobacco Use    Smoking status: Former     Current packs/day: 0.00     Average packs/day: 1 pack/day for 27.9 years (27.9 ttl pk-yrs)     Types: Cigarettes     Start date: 1971     Quit date: 12/15/1998     Years since quittin.3    Smokeless tobacco: Never    Tobacco comments:     quit 20 years ago   Vaping Use    Vaping status: Never Used   Substance Use Topics    Alcohol use: Yes     Comment: once in a while    Drug use: No         2023   LAST FHS-7 RESULTS   1st degree relative breast or ovarian cancer No   Any relative bilateral breast cancer No   Any male have breast cancer No   Any ONE woman have BOTH breast AND ovarian cancer No   Any woman with breast cancer before 50yrs No   2 or more relatives with breast AND/OR ovarian cancer Yes   2 or more relatives with breast AND/OR bowel cancer Yes       Mammogram Screening - Annual  screen due to history of breast cancer, carcinoma in situ, or hyperplasia      History of abnormal Pap smear: NO - age 30-65 PAP every 5 years with negative HPV co-testing recommended        Latest Ref Rng & Units 12/15/2020    11:35 AM 12/15/2020    11:25 AM 12/5/2017     4:57 PM   PAP / HPV   PAP (Historical)  NIL   NIL    HPV 16 DNA NEG^Negative  Negative     HPV 18 DNA NEG^Negative  Negative     Other HR HPV NEG^Negative  Negative       ASCVD Risk   The 10-year ASCVD risk score (José GILL, et al., 2019) is: 4.9%    Values used to calculate the score:      Age: 63 years      Sex: Female      Is Non- : No      Diabetic: No      Tobacco smoker: No      Systolic Blood Pressure: 132 mmHg      Is BP treated: No      HDL Cholesterol: 71 mg/dL      Total Cholesterol: 174 mg/dL      Reviewed and updated as needed this visit by Provider                    Past Medical History:   Diagnosis Date    Anemia     Lebalnc's esophagus     EGD in 2014; recent EGD in 4/2018; repeat EGD in 3 years    Breast cancer (H)     Ex-smoker     1 PPD x 28 years, quit 20 years ago    Gastroesophageal reflux disease     Hematuria 01/23/2007    History of breast biopsy     left    Menopausal symptoms 07/17/2009    Motion sickness     Obese     Postsurgical hypothyroidism 07/17/2009    Symptomatic menopausal or female climacteric states 07/17/2009    Tear of lateral cartilage or meniscus of knee, current 10/24/2007     Past Surgical History:   Procedure Laterality Date    ABDOMEN SURGERY  1974    appendix    APPENDECTOMY OPEN      ARTHROSCOPY KNEE RT/LT  11/15/2007    right knee arthroscopy with arthroscopic lateral meniscectomy    BIOPSY Left     Breast. Benign    CL AFF SURGICAL PATHOLOGY  09/18/2002    endometrial biopsy    COLONOSCOPY      ELBOW SURGERY Right     ESOPHAGOSCOPY, GASTROSCOPY, DUODENOSCOPY (EGD), COMBINED N/A 12/23/2014    Procedure: COMBINED ESOPHAGOSCOPY, GASTROSCOPY, DUODENOSCOPY (EGD),  BIOPSY SINGLE OR MULTIPLE;  Surgeon: Paradise Radford MD;  Location: [facility]    GRAFT FAT TO BREAST Right 11/08/2021    Procedure: AND FAT GRAFTING FROM ABDOMEN;  Surgeon: Genaro Garcia MD;  Location: [facility] OR    HC THYROIDECTOMY      goitre    INSERT TISSUE EXPANDER BREAST Bilateral 03/17/2021    Procedure: bilateral BREAST TISSUE EXPANDER;  Surgeon: Genaro Garcia MD;  Location: [facility] OR    LAMINECTOMY, FUSION LUMBAR ONE LEVEL, COMBINED  10/26/2011    Procedure:COMBINED LAMINECTOMY, FUSION LUMBAR ONE LEVEL; L4-5 Decompression, L4-5 Posterior Lateral Fusion with Madhavi and Local Bone; Surgeon:BARBRA BRIGHT; Location:[facility] OR    MASTECTOMY SIMPLE, SENTINEL NODE, COMBINED Bilateral 03/17/2021    Procedure: BILATERAL SKIN SPARING MASTECTOMY WITH right  SENTINEL LYMPH NODE BIOPSY;  Surgeon: Carmen Stein MD;  Location: [facility]    RECONSTRUCT BREAST Bilateral 11/08/2021    Procedure: REVISION OF BILATERAL BREAST RECONSTRUCTION WITH REMOVE AND REPLACE BILATERAL BREAST IMPLANT;  Surgeon: Genaro Garcia MD;  Location: [facility]    RECONSTRUCT BREAST BILATERAL, IMPLANT PROSTHESIS BILATERAL, COMBINED Bilateral 06/11/2021    Procedure: BILATERAL SECOND STAGE BREAST RECONSTRUCTION WITH SILICONE IMPLANTS;  Surgeon: Genaro Garcia MD;  Location: [facility]    TONSILLECTOMY & ADENOIDECTOMY       BP Readings from Last 3 Encounters:   04/10/24 132/86   03/05/24 (!) 149/83   03/01/24 133/82    Wt Readings from Last 3 Encounters:   04/10/24 97 kg (213 lb 12.8 oz)   03/01/24 98.9 kg (218 lb)   12/28/23 99.2 kg (218 lb 9.6 oz)                  Patient Active Problem List   Diagnosis    GERD (gastroesophageal reflux disease)    Menorrhagia    Iron deficiency anemia    Simple endometrial hyperplasia without atypia    Postsurgical hypothyroidism    Hyperlipidemia LDL goal <130    Malignant neoplasm of left breast (H)    Ductal carcinoma in situ (DCIS) of right breast    Right wrist pain    Class 2 severe  obesity due to excess calories with serious comorbidity in adult (H)     Past Surgical History:   Procedure Laterality Date    ABDOMEN SURGERY  1974    appendix    APPENDECTOMY OPEN      ARTHROSCOPY KNEE RT/LT  11/15/2007    right knee arthroscopy with arthroscopic lateral meniscectomy    BIOPSY Left     Breast. Benign    CL AFF SURGICAL PATHOLOGY  09/18/2002    endometrial biopsy    COLONOSCOPY      ELBOW SURGERY Right     ESOPHAGOSCOPY, GASTROSCOPY, DUODENOSCOPY (EGD), COMBINED N/A 12/23/2014    Procedure: COMBINED ESOPHAGOSCOPY, GASTROSCOPY, DUODENOSCOPY (EGD), BIOPSY SINGLE OR MULTIPLE;  Surgeon: Paradise Radford MD;  Location: [facility]    GRAFT FAT TO BREAST Right 11/08/2021    Procedure: AND FAT GRAFTING FROM ABDOMEN;  Surgeon: Genaro Garcia MD;  Location:  OR    HC THYROIDECTOMY      goitre    INSERT TISSUE EXPANDER BREAST Bilateral 03/17/2021    Procedure: bilateral BREAST TISSUE EXPANDER;  Surgeon: Genaro Garcia MD;  Location: [facility]    LAMINECTOMY, FUSION LUMBAR ONE LEVEL, COMBINED  10/26/2011    Procedure:COMBINED LAMINECTOMY, FUSION LUMBAR ONE LEVEL; L4-5 Decompression, L4-5 Posterior Lateral Fusion with Madhavi and Local Bone; Surgeon:BARBRA BRIGHT; Location:[facility]    MASTECTOMY SIMPLE, SENTINEL NODE, COMBINED Bilateral 03/17/2021    Procedure: BILATERAL SKIN SPARING MASTECTOMY WITH right  SENTINEL LYMPH NODE BIOPSY;  Surgeon: Carmen Stein MD;  [facility]: [facility]    RECONSTRUCT BREAST Bilateral 11/08/2021    Procedure: REVISION OF BILATERAL BREAST RECONSTRUCTION WITH REMOVE AND REPLACE BILATERAL BREAST IMPLANT;  Surgeon: Genaro Garcia MD;  [facility]    RECONSTRUCT BREAST BILATERAL, IMPLANT PROSTHESIS BILATERAL, COMBINED Bilateral 06/11/2021    Procedure: BILATERAL SECOND STAGE BREAST RECONSTRUCTION WITH SILICONE IMPLANTS;  Surgeon: Genaro Garcia MD;  Location: [facility]    TONSILLECTOMY & ADENOIDECTOMY         Social History     Tobacco Use    Smoking  status: Former     Current packs/day: 0.00     Average packs/day: 1 pack/day for 27.9 years (27.9 ttl pk-yrs)     Types: Cigarettes     Start date: 1971     Quit date: 12/15/1998     Years since quittin.3    Smokeless tobacco: Never    Tobacco comments:     quit 20 years ago   Substance Use Topics    Alcohol use: Yes     Comment: once in a while     Family History   Problem Relation Age of Onset    Asthma Mother     Diabetes Mother     Hypertension Mother     Arthritis Mother     Circulatory Mother     Obesity Mother     Thyroid Disease Mother     Aneurysm Mother         Brain,  at age 69    Hypertension Father     Alcohol/Drug Father     Arthritis Father     Obesity Father     Breast Cancer Maternal Grandmother     Breast Cancer Paternal Grandmother         My fathers mother    Thyroid Disease Brother     Thyroid Disease Brother     Colon Cancer No family hx of     Anesthesia Reaction No family hx of          Current Outpatient Medications   Medication Sig Dispense Refill    acetaminophen (TYLENOL) 500 MG tablet Take 500-1,000 mg by mouth daily as needed for mild pain      diclofenac (VOLTAREN) 75 MG EC tablet TAKE 1 TABLET BY MOUTH TWICE A DAY AS NEEDED FOR MODERATE PAIN 60 tablet 0    Ferrous Sulfate (IRON PO) Take 1 tablet by mouth daily      ibuprofen (ADVIL/MOTRIN) 600 MG tablet Take 600 mg by mouth every 6 hours as needed for moderate pain      levothyroxine (SYNTHROID/LEVOTHROID) 112 MCG tablet TAKE 1 TABLET BY MOUTH EVERY DAY 90 tablet 0    Multiple Vitamins-Minerals (MULTIVITAL PO) Take 1 tablet by mouth daily      omeprazole (PRILOSEC) 20 MG DR capsule TAKE 1 CAPSULE BY MOUTH TWICE A DAY (BEFORE A MEAL) 180 capsule 0    OVER-THE-COUNTER Place 1-2 drops into both eyes every hour as needed      rosuvastatin (CRESTOR) 20 MG tablet Take 1 tablet (20 mg) by mouth daily 90 tablet 3     Allergies   Allergen Reactions    Latex Anaphylaxis    Acetaminophen Nausea and Vomiting    Cortisone Nausea and  Vomiting     oral    Hydrocodone Nausea and Vomiting    Nickel     Vicodin [Acetaminophen] Nausea and Vomiting    Adhesive Tape Rash    Cvs Petroleum Jelly [Basis Facial Moisturizer] Rash    Other Drug Allergy (See Comments) Itching, Rash and Blisters     Some Medals (Nickel, Mike Silver)    Petrolatum Itching and Rash     Current providers sharing in care for this patient include:  Patient Care Team:  Sarita Shultz MD as PCP - General (Family Practice)  Sarita Shultz MD as Assigned PCP  Carmen Stein MD as MD (Surgery)  Alexis Han DO as Assigned Musculoskeletal Provider  Raquel Zayas PA-C as Physician Assistant (Urology)  Forest Conn MD as Assigned Surgical Provider    The following health maintenance items are reviewed in Epic and correct as of today:  Health Maintenance   Topic Date Due    DEXA  Never done    MEDICARE ANNUAL WELLNESS VISIT  02/07/2024    ANNUAL REVIEW OF HM ORDERS  02/07/2024    LIPID  02/15/2024    TSH W/FREE T4 REFLEX  02/15/2024    Pneumococcal Vaccine: 65+ Years (1 of 1 - PCV) 10/24/2023    FALL RISK ASSESSMENT  04/10/2025    GLUCOSE  02/19/2027    COLORECTAL CANCER SCREENING  10/05/2027    ADVANCE CARE PLANNING  02/07/2028    DTAP/TDAP/TD IMMUNIZATION (3 - Td or Tdap) 12/15/2030    HEPATITIS C SCREENING  Completed    HIV SCREENING  Completed    PHQ-2 (once per calendar year)  Completed    INFLUENZA VACCINE  Completed    ZOSTER IMMUNIZATION  Completed    RSV VACCINE (Pregnancy & 60+)  Completed    COVID-19 Vaccine  Completed    IPV IMMUNIZATION  Aged Out    HPV IMMUNIZATION  Aged Out    MENINGITIS IMMUNIZATION  Aged Out    RSV MONOCLONAL ANTIBODY  Aged Out    MAMMO SCREENING  Discontinued    PAP  Discontinued         Review of Systems  Constitutional, neuro, ENT, endocrine, pulmonary, cardiac, gastrointestinal, genitourinary, musculoskeletal, integument and psychiatric systems are negative, except as otherwise noted.     Objective    Exam  BP  "132/86   Pulse 80   Temp 97.7  F (36.5  C) (Tympanic)   Resp 18   Ht 1.626 m (5' 4\")   Wt 97 kg (213 lb 12.8 oz)   LMP 04/01/2012   SpO2 98%   BMI 36.70 kg/m     Estimated body mass index is 36.7 kg/m  as calculated from the following:    Height as of this encounter: 1.626 m (5' 4\").    Weight as of this encounter: 97 kg (213 lb 12.8 oz).    Physical Exam  GENERAL: alert and no distress  EYES: Eyes grossly normal to inspection, PERRL and conjunctivae and sclerae normal  HENT: ear canals and TM's normal, nose and mouth without ulcers or lesions  NECK: no adenopathy, no asymmetry, masses, or scars  RESP: lungs clear to auscultation - no rales, rhonchi or wheezes  CV: regular rate and rhythm, normal S1 S2, no S3 or S4, no murmur, click or rub, no peripheral edema  ABDOMEN: soft, nontender, no hepatosplenomegaly, no masses and bowel sounds normal  MS: no gross musculoskeletal defects noted, no edema  SKIN: no suspicious lesions or rashes  NEURO: Normal strength and tone, mentation intact and speech normal  PSYCH: mentation appears normal, affect normal/bright        4/10/2024   Mini Cog   Clock Draw Score 2 Normal   3 Item Recall 3 objects recalled   Mini Cog Total Score 5         Vision Screen  Patient wears corrective lenses (select all that apply): Worn during vision screen  Vision Screen Results: Pass      Signed Electronically by: Sarita Shultz MD    "

## 2024-04-12 ENCOUNTER — TELEPHONE (OUTPATIENT)
Dept: FAMILY MEDICINE | Facility: CLINIC | Age: 64
End: 2024-04-12

## 2024-04-12 NOTE — TELEPHONE ENCOUNTER
Patient called and and stated that she was scheduled for a biopsy of her adrenal gland at [facility].    She stated that they do not do it, and it needs to be a Dr. Sosa, or go to the [facility]?

## 2024-04-12 NOTE — TELEPHONE ENCOUNTER
RN called pt to relay pcp's message below. Pt states that she had an appointment with gen surg that was canceled as she was informed that this provider does not perform adrenal biopsies. RN call gen surg central scheduling who could not find why previous gen surg provider could not see pt. Central scheduler recommended RN reach out to endocrinology to see what provider can do this. RN informed of new findings and will reach out to pt with update.     RN called Endo at [phone], staff at endo states that their providers can see pt's with adrenal masses but unsure if providers can do biopsies.     Do you want us to coordinate with gen surg (with understanding that pt was turned away from first appointment) or try endo referral?    Routing to ordering provider to advise.     Kita Ch, RN on 4/12/2024 at 4:13 PM

## 2024-04-12 NOTE — TELEPHONE ENCOUNTER
Noted.   Looks like Dr Minaya referred her to General Surgery on 3/15. Did patient have this appointment?   The Surgeon would decide where and when to have the Adrenal biopsy.

## 2024-04-12 NOTE — TELEPHONE ENCOUNTER
Can we find a General surgeon that specializes in Adrenal gland ?Patient can be scheduled with the provider.

## 2024-04-15 NOTE — TELEPHONE ENCOUNTER
REFERRAL INFORMATION:  Referring Provider: Dr. Dl Calvert  Referring Clinic: [facility] - [facility]  Reason for Visit/Diagnosis: Adrenal Nodule       FUTURE VISIT INFORMATION:  Appointment Date: 5/1/2024  Appointment Time: 9 AM     NOTES RECORD STATUS  DETAILS   OFFICE NOTE from Referring Provider Internal [facility]:  2/16/24 - PCC OV with Dr. Fraga   OFFICE NOTE from Other Specialists Internal [facility]:  4/10/24 - PCC OV with Dr. Shultz  12/28/23 - PCC OV with BUDDY Aponte    [facility]:  3/5/24 - URO OV with Dr. Conn  3/1/24 - URO OV with BUDDY Barlow   HOSPITAL DISCHARGE SUMMARY/ ED VISITS  N/A    OPERATIVE REPORT N/A    ENDOSCOPY (EGD)  Received [facility]:  6/7/21 - EGD   PERTINENT LABS Internal    IMAGING (CT, MRI, US, XR)  Internal [facility]:  3/14/24 - MRI Adrenal  2/27/24 - CT Urogram

## 2024-04-15 NOTE — TELEPHONE ENCOUNTER
Hoang is who does adrenal tumors at the  according to the scheduling template.    SERGIO Chadwick RN 4/15/2024 12:28 PM

## 2024-04-17 ENCOUNTER — LAB (OUTPATIENT)
Dept: LAB | Facility: CLINIC | Age: 64
End: 2024-04-17
Payer: COMMERCIAL

## 2024-04-17 DIAGNOSIS — E27.9 ADRENAL NODULE (H): Primary | ICD-10-CM

## 2024-04-17 DIAGNOSIS — R73.03 PREDIABETES: ICD-10-CM

## 2024-04-17 DIAGNOSIS — E78.5 HYPERLIPIDEMIA LDL GOAL <130: ICD-10-CM

## 2024-04-17 DIAGNOSIS — E89.0 POSTSURGICAL HYPOTHYROIDISM: ICD-10-CM

## 2024-04-17 LAB
CHOLEST SERPL-MCNC: 173 MG/DL
FASTING STATUS PATIENT QL REPORTED: YES
HBA1C MFR BLD: 5.4 % (ref 0–5.6)
HDLC SERPL-MCNC: 64 MG/DL
LDLC SERPL CALC-MCNC: 93 MG/DL
NONHDLC SERPL-MCNC: 109 MG/DL
TRIGL SERPL-MCNC: 81 MG/DL
TSH SERPL DL<=0.005 MIU/L-ACNC: 1.74 UIU/ML (ref 0.3–4.2)

## 2024-04-17 PROCEDURE — 36415 COLL VENOUS BLD VENIPUNCTURE: CPT

## 2024-04-17 PROCEDURE — 80061 LIPID PANEL: CPT

## 2024-04-17 PROCEDURE — 83036 HEMOGLOBIN GLYCOSYLATED A1C: CPT

## 2024-04-17 PROCEDURE — 84443 ASSAY THYROID STIM HORMONE: CPT

## 2024-04-18 DIAGNOSIS — E89.0 POSTSURGICAL HYPOTHYROIDISM: ICD-10-CM

## 2024-04-18 RX ORDER — LEVOTHYROXINE SODIUM 112 UG/1
112 TABLET ORAL DAILY
Qty: 90 TABLET | Refills: 3 | Status: SHIPPED | OUTPATIENT
Start: 2024-04-18 | End: 2024-06-04

## 2024-04-18 NOTE — CONFIDENTIAL NOTE
RECORDS RECEIVED FROM: internal    DATE RECEIVED: 4.22.24    NOTES (FOR ALL VISITS) STATUS DETAILS   OFFICE NOTES from referring provider internal    Art Bolton MD      MEDICATION LIST internal     IMAGING      MRI (BRAIN) internal  MR adrenal- 3.14.24   XR (Chest) internal  2.7.23   LABS     DIABETES: HBGA1C, CREATININE, FASTING LIPIDS, MICROALBUMIN URINE, POTASSIUM, TSH, T4    THYROID: TSH, T4, CBC, THYRODLONULIN, TOTAL T3, FREE T4, CALCITONIN, CEA internal  HBGA1C- 4.17.24   TSH/T4- 4.17.24   Lipid- 4.17.24  Cortisol- 3.11.24  Bmp- 2.19.24  Cbc- 2.15.23  Cmp- 2.15.23

## 2024-04-22 ENCOUNTER — PRE VISIT (OUTPATIENT)
Dept: ENDOCRINOLOGY | Facility: CLINIC | Age: 64
End: 2024-04-22

## 2024-04-22 ENCOUNTER — VIRTUAL VISIT (OUTPATIENT)
Dept: ENDOCRINOLOGY | Facility: CLINIC | Age: 64
End: 2024-04-22
Payer: COMMERCIAL

## 2024-04-22 DIAGNOSIS — E27.8 LEFT ADRENAL MASS (H): Primary | ICD-10-CM

## 2024-04-22 PROCEDURE — 99204 OFFICE O/P NEW MOD 45 MIN: CPT | Mod: 95 | Performed by: INTERNAL MEDICINE

## 2024-04-22 ASSESSMENT — PAIN SCALES - GENERAL: PAINLEVEL: SEVERE PAIN (7)

## 2024-04-22 NOTE — PROGRESS NOTES
Video-Visit Details    Type of service:  Video Visit  Joined the call at 4/22/2024, 8:45:00 am.  Left the call at 4/22/2024, 9:10:23 am.    Originating Location (pt. Location): Home  Distant Location (provider location): Off-site    Mode of Communication:  Video Conference via [employer]    =======================================================  Assessment     Laura Ferreira is a 63 year old female with a past medical history significant for Leblanc's esophagus, breast cancer, obesity, seen for evaluation of incidentally discovered possible enlargement of the medial limb of the left adrenal gland.  No definite adrenal nodules were identified on CT and MRI, with the caveat that those were not dedicated adrenal images.  The biochemical evaluation for functionality has been unremarkable.    Recommendation:  Schedule a follow-up dedicated adrenal CT in 6 months.    She questions whether or not she can pursue spine surgery in the near future and I reassured her she has no contraindications from an endocrinological standpoint.     Orders Placed This Encounter   Procedures    CT Abdomen w/o & w Contrast     =======================================================  The patient is seen in consultation at the request of Dr. Art Bolton.     History of Present Illness:  Laura Ferreira is a 63 year old female with a past medical history significant for Leblanc's esophagus, breast cancer (2021), obesity, hypercholesterolemia, postsurgical hypothyroidism.    The patient had a CT urogram done on 2/27/2024, for evaluation of hematuria.  The CT revealed indeterminate pulmonary nodules and a possible left adrenal nodule.  Subsequently, the patient had an adrenal MRI done on 3/14/2024 which showed mild left medial limb nodular thickening, no a definite discrete nodule.  I reviewed the CT and MRI images from [place].  The MRI also revealed indeterminate hepatic lesions.  A follow-up MRI is scheduled in June this year.    The patient  underwent a 1 mg dexamethasone suppression test on 3/11/2024 which revealed a cortisol level of 1.5, negative plasma metanephrines and an aldosterone level of 7.1 and renin 3.    Laura has no prior history of hypertension, hypokalemia.  Around the time of menopause she did gain a significant amount of weight.  Over the last years, her weight has remained stable.   Family history is significant for obesity, kidney stones, an oldest brother diagnosed with a benign brain tumor.    The diagnosis of hypothyroidism ensued after undergoing thyroidectomy for a large goiter, over 20 years ago.  Other pertinent labs reviewed:  4/17/2024 TSH 1.74, A1c 5.4    ROS:  Systemic symptoms: longstanding fatigue   Eye symptoms: No eye symptoms.  Otolaryngeal symptoms: occasional dry cough; occasional dysphagia for certain food products (potatoes)   Cardiovascular symptoms: no CP, palpitations present mainly at night and longstanding - for the last 5 years - lasting up to 15 minutes   Pulmonary symptoms: SOB with exertion   Gastrointestinal symptoms: alternating episodes of constipation and diarrhea; heartburns    Genitourinary symptoms: urinates 2-3 times a night for the last 8 months, more often   Endocrine symptoms: hot flashes still present   Hematologic symptoms: No hematologic symptoms.  Musculoskeletal symptoms: back pain; muscle cramps in her legs   Neurological symptoms: No tremor; headaches present for the last year, in the morning - not evaluated for sleep apnea   Psychological symptoms: Some depression related to the loss of her , who passed away in January this year.  Skin symptoms: hair loss noticed over the years; no facial hair, no acne, easy bruising for years      Past Medical History   Past Medical History:   Diagnosis Date    Anemia     Leblanc's esophagus     EGD in 2014; recent EGD in 4/2018; repeat EGD in 3 years    Breast cancer (H)     Ex-smoker     1 PPD x 28 years, quit 20 years ago    Gastroesophageal  reflux disease     Hematuria 01/23/2007    History of breast biopsy     left    Menopausal symptoms 07/17/2009    Motion sickness     Obese     Postsurgical hypothyroidism 07/17/2009    Symptomatic menopausal or female climacteric states 07/17/2009    Tear of lateral cartilage or meniscus of knee, current 10/24/2007   Negative tissue transglutaminase antibodies in 2019  Menopause in her late 40s     Past Surgical History   Past Surgical History:   Procedure Laterality Date    ABDOMEN SURGERY  1974    appendix    APPENDECTOMY OPEN      ARTHROSCOPY KNEE RT/LT  11/15/2007    right knee arthroscopy with arthroscopic lateral meniscectomy    BIOPSY Left     Breast. Benign    CL AFF SURGICAL PATHOLOGY  09/18/2002    endometrial biopsy    COLONOSCOPY      ELBOW SURGERY Right     ESOPHAGOSCOPY, GASTROSCOPY, DUODENOSCOPY (EGD), COMBINED N/A 12/23/2014    Procedure: COMBINED ESOPHAGOSCOPY, GASTROSCOPY, DUODENOSCOPY (EGD), BIOPSY SINGLE OR MULTIPLE;  Surgeon: Paradise Radford MD;  Location: [facility]    GRAFT FAT TO BREAST Right 11/08/2021    Procedure: AND FAT GRAFTING FROM ABDOMEN;  Surgeon: Genaro Garcia MD;  Location:  OR    HC THYROIDECTOMY      goitre    INSERT TISSUE EXPANDER BREAST Bilateral 03/17/2021    Procedure: bilateral BREAST TISSUE EXPANDER;  Surgeon: Genaro Garcia MD;  Location: [facility] OR    LAMINECTOMY, FUSION LUMBAR ONE LEVEL, COMBINED  10/26/2011    Procedure:COMBINED LAMINECTOMY, FUSION LUMBAR ONE LEVEL; L4-5 Decompression, L4-5 Posterior Lateral Fusion with Madhavi and Local Bone; Surgeon:BARBRA BRIGHT; Location:[facility] OR    MASTECTOMY SIMPLE, SENTINEL NODE, COMBINED Bilateral 03/17/2021    Procedure: BILATERAL SKIN SPARING MASTECTOMY WITH right  SENTINEL LYMPH NODE BIOPSY;  Surgeon: Carmen Stein MD;  Location:  OR    RECONSTRUCT BREAST Bilateral 11/08/2021    Procedure: REVISION OF BILATERAL BREAST RECONSTRUCTION WITH REMOVE AND REPLACE BILATERAL BREAST IMPLANT;  Surgeon: Radha  Genaro Chamberlain MD;  Location: [facility]    RECONSTRUCT BREAST BILATERAL, IMPLANT PROSTHESIS BILATERAL, COMBINED Bilateral 2021    Procedure: BILATERAL SECOND STAGE BREAST RECONSTRUCTION WITH SILICONE IMPLANTS;  Surgeon: Genaro Garcia MD;  Location: [facility] OR    TONSILLECTOMY & ADENOIDECTOMY         Current Medications    Current Outpatient Medications:     acetaminophen (TYLENOL) 500 MG tablet, Take 500-1,000 mg by mouth daily as needed for mild pain, Disp: , Rfl:     diclofenac (VOLTAREN) 75 MG EC tablet, TAKE 1 TABLET BY MOUTH TWICE A DAY AS NEEDED FOR MODERATE PAIN, Disp: 60 tablet, Rfl: 0    Ferrous Sulfate (IRON PO), Take 1 tablet by mouth daily, Disp: , Rfl:     ibuprofen (ADVIL/MOTRIN) 600 MG tablet, Take 600 mg by mouth every 6 hours as needed for moderate pain, Disp: , Rfl:     levothyroxine (SYNTHROID/LEVOTHROID) 112 MCG tablet, Take 1 tablet (112 mcg) by mouth daily, Disp: 90 tablet, Rfl: 3    Multiple Vitamins-Minerals (MULTIVITAL PO), Take 1 tablet by mouth daily, Disp: , Rfl:     omeprazole (PRILOSEC) 20 MG DR capsule, TAKE 1 CAPSULE BY MOUTH TWICE A DAY (BEFORE A MEAL), Disp: 180 capsule, Rfl: 0    OVER-THE-COUNTER, Place 1-2 drops into both eyes every hour as needed, Disp: , Rfl:     rosuvastatin (CRESTOR) 20 MG tablet, Take 1 tablet (20 mg) by mouth daily, Disp: 90 tablet, Rfl: 3    Family History   Problem Relation Age of Onset    Asthma Mother     Diabetes Mother     Hypertension Mother     Arthritis Mother     Circulatory Mother     Obesity Mother     Thyroid Disease Mother     Aneurysm Mother         Brain,  at age 69    Hypertension Father     Alcohol/Drug Father     Arthritis Father     Obesity Father     Breast Cancer Maternal Grandmother     Breast Cancer Paternal Grandmother         My fathers mother    Thyroid Disease Brother     Thyroid Disease Brother     Colon Cancer No family hx of     Anesthesia Reaction No family hx of    Father diagnosed with kidney stones. No f/h of  osteoporosis or hip fractures.   Oldest brother - benign brain tumor; had surgery.     Social History  .  She has 2 children.  Former smoker from 0486-9036, approximately 1 pack a day.  She occasionally drinks alcohol. Denies using illicit drugs. Occupation: retired.     Review of Systems   Systemic:  Eye:                      No eye symptoms   Padmini-Laryngeal:     No padmnii-laryngeal symptoms, no dysphagia, no hoarseness, no cough     Breast:                  No breast symptoms  Cardiovascular:    No cardiovascular symptoms, no CP or palpitations   Pulmonary:           No pulmonary symptoms, no SOB or cough    Gastrointestinal:   No gastrointestinal symptoms, no diarrhea or constipation   Genitourinary:       No genitourinary symptoms, no increased thirst or urination   Endocrine:            No endocrine symptoms, no cold or heat intolerance   Neurological:        No neurological symptoms, no headaches, no tremor, no numbness or tingling sensation, no dizziness   Musculoskeletal:  No musculoskeletal symptoms, no muscle or joint pain   Skin:                     No skin symptoms, no dry skin, no hair falling out   Psychological:     No psychological symptoms                 Vital Signs     Previous Weights:    Wt Readings from Last 10 Encounters:   04/10/24 97 kg (213 lb 12.8 oz)   03/01/24 98.9 kg (218 lb)   12/28/23 99.2 kg (218 lb 9.6 oz)   09/13/23 98.9 kg (218 lb)   07/25/23 98.9 kg (218 lb)   02/07/23 99.2 kg (218 lb 9.6 oz)   11/08/21 94 kg (207 lb 3.2 oz)   10/26/21 94 kg (207 lb 3.2 oz)   06/11/21 88.9 kg (196 lb)   06/08/21 90.9 kg (200 lb 6.4 oz)        [facility] 04/01/2012     Physical Exam  General Appearance: alert, no distress noted   Eyes: grossly normal to inspection, conjunctivae and sclerae normal, no lid lag or stare   Respiratory: no audible wheeze, cough, or visible cyanosis.  No visible retractions or increased work of breathing.  Able to speak fully in complete sentences.  Neurological: Cranial  nerves grossly intact, mentation intact and speech normal; no tremor of the hands   Skin: no lesions on exposed skin   Psychological: mentation appears normal, affect normal, judgement and insight intact, normal speech and appearance well-groomed     Lab Results  I reviewed prior lab results documented in Epic.  TSH   Date Value Ref Range Status   04/17/2024 1.74 0.30 - 4.20 uIU/mL Final   02/15/2023 1.21 0.40 - 4.00 mU/L Final   11/18/2021 3.07 0.40 - 4.00 mU/L Final   12/15/2020 1.06 0.40 - 4.00 mU/L Final   12/10/2019 3.27 0.40 - 4.00 mU/L Final   12/06/2018 0.70 0.40 - 4.00 mU/L Final   02/28/2018 1.52 0.40 - 4.00 mU/L Final   05/16/2017 6.37 (H) 0.40 - 4.00 mU/L Final

## 2024-04-22 NOTE — LETTER
4/22/2024       RE: Laura Ferreira  [address] [facility] [address] [facility] [address]     Dear Colleague,    Thank you for referring your patient, Laura Ferreira, to the [facility] [place] at [facility]. Please see a copy of my visit note below.    Video-Visit Details    Type of service:  Video Visit  Joined the call at 4/22/2024, 8:45:00 am.  Left the call at 4/22/2024, 9:10:23 am.    Originating Location (pt. Location): Home  Distant Location (provider location): Off-site    Mode of Communication:  Video Conference via [employer]    =======================================================  Assessment     Laura Ferreira is a 63 year old female with a past medical history significant for Leblanc's esophagus, breast cancer, obesity, seen for evaluation of incidentally discovered possible enlargement of the medial limb of the left adrenal gland.  No definite adrenal nodules were identified on CT and MRI, with the caveat that those were not dedicated adrenal images.  The biochemical evaluation for functionality has been unremarkable.    Recommendation:  Schedule a follow-up dedicated adrenal CT in 6 months.    She questions whether or not she can pursue spine surgery in the near future and I reassured her she has no contraindications from an endocrinological standpoint.     Orders Placed This Encounter   Procedures    CT Abdomen w/o & w Contrast     =======================================================  The patient is seen in consultation at the request of Dr. Art Bolton.     History of Present Illness:  Laura Ferreira is a 63 year old female with a past medical history significant for Leblanc's esophagus, breast cancer (2021), obesity, hypercholesterolemia, postsurgical hypothyroidism.    The patient had a CT urogram done on 2/27/2024, for evaluation of hematuria.  The CT revealed indeterminate pulmonary nodules and a possible left adrenal nodule.   Subsequently, the patient had an adrenal MRI done on 3/14/2024 which showed mild left medial limb nodular thickening, no a definite discrete nodule.  I reviewed the CT and MRI images from [place].  The MRI also revealed indeterminate hepatic lesions.  A follow-up MRI is scheduled in June this year.    The patient underwent a 1 mg dexamethasone suppression test on 3/11/2024 which revealed a cortisol level of 1.5, negative plasma metanephrines and an aldosterone level of 7.1 and renin 3.    Laura has no prior history of hypertension, hypokalemia.  Around the time of menopause she did gain a significant amount of weight.  Over the last years, her weight has remained stable.   Family history is significant for obesity, kidney stones, an oldest brother diagnosed with a benign brain tumor.    The diagnosis of hypothyroidism ensued after undergoing thyroidectomy for a large goiter, over 20 years ago.  Other pertinent labs reviewed:  4/17/2024 TSH 1.74, A1c 5.4    ROS:  Systemic symptoms: longstanding fatigue   Eye symptoms: No eye symptoms.  Otolaryngeal symptoms: occasional dry cough; occasional dysphagia for certain food products (potatoes)   Cardiovascular symptoms: no CP, palpitations present mainly at night and longstanding - for the last 5 years - lasting up to 15 minutes   Pulmonary symptoms: SOB with exertion   Gastrointestinal symptoms: alternating episodes of constipation and diarrhea; heartburns    Genitourinary symptoms: urinates 2-3 times a night for the last 8 months, more often   Endocrine symptoms: hot flashes still present   Hematologic symptoms: No hematologic symptoms.  Musculoskeletal symptoms: back pain; muscle cramps in her legs   Neurological symptoms: No tremor; headaches present for the last year, in the morning - not evaluated for sleep apnea   Psychological symptoms: Some depression related to the loss of her , who passed away in January this year.  Skin symptoms: hair loss noticed over the  years; no facial hair, no acne, easy bruising for years      Past Medical History   Past Medical History:   Diagnosis Date    Anemia     Leblanc's esophagus     EGD in 2014; recent EGD in 4/2018; repeat EGD in 3 years    Breast cancer (H)     Ex-smoker     1 PPD x 28 years, quit 20 years ago    Gastroesophageal reflux disease     Hematuria 01/23/2007    History of breast biopsy     left    Menopausal symptoms 07/17/2009    Motion sickness     Obese     Postsurgical hypothyroidism 07/17/2009    Symptomatic menopausal or female climacteric states 07/17/2009    Tear of lateral cartilage or meniscus of knee, current 10/24/2007   Negative tissue transglutaminase antibodies in 2019  Menopause in her late 40s     Past Surgical History   Past Surgical History:   Procedure Laterality Date    ABDOMEN SURGERY  1974    appendix    APPENDECTOMY OPEN      ARTHROSCOPY KNEE RT/LT  11/15/2007    right knee arthroscopy with arthroscopic lateral meniscectomy    BIOPSY Left     Breast. Benign    CL AFF SURGICAL PATHOLOGY  09/18/2002    endometrial biopsy    COLONOSCOPY      ELBOW SURGERY Right     ESOPHAGOSCOPY, GASTROSCOPY, DUODENOSCOPY (EGD), COMBINED N/A 12/23/2014    Procedure: COMBINED ESOPHAGOSCOPY, GASTROSCOPY, DUODENOSCOPY (EGD), BIOPSY SINGLE OR MULTIPLE;  Surgeon: Paradise Radford MD;  Location: [facility]    GRAFT FAT TO BREAST Right 11/08/2021    Procedure: AND FAT GRAFTING FROM ABDOMEN;  Surgeon: Genaro Garcia MD;  Location: [facility] OR    HC THYROIDECTOMY      goitre    INSERT TISSUE EXPANDER BREAST Bilateral 03/17/2021    Procedure: bilateral BREAST TISSUE EXPANDER;  Surgeon: Genaro Garcia MD;  Location: [facility]    LAMINECTOMY, FUSION LUMBAR ONE LEVEL, COMBINED  10/26/2011    Procedure:COMBINED LAMINECTOMY, FUSION LUMBAR ONE LEVEL; L4-5 Decompression, L4-5 Posterior Lateral Fusion with Madhavi and Local Bone; Surgeon:BARBRA BRIGHT; Location:[facility]    MASTECTOMY SIMPLE, SENTINEL NODE, COMBINED Bilateral  2021    Procedure: BILATERAL SKIN SPARING MASTECTOMY WITH right  SENTINEL LYMPH NODE BIOPSY;  Surgeon: Carmen Stein MD;  Location: [facility]    RECONSTRUCT BREAST Bilateral 2021    Procedure: REVISION OF BILATERAL BREAST RECONSTRUCTION WITH REMOVE AND REPLACE BILATERAL BREAST IMPLANT;  Surgeon: Genaro Garcia MD;  [facility]: [facility]    RECONSTRUCT BREAST BILATERAL, IMPLANT PROSTHESIS BILATERAL, COMBINED Bilateral 2021    Procedure: BILATERAL SECOND STAGE BREAST RECONSTRUCTION WITH SILICONE IMPLANTS;  Surgeon: Genaro Garcia MD;  Location: [facility]    TONSILLECTOMY & ADENOIDECTOMY         Current Medications    Current Outpatient Medications:     acetaminophen (TYLENOL) 500 MG tablet, Take 500-1,000 mg by mouth daily as needed for mild pain, Disp: , Rfl:     diclofenac (VOLTAREN) 75 MG EC tablet, TAKE 1 TABLET BY MOUTH TWICE A DAY AS NEEDED FOR MODERATE PAIN, Disp: 60 tablet, Rfl: 0    Ferrous Sulfate (IRON PO), Take 1 tablet by mouth daily, Disp: , Rfl:     ibuprofen (ADVIL/MOTRIN) 600 MG tablet, Take 600 mg by mouth every 6 hours as needed for moderate pain, Disp: , Rfl:     levothyroxine (SYNTHROID/LEVOTHROID) 112 MCG tablet, Take 1 tablet (112 mcg) by mouth daily, Disp: 90 tablet, Rfl: 3    Multiple Vitamins-Minerals (MULTIVITAL PO), Take 1 tablet by mouth daily, Disp: , Rfl:     omeprazole (PRILOSEC) 20 MG DR capsule, TAKE 1 CAPSULE BY MOUTH TWICE A DAY (BEFORE A MEAL), Disp: 180 capsule, Rfl: 0    OVER-THE-COUNTER, Place 1-2 drops into both eyes every hour as needed, Disp: , Rfl:     rosuvastatin (CRESTOR) 20 MG tablet, Take 1 tablet (20 mg) by mouth daily, Disp: 90 tablet, Rfl: 3    Family History   Problem Relation Age of Onset    Asthma Mother     Diabetes Mother     Hypertension Mother     Arthritis Mother     Circulatory Mother     Obesity Mother     Thyroid Disease Mother     Aneurysm Mother         Brain,  at age 69    Hypertension Father     Alcohol/Drug Father      Arthritis Father     Obesity Father     Breast Cancer Maternal Grandmother     Breast Cancer Paternal Grandmother         My fathers mother    Thyroid Disease Brother     Thyroid Disease Brother     Colon Cancer No family hx of     Anesthesia Reaction No family hx of    Father diagnosed with kidney stones. No f/h of osteoporosis or hip fractures.   Oldest brother - benign brain tumor; had surgery.     Social History  .  She has 2 children.  Former smoker from 7700-4568, approximately 1 pack a day.  She occasionally drinks alcohol. Denies using illicit drugs. Occupation: retired.     Review of Systems   Systemic:  Eye:                      No eye symptoms   Padmini-Laryngeal:     No padmini-laryngeal symptoms, no dysphagia, no hoarseness, no cough     Breast:                  No breast symptoms  Cardiovascular:    No cardiovascular symptoms, no CP or palpitations   Pulmonary:           No pulmonary symptoms, no SOB or cough    Gastrointestinal:   No gastrointestinal symptoms, no diarrhea or constipation   Genitourinary:       No genitourinary symptoms, no increased thirst or urination   Endocrine:            No endocrine symptoms, no cold or heat intolerance   Neurological:        No neurological symptoms, no headaches, no tremor, no numbness or tingling sensation, no dizziness   Musculoskeletal:  No musculoskeletal symptoms, no muscle or joint pain   Skin:                     No skin symptoms, no dry skin, no hair falling out   Psychological:     No psychological symptoms                 Vital Signs     Previous Weights:    Wt Readings from Last 10 Encounters:   04/10/24 97 kg (213 lb 12.8 oz)   03/01/24 98.9 kg (218 lb)   12/28/23 99.2 kg (218 lb 9.6 oz)   09/13/23 98.9 kg (218 lb)   07/25/23 98.9 kg (218 lb)   02/07/23 99.2 kg (218 lb 9.6 oz)   11/08/21 94 kg (207 lb 3.2 oz)   10/26/21 94 kg (207 lb 3.2 oz)   06/11/21 88.9 kg (196 lb)   06/08/21 90.9 kg (200 lb 6.4 oz)        [facility] 04/01/2012     Physical  Exam  General Appearance: alert, no distress noted   Eyes: grossly normal to inspection, conjunctivae and sclerae normal, no lid lag or stare   Respiratory: no audible wheeze, cough, or visible cyanosis.  No visible retractions or increased work of breathing.  Able to speak fully in complete sentences.  Neurological: Cranial nerves grossly intact, mentation intact and speech normal; no tremor of the hands   Skin: no lesions on exposed skin   Psychological: mentation appears normal, affect normal, judgement and insight intact, normal speech and appearance well-groomed     Lab Results  I reviewed prior lab results documented in Epic.  TSH   Date Value Ref Range Status   04/17/2024 1.74 0.30 - 4.20 uIU/mL Final   02/15/2023 1.21 0.40 - 4.00 mU/L Final   11/18/2021 3.07 0.40 - 4.00 mU/L Final   12/15/2020 1.06 0.40 - 4.00 mU/L Final   12/10/2019 3.27 0.40 - 4.00 mU/L Final   12/06/2018 0.70 0.40 - 4.00 mU/L Final   02/28/2018 1.52 0.40 - 4.00 mU/L Final   05/16/2017 6.37 (H) 0.40 - 4.00 mU/L Final       Frida Larson MD

## 2024-04-22 NOTE — NURSING NOTE
Is the patient currently in the state of [place]? YES    Visit mode:VIDEO    If the visit is dropped, the patient can be reconnected by: VIDEO VISIT: Text to cell phone:   Telephone Information:   Mobile [phone]       Will anyone else be joining the visit? YES: How would they like to receive their invitation? Text to cell phone: [phone]  (If patient encounters technical issues they should call [phone]    How would you like to obtain your AVS? MyChart    Are changes needed to the allergy or medication list? Pt stated no med changes    Are refills needed on medications prescribed by this physician? New patient     Reason for visit: Consult (New Endocrine - new adrenal spot )    Christina Zuñiga VVF    PT has pain everyday- pt is supposed to have back surgery on May 8th.     Pts  passed away in January, so pt is still grieving.

## 2024-04-23 ENCOUNTER — ANCILLARY PROCEDURE (OUTPATIENT)
Dept: GENERAL RADIOLOGY | Facility: CLINIC | Age: 64
End: 2024-04-23
Attending: FAMILY MEDICINE
Payer: COMMERCIAL

## 2024-04-23 ENCOUNTER — OFFICE VISIT (OUTPATIENT)
Dept: FAMILY MEDICINE | Facility: CLINIC | Age: 64
End: 2024-04-23
Payer: COMMERCIAL

## 2024-04-23 VITALS
HEIGHT: 65 IN | SYSTOLIC BLOOD PRESSURE: 130 MMHG | OXYGEN SATURATION: 96 % | HEART RATE: 81 BPM | RESPIRATION RATE: 16 BRPM | WEIGHT: 215 LBS | TEMPERATURE: 97.9 F | DIASTOLIC BLOOD PRESSURE: 76 MMHG | BODY MASS INDEX: 35.82 KG/M2

## 2024-04-23 DIAGNOSIS — M54.16 LUMBAR RADICULOPATHY: ICD-10-CM

## 2024-04-23 DIAGNOSIS — Z98.1 HISTORY OF LUMBAR FUSION: ICD-10-CM

## 2024-04-23 DIAGNOSIS — I51.7 ATRIAL ENLARGEMENT, LEFT: ICD-10-CM

## 2024-04-23 DIAGNOSIS — Z01.818 PREOP GENERAL PHYSICAL EXAM: Primary | ICD-10-CM

## 2024-04-23 LAB
ALBUMIN UR-MCNC: NEGATIVE MG/DL
APPEARANCE UR: CLEAR
BILIRUB UR QL STRIP: NEGATIVE
COLOR UR AUTO: YELLOW
ERYTHROCYTE [DISTWIDTH] IN BLOOD BY AUTOMATED COUNT: 12.3 % (ref 10–15)
GLUCOSE UR STRIP-MCNC: NEGATIVE MG/DL
HCT VFR BLD AUTO: 41.7 % (ref 35–47)
HGB BLD-MCNC: 13.7 G/DL (ref 11.7–15.7)
HGB UR QL STRIP: NEGATIVE
KETONES UR STRIP-MCNC: NEGATIVE MG/DL
LEUKOCYTE ESTERASE UR QL STRIP: NEGATIVE
MCH RBC QN AUTO: 32.2 PG (ref 26.5–33)
MCHC RBC AUTO-ENTMCNC: 32.9 G/DL (ref 31.5–36.5)
MCV RBC AUTO: 98 FL (ref 78–100)
NITRATE UR QL: NEGATIVE
PH UR STRIP: 6 [PH] (ref 5–7)
PLATELET # BLD AUTO: 231 10E3/UL (ref 150–450)
RBC # BLD AUTO: 4.26 10E6/UL (ref 3.8–5.2)
SP GR UR STRIP: 1.01 (ref 1–1.03)
UROBILINOGEN UR STRIP-ACNC: 0.2 E.U./DL
WBC # BLD AUTO: 6.3 10E3/UL (ref 4–11)

## 2024-04-23 PROCEDURE — 93000 ELECTROCARDIOGRAM COMPLETE: CPT | Performed by: FAMILY MEDICINE

## 2024-04-23 PROCEDURE — 36415 COLL VENOUS BLD VENIPUNCTURE: CPT | Performed by: FAMILY MEDICINE

## 2024-04-23 PROCEDURE — 80048 BASIC METABOLIC PNL TOTAL CA: CPT | Performed by: FAMILY MEDICINE

## 2024-04-23 PROCEDURE — 71046 X-RAY EXAM CHEST 2 VIEWS: CPT | Mod: TC | Performed by: RADIOLOGY

## 2024-04-23 PROCEDURE — 99214 OFFICE O/P EST MOD 30 MIN: CPT | Mod: 25 | Performed by: FAMILY MEDICINE

## 2024-04-23 PROCEDURE — 81003 URINALYSIS AUTO W/O SCOPE: CPT | Performed by: FAMILY MEDICINE

## 2024-04-23 PROCEDURE — 85027 COMPLETE CBC AUTOMATED: CPT | Performed by: FAMILY MEDICINE

## 2024-04-23 RX ORDER — DICLOFENAC SODIUM 75 MG/1
TABLET, DELAYED RELEASE ORAL
Qty: 60 TABLET | Refills: 0 | Status: CANCELLED | OUTPATIENT
Start: 2024-04-23

## 2024-04-23 NOTE — PATIENT INSTRUCTIONS
Preparing for Your Surgery  Getting started  A nurse will call you to review your health history and instructions. They will give you an arrival time based on your scheduled surgery time. Please be ready to share:  Your doctor's clinic name and phone number  Your medical, surgical, and anesthesia history  A list of allergies and sensitivities  A list of medicines, including herbal treatments and over-the-counter drugs  Whether the patient has a legal guardian (ask how to send us the papers in advance)  Please tell us if you're pregnant--or if there's any chance you might be pregnant. Some surgeries may injure a fetus (unborn baby), so they require a pregnancy test. Surgeries that are safe for a fetus don't always need a test, and you can choose whether to have one.   If you have a child who's having surgery, please ask for a copy of Preparing for Your Child's Surgery.    Preparing for surgery  Within 10 to 30 days of surgery: Have a pre-op exam (sometimes called an H&P, or History and Physical). This can be done at a clinic or pre-operative center.  If you're having a , you may not need this exam. Talk to your care team.  At your pre-op exam, talk to your care team about all medicines you take. If you need to stop any medicines before surgery, ask when to start taking them again.  We do this for your safety. Many medicines can make you bleed too much during surgery. Some change how well surgery (anesthesia) drugs work.  Call your insurance company to let them know you're having surgery. (If you don't have insurance, call [phone].)  Call your clinic if there's any change in your health. This includes signs of a cold or flu (sore throat, runny nose, cough, rash, fever). It also includes a scrape or scratch near the surgery site.  If you have questions on the day of surgery, call your hospital or surgery center.  Eating and drinking guidelines  For your safety: Unless your surgeon tells you otherwise,  follow the guidelines below.  Eat and drink as usual until 8 hours before you arrive for surgery. After that, no food or milk.  Drink clear liquids until 2 hours before you arrive. These are liquids you can see through, like water, Gatorade, and Propel Water. They also include plain black coffee and tea (no cream or milk), candy, and breath mints. You can spit out gum when you arrive.  If you drink alcohol: Stop drinking it the night before surgery.  If your care team tells you to take medicine on the morning of surgery, it's okay to take it with a sip of water.  Preventing infection  Shower or bathe the night before and morning of your surgery. Follow the instructions your clinic gave you. (If no instructions, use regular soap.)  Don't shave or clip hair near your surgery site. We'll remove the hair if needed.  Don't smoke or vape the morning of surgery. You may chew nicotine gum up to 2 hours before surgery. A nicotine patch is okay.  Note: Some surgeries require you to completely quit smoking and nicotine. Check with your surgeon.  Your care team will make every effort to keep you safe from infection. We will:  Clean our hands often with soap and water (or an alcohol-based hand rub).  Clean the skin at your surgery site with a special soap that kills germs.  Give you a special gown to keep you warm. (Cold raises the risk of infection.)  Wear special hair covers, masks, gowns and gloves during surgery.  Give antibiotic medicine, if prescribed. Not all surgeries need antibiotics.  What to bring on the day of surgery  Photo ID and insurance card  Copy of your health care directive, if you have one  Glasses and hearing aids (bring cases)  You can't wear contacts during surgery  Inhaler and eye drops, if you use them (tell us about these when you arrive)  CPAP machine or breathing device, if you use them  A few personal items, if spending the night  If you have . . .  A pacemaker, ICD (cardiac defibrillator) or other  implant: Bring the ID card.  An implanted stimulator: Bring the remote control.  A legal guardian: Bring a copy of the certified (court-stamped) guardianship papers.  Please remove any jewelry, including body piercings. Leave jewelry and other valuables at home.  If you're going home the day of surgery  You must have a responsible adult drive you home. They should stay with you overnight as well.  If you don't have someone to stay with you, and you aren't safe to go home alone, we may keep you overnight. Insurance often won't pay for this.  After surgery  If it's hard to control your pain or you need more pain medicine, please call your surgeon's office.  Questions?   If you have any questions for your care team, list them here: _________________________________________________________________________________________________________________________________________________________________________ ____________________________________ ____________________________________ ____________________________________  For informational purposes only. Not to replace the advice of your health care provider. Copyright   2003, 2019 [employer] [facility]. All rights reserved. Clinically reviewed by Nissa Caicedo MD. [employer] 825140 - REV 12/22.    How to Take Your Medication Before Surgery  - Take all of your medications before surgery as usual  - STOP taking all vitamins and herbal supplements 14 days before surgery.  Avoid taking OTC NSAID/Aspirin products at this time

## 2024-04-23 NOTE — PROGRESS NOTES
Preoperative Evaluation  [facility] [place]  [address]  [place] [address]  Phone: [phone]  Primary Provider: Sarita Shultz  Pre-op Performing Provider: SARITA SHULTZ  Apr 23, 2024       Laura is a 63 year old, presenting for the following:  Pre Op Exam        4/23/2024     1:44 PM   Additional Questions   Roomed by MP         4/23/2024     1:44 PM   Patient Reported Additional Medications   Patient reports taking the following new medications None per patient     Surgical Information  Surgery/Procedure: L3-4 fusion  Surgery Location: [facility]  Surgeon: Diego  Surgery Date: 5/8/24  Time of Surgery: 8am  Where patient plans to recover: Other: 2 night stay  Fax number for surgical facility: [phone]    Assessment & Plan     The proposed surgical procedure is considered INTERMEDIATE risk.    Preop general physical exam  - EKG 12-lead complete w/read - Clinics  - CBC with platelets  - Basic metabolic panel  (Ca, Cl, CO2, Creat, Gluc, K, Na, BUN)  - UA Macroscopic with reflex to Microscopic and Culture - Lab Collect  - XR Chest 2 Views      Lumbar radiculopathy worsening with a prior history of  lumbar fusion      Atrial enlargement, left. Asymptomatic  - Echocardiogram Complete- ok to complete after surgery.       - No identified additional risk factors other than previously addressed    Antiplatelet or Anticoagulation Medication Instructions   - Patient is on no antiplatelet or anticoagulation medications.    Additional Medication Instructions  Patient is to take all scheduled medications on the day of surgery    Recommendation  APPROVAL GIVEN to proceed with proposed procedure pending review of diagnostic evaluation.      Subjective       HPI related to upcoming procedure:     Laura is a pleasant 63-year-old female patient of mine here for a pre-op.  Patient has a known history of chronic low back pain due to lumbar radiculopathy with a prior history of an  L4-5 fusion.  Has been seen and evaluated by spine specialist at [facility].  Outpatient conservative management to include physical therapy and an lumbar transforaminal epidural injection were completed but symptoms persisted.  Patient has opted to proceed with the proposed L3-4 fusion.  States that she understands the risks and benefits of the procedure and wishes to proceed.  Specialist requesting for a Pre-op chest x-ray, EKG and labs to include a urinalysis.    Patient denies having any new concerns today.        4/16/2024     9:34 AM   Preop Questions   1. Have you ever had a heart attack or stroke? No   2. Have you ever had surgery on your heart or blood vessels, such as a stent placement, a coronary artery bypass, or surgery on an artery in your head, neck, heart, or legs? No   3. Do you have chest pain with activity? No   4. Do you have a history of  heart failure? No   5. Do you currently have a cold, bronchitis or symptoms of other infection? No   6. Do you have a cough, shortness of breath, or wheezing? No   7. Do you or anyone in your family have previous history of blood clots? YES    8. Do you or does anyone in your family have a serious bleeding problem such as prolonged bleeding following surgeries or cuts? No   9. Have you ever had problems with anemia or been told to take iron pills? YES    10. Have you had any abnormal blood loss such as black, tarry or bloody stools, or abnormal vaginal bleeding? YES    11. Have you ever had a blood transfusion? No   12. Are you willing to have a blood transfusion if it is medically needed before, during, or after your surgery? Yes   13. Have you or any of your relatives ever had problems with anesthesia? No   14. Do you have sleep apnea, excessive snoring or daytime drowsiness? YES    14a. Do you have a CPAP machine? No   15. Do you have any artifical heart valves or other implanted medical devices like a pacemaker, defibrillator, or continuous glucose  monitor? No   16. Do you have artificial joints? No   17. Are you allergic to latex? YES:       Health Care Directive  Patient does not have a Health Care Directive or Living Will: Discussed advance care planning with patient; information given to patient to review.    Preoperative Review of    reviewed - no record of controlled substances prescribed.      Status of Chronic Conditions:  See problem list for active medical problems.  Problems all longstanding and stable, except as noted/documented.  See ROS for pertinent symptoms related to these conditions.    Patient Active Problem List    Diagnosis Date Noted    Class 2 severe obesity due to excess calories with serious comorbidity in adult (H) 04/10/2024     Priority: Medium    Right wrist pain 08/03/2023     Priority: Medium    Ductal carcinoma in situ (DCIS) of right breast 10/26/2021     Priority: Medium    Malignant neoplasm of left breast (H) 02/25/2021     Priority: Medium     Added automatically from request for surgery [id number]      Postsurgical hypothyroidism 12/05/2017     Priority: Medium    Hyperlipidemia LDL goal <130 12/05/2017     Priority: Medium    Simple endometrial hyperplasia without atypia 08/22/2011     Priority: Medium    Menorrhagia 05/12/2011     Priority: Medium    Iron deficiency anemia 05/12/2011     Priority: Medium    GERD (gastroesophageal reflux disease) 07/02/2010     Priority: Medium      Past Medical History:   Diagnosis Date    Anemia     Leblanc's esophagus     EGD in 2014; recent EGD in 4/2018; repeat EGD in 3 years    Breast cancer (H)     Ex-smoker     1 PPD x 28 years, quit 20 years ago    Gastroesophageal reflux disease     Hematuria 01/23/2007    History of breast biopsy     left    Menopausal symptoms 07/17/2009    Motion sickness     Obese     Postsurgical hypothyroidism 07/17/2009    Symptomatic menopausal or female climacteric states 07/17/2009    Tear of lateral cartilage or meniscus of knee, current 10/24/2007      Past Surgical History:   Procedure Laterality Date    ABDOMEN SURGERY  1974    appendix    APPENDECTOMY OPEN      ARTHROSCOPY KNEE RT/LT  11/15/2007    right knee arthroscopy with arthroscopic lateral meniscectomy    BIOPSY Left     Breast. Benign    CL AFF SURGICAL PATHOLOGY  09/18/2002    endometrial biopsy    COLONOSCOPY      ELBOW SURGERY Right     ESOPHAGOSCOPY, GASTROSCOPY, DUODENOSCOPY (EGD), COMBINED N/A 12/23/2014    Procedure: COMBINED ESOPHAGOSCOPY, GASTROSCOPY, DUODENOSCOPY (EGD), BIOPSY SINGLE OR MULTIPLE;  Surgeon: Paradise Radford MD;  Location: [facility] OR    GRAFT FAT TO BREAST Right 11/08/2021    Procedure: AND FAT GRAFTING FROM ABDOMEN;  Surgeon: Genaro Garcia MD;  Location: [facility] OR    HC THYROIDECTOMY      goitre    INSERT TISSUE EXPANDER BREAST Bilateral 03/17/2021    Procedure: bilateral BREAST TISSUE EXPANDER;  Surgeon: Genaro Garcia MD;  Location: [facility] OR    LAMINECTOMY, FUSION LUMBAR ONE LEVEL, COMBINED  10/26/2011    Procedure:COMBINED LAMINECTOMY, FUSION LUMBAR ONE LEVEL; L4-5 Decompression, L4-5 Posterior Lateral Fusion with Madhavi and Local Bone; Surgeon:BARBRA BRIGHT; Location:[facility] OR    MASTECTOMY SIMPLE, SENTINEL NODE, COMBINED Bilateral 03/17/2021    Procedure: BILATERAL SKIN SPARING MASTECTOMY WITH right  SENTINEL LYMPH NODE BIOPSY;  Surgeon: Carmen Stein MD;  Location: [facility] OR    RECONSTRUCT BREAST Bilateral 11/08/2021    Procedure: REVISION OF BILATERAL BREAST RECONSTRUCTION WITH REMOVE AND REPLACE BILATERAL BREAST IMPLANT;  Surgeon: Genaro Garcia MD;  Location: [facility] OR    RECONSTRUCT BREAST BILATERAL, IMPLANT PROSTHESIS BILATERAL, COMBINED Bilateral 06/11/2021    Procedure: BILATERAL SECOND STAGE BREAST RECONSTRUCTION WITH SILICONE IMPLANTS;  Surgeon: Genaro Garcia MD;  Location: [facility] OR    TONSILLECTOMY & ADENOIDECTOMY       Current Outpatient Medications   Medication Sig Dispense Refill    acetaminophen (TYLENOL) 500 MG tablet Take  500-1,000 mg by mouth daily as needed for mild pain      Ferrous Sulfate (IRON PO) Take 1 tablet by mouth daily      levothyroxine (SYNTHROID/LEVOTHROID) 112 MCG tablet Take 1 tablet (112 mcg) by mouth daily 90 tablet 3    Multiple Vitamins-Minerals (MULTIVITAL PO) Take 1 tablet by mouth daily      omeprazole (PRILOSEC) 20 MG DR capsule TAKE 1 CAPSULE BY MOUTH TWICE A DAY (BEFORE A MEAL) 180 capsule 0    OVER-THE-COUNTER Place 1-2 drops into both eyes every hour as needed      rosuvastatin (CRESTOR) 20 MG tablet Take 1 tablet (20 mg) by mouth daily 90 tablet 3       Allergies   Allergen Reactions    Latex Anaphylaxis    Acetaminophen Nausea and Vomiting    Cortisone Nausea and Vomiting     oral    Hydrocodone Nausea and Vomiting    Nickel     Vicodin [Acetaminophen] Nausea and Vomiting    Adhesive Tape Rash    Cvs Petroleum Jelly [Basis Facial Moisturizer] Rash    Other Drug Allergy (See Comments) Itching, Rash and Blisters     Some Medals (Nickel, Mike Silver)    Petrolatum Itching and Rash        Social History     Tobacco Use    Smoking status: Former     Current packs/day: 0.00     Average packs/day: 1 pack/day for 27.9 years (27.9 ttl pk-yrs)     Types: Cigarettes     Start date: 1971     Quit date: 12/15/1998     Years since quittin.3     Passive exposure: Never    Smokeless tobacco: Never    Tobacco comments:     quit 20 years ago   Substance Use Topics    Alcohol use: Yes     Comment: once in a while     Family History   Problem Relation Age of Onset    Asthma Mother     Diabetes Mother     Hypertension Mother     Arthritis Mother     Circulatory Mother     Obesity Mother     Thyroid Disease Mother     Aneurysm Mother         Brain,  at age 69    Hypertension Father     Alcohol/Drug Father     Arthritis Father     Obesity Father     Thyroid Disease Brother     Thyroid Disease Brother     Breast Cancer Maternal Grandmother     Breast Cancer Paternal Grandmother         My fathers mother     "Colon Cancer No family hx of     Anesthesia Reaction No family hx of     Bleeding Disorder No family hx of      History   Drug Use No         Review of Systems    Review of Systems  Constitutional, neuro, ENT, endocrine, pulmonary, cardiac, gastrointestinal, genitourinary, musculoskeletal, integument and psychiatric systems are negative, except as otherwise noted.    Objective    /76   Pulse 81   Temp 97.9  F (36.6  C) (Temporal)   Resp 16   Ht 1.64 m (5' 4.57\")   Wt 97.5 kg (215 lb)   LMP 04/01/2012   SpO2 96%   BMI 36.26 kg/m     Estimated body mass index is 36.26 kg/m  as calculated from the following:    Height as of this encounter: 1.64 m (5' 4.57\").    Weight as of this encounter: 97.5 kg (215 lb).  Physical Exam  GENERAL: alert and no distress  EYES: Eyes grossly normal to inspection, PERRL and conjunctivae and sclerae normal  HENT: ear canals and TM's normal, nose and mouth without ulcers or lesions  NECK: no adenopathy, no asymmetry, masses, or scars  RESP: lungs clear to auscultation - no rales, rhonchi or wheezes  CV: regular rate and rhythm, normal S1 S2, no S3 or S4, no murmur, click or rub, no peripheral edema  ABDOMEN: soft, nontender, no hepatosplenomegaly, no masses and bowel sounds normal  MS: no gross musculoskeletal defects noted, no edema  SKIN: no suspicious lesions or rashes  NEURO: Normal strength and tone, mentation intact and speech normal  PSYCH: mentation appears normal, affect normal/bright    Recent Labs   Lab Test 04/17/24  0924 02/19/24  1433 02/15/23  1110   HGB  --   --  14.3   PLT  --   --  218   NA  --  142 140   POTASSIUM  --  4.3 4.6   CR  --  0.63 0.69   A1C 5.4  --   --         Diagnostics  Labs pending at this time.  Results will be reviewed when available.   EKG: appears normal, NSR, normal axis, normal intervals, no acute ST/T changes c/w ischemia, no LVH by voltage criteria, RBBB with left atrial enlargement  . Patient is asymptomatic-recommended on echo to " further evaluate.      Revised Cardiac Risk Index (RCRI)  The patient has the following serious cardiovascular risks for perioperative complications:   - No serious cardiac risks = 0 points     RCRI Interpretation: 0 points: Class I (very low risk - 0.4% complication rate)         Signed Electronically by: Sarita Shultz MD  Copy of this evaluation report is provided to requesting physician.

## 2024-04-24 LAB
ANION GAP SERPL CALCULATED.3IONS-SCNC: 10 MMOL/L (ref 7–15)
BUN SERPL-MCNC: 27.2 MG/DL (ref 8–23)
CALCIUM SERPL-MCNC: 9.7 MG/DL (ref 8.8–10.2)
CHLORIDE SERPL-SCNC: 103 MMOL/L (ref 98–107)
CREAT SERPL-MCNC: 0.74 MG/DL (ref 0.51–0.95)
DEPRECATED HCO3 PLAS-SCNC: 27 MMOL/L (ref 22–29)
EGFRCR SERPLBLD CKD-EPI 2021: 89 ML/MIN/1.73M2
GLUCOSE SERPL-MCNC: 103 MG/DL (ref 70–99)
POTASSIUM SERPL-SCNC: 4.7 MMOL/L (ref 3.4–5.3)
SODIUM SERPL-SCNC: 140 MMOL/L (ref 135–145)

## 2024-04-25 ENCOUNTER — OFFICE VISIT (OUTPATIENT)
Dept: SURGERY | Facility: CLINIC | Age: 64
End: 2024-04-25
Payer: COMMERCIAL

## 2024-04-25 DIAGNOSIS — Z90.13 STATUS POST BILATERAL MASTECTOMY: Primary | ICD-10-CM

## 2024-04-25 PROCEDURE — 99212 OFFICE O/P EST SF 10 MIN: CPT | Performed by: SURGERY

## 2024-04-25 NOTE — LETTER
4/25/2024         RE: Laura Ferreira  [address]  [facility] [address]        Dear Colleague,    Thank you for referring your patient, Laura Ferreira, to the [facility]. Please see a copy of my visit note below.    [facility] Follow Up Note    CHIEF COMPLAINT:  H/o right breast cancer    HISTORY OF PRESENT ILLNESS:  Laura Ferreira is a 63 year old female who is seen in follow up for  right breast cancer.     She underwent bilateral mastectomies with right sentinel lymph node biopsy and immediate reconstruction with myself and Dr. Garcia on 3/17/2021 for 6cm of high grade DCIS. She had her 2nd stage reconstruction on 6/11/2021. She had replacement of her implants and fat grafting in October 2021.       She had a right chest ultrasound on 3/13/2023 due to a palpable area along her prosthesis on the right.  Ultrasound revealed subtle increased echogenicity at the site of the palpable finding which was felt to represent mild scarring.  She then had a follow-up ultrasound on 7/31/2023 which was benign.  She is here for annual chest exam.    She reports she no longer feels the lump on her right chest. She denies any concerns on self exam. She does feel more tight in the right axilla and has occasional heaviness of the right arm. She has not noticed any arm swelling. She is scheduled for back surgery (replacement of prior spinal fusion hardware and disc placement) in a couple weeks. Her , who was diagnosed just prior to her appt with me last year with lung cancer passed away earlier this year. The celebration of life is this weekend.       Past Medical History:   Diagnosis Date     Anemia      Leblanc's esophagus     EGD in 2014; recent EGD in 4/2018; repeat EGD in 3 years     Breast cancer (H)      Ex-smoker     1 PPD x 28 years, quit 20 years ago     Gastroesophageal reflux disease      Hematuria 01/23/2007     History of breast biopsy     left     Menopausal  symptoms 07/17/2009     Motion sickness      Obese      Postsurgical hypothyroidism 07/17/2009     Symptomatic menopausal or female climacteric states 07/17/2009     Tear of lateral cartilage or meniscus of knee, current 10/24/2007       Past Surgical History:   Procedure Laterality Date     ABDOMEN SURGERY  1974    appendix     APPENDECTOMY OPEN       ARTHROSCOPY KNEE RT/LT  11/15/2007    right knee arthroscopy with arthroscopic lateral meniscectomy     BIOPSY Left     Breast. Benign     CL AFF SURGICAL PATHOLOGY  09/18/2002    endometrial biopsy     COLONOSCOPY       ELBOW SURGERY Right      ESOPHAGOSCOPY, GASTROSCOPY, DUODENOSCOPY (EGD), COMBINED N/A 12/23/2014    Procedure: COMBINED ESOPHAGOSCOPY, GASTROSCOPY, DUODENOSCOPY (EGD), BIOPSY SINGLE OR MULTIPLE;  Surgeon: Paradise Radford MD;  Location: [facility]     GRAFT FAT TO BREAST Right 11/08/2021    Procedure: AND FAT GRAFTING FROM ABDOMEN;  Surgeon: Genaro Garcia MD;  Location: [facility] OR     HC THYROIDECTOMY      goitre     INSERT TISSUE EXPANDER BREAST Bilateral 03/17/2021    Procedure: bilateral BREAST TISSUE EXPANDER;  Surgeon: Genaro Garcai MD;  Location: [facility] OR     LAMINECTOMY, FUSION LUMBAR ONE LEVEL, COMBINED  10/26/2011    Procedure:COMBINED LAMINECTOMY, FUSION LUMBAR ONE LEVEL; L4-5 Decompression, L4-5 Posterior Lateral Fusion with Madhavi and Local Bone; Surgeon:BARBRA BRIGHT; Location:[facility] OR     MASTECTOMY SIMPLE, SENTINEL NODE, COMBINED Bilateral 03/17/2021    Procedure: BILATERAL SKIN SPARING MASTECTOMY WITH right  SENTINEL LYMPH NODE BIOPSY;  Surgeon: Carmen Stein MD;  Location: [facility]     RECONSTRUCT BREAST Bilateral 11/08/2021    Procedure: REVISION OF BILATERAL BREAST RECONSTRUCTION WITH REMOVE AND REPLACE BILATERAL BREAST IMPLANT;  Surgeon: Genaro Garcia MD;  [facility]: [facility]     RECONSTRUCT BREAST BILATERAL, IMPLANT PROSTHESIS BILATERAL, COMBINED Bilateral 06/11/2021    Procedure: BILATERAL SECOND STAGE BREAST  RECONSTRUCTION WITH SILICONE IMPLANTS;  Surgeon: Genaro Garcia MD;  Location: [facility]     TONSILLECTOMY & ADENOIDECTOMY         Family History   Problem Relation Age of Onset     Asthma Mother      Diabetes Mother      Hypertension Mother      Arthritis Mother      Circulatory Mother      Obesity Mother      Thyroid Disease Mother      Aneurysm Mother         Brain,  at age 69     Hypertension Father      Alcohol/Drug Father      Arthritis Father      Obesity Father      Thyroid Disease Brother      Thyroid Disease Brother      Breast Cancer Maternal Grandmother      Breast Cancer Paternal Grandmother         My fathers mother     Colon Cancer No family hx of      Anesthesia Reaction No family hx of      Bleeding Disorder No family hx of        Social History     Tobacco Use     Smoking status: Former     Current packs/day: 0.00     Average packs/day: 1 pack/day for 27.9 years (27.9 ttl pk-yrs)     Types: Cigarettes     Start date: 1971     Quit date: 12/15/1998     Years since quittin.3     Passive exposure: Never     Smokeless tobacco: Never     Tobacco comments:     quit 20 years ago   Substance Use Topics     Alcohol use: Yes     Comment: once in a while       Patient Active Problem List   Diagnosis     GERD (gastroesophageal reflux disease)     Menorrhagia     Iron deficiency anemia     Simple endometrial hyperplasia without atypia     Postsurgical hypothyroidism     Hyperlipidemia LDL goal <130     Malignant neoplasm of left breast (H)     Ductal carcinoma in situ (DCIS) of right breast     Right wrist pain     Class 2 severe obesity due to excess calories with serious comorbidity in adult (H)     Allergies   Allergen Reactions     Latex Anaphylaxis     Acetaminophen Nausea and Vomiting     Cortisone Nausea and Vomiting     oral     Hydrocodone Nausea and Vomiting     Nickel      Vicodin [Acetaminophen] Nausea and Vomiting     Adhesive Tape Rash     Cvs Petroleum Jelly [Basis Facial  Moisturizer] Rash     Other Drug Allergy (See Comments) Itching, Rash and Blisters     Some Medals (Nickel, Mike Silver)     Petrolatum Itching and Rash     Current Outpatient Medications   Medication Sig Dispense Refill     acetaminophen (TYLENOL) 500 MG tablet Take 500-1,000 mg by mouth daily as needed for mild pain       Ferrous Sulfate (IRON PO) Take 1 tablet by mouth daily       levothyroxine (SYNTHROID/LEVOTHROID) 112 MCG tablet Take 1 tablet (112 mcg) by mouth daily 90 tablet 3     Multiple Vitamins-Minerals (MULTIVITAL PO) Take 1 tablet by mouth daily       omeprazole (PRILOSEC) 20 MG DR capsule TAKE 1 CAPSULE BY MOUTH TWICE A DAY (BEFORE A MEAL) 180 capsule 0     OVER-THE-COUNTER Place 1-2 drops into both eyes every hour as needed       rosuvastatin (CRESTOR) 20 MG tablet Take 1 tablet (20 mg) by mouth daily 90 tablet 3     Vitals: LMP 04/01/2012   BMI= There is no height or weight on file to calculate BMI.    EXAM:  GENERAL: healthy, alert and no distress   BREAST: Breasts are surgically absent.  Implants are in place bilaterally.no masses palpable on either chest wall. Some tightness in the right axilla and mild cording present. No lymphedema noted.   There is no axillary or supraclavicular lymphadenopathy.  CARDIOVASCULAR:  RRR  RESPIRATORY: nonlabored breathing  NECK: Neck supple. No adenopathy. Thyroid symmetric, normal size  SKIN: No suspicious lesions or rashes  LYMPH: Normal cervical lymph nodes      ASSESSMENT/PLAN:  Laura Ferreira is now 3 years status post bilateral mastectomies with right sentinel lymph node biopsy.  Her clinical chest exam is benign.  She does have some tightness in the right axilla and we discussed considering working with PT/OT for this. This would be difficult for her with her upcoming back surgery and she will work on massaging the area and stretching on her own for now. She should continue with annual chest wall exam going forward and would like to see me for this.            Carmen Stein MD  [facility]  [phone]        Please route or send letter to:  Primary Care Provider (PCP) and Referring Provider         Again, thank you for allowing me to participate in the care of your patient.        Sincerely,        Carmen Stein MD

## 2024-04-25 NOTE — PROGRESS NOTES
[facility] Follow Up Note    CHIEF COMPLAINT:  H/o right breast cancer    HISTORY OF PRESENT ILLNESS:  Laura Ferreira is a 63 year old female who is seen in follow up for  right breast cancer.     She underwent bilateral mastectomies with right sentinel lymph node biopsy and immediate reconstruction with myself and Dr. Garcia on 3/17/2021 for 6cm of high grade DCIS. She had her 2nd stage reconstruction on 6/11/2021. She had replacement of her implants and fat grafting in October 2021.       She had a right chest ultrasound on 3/13/2023 due to a palpable area along her prosthesis on the right.  Ultrasound revealed subtle increased echogenicity at the site of the palpable finding which was felt to represent mild scarring.  She then had a follow-up ultrasound on 7/31/2023 which was benign.  She is here for annual chest exam.    She reports she no longer feels the lump on her right chest. She denies any concerns on self exam. She does feel more tight in the right axilla and has occasional heaviness of the right arm. She has not noticed any arm swelling. She is scheduled for back surgery (replacement of prior spinal fusion hardware and disc placement) in a couple weeks. Her , who was diagnosed just prior to her appt with me last year with lung cancer passed away earlier this year. The celebration of life is this weekend.       Past Medical History:   Diagnosis Date    Anemia     Leblanc's esophagus     EGD in 2014; recent EGD in 4/2018; repeat EGD in 3 years    Breast cancer (H)     Ex-smoker     1 PPD x 28 years, quit 20 years ago    Gastroesophageal reflux disease     Hematuria 01/23/2007    History of breast biopsy     left    Menopausal symptoms 07/17/2009    Motion sickness     Obese     Postsurgical hypothyroidism 07/17/2009    Symptomatic menopausal or female climacteric states 07/17/2009    Tear of lateral cartilage or meniscus of knee, current 10/24/2007       Past Surgical History:    Procedure Laterality Date    ABDOMEN SURGERY  1974    appendix    APPENDECTOMY OPEN      ARTHROSCOPY KNEE RT/LT  11/15/2007    right knee arthroscopy with arthroscopic lateral meniscectomy    BIOPSY Left     Breast. Benign    CL AFF SURGICAL PATHOLOGY  09/18/2002    endometrial biopsy    COLONOSCOPY      ELBOW SURGERY Right     ESOPHAGOSCOPY, GASTROSCOPY, DUODENOSCOPY (EGD), COMBINED N/A 12/23/2014    Procedure: COMBINED ESOPHAGOSCOPY, GASTROSCOPY, DUODENOSCOPY (EGD), BIOPSY SINGLE OR MULTIPLE;  Surgeon: Paradise Radford MD;  Location: [facility]    GRAFT FAT TO BREAST Right 11/08/2021    Procedure: AND FAT GRAFTING FROM ABDOMEN;  Surgeon: Genaro Garcia MD;  [facility]: [facility]    HC THYROIDECTOMY      goitre    INSERT TISSUE EXPANDER BREAST Bilateral 03/17/2021    Procedure: bilateral BREAST TISSUE EXPANDER;  Surgeon: Genaro Garcia MD;  [facility]: [facility]    LAMINECTOMY, FUSION LUMBAR ONE LEVEL, COMBINED  10/26/2011    Procedure:COMBINED LAMINECTOMY, FUSION LUMBAR ONE LEVEL; L4-5 Decompression, L4-5 Posterior Lateral Fusion with Madhavi and Local Bone; Surgeon:BARBRA BRIGHT; Location:[facility]    MASTECTOMY SIMPLE, SENTINEL NODE, COMBINED Bilateral 03/17/2021    Procedure: BILATERAL SKIN SPARING MASTECTOMY WITH right  SENTINEL LYMPH NODE BIOPSY;  Surgeon: Carmen Stein MD;  [facility]    RECONSTRUCT BREAST Bilateral 11/08/2021    Procedure: REVISION OF BILATERAL BREAST RECONSTRUCTION WITH REMOVE AND REPLACE BILATERAL BREAST IMPLANT;  Surgeon: Genaro Garcia MD;  [facility]: [facility]    RECONSTRUCT BREAST BILATERAL, IMPLANT PROSTHESIS BILATERAL, COMBINED Bilateral 06/11/2021    Procedure: BILATERAL SECOND STAGE BREAST RECONSTRUCTION WITH SILICONE IMPLANTS;  Surgeon: Genaro Garcia MD;  Location: [facility]    TONSILLECTOMY & ADENOIDECTOMY         Family History   Problem Relation Age of Onset    Asthma Mother     Diabetes Mother     Hypertension Mother     Arthritis Mother      Circulatory Mother     Obesity Mother     Thyroid Disease Mother     Aneurysm Mother         Brain,  at age 69    Hypertension Father     Alcohol/Drug Father     Arthritis Father     Obesity Father     Thyroid Disease Brother     Thyroid Disease Brother     Breast Cancer Maternal Grandmother     Breast Cancer Paternal Grandmother         My fathers mother    Colon Cancer No family hx of     Anesthesia Reaction No family hx of     Bleeding Disorder No family hx of        Social History     Tobacco Use    Smoking status: Former     Current packs/day: 0.00     Average packs/day: 1 pack/day for 27.9 years (27.9 ttl pk-yrs)     Types: Cigarettes     Start date: 1971     Quit date: 12/15/1998     Years since quittin.3     Passive exposure: Never    Smokeless tobacco: Never    Tobacco comments:     quit 20 years ago   Substance Use Topics    Alcohol use: Yes     Comment: once in a while       Patient Active Problem List   Diagnosis    GERD (gastroesophageal reflux disease)    Menorrhagia    Iron deficiency anemia    Simple endometrial hyperplasia without atypia    Postsurgical hypothyroidism    Hyperlipidemia LDL goal <130    Malignant neoplasm of left breast (H)    Ductal carcinoma in situ (DCIS) of right breast    Right wrist pain    Class 2 severe obesity due to excess calories with serious comorbidity in adult (H)     Allergies   Allergen Reactions    Latex Anaphylaxis    Acetaminophen Nausea and Vomiting    Cortisone Nausea and Vomiting     oral    Hydrocodone Nausea and Vomiting    Nickel     Vicodin [Acetaminophen] Nausea and Vomiting    Adhesive Tape Rash    Cvs Petroleum Jelly [Basis Facial Moisturizer] Rash    Other Drug Allergy (See Comments) Itching, Rash and Blisters     Some Medals (Nickel, Mike Silver)    Petrolatum Itching and Rash     Current Outpatient Medications   Medication Sig Dispense Refill    acetaminophen (TYLENOL) 500 MG tablet Take 500-1,000 mg by mouth daily as needed for mild  pain      Ferrous Sulfate (IRON PO) Take 1 tablet by mouth daily      levothyroxine (SYNTHROID/LEVOTHROID) 112 MCG tablet Take 1 tablet (112 mcg) by mouth daily 90 tablet 3    Multiple Vitamins-Minerals (MULTIVITAL PO) Take 1 tablet by mouth daily      omeprazole (PRILOSEC) 20 MG DR capsule TAKE 1 CAPSULE BY MOUTH TWICE A DAY (BEFORE A MEAL) 180 capsule 0    OVER-THE-COUNTER Place 1-2 drops into both eyes every hour as needed      rosuvastatin (CRESTOR) 20 MG tablet Take 1 tablet (20 mg) by mouth daily 90 tablet 3     Vitals: LMP 04/01/2012   BMI= There is no height or weight on file to calculate BMI.    EXAM:  GENERAL: healthy, alert and no distress   BREAST: Breasts are surgically absent.  Implants are in place bilaterally.no masses palpable on either chest wall. Some tightness in the right axilla and mild cording present. No lymphedema noted.   There is no axillary or supraclavicular lymphadenopathy.  CARDIOVASCULAR:  RRR  RESPIRATORY: nonlabored breathing  NECK: Neck supple. No adenopathy. Thyroid symmetric, normal size  SKIN: No suspicious lesions or rashes  LYMPH: Normal cervical lymph nodes      ASSESSMENT/PLAN:  Laura Ferreira is now 3 years status post bilateral mastectomies with right sentinel lymph node biopsy.  Her clinical chest exam is benign.  She does have some tightness in the right axilla and we discussed considering working with PT/OT for this. This would be difficult for her with her upcoming back surgery and she will work on massaging the area and stretching on her own for now. She should continue with annual chest wall exam going forward and would like to see me for this.           Carmen Stein MD  [facility]  [phone]        Please route or send letter to:  Primary Care Provider (PCP) and Referring Provider

## 2024-04-25 NOTE — NURSING NOTE
Laura Ferreira's goals for this visit include:   Chief Complaint   Patient presents with    RECHECK     Annual chest wall exam       She requests these members of her care team be copied on today's visit information: no    PCP: Sarita Shultz    Referring Provider:  No referring provider defined for this encounter.    LMP 04/01/2012     Do you need any medication refills at today's visit? No    Magdalene Crawford LPN

## 2024-04-30 ENCOUNTER — HOSPITAL ENCOUNTER (OUTPATIENT)
Dept: BONE DENSITY | Facility: HOSPITAL | Age: 64
Discharge: HOME | End: 2024-04-30
Attending: FAMILY MEDICINE | Admitting: FAMILY MEDICINE
Payer: COMMERCIAL

## 2024-04-30 DIAGNOSIS — Z78.0 POSTMENOPAUSAL ESTROGEN DEFICIENCY: ICD-10-CM

## 2024-04-30 PROCEDURE — 77080 DXA BONE DENSITY AXIAL: CPT

## 2024-04-30 PROCEDURE — 77089 TBS DXA CAL W/I&R FX RISK: CPT

## 2024-05-01 ENCOUNTER — PRE VISIT (OUTPATIENT)
Dept: SURGERY | Facility: CLINIC | Age: 64
End: 2024-05-01

## 2024-05-02 ENCOUNTER — TELEPHONE (OUTPATIENT)
Dept: ENDOCRINOLOGY | Facility: CLINIC | Age: 64
End: 2024-05-02
Payer: COMMERCIAL

## 2024-05-02 NOTE — TELEPHONE ENCOUNTER
Left Voicemail (1st Attempt) and Sent Mychart (1st Attempt) for the patient to call back and schedule the following:    Appointment type: Return Endocrine  Provider: Dr. Larson  Return date: 6 mo (around 10/22/24)  Specialty phone number: [phone]  Additional appointment(s) needed: CT ordered to be done prior to follow-up appt  Additonal Notes: LVM/Shahab x1

## 2024-05-07 ENCOUNTER — TELEPHONE (OUTPATIENT)
Dept: FAMILY MEDICINE | Facility: CLINIC | Age: 64
End: 2024-05-07

## 2024-05-07 ENCOUNTER — LAB (OUTPATIENT)
Dept: LAB | Facility: CLINIC | Age: 64
End: 2024-05-07
Payer: COMMERCIAL

## 2024-05-07 DIAGNOSIS — Z01.818 PRE-OPERATIVE EXAMINATION: Primary | ICD-10-CM

## 2024-05-07 DIAGNOSIS — Z01.818 PRE-OPERATIVE EXAMINATION: ICD-10-CM

## 2024-05-07 LAB
DEPRECATED S PYO AG THROAT QL EIA: NEGATIVE
GROUP A STREP BY PCR: NOT DETECTED

## 2024-05-07 PROCEDURE — 87651 STREP A DNA AMP PROBE: CPT

## 2024-05-07 NOTE — TELEPHONE ENCOUNTER
RN called and spoke with patient     RN assisted in scheduling lab appointment for this afternoon.      Go Li, RN, BSN, PHN  [facility]

## 2024-05-07 NOTE — TELEPHONE ENCOUNTER
Writer received call from patient regarding this request. She has surgery tomorrow, 5/8/24 at [facility] and needs a strep test to be cleared prior to surgery. This is due to new symptoms of nasal drainage. She was directed to schedule a visit (offered virtual) however she declined and requested a message be sent to PCP, Dr. Shultz to generate an order for testing.    SERGIO Morley RN  [facility], [facility]

## 2024-05-07 NOTE — TELEPHONE ENCOUNTER
Order/Referral Request    Who is requesting: [facility] where she's having back surgery    Orders being requested: strep test    Reason service is needed/diagnosis: back surgery tomorrow and has a sore throat with 3 negative covid tests    When are orders needed by: asap    Has this been discussed with Provider: No    Does patient have a preference on a Group/Provider/Facility? [facility]    Does patient have an appointment scheduled?: No    Where to send orders: Place orders within Epic    Could we send this information to you in [employer] or would you prefer to receive a phone call?:   Patient would like to be contacted via [employer]

## 2024-05-24 ENCOUNTER — TRANSFERRED RECORDS (OUTPATIENT)
Dept: HEALTH INFORMATION MANAGEMENT | Facility: CLINIC | Age: 64
End: 2024-05-24
Payer: COMMERCIAL

## 2024-05-30 ENCOUNTER — ANCILLARY PROCEDURE (OUTPATIENT)
Dept: CARDIOLOGY | Facility: CLINIC | Age: 64
End: 2024-05-30
Attending: FAMILY MEDICINE
Payer: COMMERCIAL

## 2024-05-30 DIAGNOSIS — I51.7 ATRIAL ENLARGEMENT, LEFT: ICD-10-CM

## 2024-05-30 LAB — LVEF ECHO: NORMAL

## 2024-05-30 PROCEDURE — 93306 TTE W/DOPPLER COMPLETE: CPT | Performed by: INTERNAL MEDICINE

## 2024-05-31 DIAGNOSIS — K20.90 BARRETT'S ESOPHAGUS WITH ESOPHAGITIS: ICD-10-CM

## 2024-05-31 DIAGNOSIS — K22.70 BARRETT'S ESOPHAGUS WITH ESOPHAGITIS: ICD-10-CM

## 2024-05-31 DIAGNOSIS — E78.5 HYPERLIPIDEMIA LDL GOAL <130: ICD-10-CM

## 2024-05-31 DIAGNOSIS — E89.0 POSTSURGICAL HYPOTHYROIDISM: ICD-10-CM

## 2024-05-31 RX ORDER — ROSUVASTATIN CALCIUM 20 MG/1
20 TABLET, COATED ORAL DAILY
Qty: 90 TABLET | Refills: 3 | OUTPATIENT
Start: 2024-05-31

## 2024-05-31 RX ORDER — LEVOTHYROXINE SODIUM 112 UG/1
112 TABLET ORAL DAILY
Qty: 90 TABLET | Refills: 3 | OUTPATIENT
Start: 2024-05-31

## 2024-05-31 NOTE — TELEPHONE ENCOUNTER
Diclofenac      Last Written Prescription Date:    Last Fill Quantity: ,   # refills:   Last Office Visit:   Future Office visit:       Routing refill request to provider for review/approval because:  Drug not active on patient's medication list

## 2024-06-04 NOTE — TELEPHONE ENCOUNTER
"Calling to ask status of this script. Patient was placed on the line with us. She would like ALL medications to go to [facility].      1) Omeprazole and Levothroxine need to be resent to [facility].       2) The pharmacist had a question about the patients Rosuvastatin 20 mg script.     Joe stated they have a \"statin\" allergy on the account.   They do not know what type of reaction she had in the past. He stated this is patient reported.     This is not listed on our allergy list at [facility].     RN spoke with the patient who stated she does NOT have an allergy to statins. RN encouraged her to update the pharmacy with that information.    Dr. Shultz, are you okay with patient continuing with Rosuvastatin knowing the above information?       Diane Moore RN on 6/4/2024 at 1:44 PM    "

## 2024-06-05 ENCOUNTER — TELEPHONE (OUTPATIENT)
Dept: FAMILY MEDICINE | Facility: CLINIC | Age: 64
End: 2024-06-05
Payer: COMMERCIAL

## 2024-06-05 RX ORDER — LEVOTHYROXINE SODIUM 112 UG/1
112 TABLET ORAL DAILY
Qty: 90 TABLET | Refills: 3 | Status: SHIPPED | OUTPATIENT
Start: 2024-06-05

## 2024-06-05 NOTE — TELEPHONE ENCOUNTER
Laura contacted regarding a medication question (see Refill encounter on 5/31/24).     She reports that she has had breast surgery and has bilateral implants (after breast cancer surgery).     She states that she had a painful episode in her right breast for 15-20 minutes earlier today. She massaged her breast and the pain resolved. However, she noticed a rash on her right breast after she took her shower. The rash is not itchy. No redness, swelling or fever.     Patient will apply some 1% Hydrocodone & monitor the rash.     She declined scheduling an appointment at this point. However, she will call back if  the rash does not clear up or if she has more breast pain.     Ginger Rodriguez RN BSVIVIAN  [facility]

## 2024-06-05 NOTE — TELEPHONE ENCOUNTER
Patient reports that she has tolerated Rosuvastatin well and has continued to take it without any issues.     Ginger HILL  [facility]

## 2024-06-07 ENCOUNTER — TRANSFERRED RECORDS (OUTPATIENT)
Dept: HEALTH INFORMATION MANAGEMENT | Facility: CLINIC | Age: 64
End: 2024-06-07
Payer: COMMERCIAL

## 2024-06-12 ENCOUNTER — MYC MEDICAL ADVICE (OUTPATIENT)
Dept: FAMILY MEDICINE | Facility: CLINIC | Age: 64
End: 2024-06-12
Payer: COMMERCIAL

## 2024-08-08 ENCOUNTER — ANCILLARY PROCEDURE (OUTPATIENT)
Dept: CT IMAGING | Facility: CLINIC | Age: 64
End: 2024-08-08
Attending: STUDENT IN AN ORGANIZED HEALTH CARE EDUCATION/TRAINING PROGRAM
Payer: COMMERCIAL

## 2024-08-08 DIAGNOSIS — R91.8 PULMONARY NODULES: ICD-10-CM

## 2024-08-08 PROCEDURE — 71250 CT THORAX DX C-: CPT | Mod: TC | Performed by: RADIOLOGY

## 2024-08-12 ENCOUNTER — TRANSFERRED RECORDS (OUTPATIENT)
Dept: HEALTH INFORMATION MANAGEMENT | Facility: CLINIC | Age: 64
End: 2024-08-12
Payer: COMMERCIAL

## 2024-08-13 DIAGNOSIS — R91.8 PULMONARY NODULES: Primary | ICD-10-CM

## 2024-08-14 ENCOUNTER — PATIENT OUTREACH (OUTPATIENT)
Dept: ONCOLOGY | Facility: CLINIC | Age: 64
End: 2024-08-14
Payer: COMMERCIAL

## 2024-08-14 DIAGNOSIS — R91.8 PULMONARY NODULES: Primary | ICD-10-CM

## 2024-08-14 NOTE — PROGRESS NOTES
New IP (Interventional Pulmonology) referral rec'd.  Chart reviewed.       New Patient: Interventional Pulmonary (Lung nodule) Nurse Navigator Note    [facility]    Referred to (specialty): Interventional Pulmonary (Lung nodule)    Requested provider (if applicable): n/a    Date Referral Received: 8/14/2024    Evaluation for :  Lung nodule    Clinical History (per Nurse review of records provided):    **BOOK MARKED**    Narrative & Impression   CT CHEST W/O CONTRAST 8/8/2024 9:23 AM     CLINICAL HISTORY: Pulmonary nodules     TECHNIQUE: CT chest without IV contrast. Multiplanar reformats were  obtained. Dose reduction techniques were used.  CONTRAST: None.     COMPARISON: CT urogram on 2/27/2024     FINDINGS:   LUNGS AND PLEURA: There are multiple solid, part solid and groundglass  nodules in the lungs. Representative nodules include a left lower lobe  solid nodule measuring 10 mm in mean diameter (series 4, image 136), a  right upper lobe part solid nodule measuring 10 mm in mean diameter  with the solid component measuring 6 mm (4/60/65), and adjacent right  apical groundglass nodule measuring 13 mm (4/60), and an unchanged  groundglass nodule in the lingula measuring 10 mm (4/183). No  infiltrate or pleural effusion. The central tracheobronchial tree is  clear. Minimal paraseptal emphysema. No bronchiectasis or pulmonary  fibrosis.     MEDIASTINUM/AXILLAE: Absent thyroid gland. No lymphadenopathy. No  thoracic aortic aneurysm. Mild coronary artery calcifications.     UPPER ABDOMEN: No significant finding.     MUSCULOSKELETAL: Bilateral mastectomies with breast prosthesis. No  suspicious lesions in the bones.                                                                      IMPRESSION:   1.  Multiple indeterminate part-solid, solid and groundglass nodular  opacities throughout the lungs, the largest being a left lower lobe  10  mm mean diameter solid nodule. Please see follow-up guidelines.  Recommend pulmonology consultation.     REFERENCE:  Guidelines for Management of Incidental Pulmonary Nodules Detected on  CT Images: From the Fleischner Society 2017.   Guidelines apply to incidental nodules in patients who are 35 years or  older.  Guidelines do not apply to lung cancer screening, patients with  immunosuppression, or patients with known primary cancer.     SUBSOLID NODULES  Part solid  Nodule size 6 mm or greater  CT at 3-6 months to confirm persistence. If unchanged and solid  component remains <6 mm, annual CT for 5 years.     Consider referral to lung nodule clinic.     SUDARSHAN GARCIA MD      Records Location: [facility]     Records Needed: none    Additional testing needed prior to consult: PFT's

## 2024-09-10 NOTE — TELEPHONE ENCOUNTER
RECORDS STATUS - ALL OTHER DIAGNOSIS      RECORDS RECEIVED FROM: [facility] - Internal records   DATE RECEIVED: 9/10

## 2024-09-12 ENCOUNTER — OFFICE VISIT (OUTPATIENT)
Dept: PULMONOLOGY | Facility: CLINIC | Age: 64
End: 2024-09-12
Attending: STUDENT IN AN ORGANIZED HEALTH CARE EDUCATION/TRAINING PROGRAM
Payer: COMMERCIAL

## 2024-09-12 ENCOUNTER — PRE VISIT (OUTPATIENT)
Dept: PULMONOLOGY | Facility: CLINIC | Age: 64
End: 2024-09-12

## 2024-09-12 VITALS
SYSTOLIC BLOOD PRESSURE: 134 MMHG | OXYGEN SATURATION: 95 % | BODY MASS INDEX: 36.02 KG/M2 | HEART RATE: 67 BPM | DIASTOLIC BLOOD PRESSURE: 68 MMHG | WEIGHT: 213.6 LBS

## 2024-09-12 DIAGNOSIS — R91.8 PULMONARY NODULES: ICD-10-CM

## 2024-09-12 LAB — HGB BLD-MCNC: 15.2 G/DL

## 2024-09-12 PROCEDURE — 94726 PLETHYSMOGRAPHY LUNG VOLUMES: CPT | Mod: 26 | Performed by: INTERNAL MEDICINE

## 2024-09-12 PROCEDURE — 94375 RESPIRATORY FLOW VOLUME LOOP: CPT | Mod: 26 | Performed by: INTERNAL MEDICINE

## 2024-09-12 PROCEDURE — 99204 OFFICE O/P NEW MOD 45 MIN: CPT | Performed by: INTERNAL MEDICINE

## 2024-09-12 PROCEDURE — 85018 HEMOGLOBIN: CPT | Mod: QW

## 2024-09-12 PROCEDURE — 94729 DIFFUSING CAPACITY: CPT | Mod: 26 | Performed by: INTERNAL MEDICINE

## 2024-09-12 RX ORDER — TIZANIDINE 2 MG/1
TABLET ORAL
COMMUNITY

## 2024-09-12 NOTE — PROGRESS NOTES
LUNG NODULE & INTERVENTIONAL PULMONARY CLINIC  [facility], [facility]     Laura Ferreira MRN# [id number]   Age: 63 year old YOB: 1960       Requesting Physician: Deepti Calvert MD  [address] [place] [address]       Assessment and Plan:    1.  Multiple solid and subsolid lung nodules.  Overall morphology is not concerning for cancer but she does carry some risk because of her smoking history.  She is understandably anxious because her   recent lung cancer.    I will review with our multidisciplinary conference.  She would like to do a nodify test if possible.  Consideration is 3-month CT versus more intensive investigation.  Her left lower lobe superior segment nodule looks like it may not be high yield from a bronchoscopic standpoint.           History:     Laura Ferreira is a 63 year old female with sig h/o for DCIS  who is here for evaluation/followup of lung nodule(s).    Incidental finding on a CT urogram.    Previous history of bronchitis.  Pleurisy of unknown cause about 18 years ago.    Breast cancer - 2018.    Moderate smoking history quit about 20 years    - My interpretation of the images relevant for this visit includes: Several subsolid and groundglass nodules.  1 more solid nodule in the superior segment of the left lower lobe with some satellite nodules.           Past Medical History:      Past Medical History:   Diagnosis Date    Anemia     Leblanc's esophagus     EGD in ; recent EGD in 2018; repeat EGD in 3 years    Breast cancer (H)     Ex-smoker     1 PPD x 28 years, quit 20 years ago    Gastroesophageal reflux disease     Hematuria 2007    History of breast biopsy     left    Menopausal symptoms 2009    Motion sickness     Obese     Postsurgical hypothyroidism 2009    Symptomatic menopausal or female climacteric states 2009    Tear of lateral cartilage or meniscus  of knee, current 10/24/2007           Past Surgical History:      Past Surgical History:   Procedure Laterality Date    ABDOMEN SURGERY  1974    appendix    APPENDECTOMY OPEN      ARTHROSCOPY KNEE RT/LT  11/15/2007    right knee arthroscopy with arthroscopic lateral meniscectomy    BIOPSY Left     Breast. Benign    CL AFF SURGICAL PATHOLOGY  09/18/2002    endometrial biopsy    COLONOSCOPY      ELBOW SURGERY Right     ESOPHAGOSCOPY, GASTROSCOPY, DUODENOSCOPY (EGD), COMBINED N/A 12/23/2014    Procedure: COMBINED ESOPHAGOSCOPY, GASTROSCOPY, DUODENOSCOPY (EGD), BIOPSY SINGLE OR MULTIPLE;  Surgeon: Paradise Radford MD;  Location: [facility] OR    GRAFT FAT TO BREAST Right 11/08/2021    Procedure: AND FAT GRAFTING FROM ABDOMEN;  Surgeon: Genaro Garcia MD;  Location:  OR    HC THYROIDECTOMY      goitre    INSERT TISSUE EXPANDER BREAST Bilateral 03/17/2021    Procedure: bilateral BREAST TISSUE EXPANDER;  Surgeon: Genaro Garcia MD;  Location: [facility] OR    LAMINECTOMY, FUSION LUMBAR ONE LEVEL, COMBINED  10/26/2011    Procedure:COMBINED LAMINECTOMY, FUSION LUMBAR ONE LEVEL; L4-5 Decompression, L4-5 Posterior Lateral Fusion with Madhavi and Local Bone; Surgeon:BARBRA BRIGHT; Location:[facility] OR    MASTECTOMY SIMPLE, SENTINEL NODE, COMBINED Bilateral 03/17/2021    Procedure: BILATERAL SKIN SPARING MASTECTOMY WITH right  SENTINEL LYMPH NODE BIOPSY;  Surgeon: Carmen Stein MD;  Location: [facility] OR    RECONSTRUCT BREAST Bilateral 11/08/2021    Procedure: REVISION OF BILATERAL BREAST RECONSTRUCTION WITH REMOVE AND REPLACE BILATERAL BREAST IMPLANT;  Surgeon: Genaro Garcia MD;  Location: [facility] OR    RECONSTRUCT BREAST BILATERAL, IMPLANT PROSTHESIS BILATERAL, COMBINED Bilateral 06/11/2021    Procedure: BILATERAL SECOND STAGE BREAST RECONSTRUCTION WITH SILICONE IMPLANTS;  Surgeon: Genaro Garcia MD;  Location: [facility] OR    TONSILLECTOMY & ADENOIDECTOMY            Social History:     Social History     Tobacco  Use    Smoking status: Former     Current packs/day: 0.00     Average packs/day: 1 pack/day for 27.9 years (27.9 ttl pk-yrs)     Types: Cigarettes     Start date: 1971     Quit date: 12/15/1998     Years since quittin.7     Passive exposure: Never    Smokeless tobacco: Never    Tobacco comments:     quit 20 years ago   Substance Use Topics    Alcohol use: Yes     Comment: once in a while          Family History:     Family History   Problem Relation Age of Onset    Asthma Mother     Diabetes Mother     Hypertension Mother     Arthritis Mother     Circulatory Mother     Obesity Mother     Thyroid Disease Mother     Aneurysm Mother         Brain,  at age 69    Hypertension Father     Alcohol/Drug Father     Arthritis Father     Obesity Father     Thyroid Disease Brother     Thyroid Disease Brother     Breast Cancer Maternal Grandmother     Breast Cancer Paternal Grandmother         My fathers mother    Colon Cancer No family hx of     Anesthesia Reaction No family hx of     Bleeding Disorder No family hx of            Allergies:      Allergies   Allergen Reactions    Acetaminophen Nausea and Vomiting    Latex      Added based on information entered during case entry, please review and add reactions, type, and severity as needed    Nickel     Vicodin [Acetaminophen] Nausea and Vomiting    Adhesive Tape Rash    Cortisone Nausea and Vomiting and Nausea     oral    Cvs Petroleum Jelly [Basis Facial Moisturizer] Rash    Hydrocodone Nausea and Vomiting and Nausea    Other Drug Allergy (See Comments) Itching, Rash and Blisters     Some Medals (Nickel, Mike Silver)    Petrolatum Itching and Rash          Medications:     Current Outpatient Medications   Medication Sig Dispense Refill    acetaminophen (TYLENOL) 500 MG tablet Take 500-1,000 mg by mouth daily as needed for mild pain      Ferrous Sulfate (IRON PO) Take 1 tablet by mouth daily      levothyroxine (SYNTHROID/LEVOTHROID) 112 MCG tablet Take 1 tablet  (112 mcg) by mouth daily 90 tablet 3    Multiple Vitamins-Minerals (MULTIVITAL PO) Take 1 tablet by mouth daily      omeprazole (PRILOSEC) 20 MG DR capsule TAKE 1 CAPSULE BY MOUTH TWICE A DAY (BEFORE A MEAL) 180 capsule 2    OVER-THE-COUNTER Place 1-2 drops into both eyes every hour as needed      rosuvastatin (CRESTOR) 20 MG tablet Take 1 tablet (20 mg) by mouth daily 90 tablet 3    tiZANidine (ZANAFLEX) 2 MG tablet take 1 tablet by mouth every 6 hours as needed for muscle spasm       No current facility-administered medications for this visit.          Review of Systems:     See HPI         Physical Exam:   /68 (BP Location: Left arm, Patient Position: Sitting, Cuff Size: Adult Large)   Pulse 67   Wt 96.9 kg (213 lb 9.6 oz)   LMP 04/01/2012   SpO2 95%   BMI 36.02 kg/m      Constitutional - looks well, in no apparent distress  Eyes - no redness or discharge  Respiratory -breathing appears comfortable. No wheeze or rhonchi.   Cardiac -- Normal rate, rhythm.   Skin - No appreciable discoloration or lesions (very limited exam)  Neurological - No apparent tremors. Speech fluent and articlate  Psychiatric - no signs of delirium or anxiety          Current Laboratory Data:   All laboratory and imaging data reviewed.    No results found for this or any previous visit (from the past 24 hour(s)).

## 2024-09-12 NOTE — TELEPHONE ENCOUNTER
Pulmonary Nodule Conference - 2024.      Patient Name: Laura Ferreira    Reason for conference discussion (brief overview): 63-year-old woman with history of moderate tobacco use, quit 20 years ago. Incidental finding on abdominal CT with follow-up chest CT showing multiple groundglass nodules but also irregular superior segment solid nodule is approximately 10 mm. Some satellite nodules. Her  recently  of lung cancer.  Daughter is RN and they want to be very proactive.     CT scan from 2024 showed:  - Left lower lobe solid nodule measuring 10 mm in mean diameter (series 4, image 136)  - A right upper lobe part solid nodule measuring 10 mm in mean diameter with the solid component measuring 6 mm (/65)  - Right apical groundglass nodule measuring 13 mm (60)  - Unchanged ground glass nodule in the lingula measuring 10 mm (183).        Specific Question:  Current plan is 3-month CT.  Does the group think that is reasonable?    Pertinent Histology:  n/a    Referring Physician: Dr. Harrison Holt    The patient's case was presented at the multidisciplinary conference for the above noted reason.  There was a consensus recommendation for the following actions:     Team Consensus: Will proceed with 3 month follow-up surveillance.       Case Lead:  Dr. Harrison Holt / Mary Santillan RN[facility] (IP)    Interventional Radiology Staff Present: Neptali Redd MD    RNCC will place follow-up orders and let patient know plan.   
Statement Selected

## 2024-09-13 ENCOUNTER — TEAM CONFERENCE (OUTPATIENT)
Dept: PULMONOLOGY | Facility: CLINIC | Age: 64
End: 2024-09-13
Payer: COMMERCIAL

## 2024-09-13 DIAGNOSIS — R91.8 PULMONARY NODULES: Primary | ICD-10-CM

## 2024-09-14 LAB
DLCOUNC-%PRED-PRE: 118 %
DLCOUNC-PRE: 22.87 ML/MIN/MMHG
DLCOUNC-PRED: 19.29 ML/MIN/MMHG
ERV-%PRED-PRE: 34 %
ERV-PRE: 0.36 L
ERV-PRED: 1.03 L
EXPTIME-PRE: 5.14 SEC
FEF2575-%PRED-PRE: 89 %
FEF2575-PRE: 1.72 L/SEC
FEF2575-PRED: 1.92 L/SEC
FEFMAX-%PRED-PRE: 133 %
FEFMAX-PRE: 7.91 L/SEC
FEFMAX-PRED: 5.94 L/SEC
FEV1-%PRED-PRE: 102 %
FEV1-PRE: 2.23 L
FEV1FEV6-PRE: 77 %
FEV1FEV6-PRED: 80 %
FEV1FVC-PRE: 76 %
FEV1FVC-PRED: 79 %
FEV1SVC-PRE: 78 %
FEV1SVC-PRED: 69 %
FIFMAX-PRE: 4.74 L/SEC
FRCPLETH-%PRED-PRE: 96 %
FRCPLETH-PRE: 2.61 L
FRCPLETH-PRED: 2.71 L
FVC-%PRED-PRE: 106 %
FVC-PRE: 2.92 L
FVC-PRED: 2.74 L
IC-%PRED-PRE: 121 %
IC-PRE: 2.52 L
IC-PRED: 2.07 L
RVPLETH-%PRED-PRE: 112 %
RVPLETH-PRE: 2.25 L
RVPLETH-PRED: 2 L
TLCPLETH-%PRED-PRE: 103 %
TLCPLETH-PRE: 5.13 L
TLCPLETH-PRED: 4.94 L
VA-%PRED-PRE: 88 %
VA-PRE: 4.07 L
VC-%PRED-PRE: 91 %
VC-PRE: 2.88 L
VC-PRED: 3.16 L

## 2024-10-09 ENCOUNTER — TRANSFERRED RECORDS (OUTPATIENT)
Dept: HEALTH INFORMATION MANAGEMENT | Facility: CLINIC | Age: 64
End: 2024-10-09

## 2024-10-21 ENCOUNTER — ANCILLARY PROCEDURE (OUTPATIENT)
Dept: CT IMAGING | Facility: CLINIC | Age: 64
End: 2024-10-21
Attending: INTERNAL MEDICINE
Payer: COMMERCIAL

## 2024-10-21 DIAGNOSIS — E27.8 LEFT ADRENAL MASS (H): ICD-10-CM

## 2024-10-21 PROCEDURE — 74170 CT ABD WO CNTRST FLWD CNTRST: CPT | Performed by: RADIOLOGY

## 2024-10-21 RX ORDER — IOPAMIDOL 755 MG/ML
105 INJECTION, SOLUTION INTRAVASCULAR ONCE
Status: COMPLETED | OUTPATIENT
Start: 2024-10-21 | End: 2024-10-21

## 2024-10-21 RX ADMIN — IOPAMIDOL 105 ML: 755 INJECTION, SOLUTION INTRAVASCULAR at 11:29

## 2024-10-25 NOTE — RESULT ENCOUNTER NOTE
The left adrenal gland nodule has remained stable. The CT revealed hepatic lesions and pulmonary nodules, for which I recommend to follow-up with your primary care provider.

## 2024-10-28 ENCOUNTER — VIRTUAL VISIT (OUTPATIENT)
Dept: ENDOCRINOLOGY | Facility: CLINIC | Age: 64
End: 2024-10-28
Payer: COMMERCIAL

## 2024-10-28 DIAGNOSIS — E27.8 LEFT ADRENAL MASS (H): Primary | ICD-10-CM

## 2024-10-28 DIAGNOSIS — E89.0 POSTSURGICAL HYPOTHYROIDISM: ICD-10-CM

## 2024-10-28 PROCEDURE — 99214 OFFICE O/P EST MOD 30 MIN: CPT | Mod: 95 | Performed by: INTERNAL MEDICINE

## 2024-10-28 PROCEDURE — G2211 COMPLEX E/M VISIT ADD ON: HCPCS | Mod: 95 | Performed by: INTERNAL MEDICINE

## 2024-10-28 RX ORDER — DEXAMETHASONE 1 MG
TABLET ORAL
Qty: 1 TABLET | Refills: 0 | Status: SHIPPED | OUTPATIENT
Start: 2024-10-28

## 2024-10-28 ASSESSMENT — PAIN SCALES - GENERAL: PAINLEVEL_OUTOF10: NO PAIN (0)

## 2024-10-28 NOTE — LETTER
10/28/2024       RE: Laura Ferreira  [address] [id number]     Dear Colleague,    Thank you for referring your patient, Laura Ferreira, to the [facility]. Please see a copy of my visit note below.    Video-Visit Details    Type of service:  Video Visit  Joined the call at 10/28/2024, 8:45:30 am.  Left the call at 10/28/2024, 9:07:23 am.    Originating Location (pt. Location): Home  Distant Location (provider location): Off-site    Mode of Communication:  Video Conference via [employer]    =======================================================  Assessment     Laura Ferreira is a 64 year old female with a past medical history significant for Leblanc's esophagus, breast cancer, obesity.    1. Incidentally discovered 1.5 cm left adrenal nodule which was stable on imaging from February to October 2024.  On the precontrast images, the Hounsfield unit density was around 28.   The biochemical evaluation for functionality from March 2024 has been unremarkable.  Recommendation:  Pursue another dexamethasone suppression test in March next year    2.  Surgical hypothyroidism  Clinically, the patient complains of fatigue and difficulty losing weight.  Counseled on diet and exercise, the option of pursuing a formal evaluation for sleep apnea through her primary care clinic.  Follow-up TFTs with his last lab work, in March    RTC 1 year    Orders Placed This Encounter   Procedures     Cortisol, Dexamethasone Suppression     TSH     T4 free     =======================================================  The patient is seen in follow-up.  She was initially evaluated by me in April 2024.    History of Present Illness:  Laura Ferreira is a 64 year old female with a past medical history significant for Leblanc's esophagus, breast cancer (2021), obesity, hypercholesterolemia, postsurgical hypothyroidism.    The patient had a CT  urogram done on 2/27/2024, for evaluation of hematuria.  The CT revealed indeterminate pulmonary nodules and a possible left adrenal nodule.  Subsequently, the patient had an adrenal MRI done on 3/14/2024 in [place], which showed mild left medial limb nodular thickening, no a definite discrete nodule. The MRI also revealed indeterminate hepatic lesions.  The chest CT done in August 2024 revealed multiple indeterminate pulmonary nodules, with the largest measuring 1 cm  Abdominal CT with and without contrast from 10/21/2024 showed a left adrenal nodule compatible with an adenoma, unchanged, measuring approximately 1.5 x 1.2 cm.  The Hounsfield unit density precontrast was 28.  The absolute washout was 60%.  The liver lesions were consistent with cysts and a possible atypical hemangioma.  I reviewed the CT images.  The pan is to monitor the lung nodules with a chest CT in 3 months (scheduled for mid November).    The patient has been having a dry cough for the last few weeks.  Her weight is stable.    Laura has no prior history of hypertension, hypokalemia.  Around the time of menopause she did gain a significant amount of weight.    Family history is significant for obesity, kidney stones, an oldest brother diagnosed with a benign brain tumor.    The diagnosis of hypothyroidism ensued after undergoing thyroidectomy for a large goiter, over 20 years ago.  She has been compliant in taking 112 mcg levothyroxine daily.  Most recent TFTs were normal, in April of this year.    Other pertinent labs reviewed:  4/17/2024 TSH 1.74, A1c 5.4, LDL cholesterol 93, HDL cholesterol 109, triglycerides 81.  3/11/2024  1 mg DST cortisol 1.5, negative plasma metanephrines, aldosterone 7.1 and renin 3.    She has been having a hard time losing weight.  She does not follow a particular diet.  She tries to walk daily for 35 to 45 minutes and she also does some stretching exercises.  Following the back surgery from May this year, her blood  pressure has significantly improved.     ROS:  Systemic symptoms: longstanding fatigue; she has no energy   Eye symptoms: No eye symptoms.  Otolaryngeal symptoms: as above; occasional dysphagia for certain food products  Cardiovascular symptoms: no CP, palpitations present mainly at night and longstanding - for the last 5 years - lasting up to 5 minutes or so; they are rare during daytime   Pulmonary symptoms: SOB with exertion   Gastrointestinal symptoms: alternating episodes of constipation and diarrhea - imporved since  taking probiotics; heartburns    Genitourinary symptoms: urinates 1-2 times a night, more often in the last year or so   Endocrine symptoms: hot flashes still present - at least once daily   Hematologic symptoms: No hematologic symptoms.  Musculoskeletal symptoms: back and R hip pain  Neurological symptoms: No tremor; no headaches noticed recently  Psychological symptoms: Some depression related to the loss of her , who passed away in January this year.  Skin symptoms: hair loss noticed over the years; no facial hair, no acne, easy bruising for years      Past Medical History   Past Medical History:   Diagnosis Date     Anemia      Leblanc's esophagus     EGD in 2014; recent EGD in 4/2018; repeat EGD in 3 years     Breast cancer (H)      Ex-smoker     1 PPD x 28 years, quit 20 years ago     Gastroesophageal reflux disease      Hematuria 01/23/2007     History of breast biopsy     left     Menopausal symptoms 07/17/2009     Motion sickness      Obese      Postsurgical hypothyroidism 07/17/2009     Symptomatic menopausal or female climacteric states 07/17/2009     Tear of lateral cartilage or meniscus of knee, current 10/24/2007   Negative tissue transglutaminase antibodies in 2019  Menopause in her late 40s     Past Surgical History   Past Surgical History:   Procedure Laterality Date     ABDOMEN SURGERY  1974    appendix     APPENDECTOMY OPEN       ARTHROSCOPY KNEE RT/LT  11/15/2007     right knee arthroscopy with arthroscopic lateral meniscectomy     BIOPSY Left     Breast. Benign     CL AFF SURGICAL PATHOLOGY  09/18/2002    endometrial biopsy     COLONOSCOPY       ELBOW SURGERY Right      ESOPHAGOSCOPY, GASTROSCOPY, DUODENOSCOPY (EGD), COMBINED N/A 12/23/2014    Procedure: COMBINED ESOPHAGOSCOPY, GASTROSCOPY, DUODENOSCOPY (EGD), BIOPSY SINGLE OR MULTIPLE;  Surgeon: Paradise Radford MD;  Location: [facility] OR     GRAFT FAT TO BREAST Right 11/08/2021    Procedure: AND FAT GRAFTING FROM ABDOMEN;  Surgeon: Genaro Garcia MD;  Location: [facility] OR     HC THYROIDECTOMY      goitre     INSERT TISSUE EXPANDER BREAST Bilateral 03/17/2021    Procedure: bilateral BREAST TISSUE EXPANDER;  Surgeon: Genaro Garcia MD;  Location: [facility] OR     LAMINECTOMY, FUSION LUMBAR ONE LEVEL, COMBINED  10/26/2011    Procedure:COMBINED LAMINECTOMY, FUSION LUMBAR ONE LEVEL; L4-5 Decompression, L4-5 Posterior Lateral Fusion with Madhavi and Local Bone; Surgeon:BARBRA BRIGHT; Location:[facility] OR     MASTECTOMY SIMPLE, SENTINEL NODE, COMBINED Bilateral 03/17/2021    Procedure: BILATERAL SKIN SPARING MASTECTOMY WITH right  SENTINEL LYMPH NODE BIOPSY;  Surgeon: Carmen Stein MD;  Location: [facility] OR     RECONSTRUCT BREAST Bilateral 11/08/2021    Procedure: REVISION OF BILATERAL BREAST RECONSTRUCTION WITH REMOVE AND REPLACE BILATERAL BREAST IMPLANT;  Surgeon: Genaro Garcia MD;  Location: [facility]     RECONSTRUCT BREAST BILATERAL, IMPLANT PROSTHESIS BILATERAL, COMBINED Bilateral 06/11/2021    Procedure: BILATERAL SECOND STAGE BREAST RECONSTRUCTION WITH SILICONE IMPLANTS;  Surgeon: Genaro Garcia MD;  Location: [facility] OR     TONSILLECTOMY & ADENOIDECTOMY         Current Medications    Current Outpatient Medications:      acetaminophen (TYLENOL) 500 MG tablet, Take 500-1,000 mg by mouth daily as needed for mild pain, Disp: , Rfl:      Ferrous Sulfate (IRON PO), Take 1 tablet by mouth daily, Disp: , Rfl:       levothyroxine (SYNTHROID/LEVOTHROID) 112 MCG tablet, Take 1 tablet (112 mcg) by mouth daily, Disp: 90 tablet, Rfl: 3     Multiple Vitamins-Minerals (MULTIVITAL PO), Take 1 tablet by mouth daily, Disp: , Rfl:      omeprazole (PRILOSEC) 20 MG DR capsule, TAKE 1 CAPSULE BY MOUTH TWICE A DAY (BEFORE A MEAL), Disp: 180 capsule, Rfl: 2     OVER-THE-COUNTER, Place 1-2 drops into both eyes every hour as needed, Disp: , Rfl:      rosuvastatin (CRESTOR) 20 MG tablet, Take 1 tablet (20 mg) by mouth daily, Disp: 90 tablet, Rfl: 3     tiZANidine (ZANAFLEX) 2 MG tablet, take 1 tablet by mouth every 6 hours as needed for muscle spasm, Disp: , Rfl:     Family History   Problem Relation Age of Onset     Asthma Mother      Diabetes Mother      Hypertension Mother      Arthritis Mother      Circulatory Mother      Obesity Mother      Thyroid Disease Mother      Aneurysm Mother         Brain,  at age 69     Hypertension Father      Alcohol/Drug Father      Arthritis Father      Obesity Father      Thyroid Disease Brother      Thyroid Disease Brother      Breast Cancer Maternal Grandmother      Breast Cancer Paternal Grandmother         My fathers mother     Colon Cancer No family hx of      Anesthesia Reaction No family hx of      Bleeding Disorder No family hx of    Father diagnosed with kidney stones. No f/h of osteoporosis or hip fractures.   Oldest brother - benign brain tumor; had surgery.     Social History  .  She has 2 children.  Former smoker from [id number], approximately 1 pack a day.  She occasionally drinks alcohol. Denies using illicit drugs. Occupation: retired.     Vital Signs     Previous Weights:    BP per patient - 2 weeks ago 100/70     BP Readings from Last 6 Encounters:   24 134/68   24 130/76   04/10/24 132/86   24 (!) 149/83   24 133/82   23 132/86     Wt Readings from Last 10 Encounters:   24 96.9 kg (213 lb 9.6 oz)   24 97.5 kg (215 lb)   04/10/24 97 kg (213  lb 12.8 oz)   03/01/24 98.9 kg (218 lb)   12/28/23 99.2 kg (218 lb 9.6 oz)   09/13/23 98.9 kg (218 lb)   07/25/23 98.9 kg (218 lb)   02/07/23 99.2 kg (218 lb 9.6 oz)   11/08/21 94 kg (207 lb 3.2 oz)   10/26/21 94 kg (207 lb 3.2 oz)        LMP 04/01/2012     Physical Exam  General Appearance: alert, no distress noted   Eyes: grossly normal to inspection, conjunctivae and sclerae normal, no lid lag or stare   Respiratory: no audible wheeze, cough, or visible cyanosis.  No visible retractions or increased work of breathing.  Able to speak fully in complete sentences.  Neurological: Cranial nerves grossly intact, mentation intact and speech normal; no tremor of the hands   Skin: no lesions on exposed skin   Psychological: mentation appears normal, affect normal, judgement and insight intact, normal speech and appearance well-groomed     Lab Results  I reviewed prior lab results documented in Epic.  TSH   Date Value Ref Range Status   04/17/2024 1.74 0.30 - 4.20 uIU/mL Final   02/15/2023 1.21 0.40 - 4.00 mU/L Final   11/18/2021 3.07 0.40 - 4.00 mU/L Final   12/15/2020 1.06 0.40 - 4.00 mU/L Final   12/10/2019 3.27 0.40 - 4.00 mU/L Final   12/06/2018 0.70 0.40 - 4.00 mU/L Final   02/28/2018 1.52 0.40 - 4.00 mU/L Final   05/16/2017 6.37 (H) 0.40 - 4.00 mU/L Final       The longitudinal plan of care for the diagnosis(es)/condition(s) as documented were addressed during this visit. Due to the added complexity in care, I will continue to support Laura in the subsequent management and with ongoing continuity of care.                        Again, thank you for allowing me to participate in the care of your patient.      Sincerely,    Frida Larson MD

## 2024-10-28 NOTE — PROGRESS NOTES
Video-Visit Details    Type of service:  Video Visit  Joined the call at 10/28/2024, 8:45:30 am.  Left the call at 10/28/2024, 9:07:23 am.    Originating Location (pt. Location): Home  Distant Location (provider location): Off-site    Mode of Communication:  Video Conference via [employer]    =======================================================  Assessment     Laura Ferreira is a 64 year old female with a past medical history significant for Leblanc's esophagus, breast cancer, obesity.    1. Incidentally discovered 1.5 cm left adrenal nodule which was stable on imaging from February to October 2024.  On the precontrast images, the Hounsfield unit density was around 28.   The biochemical evaluation for functionality from March 2024 has been unremarkable.  Recommendation:  Pursue another dexamethasone suppression test in March next year    2.  Surgical hypothyroidism  Clinically, the patient complains of fatigue and difficulty losing weight.  Counseled on diet and exercise, the option of pursuing a formal evaluation for sleep apnea through her primary care clinic.  Follow-up TFTs with his last lab work, in March    [facility] 1 year    Orders Placed This Encounter   Procedures    Cortisol, Dexamethasone Suppression    TSH    T4 free     =======================================================  The patient is seen in follow-up.  She was initially evaluated by me in April 2024.    History of Present Illness:  Laura Ferreira is a 64 year old female with a past medical history significant for Leblanc's esophagus, breast cancer (2021), obesity, hypercholesterolemia, postsurgical hypothyroidism.    The patient had a CT urogram done on 2/27/2024, for evaluation of hematuria.  The CT revealed indeterminate pulmonary nodules and a possible left adrenal nodule.  Subsequently, the patient had an adrenal MRI done on 3/14/2024 in [place], which showed mild left medial limb nodular thickening, no a definite discrete nodule. The MRI also  revealed indeterminate hepatic lesions.  The chest CT done in August 2024 revealed multiple indeterminate pulmonary nodules, with the largest measuring 1 cm  Abdominal CT with and without contrast from 10/21/2024 showed a left adrenal nodule compatible with an adenoma, unchanged, measuring approximately 1.5 x 1.2 cm.  The Hounsfield unit density precontrast was 28.  The absolute washout was 60%.  The liver lesions were consistent with cysts and a possible atypical hemangioma.  I reviewed the CT images.  The pan is to monitor the lung nodules with a chest CT in 3 months (scheduled for mid November).    The patient has been having a dry cough for the last few weeks.  Her weight is stable.    Laura has no prior history of hypertension, hypokalemia.  Around the time of menopause she did gain a significant amount of weight.    Family history is significant for obesity, kidney stones, an oldest brother diagnosed with a benign brain tumor.    The diagnosis of hypothyroidism ensued after undergoing thyroidectomy for a large goiter, over 20 years ago.  She has been compliant in taking 112 mcg levothyroxine daily.  Most recent TFTs were normal, in April of this year.    Other pertinent labs reviewed:  4/17/2024 TSH 1.74, A1c 5.4, LDL cholesterol 93, HDL cholesterol 109, triglycerides 81.  3/11/2024  1 mg DST cortisol 1.5, negative plasma metanephrines, aldosterone 7.1 and renin 3.    She has been having a hard time losing weight.  She does not follow a particular diet.  She tries to walk daily for 35 to 45 minutes and she also does some stretching exercises.  Following the back surgery from May this year, her blood pressure has significantly improved.     ROS:  Systemic symptoms: longstanding fatigue; she has no energy   Eye symptoms: No eye symptoms.  Otolaryngeal symptoms: as above; occasional dysphagia for certain food products  Cardiovascular symptoms: no CP, palpitations present mainly at night and longstanding - for the  last 5 years - lasting up to 5 minutes or so; they are rare during daytime   Pulmonary symptoms: SOB with exertion   Gastrointestinal symptoms: alternating episodes of constipation and diarrhea - imporved since  taking probiotics; heartburns    Genitourinary symptoms: urinates 1-2 times a night, more often in the last year or so   Endocrine symptoms: hot flashes still present - at least once daily   Hematologic symptoms: No hematologic symptoms.  Musculoskeletal symptoms: back and R hip pain  Neurological symptoms: No tremor; no headaches noticed recently  Psychological symptoms: Some depression related to the loss of her , who passed away in January this year.  Skin symptoms: hair loss noticed over the years; no facial hair, no acne, easy bruising for years      Past Medical History   Past Medical History:   Diagnosis Date    Anemia     Leblanc's esophagus     EGD in 2014; recent EGD in 4/2018; repeat EGD in 3 years    Breast cancer (H)     Ex-smoker     1 PPD x 28 years, quit 20 years ago    Gastroesophageal reflux disease     Hematuria 01/23/2007    History of breast biopsy     left    Menopausal symptoms 07/17/2009    Motion sickness     Obese     Postsurgical hypothyroidism 07/17/2009    Symptomatic menopausal or female climacteric states 07/17/2009    Tear of lateral cartilage or meniscus of knee, current 10/24/2007   Negative tissue transglutaminase antibodies in 2019  Menopause in her late 40s     Past Surgical History   Past Surgical History:   Procedure Laterality Date    ABDOMEN SURGERY  1974    appendix    APPENDECTOMY OPEN      ARTHROSCOPY KNEE RT/LT  11/15/2007    right knee arthroscopy with arthroscopic lateral meniscectomy    BIOPSY Left     Breast. Benign    CL AFF SURGICAL PATHOLOGY  09/18/2002    endometrial biopsy    COLONOSCOPY      ELBOW SURGERY Right     ESOPHAGOSCOPY, GASTROSCOPY, DUODENOSCOPY (EGD), COMBINED N/A 12/23/2014    Procedure: COMBINED ESOPHAGOSCOPY, GASTROSCOPY,  DUODENOSCOPY (EGD), BIOPSY SINGLE OR MULTIPLE;  Surgeon: Paradise Radford MD;  Location: [facility]    GRAFT FAT TO BREAST Right 11/08/2021    Procedure: AND FAT GRAFTING FROM ABDOMEN;  Surgeon: Genaro Garcia MD;  Location: [facility] OR    HC THYROIDECTOMY      goitre    INSERT TISSUE EXPANDER BREAST Bilateral 03/17/2021    Procedure: bilateral BREAST TISSUE EXPANDER;  Surgeon: Genaro Garcia MD;  Location: [facility]    LAMINECTOMY, FUSION LUMBAR ONE LEVEL, COMBINED  10/26/2011    Procedure:COMBINED LAMINECTOMY, FUSION LUMBAR ONE LEVEL; L4-5 Decompression, L4-5 Posterior Lateral Fusion with Madhavi and Local Bone; Surgeon:BARBRA BRIGHT; Location:[facility] OR    MASTECTOMY SIMPLE, SENTINEL NODE, COMBINED Bilateral 03/17/2021    Procedure: BILATERAL SKIN SPARING MASTECTOMY WITH right  SENTINEL LYMPH NODE BIOPSY;  Surgeon: Carmen Stein MD;  Location: [facility]    RECONSTRUCT BREAST Bilateral 11/08/2021    Procedure: REVISION OF BILATERAL BREAST RECONSTRUCTION WITH REMOVE AND REPLACE BILATERAL BREAST IMPLANT;  Surgeon: Genaro Garcia MD;  [facility]    RECONSTRUCT BREAST BILATERAL, IMPLANT PROSTHESIS BILATERAL, COMBINED Bilateral 06/11/2021    Procedure: BILATERAL SECOND STAGE BREAST RECONSTRUCTION WITH SILICONE IMPLANTS;  Surgeon: Genaro Garcia MD;  Location: [facility] OR    TONSILLECTOMY & ADENOIDECTOMY         Current Medications    Current Outpatient Medications:     acetaminophen (TYLENOL) 500 MG tablet, Take 500-1,000 mg by mouth daily as needed for mild pain, Disp: , Rfl:     Ferrous Sulfate (IRON PO), Take 1 tablet by mouth daily, Disp: , Rfl:     levothyroxine (SYNTHROID/LEVOTHROID) 112 MCG tablet, Take 1 tablet (112 mcg) by mouth daily, Disp: 90 tablet, Rfl: 3    Multiple Vitamins-Minerals (MULTIVITAL PO), Take 1 tablet by mouth daily, Disp: , Rfl:     omeprazole (PRILOSEC) 20 MG DR capsule, TAKE 1 CAPSULE BY MOUTH TWICE A DAY (BEFORE A MEAL), Disp: 180 capsule, Rfl: 2     OVER-THE-COUNTER, Place 1-2 drops into both eyes every hour as needed, Disp: , Rfl:     rosuvastatin (CRESTOR) 20 MG tablet, Take 1 tablet (20 mg) by mouth daily, Disp: 90 tablet, Rfl: 3    tiZANidine (ZANAFLEX) 2 MG tablet, take 1 tablet by mouth every 6 hours as needed for muscle spasm, Disp: , Rfl:     Family History   Problem Relation Age of Onset    Asthma Mother     Diabetes Mother     Hypertension Mother     Arthritis Mother     Circulatory Mother     Obesity Mother     Thyroid Disease Mother     Aneurysm Mother         Brain,  at age 69    Hypertension Father     Alcohol/Drug Father     Arthritis Father     Obesity Father     Thyroid Disease Brother     Thyroid Disease Brother     Breast Cancer Maternal Grandmother     Breast Cancer Paternal Grandmother         My fathers mother    Colon Cancer No family hx of     Anesthesia Reaction No family hx of     Bleeding Disorder No family hx of    Father diagnosed with kidney stones. No f/h of osteoporosis or hip fractures.   Oldest brother - benign brain tumor; had surgery.     Social History  .  She has 2 children.  Former smoker from 4360-5070, approximately 1 pack a day.  She occasionally drinks alcohol. Denies using illicit drugs. Occupation: retired.     Vital Signs     Previous Weights:    BP per patient - 2 weeks ago 100/70     BP Readings from Last 6 Encounters:   24 134/68   24 130/76   04/10/24 132/86   24 (!) 149/83   24 133/82   23 132/86     Wt Readings from Last 10 Encounters:   24 96.9 kg (213 lb 9.6 oz)   24 97.5 kg (215 lb)   04/10/24 97 kg (213 lb 12.8 oz)   24 98.9 kg (218 lb)   23 99.2 kg (218 lb 9.6 oz)   23 98.9 kg (218 lb)   23 98.9 kg (218 lb)   23 99.2 kg (218 lb 9.6 oz)   21 94 kg (207 lb 3.2 oz)   10/26/21 94 kg (207 lb 3.2 oz)        LMP 2012     Physical Exam  General Appearance: alert, no distress noted   Eyes: grossly normal to inspection,  conjunctivae and sclerae normal, no lid lag or stare   Respiratory: no audible wheeze, cough, or visible cyanosis.  No visible retractions or increased work of breathing.  Able to speak fully in complete sentences.  Neurological: Cranial nerves grossly intact, mentation intact and speech normal; no tremor of the hands   Skin: no lesions on exposed skin   Psychological: mentation appears normal, affect normal, judgement and insight intact, normal speech and appearance well-groomed     Lab Results  I reviewed prior lab results documented in Epic.  TSH   Date Value Ref Range Status   04/17/2024 1.74 0.30 - 4.20 uIU/mL Final   02/15/2023 1.21 0.40 - 4.00 mU/L Final   11/18/2021 3.07 0.40 - 4.00 mU/L Final   12/15/2020 1.06 0.40 - 4.00 mU/L Final   12/10/2019 3.27 0.40 - 4.00 mU/L Final   12/06/2018 0.70 0.40 - 4.00 mU/L Final   02/28/2018 1.52 0.40 - 4.00 mU/L Final   05/16/2017 6.37 (H) 0.40 - 4.00 mU/L Final       The longitudinal plan of care for the diagnosis(es)/condition(s) as documented were addressed during this visit. Due to the added complexity in care, I will continue to support Laura in the subsequent management and with ongoing continuity of care.

## 2024-10-28 NOTE — NURSING NOTE
Current patient location: [address] [facility] [address]    Is the patient currently in the state of [place]? NO    Visit mode:VIDEO    If the visit is dropped, the patient can be reconnected by: VIDEO VISIT: Text to cell phone:   Telephone Information:   Mobile [phone]       Will anyone else be joining the visit? NO  (If patient encounters technical issues they should call [phone]    Are changes needed to the allergy or medication list? No    Are refills needed on medications prescribed by this physician? Discuss with provider    Rooming Documentation:  Not applicable    Reason for visit: RECHCANDICE BELLOF       Normal rate, regular rhythm.  Heart sounds S1, S2.  No murmurs, rubs or gallops.

## 2024-11-11 DIAGNOSIS — K20.90 BARRETT'S ESOPHAGUS WITH ESOPHAGITIS: ICD-10-CM

## 2024-11-11 DIAGNOSIS — K22.70 BARRETT'S ESOPHAGUS WITH ESOPHAGITIS: ICD-10-CM

## 2024-11-14 ENCOUNTER — ANCILLARY PROCEDURE (OUTPATIENT)
Dept: CT IMAGING | Facility: CLINIC | Age: 64
End: 2024-11-14
Attending: STUDENT IN AN ORGANIZED HEALTH CARE EDUCATION/TRAINING PROGRAM
Payer: COMMERCIAL

## 2024-11-14 DIAGNOSIS — R91.8 PULMONARY NODULES: ICD-10-CM

## 2024-11-14 PROCEDURE — 71250 CT THORAX DX C-: CPT | Mod: TC | Performed by: STUDENT IN AN ORGANIZED HEALTH CARE EDUCATION/TRAINING PROGRAM

## 2024-11-19 ENCOUNTER — PATIENT OUTREACH (OUTPATIENT)
Dept: ONCOLOGY | Facility: CLINIC | Age: 64
End: 2024-11-19
Payer: COMMERCIAL

## 2024-11-19 DIAGNOSIS — R91.8 PULMONARY NODULES: Primary | ICD-10-CM

## 2024-11-19 NOTE — PROGRESS NOTES
New Patient Oncology Nurse Navigator Note     Referring provider: Dr. Harrison Holt    Referring Clinic/Organization: [facility]  Referred to: Thoracic Surgery  Requested provider (if applicable): First available - did not specify   Referral Received: 11/19/24       Evaluation for :   Diagnosis   R91.8 (ICD-10-CM) - Pulmonary nodules     Clinical History (per Nurse review of records provided):      09/12/2024 PFT done (bookmarked)     11/14/2024 CT Chest w/o contrast (bookmarked) showed:   IMPRESSION:   1.  Slightly increased in size indeterminate left lower lobe solid  nodule measuring 13 x 10 mm, previously 11 x 8 mm. Recommend  short-term follow-up with CT chest and/or PET/CT.  2.  Other solid, groundglass and part solid nodules have not  significantly changed.    Clinical Assessment / Barriers to Care (Per Nurse):    Records Location: [facility]   Records Needed: None  Additional testing needed prior to consult: PET  Referral updates and Plan:     11/19: Msg sent to Krista to notify pt of the referral.   11/20: Per Dr. Holt, he has ordered a PET and notified Laura of the referral.  Writer called Laura to discuss the referral. She confirmed she is aware of the PET order and reports she knows what a PET scan is because her  just recently passed away from lung cancer.     She had several questions about cancer care through the [facility] system which writer answered to the best of my ability. She reports her 's oncologist was through the [facility] system and he received treatment at [facility]. Writer explained that if she needs a medical oncologist in future we would call her and talk to her about where she would like to be seen. Writer gave Laura my direct number in case she has any additional questions or concerns. Her call was transferred to NPS to schedule.    Addendum 11/21: Per chart review PET is scheduled 12/4 and consult is scheduled with Dr. Ortiz 12/19. Per Dr. Holt timing of appts is  acceptable.     Kiley Crawford, SUNNYN, RN, OCN  [facility] Oncology Nurse Navigator  [phone] / [phone]

## 2024-11-20 DIAGNOSIS — R91.8 PULMONARY NODULES: Primary | ICD-10-CM

## 2024-12-04 ENCOUNTER — HOSPITAL ENCOUNTER (OUTPATIENT)
Dept: PET IMAGING | Facility: HOSPITAL | Age: 64
Discharge: HOME | End: 2024-12-04
Attending: INTERNAL MEDICINE
Payer: COMMERCIAL

## 2024-12-04 DIAGNOSIS — R91.8 PULMONARY NODULES: ICD-10-CM

## 2024-12-04 LAB — GLUCOSE BLDC GLUCOMTR-MCNC: 93 MG/DL (ref 70–99)

## 2024-12-04 PROCEDURE — 82962 GLUCOSE BLOOD TEST: CPT

## 2024-12-04 PROCEDURE — 78815 PET IMAGE W/CT SKULL-THIGH: CPT | Mod: PI

## 2024-12-04 PROCEDURE — A9552 F18 FDG: HCPCS | Performed by: INTERNAL MEDICINE

## 2024-12-04 PROCEDURE — 343N000001 HC RX 343 MED OP 636: Performed by: INTERNAL MEDICINE

## 2024-12-04 RX ORDER — FLUDEOXYGLUCOSE F 18 200 MCI/ML
7-18 INJECTION, SOLUTION INTRAVENOUS ONCE
Status: COMPLETED | OUTPATIENT
Start: 2024-12-04 | End: 2024-12-04

## 2024-12-04 RX ADMIN — FLUDEOXYGLUCOSE F 18 12.73 MILLICURIE: 200 INJECTION, SOLUTION INTRAVENOUS at 13:53

## 2024-12-11 NOTE — TELEPHONE ENCOUNTER
RECORDS STATUS - ALL OTHER DIAGNOSIS      RECORDS RECEIVED FROM: [facility] - Internal records   DATE RECEIVED: 12/11

## 2024-12-12 ENCOUNTER — PATIENT OUTREACH (OUTPATIENT)
Dept: SURGERY | Facility: CLINIC | Age: 64
End: 2024-12-12

## 2024-12-12 ENCOUNTER — ONCOLOGY VISIT (OUTPATIENT)
Dept: SURGERY | Facility: CLINIC | Age: 64
End: 2024-12-12
Attending: INTERNAL MEDICINE
Payer: COMMERCIAL

## 2024-12-12 ENCOUNTER — PRE VISIT (OUTPATIENT)
Dept: SURGERY | Facility: CLINIC | Age: 64
End: 2024-12-12

## 2024-12-12 VITALS
BODY MASS INDEX: 37.03 KG/M2 | DIASTOLIC BLOOD PRESSURE: 89 MMHG | HEIGHT: 64 IN | HEART RATE: 62 BPM | WEIGHT: 216.9 LBS | OXYGEN SATURATION: 97 % | RESPIRATION RATE: 16 BRPM | SYSTOLIC BLOOD PRESSURE: 153 MMHG | TEMPERATURE: 98.4 F

## 2024-12-12 DIAGNOSIS — R91.8 PULMONARY NODULES: ICD-10-CM

## 2024-12-12 PROCEDURE — G0463 HOSPITAL OUTPT CLINIC VISIT: HCPCS | Performed by: STUDENT IN AN ORGANIZED HEALTH CARE EDUCATION/TRAINING PROGRAM

## 2024-12-12 PROCEDURE — 99204 OFFICE O/P NEW MOD 45 MIN: CPT | Performed by: STUDENT IN AN ORGANIZED HEALTH CARE EDUCATION/TRAINING PROGRAM

## 2024-12-12 ASSESSMENT — PAIN SCALES - GENERAL: PAINLEVEL_OUTOF10: MODERATE PAIN (4)

## 2024-12-12 NOTE — NURSING NOTE
"Oncology Rooming Note    December 12, 2024 9:27 AM   Luara Ferreira is a 64 year old female who presents for:    Chief Complaint   Patient presents with    Oncology Clinic Visit     Pulmonary nodules     Initial Vitals: BP (!) 153/89 (BP Location: Right arm, Patient Position: Sitting, Cuff Size: Adult Regular)   Pulse 62   Temp 98.4  F (36.9  C) (Oral)   Resp 16   Ht 1.632 m (5' 4.25\")   Wt 98.4 kg (216 lb 14.4 oz)   LMP 04/01/2012   SpO2 97%   BMI 36.94 kg/m   Estimated body mass index is 36.94 kg/m  as calculated from the following:    Height as of this encounter: 1.632 m (5' 4.25\").    Weight as of this encounter: 98.4 kg (216 lb 14.4 oz). Body surface area is 2.11 meters squared.  Moderate Pain (4) Comment: Data Unavailable   Patient's last menstrual period was 04/01/2012.  Allergies reviewed: Yes  Medications reviewed: Yes    Medications: Medication refills not needed today.  Pharmacy name entered into [employer]:    [facility] - [address] [address] [place] [address] [place]     Frailty Screening:   Is the patient here for a new oncology consult visit in cancer care? 1. Yes. Over the past month, have you experienced difficulty or required a caregiver to assist with:   1. Balance, walking or general mobility (including any falls)? NO  2. Completion of self-care tasks such as bathing, dressing, toileting, grooming/hygiene?  NO  3. Concentration or memory that affects your daily life?  NO       Clinical concerns: none      Shraddha Jimenez, EMT  12/12/2024              "

## 2024-12-12 NOTE — LETTER
12/12/2024      Laura Ferreira  [address] [facility] [address]      Dear Colleague,    Thank you for referring your patient, Laura Ferreira, to the [facility]. Please see a copy of my visit note below.    THORACIC SURGERY - NEW PATIENT OFFICE VISIT          I saw Laura Ferreira at Dr. Harrison Holt's request in consultation for the evaluation and treatment of a indeterminate pulmonary nodule of the LEFT lower lobe.    HPI  Ms. Laura Ferreira is a 64 year old female patient who presents with an indeterminate left lower lobe nodule.    This was incidentally found during work up of hematuria.    She has no smptoms.  She is active and healthy at baseline     ECOG performance status  0- Fully active, without restriction                 Previsit Tests   PFT (9/12/24): FEV1 102% predicted, 2.23 L, DLCO 118%    CT scan (11/14/24): LEFT lower lobe 1.3 x 1.0 cm pulmonary Nodule, adjacent solid satellite 0.5 cm nodule, Right upper lobe semisolid, 24 x 10 mm with lateral 5 mm solid component, and Lingula, 13 mm groundglass nodule without mediastinal adenopathy, with no pleural effusion   PET scan (12/4/24): FDG avid 1.2 x 0.3 cm LEFT lower lobe Nodule (Max SUV 5.9). Several other small groundglass and part solid nodules elsewhere in both lungs are present, some of which demonstrate low-level but appreciable uptake above background, suspicious for multifocal low-grade pulmonary adenocarcinoma.       Brain MRI (not sone ):      PMH  Reviewed, as below    Past Medical History:   Diagnosis Date     Anemia      Leblanc's esophagus     EGD in 2014; recent EGD in 4/2018; repeat EGD in 3 years     Breast cancer (H)      Ex-smoker     1 PPD x 28 years, quit 20 years ago     Gastroesophageal reflux disease      Hematuria 01/23/2007     History of breast biopsy     left     Menopausal symptoms 07/17/2009     Motion sickness      Obese      Postsurgical hypothyroidism 07/17/2009     Symptomatic  menopausal or female climacteric states 07/17/2009     Tear of lateral cartilage or meniscus of knee, current 10/24/2007        [facility]  Reviewed, as below    Past Surgical History:   Procedure Laterality Date     ABDOMEN SURGERY  1974    appendix     APPENDECTOMY OPEN       ARTHROSCOPY KNEE RT/LT  11/15/2007    right knee arthroscopy with arthroscopic lateral meniscectomy     BIOPSY Left     Breast. Benign     CL AFF SURGICAL PATHOLOGY  09/18/2002    endometrial biopsy     COLONOSCOPY       ELBOW SURGERY Right      ESOPHAGOSCOPY, GASTROSCOPY, DUODENOSCOPY (EGD), COMBINED N/A 12/23/2014    Procedure: COMBINED ESOPHAGOSCOPY, GASTROSCOPY, DUODENOSCOPY (EGD), BIOPSY SINGLE OR MULTIPLE;  Surgeon: Paradise Radford MD;  Location: [facility] OR     GRAFT FAT TO BREAST Right 11/08/2021    Procedure: AND FAT GRAFTING FROM ABDOMEN;  Surgeon: Genaro Garcia MD;  Location: [facility] OR     HC THYROIDECTOMY      goitre     INSERT TISSUE EXPANDER BREAST Bilateral 03/17/2021    Procedure: bilateral BREAST TISSUE EXPANDER;  Surgeon: Genaro Garcia MD;  Location: [facility] OR     LAMINECTOMY, FUSION LUMBAR ONE LEVEL, COMBINED  10/26/2011    Procedure:COMBINED LAMINECTOMY, FUSION LUMBAR ONE LEVEL; L4-5 Decompression, L4-5 Posterior Lateral Fusion with Madhavi and Local Bone; Surgeon:BARBRA BRIGHT; Location:[facility] OR     MASTECTOMY SIMPLE, SENTINEL NODE, COMBINED Bilateral 03/17/2021    Procedure: BILATERAL SKIN SPARING MASTECTOMY WITH right  SENTINEL LYMPH NODE BIOPSY;  Surgeon: Carmen Stein MD;  Location: [facility] OR     RECONSTRUCT BREAST Bilateral 11/08/2021    Procedure: REVISION OF BILATERAL BREAST RECONSTRUCTION WITH REMOVE AND REPLACE BILATERAL BREAST IMPLANT;  Surgeon: Genaro Garcia MD;  Location: [facility] OR     RECONSTRUCT BREAST BILATERAL, IMPLANT PROSTHESIS BILATERAL, COMBINED Bilateral 06/11/2021    Procedure: BILATERAL SECOND STAGE BREAST RECONSTRUCTION WITH SILICONE IMPLANTS;  Surgeon: Genaro Garcia MD;   Location:  OR     TONSILLECTOMY & ADENOIDECTOMY          Allergies   Allergen Reactions     Acetaminophen Nausea and Vomiting     Latex      Added based on information entered during case entry, please review and add reactions, type, and severity as needed     Nickel      Vicodin [Acetaminophen] Nausea and Vomiting     Adhesive Tape Rash     Cortisone Nausea and Vomiting and Nausea     oral     Cvs Petroleum Jelly [Basis Facial Moisturizer] Rash     Hydrocodone Nausea and Vomiting and Nausea     Other Drug Allergy (See Comments) Itching, Rash and Blisters     Some Medals (Nickel, Mike Silver)     Petrolatum Itching and Rash       Current Outpatient Medications   Medication Sig Dispense Refill     acetaminophen (TYLENOL) 500 MG tablet Take 500-1,000 mg by mouth daily as needed for mild pain       dexAMETHasone (DECADRON) 1 MG tablet Take one tablet at 11 PM and have labwork scheduled the following morning at 8 AM. 1 tablet 0     Ferrous Sulfate (IRON PO) Take 1 tablet by mouth daily       levothyroxine (SYNTHROID/LEVOTHROID) 112 MCG tablet Take 1 tablet (112 mcg) by mouth daily 90 tablet 3     Multiple Vitamins-Minerals (MULTIVITAL PO) Take 1 tablet by mouth daily       omeprazole (PRILOSEC) 20 MG DR capsule TAKE 1 CAPSULE BY MOUTH TWICE  DAILY BEFORE MEALS 200 capsule 0     OVER-THE-COUNTER Place 1-2 drops into both eyes every hour as needed       rosuvastatin (CRESTOR) 20 MG tablet Take 1 tablet (20 mg) by mouth daily 90 tablet 3     No current facility-administered medications for this visit.       ETOH: occasional   TOBACCO: stopped 20 yrs ago   OTHER DRUGS:     Physical examination  LMP 04/01/2012        From a personal perspective, she is here with her 2 daughters     Code Status:     Health Care Proxy:    IMPRESSION   64 year old female patient with LEFT lower lobe indeterminate pulmonary nodule and bilateral GGOs             PLAN  I spent 45 min on the date of the encounter in chart review, patient  visit, review of tests, documentation and/or discussion with other providers about the issues documented above. I reviewed the plan as follows:  We discussed that she had multiple nodules-  GGOs that were stable and can be observed and one mostly solid one in the LLL with PET uptake that was more suspicious for a malignancy. We discussed options of a biopsy vs straight to surgery. SHe prefers to have it resected irrespective of the biopsy.  Given that other GGOs needing observation and potential for further lung resection in the future, we will plan for a segmentectomy with aplan for lobectomy only if nodes ar epositive on frozen. I spoke to her about the possibility of the nodule being benign and the technical non-feasibility of a wedge in her case.  Procedure planned: Procedure planned: Robotic Left  segmentectomy, possible lobectomy, with possible  thoracotomy.  I reviewed the indications, risks, and benefits of the procedure with Ms. Laura Ferreira. We discussed the intraoperative risks of bleeding and injury to vital organs, potential postoperative complications including, but not limited to, major respiratory events, arrhythmia, bleeding, infection, reoperation, and death. I explained the anticipated hospital course (+/- 1-3 days) and postoperative recovery including pain control, chest drain management, and variable degrees of dyspnea (or need for supplemental oxygen) and fatigue that tend to get better with time.  .  Necessary Preop Tests & Appointments: Preoperative assessment clinic, Pulmonary function testing, and MRI Brain  Regional Anesthesia Plan: Intraoperative intercostal nerve block  Anticoagulation Plan: Prophylactic Lovenox      I appreciate the opportunity to participate in the care of your patient and will keep you updated.  Sincerely,            Again, thank you for allowing me to participate in the care of your patient.        Sincerely,        Shae Ocasio MD

## 2024-12-12 NOTE — PROGRESS NOTES
THORACIC SURGERY - NEW PATIENT OFFICE VISIT          I saw Laura Ferreira at Dr. Harrison Holt's request in consultation for the evaluation and treatment of a indeterminate pulmonary nodule of the LEFT lower lobe.    HPI  Ms. Laura Ferreira is a 64 year old female patient who presents with an indeterminate left lower lobe nodule.    This was incidentally found during work up of hematuria.    She has no smptoms.  She is active and healthy at baseline     ECOG performance status  0- Fully active, without restriction                 Previsit Tests   PFT (9/12/24): FEV1 102% predicted, 2.23 L, DLCO 118%    CT scan (11/14/24): LEFT lower lobe 1.3 x 1.0 cm pulmonary Nodule, adjacent solid satellite 0.5 cm nodule, Right upper lobe semisolid, 24 x 10 mm with lateral 5 mm solid component, and Lingula, 13 mm groundglass nodule without mediastinal adenopathy, with no pleural effusion   PET scan (12/4/24): FDG avid 1.2 x 0.3 cm LEFT lower lobe Nodule (Max SUV 5.9). Several other small groundglass and part solid nodules elsewhere in both lungs are present, some of which demonstrate low-level but appreciable uptake above background, suspicious for multifocal low-grade pulmonary adenocarcinoma.       Brain MRI (not sone ):      PMH  Reviewed, as below    Past Medical History:   Diagnosis Date    Anemia     Leblanc's esophagus     EGD in 2014; recent EGD in 4/2018; repeat EGD in 3 years    Breast cancer (H)     Ex-smoker     1 PPD x 28 years, quit 20 years ago    Gastroesophageal reflux disease     Hematuria 01/23/2007    History of breast biopsy     left    Menopausal symptoms 07/17/2009    Motion sickness     Obese     Postsurgical hypothyroidism 07/17/2009    Symptomatic menopausal or female climacteric states 07/17/2009    Tear of lateral cartilage or meniscus of knee, current 10/24/2007        PSH  Reviewed, as below    Past Surgical History:   Procedure Laterality Date    ABDOMEN SURGERY  1974    appendix    APPENDECTOMY OPEN       ARTHROSCOPY KNEE RT/LT  11/15/2007    right knee arthroscopy with arthroscopic lateral meniscectomy    BIOPSY Left     Breast. Benign    CL AFF SURGICAL PATHOLOGY  09/18/2002    endometrial biopsy    COLONOSCOPY      ELBOW SURGERY Right     ESOPHAGOSCOPY, GASTROSCOPY, DUODENOSCOPY (EGD), COMBINED N/A 12/23/2014    Procedure: COMBINED ESOPHAGOSCOPY, GASTROSCOPY, DUODENOSCOPY (EGD), BIOPSY SINGLE OR MULTIPLE;  Surgeon: Paradise Radford MD;  Location: [facility] OR    GRAFT FAT TO BREAST Right 11/08/2021    Procedure: AND FAT GRAFTING FROM ABDOMEN;  Surgeon: Genaro Garcia MD;  Location: [facility] OR    HC THYROIDECTOMY      goitre    INSERT TISSUE EXPANDER BREAST Bilateral 03/17/2021    Procedure: bilateral BREAST TISSUE EXPANDER;  Surgeon: Genaro Garcia MD;  Location: [facility] OR    LAMINECTOMY, FUSION LUMBAR ONE LEVEL, COMBINED  10/26/2011    Procedure:COMBINED LAMINECTOMY, FUSION LUMBAR ONE LEVEL; L4-5 Decompression, L4-5 Posterior Lateral Fusion with Madhavi and Local Bone; Surgeon:BARBRA BRIGHT; Location:[facility] OR    MASTECTOMY SIMPLE, SENTINEL NODE, COMBINED Bilateral 03/17/2021    Procedure: BILATERAL SKIN SPARING MASTECTOMY WITH right  SENTINEL LYMPH NODE BIOPSY;  Surgeon: Carmen Stein MD;  Location:  OR    RECONSTRUCT BREAST Bilateral 11/08/2021    Procedure: REVISION OF BILATERAL BREAST RECONSTRUCTION WITH REMOVE AND REPLACE BILATERAL BREAST IMPLANT;  Surgeon: Genaro Garcia MD;  Location: [facility] OR    RECONSTRUCT BREAST BILATERAL, IMPLANT PROSTHESIS BILATERAL, COMBINED Bilateral 06/11/2021    Procedure: BILATERAL SECOND STAGE BREAST RECONSTRUCTION WITH SILICONE IMPLANTS;  Surgeon: Genaro Garcia MD;  Location: [facility] OR    TONSILLECTOMY & ADENOIDECTOMY          Allergies   Allergen Reactions    Acetaminophen Nausea and Vomiting    Latex      Added based on information entered during case entry, please review and add reactions, type, and severity as needed    Nickel     Vicodin  [Acetaminophen] Nausea and Vomiting    Adhesive Tape Rash    Cortisone Nausea and Vomiting and Nausea     oral    Cvs Petroleum Jelly [Basis Facial Moisturizer] Rash    Hydrocodone Nausea and Vomiting and Nausea    Other Drug Allergy (See Comments) Itching, Rash and Blisters     Some Medals (Nickel, Mike Silver)    Petrolatum Itching and Rash       Current Outpatient Medications   Medication Sig Dispense Refill    acetaminophen (TYLENOL) 500 MG tablet Take 500-1,000 mg by mouth daily as needed for mild pain      dexAMETHasone (DECADRON) 1 MG tablet Take one tablet at 11 PM and have labwork scheduled the following morning at 8 AM. 1 tablet 0    Ferrous Sulfate (IRON PO) Take 1 tablet by mouth daily      levothyroxine (SYNTHROID/LEVOTHROID) 112 MCG tablet Take 1 tablet (112 mcg) by mouth daily 90 tablet 3    Multiple Vitamins-Minerals (MULTIVITAL PO) Take 1 tablet by mouth daily      omeprazole (PRILOSEC) 20 MG DR capsule TAKE 1 CAPSULE BY MOUTH TWICE  DAILY BEFORE MEALS 200 capsule 0    OVER-THE-COUNTER Place 1-2 drops into both eyes every hour as needed      rosuvastatin (CRESTOR) 20 MG tablet Take 1 tablet (20 mg) by mouth daily 90 tablet 3     No current facility-administered medications for this visit.       ETOH: occasional   TOBACCO: stopped 20 yrs ago   OTHER DRUGS:     Physical examination  LMP 04/01/2012        From a personal perspective, she is here with her 2 daughters     Code Status:     Health Care Proxy:    IMPRESSION   64 year old female patient with LEFT lower lobe indeterminate pulmonary nodule and RUL GGOs             PLAN  I spent 45 min on the date of the encounter in chart review, patient visit, review of tests, documentation and/or discussion with other providers about the issues documented above. I reviewed the plan as follows:  We discussed that she had multiple   Procedure planned: ***.  Necessary Preop Tests & Appointments: {TSPREOP:859791}  Regional Anesthesia Plan:  {tsblocks:409471}  Anticoagulation Plan: {tsanticoagulation:973196}  Smoking Cessation:   Ms. Ferreira was counseled on the importance of smoking cessation and its impact on the tani-operative course. {tscessation:250589} This guideline is in accordance with the World Health Organization (WHO) and has shown to lower the risk of both surgical and anesthesia complications as well as improve post-operative outcomes.     I appreciate the opportunity to participate in the care of your patient and will keep you updated.  Sincerely,    ***

## 2024-12-15 ENCOUNTER — PREP FOR PROCEDURE (OUTPATIENT)
Dept: SURGERY | Facility: CLINIC | Age: 64
End: 2024-12-15
Payer: COMMERCIAL

## 2024-12-15 DIAGNOSIS — R91.1 LUNG NODULE: Primary | ICD-10-CM

## 2024-12-15 RX ORDER — ENOXAPARIN SODIUM 100 MG/ML
40 INJECTION SUBCUTANEOUS
OUTPATIENT
Start: 2024-12-15

## 2024-12-24 ENCOUNTER — TELEPHONE (OUTPATIENT)
Dept: SURGERY | Facility: CLINIC | Age: 64
End: 2024-12-24
Payer: COMMERCIAL

## 2024-12-24 NOTE — TELEPHONE ENCOUNTER
Spoke with patient to schedule procedure with Dr. Ocasio   Procedure was scheduled on 01/15 at [facility] OR  Patient will have H&P with pac    Informed pt of surgery details:  Date:    Wednesday, January 15, 2024  Arrival Time:  5:30 AM    Pt is aware surgery start time may change, and someone will reach out with the new arrival time, if so.    Patient is aware a COVID-19 test is needed before their procedure ONLY IF symptomatic.   (Patient is aware Thoracic is no longer requiring COVID-19 test)       Patient is aware a / is needed day of surgery.   Surgery Letter was sent via [employer],     Patient has my direct contact information for any further questions.      Appointments in Next Year      Dec 27, 2024 4:30 PM  (Arrive by 4:00 PM)  MR BRAIN W/O & W CONTRAST with JENNIFERRMMICKIE  [facility] ([facility] ) [phone]     Jan 02, 2025 1:00 PM  (Arrive by 12:45 PM)  PAC EVALUATION with America Tsang PA-C  [facility] [place] ([facility] ) [phone]     Jan 24, 2025 10:30 AM  (Arrive by 10:15 AM)  Return Patient with HAIR Pierre  [facility] ([facility] ) [phone]

## 2024-12-26 NOTE — TELEPHONE ENCOUNTER
FUTURE VISIT INFORMATION      SURGERY INFORMATION:  Date: 1/15/25  Location: [facility] or  Surgeon:  Shae Ocasio MD   Anesthesia Type:  general  Procedure: LOBECTOMY, LUNG, ROBOT-ASSISTED, possible segmentectomy, possible wedge resection     RECORDS REQUESTED FROM:       Primary Care Provider: Sarita Shultz MD - [facility]    Most recent EKG+ Tracin24    Most recent ECHO: 24    Most recent PFT's: 24

## 2024-12-31 ENCOUNTER — TELEPHONE (OUTPATIENT)
Dept: ENDOCRINOLOGY | Facility: CLINIC | Age: 64
End: 2024-12-31

## 2024-12-31 NOTE — TELEPHONE ENCOUNTER
Patient confirmed scheduled appointment:  Date: 10/13/25  Time: 10:30am  Visit type: RETURN ENDOCRINE  Provider: Frida Larson MD  Location: [facility]  Testing/imaging:   Additional notes: VIrtual

## 2025-01-01 LAB
ABO + RH BLD: NORMAL
BLD GP AB SCN SERPL QL: NEGATIVE
SPECIMEN EXP DATE BLD: NORMAL

## 2025-01-02 ENCOUNTER — APPOINTMENT (OUTPATIENT)
Dept: LAB | Facility: CLINIC | Age: 65
End: 2025-01-02
Payer: COMMERCIAL

## 2025-01-02 ENCOUNTER — ANCILLARY PROCEDURE (OUTPATIENT)
Dept: ULTRASOUND IMAGING | Facility: CLINIC | Age: 65
End: 2025-01-02
Attending: PHYSICIAN ASSISTANT
Payer: COMMERCIAL

## 2025-01-02 ENCOUNTER — LAB (OUTPATIENT)
Dept: LAB | Facility: CLINIC | Age: 65
End: 2025-01-02
Payer: COMMERCIAL

## 2025-01-02 ENCOUNTER — OFFICE VISIT (OUTPATIENT)
Dept: SURGERY | Facility: CLINIC | Age: 65
End: 2025-01-02
Payer: MEDICARE

## 2025-01-02 ENCOUNTER — ANESTHESIA EVENT (OUTPATIENT)
Dept: SURGERY | Facility: CLINIC | Age: 65
End: 2025-01-02
Payer: COMMERCIAL

## 2025-01-02 ENCOUNTER — PRE VISIT (OUTPATIENT)
Dept: SURGERY | Facility: CLINIC | Age: 65
End: 2025-01-02

## 2025-01-02 VITALS
TEMPERATURE: 97.9 F | OXYGEN SATURATION: 97 % | HEART RATE: 72 BPM | SYSTOLIC BLOOD PRESSURE: 125 MMHG | DIASTOLIC BLOOD PRESSURE: 78 MMHG | BODY MASS INDEX: 38.04 KG/M2 | WEIGHT: 222.8 LBS | HEIGHT: 64 IN | RESPIRATION RATE: 16 BRPM

## 2025-01-02 DIAGNOSIS — M79.604 PAIN OF RIGHT LOWER EXTREMITY: ICD-10-CM

## 2025-01-02 DIAGNOSIS — R91.1 LUNG NODULE: ICD-10-CM

## 2025-01-02 DIAGNOSIS — Z01.818 PREOP EXAMINATION: ICD-10-CM

## 2025-01-02 DIAGNOSIS — Z01.818 PREOP EXAMINATION: Primary | ICD-10-CM

## 2025-01-02 LAB
ANION GAP SERPL CALCULATED.3IONS-SCNC: 8 MMOL/L (ref 7–15)
BUN SERPL-MCNC: 15.4 MG/DL (ref 8–23)
CALCIUM SERPL-MCNC: 9.5 MG/DL (ref 8.8–10.4)
CHLORIDE SERPL-SCNC: 103 MMOL/L (ref 98–107)
CREAT SERPL-MCNC: 0.69 MG/DL (ref 0.51–0.95)
EGFRCR SERPLBLD CKD-EPI 2021: >90 ML/MIN/1.73M2
ERYTHROCYTE [DISTWIDTH] IN BLOOD BY AUTOMATED COUNT: 12.5 % (ref 10–15)
GLUCOSE SERPL-MCNC: 88 MG/DL (ref 70–99)
HCO3 SERPL-SCNC: 30 MMOL/L (ref 22–29)
HCT VFR BLD AUTO: 42.1 % (ref 35–47)
HGB BLD-MCNC: 13.7 G/DL (ref 11.7–15.7)
MCH RBC QN AUTO: 30.2 PG (ref 26.5–33)
MCHC RBC AUTO-ENTMCNC: 32.5 G/DL (ref 31.5–36.5)
MCV RBC AUTO: 93 FL (ref 78–100)
PLATELET # BLD AUTO: 219 10E3/UL (ref 150–450)
POTASSIUM SERPL-SCNC: 4.6 MMOL/L (ref 3.4–5.3)
RBC # BLD AUTO: 4.53 10E6/UL (ref 3.8–5.2)
SODIUM SERPL-SCNC: 141 MMOL/L (ref 135–145)
WBC # BLD AUTO: 7.3 10E3/UL (ref 4–11)

## 2025-01-02 PROCEDURE — 85027 COMPLETE CBC AUTOMATED: CPT | Performed by: PATHOLOGY

## 2025-01-02 PROCEDURE — 80048 BASIC METABOLIC PNL TOTAL CA: CPT | Performed by: PATHOLOGY

## 2025-01-02 PROCEDURE — 36415 COLL VENOUS BLD VENIPUNCTURE: CPT | Performed by: PATHOLOGY

## 2025-01-02 PROCEDURE — 93971 EXTREMITY STUDY: CPT | Mod: RT | Performed by: STUDENT IN AN ORGANIZED HEALTH CARE EDUCATION/TRAINING PROGRAM

## 2025-01-02 ASSESSMENT — PAIN SCALES - GENERAL: PAINLEVEL_OUTOF10: NO PAIN (0)

## 2025-01-02 ASSESSMENT — LIFESTYLE VARIABLES: TOBACCO_USE: 1

## 2025-01-02 ASSESSMENT — ENCOUNTER SYMPTOMS: SEIZURES: 0

## 2025-01-02 NOTE — PATIENT INSTRUCTIONS
Preparing for Your Surgery      Name:  Laura Ferreira   MRN:  [id number]   :  1960   Today's Date:  2025       Arriving for surgery:  Surgery date:  1/15/25  Arrival time:  5:30 am  Surgery time: 7:30 am    Please come to:     Please come to:       [facility] [place] [facility]    [address] [place]  [address]     The [facility] ([facility] [place] Patient/Visitor Ramp is at [address]. Patients and visitors who self-park will receive the reduced hospital parking rate. If the Patient /Visitor Ramp is full, please follow the signs to the [employer] car park located at the main hospital entrance.       parking is available (24 hours/ 7 days a week)      Discounted parking pass options are available for patients and visitors. They can be purchased at the [employer] desk at the main hospital entrance.     -    Stop at the security desk and they will direct surgery patients to the Surgery Check in and Family [facility]. [phone]        - If you need directions, a wheelchair or an escort please stop at the Information/security desk in the lobby.     What can I eat or drink?  -  You may eat and drink normally up to 8 hours prior to arrival time. (Until 9:30 pm on 25)  -  You may have clear liquids until 2 hours prior to arrival time. (Until 3:30 am on 1/15/25)    Examples of clear liquids:  Water  Clear broth  Juices (apple, white grape, white cranberry  and cider) without pulp  Noncarbonated, powder based beverages  (lemonade and Justin-Aid)  Sodas (Sprite, 7-Up, ginger ale and seltzer)  Coffee or tea (without milk or cream)  Gatorade    -  No Alcohol or cannabis products for at least 24 hours before surgery.     Enhanced Recovery After Surgery     This is a team effort, including you, to get you back on your feet, eating and drinking      normally and out of the hospital as quickly as possible.  The goals are:  1) NO INFECTIONS and   2) RETURN TO NORMAL DIET    How can we achieve these goals?  1) STAY ACTIVE: Walk every day before your surgery; try to increase the amount every day.  Walk after surgery as much as you can-the nurses will help you.  Walking speeds healing and gets you home quicker, you heal better at home and have less risk of infection.     2) INCENTIVE SPIROMETER: Practice your incentive spirometer 4 times per day with 5 repetitions each time.  Using the incentive spirometer can strengthen your muscles between your ribs and help you have a strong cough after surgery.  A more effective cough can help prevent problems with your lungs.    3) STAY HYDRATED: Drink clear liquids up until 2 hours prior to arrival. We would like you to purchase a drink such as Gatorade or Ensure Clear (not the milkshake type).  Drink this before bedtime and the morning of surgery, drink between 8-10 ounces or until you feel hydrated.  Keeping well hydrated leads to your veins being plump, you wake up faster, and you are less likely to be nauseated. Start drinking water as soon as you can after surgery and advance to clear liquids and food as tolerated.  IV fluids contain salt, drinking fluids will minimize the amount of IV fluids you need and decrease the amount of salt you get.    The most common reason for the patient to be readmitted is dehydration. Staying hydrated after you go home from the hospital is very important.  Ensure or Ensure Clear are good options to keep you hydrated.     4) PAIN MANAGEMENT: If we minimize the amount of opioids and narcotics, and use regional blocks (which numb the area where your surgery is) along with oral pain medications; you will have less side effects of nausea and constipation. Narcotics can slow down your bowels and cause you to stay in the hospital longer.     Our goal is to keep you comfortable; eating and drinking normally and back home safely.   Which medicines can I take?    Hold  Multivitamins for 7 days before surgery.    Hold Ibuprofen (Advil, Motrin) for 1 day(s) before surgery--unless otherwise directed by surgeon.  Hold Naproxen (Aleve) for 4 days before surgery.    -  DO NOT take these medications the day of surgery:   Ferrous sulfate   Calcium-magnesium-zinc-vitamin D      -  PLEASE TAKE these medications the day of surgery:   Acetaminophen (Tylenol) as needed   Levothyroxine (Synthroid)   Omeprazole (Prilosec)   Eye drops as needed      How do I prepare myself?  - Please take 2 showers (one the night prior to surgery and one the morning of surgery) using Scrubcare or Hibiclens soap.    Use this soap only from the neck to your toes. Avoid genital area      Leave the soap on your skin for one minute--then rinse thoroughly.      You may use your own shampoo and conditioner. No other hair products.   - Please remove all jewelry and body piercings.  - No lotions, deodorants or fragrance.  - No makeup or fingernail polish.   - Bring your ID and insurance card.    -If you use a CPAP machine, please bring the CPAP machine, tubing, and mask to hospital.    -If you have a Deep Brain Stimulator, Spinal Cord Stimulator, or any Neuro Stimulator device---you must bring the remote control to the hospital.        Covid testing policy as of 12/06/2022  Your surgeon will notify and schedule you for a COVID test if one is needed before surgery--please direct any questions or COVID symptoms to your surgeon      Questions or Concerns:    - For any questions regarding the day of surgery or your hospital stay, please contact the Pre Admission Nursing Office at [phone].       - If you have health changes between today and your surgery, please call your surgeon.       - For questions after surgery, please call your surgeons office.           Current Visitor Guidelines    You may have 2 visitors in the pre op area.    Visiting hours: 8 a.m. to 8:30 p.m.    Patients confirmed or suspected to have symptoms  of COVID 19 or flu:     No visitors allowed for adult patients.   Children (under age 18) can have 1 named visitor.     People who are sick or showing symptoms of COVID 19 or flu:    Are not allowed to visit patients--we can only make exceptions in special situations.       Please follow these guidelines for your visit:          Please maintain social distance          Masking is optional--however at times you may be asked to wear a mask for the safety of yourself and others     Clean your hands with alcohol hand . Do this when you arrive at and leave the building and patient room,    And again after you touch your mask or anything in the room.     Go directly to and from the room you are visiting.     Stay in the patient s room during your visit. Limit going to other places in the hospital as much as possible     Leave bags and jackets at home or in the car.     For everyone s health, please don t come and go during your visit. That includes for smoking   during your visit.

## 2025-01-02 NOTE — H&P
Pre-Operative H & P     CC:  Preoperative exam to assess for increased cardiopulmonary risk while undergoing surgery and anesthesia.    Date of Encounter: 1/2/2025  Primary Care Physician:  Sarita Shultz     Reason for visit:   Encounter Diagnoses   Name Primary?    Lung nodule Yes    Preop examination     Pain of right lower extremity        HPI  Laura Ferreira is a 64 year old female who presents for pre-operative H & P in preparation for  Procedure Information       Case: [id number] Date/Time: 01/15/25 0730    Procedure: LOBECTOMY, LUNG, ROBOT-ASSISTED, possible segmentectomy, possible wedge resection (Left: Chest)    Anesthesia type: General    Diagnosis: Lung nodule [R91.1]    Pre-op diagnosis: Lung nodule [R91.1]    Location: [facility]  /  OR    Providers: Shae Ocasio MD            Patient is being evaluated for comorbid conditions of former tobacco use. HLD, s/p post-surgical hypothyroidism, GERD, Leblanc's esophagus, s/p lumbar fusion, h/o breast cancer.    Ms. Ferreira has an indeterminate left lower lobe nodule. This was incidentally found during work up of hematuria. She has been counseled by Dr. Ocasio and now presents for the above procedure.      History is obtained from the patient and chart review    Hx of abnormal bleeding or anti-platelet use: denies    Menstrual history: Patient's last menstrual period was 04/01/2012.:       Past Medical History  Past Medical History:   Diagnosis Date    Anemia     Leblanc's esophagus     EGD in 2014; recent EGD in 4/2018; repeat EGD in 3 years    Breast cancer (H)     Ex-smoker     1 PPD x 28 years, quit 20 years ago    Gastroesophageal reflux disease     Hematuria 01/23/2007    History of breast biopsy     left    Menopausal symptoms 07/17/2009    Motion sickness     Obese     Postsurgical hypothyroidism 07/17/2009    Symptomatic menopausal or female climacteric states 07/17/2009    Tear of lateral cartilage or meniscus of knee, current 10/24/2007       Past  Surgical History  Past Surgical History:   Procedure Laterality Date    ABDOMEN SURGERY  1974    appendix    APPENDECTOMY OPEN      ARTHROSCOPY KNEE RT/LT  11/15/2007    right knee arthroscopy with arthroscopic lateral meniscectomy    BIOPSY Left     Breast. Benign    CL AFF SURGICAL PATHOLOGY  09/18/2002    endometrial biopsy    COLONOSCOPY      ELBOW SURGERY Right     ESOPHAGOSCOPY, GASTROSCOPY, DUODENOSCOPY (EGD), COMBINED N/A 12/23/2014    Procedure: COMBINED ESOPHAGOSCOPY, GASTROSCOPY, DUODENOSCOPY (EGD), BIOPSY SINGLE OR MULTIPLE;  Surgeon: Paradise Radford MD;  Location: [facility] OR    GRAFT FAT TO BREAST Right 11/08/2021    Procedure: AND FAT GRAFTING FROM ABDOMEN;  Surgeon: Genaro Garcia MD;  Location: [facility] OR    HC THYROIDECTOMY      goitre    INSERT TISSUE EXPANDER BREAST Bilateral 03/17/2021    Procedure: bilateral BREAST TISSUE EXPANDER;  Surgeon: Genaro Garcia MD;  Location: [facility]    LAMINECTOMY, FUSION LUMBAR ONE LEVEL, COMBINED  10/26/2011    Procedure:COMBINED LAMINECTOMY, FUSION LUMBAR ONE LEVEL; L4-5 Decompression, L4-5 Posterior Lateral Fusion with Madhavi and Local Bone; Surgeon:BARBRA BRIGHT; Location:[facility] OR    MASTECTOMY SIMPLE, SENTINEL NODE, COMBINED Bilateral 03/17/2021    Procedure: BILATERAL SKIN SPARING MASTECTOMY WITH right  SENTINEL LYMPH NODE BIOPSY;  Surgeon: Carmen Stein MD;  Location: [facility]    RECONSTRUCT BREAST Bilateral 11/08/2021    Procedure: REVISION OF BILATERAL BREAST RECONSTRUCTION WITH REMOVE AND REPLACE BILATERAL BREAST IMPLANT;  Surgeon: Genaro Garcia MD;  Location: [facility]    RECONSTRUCT BREAST BILATERAL, IMPLANT PROSTHESIS BILATERAL, COMBINED Bilateral 06/11/2021    Procedure: BILATERAL SECOND STAGE BREAST RECONSTRUCTION WITH SILICONE IMPLANTS;  Surgeon: Genaro Garcia MD;  Location: [facility]    TONSILLECTOMY & ADENOIDECTOMY         Prior to Admission Medications  Current Outpatient Medications   Medication Sig Dispense Refill     acetaminophen (TYLENOL) 500 MG tablet Take 500-1,000 mg by mouth daily as needed for mild pain      Ferrous Sulfate (IRON PO) Take 1 tablet by mouth daily      levothyroxine (SYNTHROID/LEVOTHROID) 112 MCG tablet Take 1 tablet (112 mcg) by mouth daily (Patient taking differently: Take 112 mcg by mouth every morning.) 90 tablet 3    Multiple Minerals-Vitamins (CALCIUM-MAGNESIUM-ZINC-D3 PO) Take 500 mg by mouth daily.      Multiple Vitamins-Minerals (MULTIVITAL PO) Take 1 tablet by mouth daily      omeprazole (PRILOSEC) 20 MG DR capsule TAKE 1 CAPSULE BY MOUTH TWICE  DAILY BEFORE MEALS 200 capsule 0    OVER-THE-COUNTER Place 1-2 drops into both eyes every hour as needed      rosuvastatin (CRESTOR) 20 MG tablet Take 1 tablet (20 mg) by mouth daily (Patient taking differently: Take 20 mg by mouth at bedtime.) 90 tablet 3    dexAMETHasone (DECADRON) 1 MG tablet Take one tablet at 11 PM and have labwork scheduled the following morning at 8 AM. (Patient not taking: Reported on 1/2/2025) 1 tablet 0       Allergies  Allergies   Allergen Reactions    Acetaminophen Nausea and Vomiting    Latex      Added based on information entered during case entry, please review and add reactions, type, and severity as needed    Nickel     Vicodin [Acetaminophen] Nausea and Vomiting    Adhesive Tape Rash    Cortisone Nausea and Vomiting and Nausea     oral    Cvs Petroleum Jelly [Basis Facial Moisturizer] Rash    Hydrocodone Nausea and Vomiting and Nausea    Other Drug Allergy (See Comments) Itching, Rash and Blisters     Some Medals (Nickel, Mike Silver)    Petrolatum Itching and Rash       Social History  Social History     Socioeconomic History    Marital status:      Spouse name: Not on file    Number of children: Not on file    Years of education: Not on file    Highest education level: Not on file   Occupational History    Not on file   Tobacco Use    Smoking status: Former     Current packs/day: 0.00     Average packs/day:  1 pack/day for 27.9 years (27.9 ttl pk-yrs)     Types: Cigarettes     Start date: 1971     Quit date: 12/15/1998     Years since quittin.0     Passive exposure: Never    Smokeless tobacco: Never    Tobacco comments:     quit 20 years ago   Vaping Use    Vaping status: Never Used   Substance and Sexual Activity    Alcohol use: Yes     Comment: once in a while    Drug use: No    Sexual activity: Yes     Partners: Male     Birth control/protection: None     Comment: none   Other Topics Concern    Parent/sibling w/ CABG, MI or angioplasty before 65F 55M? Yes     Comment: mother   Social History Narrative    Not on file     Social Drivers of Health     Financial Resource Strain: Low Risk  (4/3/2024)    Financial Resource Strain     Within the past 12 months, have you or your family members you live with been unable to get utilities (heat, electricity) when it was really needed?: No   Food Insecurity: No Food Insecurity (2024)    Received from [facility]     Hunger Vital Sign     Worried About Running Out of Food in the Last Year: Never true     Ran Out of Food in the Last Year: Never true   Transportation Needs: No Transportation Needs (2024)    Received from [facility]     PRAPARE - Transportation     Lack of Transportation (Medical): No     Lack of Transportation (Non-Medical): No   Physical Activity: Inactive (4/3/2024)    Exercise Vital Sign     Days of Exercise per Week: 0 days     Minutes of Exercise per Session: 0 min   Stress: No Stress Concern Present (4/3/2024)    [employer] [place] [employer] - Occupational Stress Questionnaire     Feeling of Stress : Only a little   Social Connections: Unknown (4/3/2024)    Social Connection and Isolation Panel [NHANES]     Frequency of Communication with Friends and Family: Not on file     Frequency of Social Gatherings with Friends and Family: Twice a week     Attends Buddhism Services: Not on file     Active Member of  Clubs or Organizations: Not on file     Attends Club or Organization Meetings: Not on file     Marital Status: Not on file   Interpersonal Safety: Not At Risk (2024)    Received from [facility]     Humiliation, Afraid, Rape, and Kick questionnaire     Fear of Current or Ex-Partner: No     Emotionally Abused: No     Physically Abused: No     Sexually Abused: No   Housing Stability: Low Risk  (2024)    Received from [facility]     Housing Stability Vital Sign     Unable to Pay for Housing in the Last Year: No     Number of Places Lived in the Last Year: 1     Unstable Housing in the Last Year: No       Family History  Family History   Problem Relation Age of Onset    Asthma Mother     Diabetes Mother     Hypertension Mother     Arthritis Mother     Circulatory Mother     Obesity Mother     Thyroid Disease Mother     Aneurysm Mother         Brain,  at age 69    Hypertension Father     Alcohol/Drug Father     Arthritis Father     Obesity Father     Thyroid Disease Brother     Thyroid Disease Brother     Breast Cancer Maternal Grandmother     Breast Cancer Paternal Grandmother         My fathers mother    Colon Cancer No family hx of     Anesthesia Reaction No family hx of     Bleeding Disorder No family hx of        Review of Systems  The complete review of systems is negative other than noted in the HPI or here.     Anesthesia Evaluation   Pt has had prior anesthetic.     History of anesthetic complications  - motion sickness.      ROS/MED HX  ENT/Pulmonary:     (+)                tobacco use, Past use,  30  Pack-Year Hx,                   (-) asthma, sleep apnea and recent URI   Neurologic:  - neg neurologic ROS  (-) no seizures and no CVA   Cardiovascular:     (+)  - -   -  - -                                 Previous cardiac testing   Echo: Date: 24 Results:  See H&P  Stress Test:  Date: Results:    ECG Reviewed:  Date: Results:    Cath:  Date: Results:      METS/Exercise  "Tolerance: 4 - Raking leaves, gardening Comment: Walks dog 1.5 miles for walk regularly   Hematologic:  - neg hematologic  ROS  (-) history of blood clots and history of blood transfusion   Musculoskeletal: Comment: S/p L3-5 fusion 5/10/24  S/p L4-5 fusion    (+)  arthritis (knees),             GI/Hepatic:     (+) GERD, Asymptomatic on medication, esophageal disease (Leblanc's),              (-) liver disease   Renal/Genitourinary:  - neg Renal ROS  (-) renal disease   Endo:     (+)          thyroid problem, hypothyroidism s/p thyroidectomy 2/2 goiter,    Obesity,    (-) Type II DM   Psychiatric/Substance Use:  - neg psychiatric ROS     Infectious Disease:  - neg infectious disease ROS     Malignancy:   (+) Malignancy, History of Breast.Breast CA Remission status post Surgery.      Other:  - neg other ROS          /78 (BP Location: Right arm, Patient Position: Sitting, Cuff Size: Adult Large)   Pulse 72   Temp 97.9  F (36.6  C) (Oral)   Resp 16   Ht 1.626 m (5' 4\")   Wt 101.1 kg (222 lb 12.8 oz)   LMP 04/01/2012   SpO2 97%   Breastfeeding No   BMI 38.24 kg/m      Physical Exam  Constitutional: Awake, alert, cooperative, no apparent distress, and appears stated age.  Eyes: Pupils equal, round and reactive to light, extra ocular muscles intact, sclera clear, conjunctiva normal.  HENT: Normocephalic, oral pharynx with moist mucus membranes, good dentition. No goiter appreciated. No removable dental hardware.  Respiratory: Clear to auscultation bilaterally, no crackles or wheezing. No SOB when supine.  Cardiovascular: Regular rate and rhythm, normal S1 and S2, and no murmur noted.  Carotids +2, no bruits. No edema. Palpable pulses to radial, DP and PT arteries.   GI: Normal bowel sounds, soft, non-distended, non-tender, no masses palpated.    Lymph/Hematologic: No cervical lymphadenopathy and no supraclavicular lymphadenopathy.  Genitourinary:  deferred  Skin: Warm and dry.  No rashes.   Musculoskeletal: " full ROM of neck. There is no redness, warmth, or swelling of the joints. Gross motor strength is normal.  2 cm patch of mild erythema on anterolateral aspect of RLE, midway down calf, w/ mild tenderness; skin intact.  Neurologic: Awake, alert, oriented to name, place and time. Cranial nerves II-XII are grossly intact. Gait is normal. Ambulates from chair to exam table, seats self, lies supine and sits back up w/o assistance.  Neuropsychiatric: Calm, cooperative. Normal affect. Pleasant. Answers questions appropriately, follows commands w/o difficulty.        PRIOR LABS/DIAGNOSTIC STUDIES:    All pertinent labs and imaging personally reviewed        5/30/24 Echo result w/o MOPS: Interpretation Summary Left ventricular size, wall motion and function are normal. The ejectionfraction is 60-65%.Right ventricular function, chamber size, wall motion, and thickness arenormal.The inferior vena cava was normal in size with preserved respiratoryvariability. No pericardial effusion is present. There is no prior study for direct comparison.          Latest Ref Rng & Units 9/12/2024    10:18 AM   PFT   FVC L 2.92    FEV1 L 2.23    FVC% % 106    FEV1% % 102        LAB/DIAGNOSTIC STUDIES TODAY:  BMP, CBC, T&S, US RLE    Assessment    Laura Ferreira is a 64 year old female seen as a PAC referral for risk assessment and optimization for anesthesia.    Plan/Recommendations  Pt will be optimized for the proposed procedure.  See below for details on the assessment, risk, and preoperative recommendations    NEUROLOGY  - No history of TIA, CVA or seizure    -Post Op delirium risk factors:  No risk identified    ENT  - No current airway concerns.  Will need to be reassessed day of surgery.  Mallampati: II  TM: > 3    ANESTHESIA  - Hx motion sickness. Consider scopolamine patch, if indicated.    CARDIAC  - No history of CAD, Hypertension, and Afib  - METS (Metabolic Equivalents)  Patient performs 4 or more METS exercise without symptoms       "       Total Score: 0      RCRI-Low risk: Class 2 0.9% complication rate             Total Score: 1    RCRI: High Risk Surgery        PULMONARY  - Lung nodule    DEMARCUS Low Risk             Total Score: 2    DEMARCUS: BMI over 35 kg/m2    DEMARCUS: Over 50 ys old      - Denies asthma or inhaler use  - Tobacco History    History   Smoking Status    Former    Types: Cigarettes   Smokeless Tobacco    Never       GI  - GERD, Controlled on medications: Proton Pump Inhibitor BID  - Leblanc's esophagus    PONV High Risk  Total Score: 3           1 AN PONV: Pt is Female    1 AN PONV: Patient is not a current smoker    1 AN PONV: Intended Post Op Opioids          ENDOCRINE    - BMI: Estimated body mass index is 38.24 kg/m  as calculated from the following:    Height as of this encounter: 1.626 m (5' 4\").    Weight as of this encounter: 101.1 kg (222 lb 12.8 oz).  Obesity (BMI >30)  - No history of Diabetes Mellitus  - s/p thyroidectomy 2/2 goiter. Continue levothyroxine.    HEME  VTE Low Risk 0.26%             Total Score: 1    VTE: Greater than 59 yrs old      - No history of abnormal bleeding or antiplatelet use.      MSK  Patient IS Frail             Total Score: 3    Frailty: Slower walking speed    Frailty: Increased exhaustion    Frailty: Overall lack of energy      - Pt has a 2 cm patch of mild erythema on anterolateral aspect of RLE, midway down calf, w/ mild tenderness; skin intact. US ordered to r/o DVT:    US Lower Extremity Venous Duplex:  Impression:   Right leg: No deep venous thrombosis.    In the area of redness and pain along the proximal lateral calf there  are no focal fluid collections or mass. Mild ill-defined thickening of  the skin with increased echogenicity, this can be seen with etiologies  such as cellulitis. Recommend correlation with clinical exam.        The patient is aware that the final anesthesia plan will be decided by the assigned anesthesia provider on the date of service.      The patient is " optimized for their procedure. AVS with information on surgery time/arrival time, meds and NPO status given by nursing staff. No further diagnostic testing indicated.      On the day of service:     Prep time: 14 minutes  Visit time: 21 minutes  Documentation time: 14 minutes  ------------------------------------------  Total time: 49 minutes      America Tsang PA-C  Preoperative Assessment Center  [facility]  [facility]  Phone: [phone]  Fax: [phone]

## 2025-01-08 ENCOUNTER — HOSPITAL ENCOUNTER (OUTPATIENT)
Dept: MRI IMAGING | Facility: HOSPITAL | Age: 65
Discharge: HOME | End: 2025-01-08
Attending: CLINICAL NURSE SPECIALIST
Payer: COMMERCIAL

## 2025-01-08 ENCOUNTER — PATIENT OUTREACH (OUTPATIENT)
Dept: SURGERY | Facility: CLINIC | Age: 65
End: 2025-01-08

## 2025-01-08 ENCOUNTER — HOSPITAL ENCOUNTER (OUTPATIENT)
Dept: CT IMAGING | Facility: HOSPITAL | Age: 65
Discharge: HOME | End: 2025-01-08
Attending: CLINICAL NURSE SPECIALIST
Payer: COMMERCIAL

## 2025-01-08 DIAGNOSIS — R91.8 PULMONARY NODULES: ICD-10-CM

## 2025-01-08 DIAGNOSIS — R91.1 LUNG NODULE: ICD-10-CM

## 2025-01-08 PROCEDURE — 255N000002 HC RX 255 OP 636: Performed by: CLINICAL NURSE SPECIALIST

## 2025-01-08 PROCEDURE — 70553 MRI BRAIN STEM W/O & W/DYE: CPT

## 2025-01-08 PROCEDURE — A9585 GADOBUTROL INJECTION: HCPCS | Performed by: CLINICAL NURSE SPECIALIST

## 2025-01-08 PROCEDURE — 250N000011 HC RX IP 250 OP 636: Performed by: CLINICAL NURSE SPECIALIST

## 2025-01-08 PROCEDURE — 71260 CT THORAX DX C+: CPT

## 2025-01-08 RX ORDER — IOPAMIDOL 755 MG/ML
80 INJECTION, SOLUTION INTRAVASCULAR ONCE
Status: COMPLETED | OUTPATIENT
Start: 2025-01-08 | End: 2025-01-08

## 2025-01-08 RX ORDER — GADOBUTROL 604.72 MG/ML
0.1 INJECTION INTRAVENOUS ONCE
Status: COMPLETED | OUTPATIENT
Start: 2025-01-08 | End: 2025-01-08

## 2025-01-08 RX ADMIN — GADOBUTROL 10 ML: 604.72 INJECTION INTRAVENOUS at 08:33

## 2025-01-08 RX ADMIN — IOPAMIDOL 80 ML: 755 INJECTION, SOLUTION INTRAVENOUS at 09:03

## 2025-01-08 NOTE — PROGRESS NOTES
Spoke with pt regarding brain MRI results (negative) and chest CT results (enlarging LLL nodule, otherwise stable GGO bilaterally).     Pt states that she has read lobectomy education book and had no questions. We discussed surgical plan per Dr. Ocasio's clinic note for left segmentectomy and lobectomy if lymph nodes were positive on frozen. Pt asked about any additional treatments following surgery-we discussed surgery would likely be curative but adjuvant chemo/immuno would depend on final pathology.    Pt's two daughters will accompany her on DOS and provide transportation at hospital discharge. She was instructed to avoid alcohol for one week prior to surgery. Pt instructed to call should she develop cold/flu symptoms in the next week.     Radha Moore, RN BSN  Thoracic Surgery RN Care Coordinator  [phone]

## 2025-01-15 ENCOUNTER — ANESTHESIA (OUTPATIENT)
Dept: SURGERY | Facility: CLINIC | Age: 65
End: 2025-01-15
Payer: COMMERCIAL

## 2025-01-15 ENCOUNTER — HOSPITAL ENCOUNTER (INPATIENT)
Facility: CLINIC | Age: 65
DRG: 165 | End: 2025-01-15
Attending: STUDENT IN AN ORGANIZED HEALTH CARE EDUCATION/TRAINING PROGRAM | Admitting: STUDENT IN AN ORGANIZED HEALTH CARE EDUCATION/TRAINING PROGRAM
Payer: COMMERCIAL

## 2025-01-15 ENCOUNTER — APPOINTMENT (OUTPATIENT)
Dept: GENERAL RADIOLOGY | Facility: CLINIC | Age: 65
DRG: 165 | End: 2025-01-15
Attending: PHYSICIAN ASSISTANT
Payer: COMMERCIAL

## 2025-01-15 DIAGNOSIS — K59.03 OPIOID-INDUCED CONSTIPATION: ICD-10-CM

## 2025-01-15 DIAGNOSIS — T40.2X5A OPIOID-INDUCED CONSTIPATION: ICD-10-CM

## 2025-01-15 DIAGNOSIS — C34.32 MALIGNANT NEOPLASM OF LOWER LOBE OF LEFT LUNG (H): Primary | ICD-10-CM

## 2025-01-15 PROBLEM — C34.90 LUNG CANCER (H): Status: ACTIVE | Noted: 2025-01-15

## 2025-01-15 PROBLEM — R91.8 LUNG NODULE, MULTIPLE: Status: ACTIVE | Noted: 2025-01-15

## 2025-01-15 LAB
ABO + RH BLD: NORMAL
BLD GP AB SCN SERPL QL: NEGATIVE
CREAT SERPL-MCNC: 0.71 MG/DL (ref 0.51–0.95)
EGFRCR SERPLBLD CKD-EPI 2021: >90 ML/MIN/1.73M2
PATH REPORT.COMMENTS IMP SPEC: NORMAL
PATH REPORT.INTRAOP OBS SPEC DOC: NORMAL
SPECIMEN EXP DATE BLD: NORMAL

## 2025-01-15 PROCEDURE — 250N000011 HC RX IP 250 OP 636: Performed by: STUDENT IN AN ORGANIZED HEALTH CARE EDUCATION/TRAINING PROGRAM

## 2025-01-15 PROCEDURE — 250N000009 HC RX 250: Performed by: STUDENT IN AN ORGANIZED HEALTH CARE EDUCATION/TRAINING PROGRAM

## 2025-01-15 PROCEDURE — 272N000002 HC OR SUPPLY OTHER OPNP: Performed by: STUDENT IN AN ORGANIZED HEALTH CARE EDUCATION/TRAINING PROGRAM

## 2025-01-15 PROCEDURE — 370N000017 HC ANESTHESIA TECHNICAL FEE, PER MIN: Performed by: STUDENT IN AN ORGANIZED HEALTH CARE EDUCATION/TRAINING PROGRAM

## 2025-01-15 PROCEDURE — 88331 PATH CONSLTJ SURG 1 BLK 1SPC: CPT | Mod: TC | Performed by: STUDENT IN AN ORGANIZED HEALTH CARE EDUCATION/TRAINING PROGRAM

## 2025-01-15 PROCEDURE — 250N000011 HC RX IP 250 OP 636: Performed by: PHYSICIAN ASSISTANT

## 2025-01-15 PROCEDURE — 32674 THORACOSCOPY LYMPH NODE EXC: CPT | Performed by: STUDENT IN AN ORGANIZED HEALTH CARE EDUCATION/TRAINING PROGRAM

## 2025-01-15 PROCEDURE — 120N000002 HC R&B MED SURG/OB UMMC

## 2025-01-15 PROCEDURE — 258N000003 HC RX IP 258 OP 636: Performed by: STUDENT IN AN ORGANIZED HEALTH CARE EDUCATION/TRAINING PROGRAM

## 2025-01-15 PROCEDURE — 88307 TISSUE EXAM BY PATHOLOGIST: CPT | Mod: 26 | Performed by: STUDENT IN AN ORGANIZED HEALTH CARE EDUCATION/TRAINING PROGRAM

## 2025-01-15 PROCEDURE — 36415 COLL VENOUS BLD VENIPUNCTURE: CPT | Performed by: PHYSICIAN ASSISTANT

## 2025-01-15 PROCEDURE — 88331 PATH CONSLTJ SURG 1 BLK 1SPC: CPT | Mod: 26 | Performed by: STUDENT IN AN ORGANIZED HEALTH CARE EDUCATION/TRAINING PROGRAM

## 2025-01-15 PROCEDURE — 86850 RBC ANTIBODY SCREEN: CPT | Performed by: STUDENT IN AN ORGANIZED HEALTH CARE EDUCATION/TRAINING PROGRAM

## 2025-01-15 PROCEDURE — 88342 IMHCHEM/IMCYTCHM 1ST ANTB: CPT | Mod: 26 | Performed by: STUDENT IN AN ORGANIZED HEALTH CARE EDUCATION/TRAINING PROGRAM

## 2025-01-15 PROCEDURE — 250N000009 HC RX 250: Performed by: PHYSICIAN ASSISTANT

## 2025-01-15 PROCEDURE — 360N000080 HC SURGERY LEVEL 7, PER MIN: Performed by: STUDENT IN AN ORGANIZED HEALTH CARE EDUCATION/TRAINING PROGRAM

## 2025-01-15 PROCEDURE — 999N000141 HC STATISTIC PRE-PROCEDURE NURSING ASSESSMENT: Performed by: STUDENT IN AN ORGANIZED HEALTH CARE EDUCATION/TRAINING PROGRAM

## 2025-01-15 PROCEDURE — 999N000065 XR CHEST PORT 1 VIEW

## 2025-01-15 PROCEDURE — 32669 THORACOSCOPY REMOVE SEGMENT: CPT | Mod: LT | Performed by: STUDENT IN AN ORGANIZED HEALTH CARE EDUCATION/TRAINING PROGRAM

## 2025-01-15 PROCEDURE — 710N000009 HC RECOVERY PHASE 1, LEVEL 1, PER MIN: Performed by: STUDENT IN AN ORGANIZED HEALTH CARE EDUCATION/TRAINING PROGRAM

## 2025-01-15 PROCEDURE — S2900 ROBOTIC SURGICAL SYSTEM: HCPCS | Performed by: STUDENT IN AN ORGANIZED HEALTH CARE EDUCATION/TRAINING PROGRAM

## 2025-01-15 PROCEDURE — 272N000001 HC OR GENERAL SUPPLY STERILE: Performed by: STUDENT IN AN ORGANIZED HEALTH CARE EDUCATION/TRAINING PROGRAM

## 2025-01-15 PROCEDURE — 82565 ASSAY OF CREATININE: CPT | Performed by: PHYSICIAN ASSISTANT

## 2025-01-15 PROCEDURE — 71045 X-RAY EXAM CHEST 1 VIEW: CPT | Mod: 26 | Performed by: RADIOLOGY

## 2025-01-15 PROCEDURE — 07B74ZZ EXCISION OF THORAX LYMPHATIC, PERCUTANEOUS ENDOSCOPIC APPROACH: ICD-10-PCS | Performed by: PHYSICIAN ASSISTANT

## 2025-01-15 PROCEDURE — 250N000025 HC SEVOFLURANE, PER MIN: Performed by: STUDENT IN AN ORGANIZED HEALTH CARE EDUCATION/TRAINING PROGRAM

## 2025-01-15 PROCEDURE — 88360 TUMOR IMMUNOHISTOCHEM/MANUAL: CPT | Mod: 26 | Performed by: STUDENT IN AN ORGANIZED HEALTH CARE EDUCATION/TRAINING PROGRAM

## 2025-01-15 PROCEDURE — 86900 BLOOD TYPING SEROLOGIC ABO: CPT | Performed by: STUDENT IN AN ORGANIZED HEALTH CARE EDUCATION/TRAINING PROGRAM

## 2025-01-15 PROCEDURE — 0BTJ4ZZ RESECTION OF LEFT LOWER LUNG LOBE, PERCUTANEOUS ENDOSCOPIC APPROACH: ICD-10-PCS | Performed by: PHYSICIAN ASSISTANT

## 2025-01-15 PROCEDURE — 8E0W4CZ ROBOTIC ASSISTED PROCEDURE OF TRUNK REGION, PERCUTANEOUS ENDOSCOPIC APPROACH: ICD-10-PCS | Performed by: PHYSICIAN ASSISTANT

## 2025-01-15 PROCEDURE — 88360 TUMOR IMMUNOHISTOCHEM/MANUAL: CPT | Mod: TC | Performed by: STUDENT IN AN ORGANIZED HEALTH CARE EDUCATION/TRAINING PROGRAM

## 2025-01-15 PROCEDURE — 88305 TISSUE EXAM BY PATHOLOGIST: CPT | Mod: 26 | Performed by: STUDENT IN AN ORGANIZED HEALTH CARE EDUCATION/TRAINING PROGRAM

## 2025-01-15 PROCEDURE — 250N000009 HC RX 250: Performed by: ANESTHESIOLOGY

## 2025-01-15 PROCEDURE — 88341 IMHCHEM/IMCYTCHM EA ADD ANTB: CPT | Mod: 26 | Performed by: STUDENT IN AN ORGANIZED HEALTH CARE EDUCATION/TRAINING PROGRAM

## 2025-01-15 PROCEDURE — 250N000013 HC RX MED GY IP 250 OP 250 PS 637: Performed by: PHYSICIAN ASSISTANT

## 2025-01-15 RX ORDER — HYDROMORPHONE HCL IN WATER/PF 6 MG/30 ML
0.2 PATIENT CONTROLLED ANALGESIA SYRINGE INTRAVENOUS
Status: DISCONTINUED | OUTPATIENT
Start: 2025-01-15 | End: 2025-01-17 | Stop reason: HOSPADM

## 2025-01-15 RX ORDER — HYDRALAZINE HYDROCHLORIDE 20 MG/ML
2.5-5 INJECTION INTRAMUSCULAR; INTRAVENOUS EVERY 10 MIN PRN
Status: DISCONTINUED | OUTPATIENT
Start: 2025-01-15 | End: 2025-01-15 | Stop reason: HOSPADM

## 2025-01-15 RX ORDER — ONDANSETRON 4 MG/1
4 TABLET, ORALLY DISINTEGRATING ORAL EVERY 6 HOURS PRN
Status: DISCONTINUED | OUTPATIENT
Start: 2025-01-15 | End: 2025-01-17 | Stop reason: HOSPADM

## 2025-01-15 RX ORDER — ACETAMINOPHEN 325 MG/1
975 TABLET ORAL EVERY 6 HOURS
Status: DISCONTINUED | OUTPATIENT
Start: 2025-01-15 | End: 2025-01-17 | Stop reason: HOSPADM

## 2025-01-15 RX ORDER — NEOSTIGMINE METHYLSULFATE 1 MG/ML
VIAL (ML) INJECTION PRN
Status: DISCONTINUED | OUTPATIENT
Start: 2025-01-15 | End: 2025-01-15

## 2025-01-15 RX ORDER — ONDANSETRON 4 MG/1
4 TABLET, ORALLY DISINTEGRATING ORAL EVERY 30 MIN PRN
Status: DISCONTINUED | OUTPATIENT
Start: 2025-01-15 | End: 2025-01-15 | Stop reason: HOSPADM

## 2025-01-15 RX ORDER — SODIUM CHLORIDE, SODIUM LACTATE, POTASSIUM CHLORIDE, CALCIUM CHLORIDE 600; 310; 30; 20 MG/100ML; MG/100ML; MG/100ML; MG/100ML
INJECTION, SOLUTION INTRAVENOUS CONTINUOUS
Status: DISCONTINUED | OUTPATIENT
Start: 2025-01-15 | End: 2025-01-15 | Stop reason: HOSPADM

## 2025-01-15 RX ORDER — NALOXONE HYDROCHLORIDE 0.4 MG/ML
0.2 INJECTION, SOLUTION INTRAMUSCULAR; INTRAVENOUS; SUBCUTANEOUS
Status: DISCONTINUED | OUTPATIENT
Start: 2025-01-15 | End: 2025-01-17 | Stop reason: HOSPADM

## 2025-01-15 RX ORDER — ONDANSETRON 2 MG/ML
INJECTION INTRAMUSCULAR; INTRAVENOUS PRN
Status: DISCONTINUED | OUTPATIENT
Start: 2025-01-15 | End: 2025-01-15

## 2025-01-15 RX ORDER — CEFAZOLIN SODIUM/WATER 2 G/20 ML
2 SYRINGE (ML) INTRAVENOUS
Status: COMPLETED | OUTPATIENT
Start: 2025-01-15 | End: 2025-01-15

## 2025-01-15 RX ORDER — ENOXAPARIN SODIUM 100 MG/ML
40 INJECTION SUBCUTANEOUS EVERY 24 HOURS
Status: DISCONTINUED | OUTPATIENT
Start: 2025-01-16 | End: 2025-01-17 | Stop reason: HOSPADM

## 2025-01-15 RX ORDER — SODIUM CHLORIDE, SODIUM LACTATE, POTASSIUM CHLORIDE, CALCIUM CHLORIDE 600; 310; 30; 20 MG/100ML; MG/100ML; MG/100ML; MG/100ML
INJECTION, SOLUTION INTRAVENOUS CONTINUOUS PRN
Status: DISCONTINUED | OUTPATIENT
Start: 2025-01-15 | End: 2025-01-15

## 2025-01-15 RX ORDER — NALOXONE HYDROCHLORIDE 0.4 MG/ML
0.4 INJECTION, SOLUTION INTRAMUSCULAR; INTRAVENOUS; SUBCUTANEOUS
Status: DISCONTINUED | OUTPATIENT
Start: 2025-01-15 | End: 2025-01-17 | Stop reason: HOSPADM

## 2025-01-15 RX ORDER — HYDROMORPHONE HCL IN WATER/PF 6 MG/30 ML
0.4 PATIENT CONTROLLED ANALGESIA SYRINGE INTRAVENOUS EVERY 5 MIN PRN
Status: DISCONTINUED | OUTPATIENT
Start: 2025-01-15 | End: 2025-01-15 | Stop reason: HOSPADM

## 2025-01-15 RX ORDER — POLYETHYLENE GLYCOL 3350 17 G/17G
17 POWDER, FOR SOLUTION ORAL DAILY
Status: DISCONTINUED | OUTPATIENT
Start: 2025-01-16 | End: 2025-01-17 | Stop reason: HOSPADM

## 2025-01-15 RX ORDER — PROPOFOL 10 MG/ML
INJECTION, EMULSION INTRAVENOUS PRN
Status: DISCONTINUED | OUTPATIENT
Start: 2025-01-15 | End: 2025-01-15

## 2025-01-15 RX ORDER — IBUPROFEN 600 MG/1
600 TABLET, FILM COATED ORAL EVERY 6 HOURS PRN
Status: DISCONTINUED | OUTPATIENT
Start: 2025-01-15 | End: 2025-01-17 | Stop reason: HOSPADM

## 2025-01-15 RX ORDER — ENOXAPARIN SODIUM 100 MG/ML
40 INJECTION SUBCUTANEOUS
Status: COMPLETED | OUTPATIENT
Start: 2025-01-15 | End: 2025-01-15

## 2025-01-15 RX ORDER — ONDANSETRON 2 MG/ML
4 INJECTION INTRAMUSCULAR; INTRAVENOUS EVERY 30 MIN PRN
Status: DISCONTINUED | OUTPATIENT
Start: 2025-01-15 | End: 2025-01-15 | Stop reason: HOSPADM

## 2025-01-15 RX ORDER — LABETALOL HYDROCHLORIDE 5 MG/ML
10 INJECTION, SOLUTION INTRAVENOUS
Status: DISCONTINUED | OUTPATIENT
Start: 2025-01-15 | End: 2025-01-15 | Stop reason: HOSPADM

## 2025-01-15 RX ORDER — LEVOTHYROXINE SODIUM 112 UG/1
112 TABLET ORAL EVERY MORNING
Status: DISCONTINUED | OUTPATIENT
Start: 2025-01-16 | End: 2025-01-17 | Stop reason: HOSPADM

## 2025-01-15 RX ORDER — PROCHLORPERAZINE MALEATE 5 MG/1
5 TABLET ORAL EVERY 6 HOURS PRN
Status: DISCONTINUED | OUTPATIENT
Start: 2025-01-15 | End: 2025-01-17 | Stop reason: HOSPADM

## 2025-01-15 RX ORDER — FENTANYL CITRATE 50 UG/ML
INJECTION, SOLUTION INTRAMUSCULAR; INTRAVENOUS PRN
Status: DISCONTINUED | OUTPATIENT
Start: 2025-01-15 | End: 2025-01-15

## 2025-01-15 RX ORDER — NALOXONE HYDROCHLORIDE 0.4 MG/ML
0.1 INJECTION, SOLUTION INTRAMUSCULAR; INTRAVENOUS; SUBCUTANEOUS
Status: DISCONTINUED | OUTPATIENT
Start: 2025-01-15 | End: 2025-01-15 | Stop reason: HOSPADM

## 2025-01-15 RX ORDER — HYDROMORPHONE HCL IN WATER/PF 6 MG/30 ML
0.4 PATIENT CONTROLLED ANALGESIA SYRINGE INTRAVENOUS
Status: DISCONTINUED | OUTPATIENT
Start: 2025-01-15 | End: 2025-01-17 | Stop reason: HOSPADM

## 2025-01-15 RX ORDER — DEXAMETHASONE SODIUM PHOSPHATE 4 MG/ML
INJECTION, SOLUTION INTRA-ARTICULAR; INTRALESIONAL; INTRAMUSCULAR; INTRAVENOUS; SOFT TISSUE PRN
Status: DISCONTINUED | OUTPATIENT
Start: 2025-01-15 | End: 2025-01-15

## 2025-01-15 RX ORDER — HYDROMORPHONE HCL IN WATER/PF 6 MG/30 ML
0.2 PATIENT CONTROLLED ANALGESIA SYRINGE INTRAVENOUS EVERY 5 MIN PRN
Status: DISCONTINUED | OUTPATIENT
Start: 2025-01-15 | End: 2025-01-15 | Stop reason: HOSPADM

## 2025-01-15 RX ORDER — SCOPOLAMINE 1 MG/3D
1 PATCH, EXTENDED RELEASE TRANSDERMAL ONCE
Status: COMPLETED | OUTPATIENT
Start: 2025-01-15 | End: 2025-01-16

## 2025-01-15 RX ORDER — FENTANYL CITRATE 50 UG/ML
25 INJECTION, SOLUTION INTRAMUSCULAR; INTRAVENOUS EVERY 5 MIN PRN
Status: DISCONTINUED | OUTPATIENT
Start: 2025-01-15 | End: 2025-01-15 | Stop reason: HOSPADM

## 2025-01-15 RX ORDER — LIDOCAINE HYDROCHLORIDE 20 MG/ML
INJECTION, SOLUTION INFILTRATION; PERINEURAL PRN
Status: DISCONTINUED | OUTPATIENT
Start: 2025-01-15 | End: 2025-01-15

## 2025-01-15 RX ORDER — DEXAMETHASONE SODIUM PHOSPHATE 4 MG/ML
4 INJECTION, SOLUTION INTRA-ARTICULAR; INTRALESIONAL; INTRAMUSCULAR; INTRAVENOUS; SOFT TISSUE
Status: DISCONTINUED | OUTPATIENT
Start: 2025-01-15 | End: 2025-01-15 | Stop reason: HOSPADM

## 2025-01-15 RX ORDER — OXYCODONE HYDROCHLORIDE 5 MG/1
5 TABLET ORAL EVERY 4 HOURS PRN
Status: DISCONTINUED | OUTPATIENT
Start: 2025-01-15 | End: 2025-01-17 | Stop reason: HOSPADM

## 2025-01-15 RX ORDER — FENTANYL CITRATE 50 UG/ML
50 INJECTION, SOLUTION INTRAMUSCULAR; INTRAVENOUS EVERY 5 MIN PRN
Status: DISCONTINUED | OUTPATIENT
Start: 2025-01-15 | End: 2025-01-15 | Stop reason: HOSPADM

## 2025-01-15 RX ORDER — BUPIVACAINE HYDROCHLORIDE AND EPINEPHRINE 2.5; 5 MG/ML; UG/ML
INJECTION, SOLUTION INFILTRATION; PERINEURAL PRN
Status: DISCONTINUED | OUTPATIENT
Start: 2025-01-15 | End: 2025-01-15 | Stop reason: HOSPADM

## 2025-01-15 RX ORDER — AMOXICILLIN 250 MG
1 CAPSULE ORAL 2 TIMES DAILY
Status: DISCONTINUED | OUTPATIENT
Start: 2025-01-15 | End: 2025-01-17 | Stop reason: HOSPADM

## 2025-01-15 RX ORDER — CEFAZOLIN SODIUM/WATER 2 G/20 ML
2 SYRINGE (ML) INTRAVENOUS SEE ADMIN INSTRUCTIONS
Status: DISCONTINUED | OUTPATIENT
Start: 2025-01-15 | End: 2025-01-15 | Stop reason: HOSPADM

## 2025-01-15 RX ORDER — PANTOPRAZOLE SODIUM 40 MG/1
40 TABLET, DELAYED RELEASE ORAL DAILY
Status: DISCONTINUED | OUTPATIENT
Start: 2025-01-15 | End: 2025-01-17 | Stop reason: HOSPADM

## 2025-01-15 RX ORDER — OXYCODONE HYDROCHLORIDE 10 MG/1
10 TABLET ORAL EVERY 4 HOURS PRN
Status: DISCONTINUED | OUTPATIENT
Start: 2025-01-15 | End: 2025-01-17 | Stop reason: HOSPADM

## 2025-01-15 RX ORDER — METHOCARBAMOL 500 MG/1
250 TABLET ORAL EVERY 6 HOURS PRN
Status: DISCONTINUED | OUTPATIENT
Start: 2025-01-15 | End: 2025-01-17 | Stop reason: HOSPADM

## 2025-01-15 RX ORDER — INDOCYANINE GREEN AND WATER 25 MG
KIT INJECTION PRN
Status: DISCONTINUED | OUTPATIENT
Start: 2025-01-15 | End: 2025-01-15

## 2025-01-15 RX ORDER — LIDOCAINE 4 G/G
1 PATCH TOPICAL
Status: DISCONTINUED | OUTPATIENT
Start: 2025-01-16 | End: 2025-01-17 | Stop reason: HOSPADM

## 2025-01-15 RX ORDER — ONDANSETRON 2 MG/ML
4 INJECTION INTRAMUSCULAR; INTRAVENOUS EVERY 6 HOURS PRN
Status: DISCONTINUED | OUTPATIENT
Start: 2025-01-15 | End: 2025-01-17 | Stop reason: HOSPADM

## 2025-01-15 RX ADMIN — PHENYLEPHRINE HYDROCHLORIDE 100 MCG: 10 INJECTION INTRAVENOUS at 09:21

## 2025-01-15 RX ADMIN — Medication 70 MG: at 07:40

## 2025-01-15 RX ADMIN — PANTOPRAZOLE SODIUM 40 MG: 40 INJECTION, POWDER, FOR SOLUTION INTRAVENOUS at 19:03

## 2025-01-15 RX ADMIN — ACETAMINOPHEN 975 MG: 325 TABLET, FILM COATED ORAL at 21:45

## 2025-01-15 RX ADMIN — PHENYLEPHRINE HYDROCHLORIDE 200 MCG: 10 INJECTION INTRAVENOUS at 08:42

## 2025-01-15 RX ADMIN — LIDOCAINE HYDROCHLORIDE 100 MG: 20 INJECTION, SOLUTION INFILTRATION; PERINEURAL at 07:39

## 2025-01-15 RX ADMIN — HYDROMORPHONE HYDROCHLORIDE 0.5 MG: 1 INJECTION, SOLUTION INTRAMUSCULAR; INTRAVENOUS; SUBCUTANEOUS at 09:44

## 2025-01-15 RX ADMIN — MIDAZOLAM 2 MG: 1 INJECTION INTRAMUSCULAR; INTRAVENOUS at 07:29

## 2025-01-15 RX ADMIN — FENTANYL CITRATE 25 MCG: 50 INJECTION, SOLUTION INTRAMUSCULAR; INTRAVENOUS at 14:06

## 2025-01-15 RX ADMIN — FENTANYL CITRATE 25 MCG: 50 INJECTION, SOLUTION INTRAMUSCULAR; INTRAVENOUS at 14:11

## 2025-01-15 RX ADMIN — Medication 20 MG: at 11:06

## 2025-01-15 RX ADMIN — Medication 20 MG: at 10:10

## 2025-01-15 RX ADMIN — Medication 30 MG: at 11:22

## 2025-01-15 RX ADMIN — PHENYLEPHRINE HYDROCHLORIDE 100 MCG: 10 INJECTION INTRAVENOUS at 12:12

## 2025-01-15 RX ADMIN — OXYCODONE HYDROCHLORIDE 10 MG: 10 TABLET ORAL at 21:45

## 2025-01-15 RX ADMIN — PHENYLEPHRINE HYDROCHLORIDE 100 MCG: 10 INJECTION INTRAVENOUS at 09:14

## 2025-01-15 RX ADMIN — HYDROMORPHONE HYDROCHLORIDE 0.5 MG: 1 INJECTION, SOLUTION INTRAMUSCULAR; INTRAVENOUS; SUBCUTANEOUS at 10:07

## 2025-01-15 RX ADMIN — SODIUM CHLORIDE, POTASSIUM CHLORIDE, SODIUM LACTATE AND CALCIUM CHLORIDE: 600; 310; 30; 20 INJECTION, SOLUTION INTRAVENOUS at 07:32

## 2025-01-15 RX ADMIN — Medication 2 G: at 08:26

## 2025-01-15 RX ADMIN — Medication 30 MG: at 09:17

## 2025-01-15 RX ADMIN — Medication 30 MG: at 08:33

## 2025-01-15 RX ADMIN — Medication 2 G: at 11:55

## 2025-01-15 RX ADMIN — FENTANYL CITRATE 100 MCG: 50 INJECTION INTRAMUSCULAR; INTRAVENOUS at 07:39

## 2025-01-15 RX ADMIN — HYDROMORPHONE HYDROCHLORIDE 0.4 MG: 0.2 INJECTION, SOLUTION INTRAMUSCULAR; INTRAVENOUS; SUBCUTANEOUS at 19:03

## 2025-01-15 RX ADMIN — PROPOFOL 120 MG: 10 INJECTION, EMULSION INTRAVENOUS at 07:39

## 2025-01-15 RX ADMIN — HYDROMORPHONE HYDROCHLORIDE 0.5 MG: 1 INJECTION, SOLUTION INTRAMUSCULAR; INTRAVENOUS; SUBCUTANEOUS at 11:58

## 2025-01-15 RX ADMIN — ENOXAPARIN SODIUM 40 MG: 40 INJECTION SUBCUTANEOUS at 06:43

## 2025-01-15 RX ADMIN — SODIUM CHLORIDE, POTASSIUM CHLORIDE, SODIUM LACTATE AND CALCIUM CHLORIDE: 600; 310; 30; 20 INJECTION, SOLUTION INTRAVENOUS at 16:13

## 2025-01-15 RX ADMIN — DEXAMETHASONE SODIUM PHOSPHATE 8 MG: 4 INJECTION, SOLUTION INTRA-ARTICULAR; INTRALESIONAL; INTRAMUSCULAR; INTRAVENOUS; SOFT TISSUE at 07:39

## 2025-01-15 RX ADMIN — HYDROMORPHONE HYDROCHLORIDE 0.2 MG: 0.2 INJECTION, SOLUTION INTRAMUSCULAR; INTRAVENOUS; SUBCUTANEOUS at 16:40

## 2025-01-15 RX ADMIN — ONDANSETRON 4 MG: 2 INJECTION INTRAMUSCULAR; INTRAVENOUS at 11:55

## 2025-01-15 RX ADMIN — PHENYLEPHRINE HYDROCHLORIDE 100 MCG: 10 INJECTION INTRAVENOUS at 10:44

## 2025-01-15 RX ADMIN — SUGAMMADEX 200 MG: 100 INJECTION, SOLUTION INTRAVENOUS at 12:27

## 2025-01-15 RX ADMIN — FENTANYL CITRATE 50 MCG: 50 INJECTION, SOLUTION INTRAMUSCULAR; INTRAVENOUS at 16:00

## 2025-01-15 RX ADMIN — SCOPOLAMINE 1 PATCH: 1.5 PATCH, EXTENDED RELEASE TRANSDERMAL at 07:07

## 2025-01-15 RX ADMIN — INDOCYANINE GREEN AND WATER 25 MG: KIT at 10:50

## 2025-01-15 ASSESSMENT — ACTIVITIES OF DAILY LIVING (ADL)
ADLS_ACUITY_SCORE: 50
ADLS_ACUITY_SCORE: 52
ADLS_ACUITY_SCORE: 50

## 2025-01-15 ASSESSMENT — LIFESTYLE VARIABLES: TOBACCO_USE: 1

## 2025-01-15 ASSESSMENT — ENCOUNTER SYMPTOMS: SEIZURES: 0

## 2025-01-15 NOTE — ANESTHESIA PROCEDURE NOTES
Arterial Line Procedure Note    Pre-Procedure   Staff -        Anesthesiologist:  Troy Alejandre MD       Performed By: anesthesiologist       Location: OR       Pre-Anesthestic Checklist: patient identified, IV checked, risks and benefits discussed, informed consent, monitors and equipment checked, pre-op evaluation and at physician/surgeon's request  Timeout:       Correct Patient: Yes        Correct Procedure: Yes        Correct Site: Yes        Correct Position: Yes   Line Placement:   This line was placed Post Induction  Procedure   Procedure: arterial line       Laterality: left       Insertion Site: radial.  Sterile Prep        Standard elements of sterile barrier followed       Skin prep: Chloraprep  Insertion/Injection        Technique: Seldinger Technique and ultrasound guided        1. Ultrasound was used to evaluate the access site.       2. Artery evaluated via ultrasound for patency/adequacy.       3. Using real-time ultrasound the needle/catheter was observed entering the artery/vein.       Catheter Type/Size: 20 G, 12 cm  Narrative         Secured by: suture       Tegaderm dressing used.       Complications: None apparent,        Arterial waveform: Yes        IBP within 10% of NIBP: Yes

## 2025-01-15 NOTE — ANESTHESIA POSTPROCEDURE EVALUATION
Patient: Laura Ferreira    Procedure: Procedure(s):  LOBECTOMY, LUNG, ROBOT-ASSISTED,flexible bronchoscopy       Anesthesia Type:  General    Note:  Disposition: Admission   Postop Pain Control: Uneventful            Sign Out: Well controlled pain   PONV: No   Neuro/Psych: Uneventful            Sign Out: Acceptable/Baseline neuro status   Airway/Respiratory: Uneventful            Sign Out: Acceptable/Baseline resp. status   CV/Hemodynamics: Uneventful            Sign Out: Acceptable CV status; No obvious hypovolemia; No obvious fluid overload   Other NRE: NONE   DID A NON-ROUTINE EVENT OCCUR? No           Last vitals:  Vitals Value Taken Time   /89 01/15/25 1400   Temp 36.7  C (98  F) 01/15/25 1300   Pulse 71 01/15/25 1409   Resp 18 01/15/25 1409   SpO2 100 % 01/15/25 1409   Vitals shown include unfiled device data.    Electronically Signed By: Abdirashid Andrade MD  January 15, 2025  2:10 PM

## 2025-01-15 NOTE — ANESTHESIA PROCEDURE NOTES
Airway       Patient location during procedure: OR       Procedure Start/Stop Times: 1/15/2025 7:43 AM  Staff -        Other Anesthesia Staff: Isamar Reyes RN       Performed By: SRNA  Consent for Airway        Urgency: elective  Indications and Patient Condition       Indications for airway management: tani-procedural       Induction type:intravenous       Mask difficulty assessment: 1 - vent by mask    Final Airway Details       Final airway type: endotracheal airway       Successful airway: ETT - double lumen left  Endotracheal Airway Details        Cuffed: yes       Successful intubation technique: direct laryngoscopy and flexible bronchoscopy (verified placement with flex bronch)       DL Blade Type: MAC 3       Grade View of Cords: 2       Adjucts: stylet       Position: Right       Measured from: gums/teeth       Secured at (cm): 27       Bite block used: None       ETT Double lumen (fr): 35    Post intubation assessment        Placement verified by: capnometry, equal breath sounds and chest rise        Number of attempts at approach: 1       Secured with: tape       Ease of procedure: easy       Dentition: Intact and Unchanged    Medication(s) Administered   Medication Administration Time: 1/15/2025 7:43 AM

## 2025-01-15 NOTE — ANESTHESIA PREPROCEDURE EVALUATION
Anesthesia Pre-Procedure Evaluation    Patient: Laura Ferreira   MRN: [id number] : 1960        Procedure : Procedure(s):  LOBECTOMY, LUNG, ROBOT-ASSISTED, possible segmentectomy, possible wedge resection  **Latex Allergy**          Past Medical History:   Diagnosis Date    Anemia     Leblanc's esophagus     EGD in ; recent EGD in 2018; repeat EGD in 3 years    Breast cancer (H)     Ex-smoker     1 PPD x 28 years, quit 20 years ago    Gastroesophageal reflux disease     Hematuria 2007    History of breast biopsy     left    Menopausal symptoms 2009    Motion sickness     Obese     Postsurgical hypothyroidism 2009    Symptomatic menopausal or female climacteric states 2009    Tear of lateral cartilage or meniscus of knee, current 10/24/2007      Past Surgical History:   Procedure Laterality Date    ABDOMEN SURGERY      appendix    APPENDECTOMY OPEN      ARTHROSCOPY KNEE RT/LT  11/15/2007    right knee arthroscopy with arthroscopic lateral meniscectomy    BIOPSY Left     Breast. Benign    CL AFF SURGICAL PATHOLOGY  2002    endometrial biopsy    COLONOSCOPY      ELBOW SURGERY Right     ESOPHAGOSCOPY, GASTROSCOPY, DUODENOSCOPY (EGD), COMBINED N/A 2014    Procedure: COMBINED ESOPHAGOSCOPY, GASTROSCOPY, DUODENOSCOPY (EGD), BIOPSY SINGLE OR MULTIPLE;  Surgeon: Paradise Radford MD;  Location: [facility] OR    GRAFT FAT TO BREAST Right 2021    Procedure: AND FAT GRAFTING FROM ABDOMEN;  Surgeon: Genaro Garcia MD;  Location: [facility] OR    HC THYROIDECTOMY      goitre    INSERT TISSUE EXPANDER BREAST Bilateral 2021    Procedure: bilateral BREAST TISSUE EXPANDER;  Surgeon: Genaro Garcia MD;  Location: [facility] OR    LAMINECTOMY, FUSION LUMBAR ONE LEVEL, COMBINED  10/26/2011    Procedure:COMBINED LAMINECTOMY, FUSION LUMBAR ONE LEVEL; L4-5 Decompression, L4-5 Posterior Lateral Fusion with Madhavi and Local Bone; Surgeon:BARBRA BRIGHT; Location: OR     MASTECTOMY SIMPLE, SENTINEL NODE, COMBINED Bilateral 2021    Procedure: BILATERAL SKIN SPARING MASTECTOMY WITH right  SENTINEL LYMPH NODE BIOPSY;  Surgeon: Carmen Stein MD;  Location: [facility]    RECONSTRUCT BREAST Bilateral 2021    Procedure: REVISION OF BILATERAL BREAST RECONSTRUCTION WITH REMOVE AND REPLACE BILATERAL BREAST IMPLANT;  Surgeon: Genaro Garcia MD;  Location: [facility]    RECONSTRUCT BREAST BILATERAL, IMPLANT PROSTHESIS BILATERAL, COMBINED Bilateral 2021    Procedure: BILATERAL SECOND STAGE BREAST RECONSTRUCTION WITH SILICONE IMPLANTS;  Surgeon: Genaro Garcia MD;  Location: [facility]    TONSILLECTOMY & ADENOIDECTOMY        Allergies   Allergen Reactions    Acetaminophen Nausea and Vomiting    Latex      Added based on information entered during case entry, please review and add reactions, type, and severity as needed    Nickel     Vicodin [Acetaminophen] Nausea and Vomiting    Adhesive Tape Rash    Cortisone Nausea and Vomiting and Nausea     oral    Cvs Petroleum Jelly [Basis Facial Moisturizer] Rash    Hydrocodone Nausea and Vomiting and Nausea    Other Drug Allergy (See Comments) Itching, Rash and Blisters     Some Medals (Nickel, Mike Silver)    Petrolatum Itching and Rash      Social History     Tobacco Use    Smoking status: Former     Current packs/day: 0.00     Average packs/day: 1 pack/day for 27.9 years (27.9 ttl pk-yrs)     Types: Cigarettes     Start date: 1971     Quit date: 12/15/1998     Years since quittin.1     Passive exposure: Never    Smokeless tobacco: Never    Tobacco comments:     quit 20 years ago   Substance Use Topics    Alcohol use: Yes     Comment: once in a while      Wt Readings from Last 1 Encounters:   01/15/25 99.1 kg (218 lb 7.6 oz)        Anesthesia Evaluation   Pt has had prior anesthetic. Type: General.    History of anesthetic complications  - motion sickness and PONV.      ROS/MED HX  ENT/Pulmonary:     (+)                 tobacco use, Past use,  30  Pack-Year Hx,                   (-) asthma, sleep apnea and recent URI   Neurologic:  - neg neurologic ROS  (-) no seizures and no CVA   Cardiovascular:     (+)  - -   -  - -                                 Previous cardiac testing   Echo: Date: 5/30/24 Results:  See H&P  Stress Test:  Date: Results:    ECG Reviewed:  Date: Results:    Cath:  Date: Results:      METS/Exercise Tolerance: 4 - Raking leaves, gardening Comment: Walks dog 1.5 miles for walk regularly   Hematologic:  - neg hematologic  ROS  (-) history of blood clots and history of blood transfusion   Musculoskeletal: Comment: S/p L3-5 fusion 5/10/24  S/p L4-5 fusion    (+)  arthritis (knees),             GI/Hepatic:     (+) GERD, Asymptomatic on medication, esophageal disease (Leblanc's),              (-) liver disease   Renal/Genitourinary:  - neg Renal ROS  (-) renal disease   Endo:     (+)          thyroid problem, hypothyroidism s/p thyroidectomy 2/2 goiter,    Obesity,    (-) Type II DM   Psychiatric/Substance Use:  - neg psychiatric ROS     Infectious Disease:  - neg infectious disease ROS     Malignancy:   (+) Malignancy, History of Breast.Breast CA Remission status post Surgery.      Other:  - neg other ROS          Physical Exam    Airway        Mallampati: I   TM distance: > 3 FB   Neck ROM: full   Mouth opening: > 3 cm    Respiratory Devices and Support         Dental     Comment: Permanent implants    (+) Minor Abnormalities - some fillings, tiny chips      Cardiovascular          Rhythm and rate: regular and normal     Pulmonary           breath sounds clear to auscultation           OUTSIDE LABS:  CBC:   Lab Results   Component Value Date    WBC 7.3 01/02/2025    WBC 6.3 04/23/2024    HGB 13.7 01/02/2025    HGB 15.2 09/12/2024    HCT 42.1 01/02/2025    HCT 41.7 04/23/2024     01/02/2025     04/23/2024     BMP:   Lab Results   Component Value Date     01/02/2025     04/23/2024     "POTASSIUM 4.6 01/02/2025    POTASSIUM 4.7 04/23/2024    CHLORIDE 103 01/02/2025    CHLORIDE 103 04/23/2024    CO2 30 (H) 01/02/2025    CO2 27 04/23/2024    BUN 15.4 01/02/2025    BUN 27.2 (H) 04/23/2024    CR 0.69 01/02/2025    CR 0.74 04/23/2024    GLC 88 01/02/2025    GLC 93 12/04/2024     COAGS: No results found for: \"PTT\", \"INR\", \"FIBR\"  POC:   Lab Results   Component Value Date     (H) 03/18/2021     HEPATIC:   Lab Results   Component Value Date    ALBUMIN 3.9 02/15/2023    PROTTOTAL 7.2 02/15/2023    ALT 49 02/15/2023    AST 25 02/15/2023    GGT 35 10/22/2013    ALKPHOS 59 02/15/2023    BILITOTAL 0.4 02/15/2023     OTHER:   Lab Results   Component Value Date    A1C 5.4 04/17/2024    CYNDI 9.5 01/02/2025    PHOS 4.3 02/15/2013    MAG 2.0 02/15/2013    TSH 1.74 04/17/2024    T4 1.06 05/16/2017       Anesthesia Plan    ASA Status:  3    NPO Status:  NPO Appropriate    Anesthesia Type: General.     - Airway: ETT   Induction: Intravenous.   Maintenance: Balanced.   Techniques and Equipment:     - Airway: Double lumen ETT, Fiberoptic Bronchoscope     - Lines/Monitors: 2nd IV, Arterial Line     Consents    Anesthesia Plan(s) and associated risks, benefits, and realistic alternatives discussed. Questions answered and patient/representative(s) expressed understanding.     - Discussed:     - Discussed with:  Patient            Postoperative Care    Pain management: IV analgesics.   PONV prophylaxis: Ondansetron (or other 5HT-3), Scopolamine patch, Dexamethasone or Solumedrol     Comments:               Brenda Regalado MD    I have reviewed the pertinent notes and labs in the chart from the past 30 days and (re)examined the patient.  Any updates or changes from those notes are reflected in this note.                         # Obesity: Estimated body mass index is 37.5 kg/m  as calculated from the following:    Height as of this encounter: 1.626 m (5' 4\").    Weight as of this encounter: 99.1 kg (218 lb 7.6 oz).  "

## 2025-01-15 NOTE — OR NURSING
Called blood bank to confirm that type and screen drawn 1/2/25 can be used today and extension form can be completed. Ok to complete extension form, no type and screen needed today. Cancelled order from today.

## 2025-01-15 NOTE — BRIEF OP NOTE
[facility]    Brief Operative Note    Pre-operative diagnosis: Lung nodule [R91.1]  Post-operative diagnosis Lung cancer     Procedure: Robot-assisted Thoracoscopic Left Lower Lobe Superior Segmentectomy and mediastinal lymph node dissection    Surgeon: Surgeons and Role:     * Shae Ocasio MD - Primary     * Harrison Ernst PA-C - Assisting  Anesthesia: General   Estimated Blood Loss: 20 mL from 1/15/2025  7:31 AM to 1/15/2025 12:47 PM      Drains: Left 24F pleural chest tube  Specimens:   ID Type Source Tests Collected by Time Destination   1 : 9L Tissue Lymph Node(s) SURGICAL PATHOLOGY EXAM Shae Ocasio MD 1/15/2025  9:02 AM    2 : LEVEL 7 Tissue Lymph Node(s) SURGICAL PATHOLOGY EXAM Shae Ocasio MD 1/15/2025  9:09 AM    3 : 10 L Tissue Lymph Node(s) SURGICAL PATHOLOGY EXAM Shae Ocasio MD 1/15/2025  9:18 AM    4 : Level 5 Tissue Lymph Node(s) SURGICAL PATHOLOGY EXAM Shae Ocasio MD 1/15/2025  9:23 AM    5 : 10 L Tissue Lymph Node(s) SURGICAL PATHOLOGY EXAM Shae Ocasio MD 1/15/2025 10:41 AM    6 : left lower lobe segment for diagnostic Tissue Lung, Lower Lobe, Left SURGICAL PATHOLOGY EXAM Shae Ocasio MD 1/15/2025 11:56 AM      Findings:   Successful segmentectomy, no immediate concerns .  Complications: None.  Implants: * No implants in log *

## 2025-01-15 NOTE — ANESTHESIA CARE TRANSFER NOTE
Patient: Laura Ferreira    Procedure: Procedure(s):  LOBECTOMY, LUNG, ROBOT-ASSISTED,flexible bronchoscopy       Diagnosis: Lung nodule [R91.1]  Diagnosis Additional Information: No value filed.    Anesthesia Type:   General     Note:    Oropharynx: spontaneously breathing and oropharynx clear of all foreign objects  Level of Consciousness: awake and drowsy  Oxygen Supplementation: face mask  Level of Supplemental Oxygen (L/min / FiO2): 6  Independent Airway: airway patency satisfactory and stable  Dentition: dentition unchanged  Vital Signs Stable: post-procedure vital signs reviewed and stable  Report to RN Given: handoff report given  Patient transferred to: PACU    Handoff Report: Identifed the Patient, Identified the Reponsible Provider, Reviewed the pertinent medical history, Discussed the surgical course, Reviewed Intra-OP anesthesia mangement and issues during anesthesia, Set expectations for post-procedure period and Allowed opportunity for questions and acknowledgement of understanding      Vitals:  Vitals Value Taken Time   /70    Temp     Pulse 76 01/15/25 1252   Resp 23 01/15/25 1252   SpO2 100 % 01/15/25 1252   Vitals shown include unfiled device data.    Electronically Signed By: HAIR Castillo CRNA  January 15, 2025  12:53 PM

## 2025-01-16 ENCOUNTER — APPOINTMENT (OUTPATIENT)
Dept: OCCUPATIONAL THERAPY | Facility: CLINIC | Age: 65
DRG: 165 | End: 2025-01-16
Attending: PHYSICIAN ASSISTANT
Payer: COMMERCIAL

## 2025-01-16 ENCOUNTER — APPOINTMENT (OUTPATIENT)
Dept: GENERAL RADIOLOGY | Facility: CLINIC | Age: 65
DRG: 165 | End: 2025-01-16
Payer: COMMERCIAL

## 2025-01-16 ENCOUNTER — APPOINTMENT (OUTPATIENT)
Dept: GENERAL RADIOLOGY | Facility: CLINIC | Age: 65
DRG: 165 | End: 2025-01-16
Attending: PHYSICIAN ASSISTANT
Payer: COMMERCIAL

## 2025-01-16 VITALS
RESPIRATION RATE: 18 BRPM | TEMPERATURE: 98.3 F | BODY MASS INDEX: 36.74 KG/M2 | SYSTOLIC BLOOD PRESSURE: 128 MMHG | OXYGEN SATURATION: 94 % | DIASTOLIC BLOOD PRESSURE: 74 MMHG | WEIGHT: 215.2 LBS | HEART RATE: 81 BPM | HEIGHT: 64 IN

## 2025-01-16 LAB
ANION GAP SERPL CALCULATED.3IONS-SCNC: 11 MMOL/L (ref 7–15)
BUN SERPL-MCNC: 13.9 MG/DL (ref 8–23)
CALCIUM SERPL-MCNC: 9.2 MG/DL (ref 8.8–10.4)
CHLORIDE SERPL-SCNC: 103 MMOL/L (ref 98–107)
CREAT SERPL-MCNC: 0.66 MG/DL (ref 0.51–0.95)
EGFRCR SERPLBLD CKD-EPI 2021: >90 ML/MIN/1.73M2
ERYTHROCYTE [DISTWIDTH] IN BLOOD BY AUTOMATED COUNT: 12.6 % (ref 10–15)
GLUCOSE SERPL-MCNC: 131 MG/DL (ref 70–99)
HCO3 SERPL-SCNC: 25 MMOL/L (ref 22–29)
HCT VFR BLD AUTO: 41.3 % (ref 35–47)
HGB BLD-MCNC: 13.9 G/DL (ref 11.7–15.7)
MCH RBC QN AUTO: 31 PG (ref 26.5–33)
MCHC RBC AUTO-ENTMCNC: 33.7 G/DL (ref 31.5–36.5)
MCV RBC AUTO: 92 FL (ref 78–100)
PLATELET # BLD AUTO: 198 10E3/UL (ref 150–450)
POTASSIUM SERPL-SCNC: 4.3 MMOL/L (ref 3.4–5.3)
RBC # BLD AUTO: 4.48 10E6/UL (ref 3.8–5.2)
SODIUM SERPL-SCNC: 139 MMOL/L (ref 135–145)
WBC # BLD AUTO: 11.7 10E3/UL (ref 4–11)

## 2025-01-16 PROCEDURE — 97530 THERAPEUTIC ACTIVITIES: CPT | Mod: GO

## 2025-01-16 PROCEDURE — 71045 X-RAY EXAM CHEST 1 VIEW: CPT | Mod: 26 | Performed by: RADIOLOGY

## 2025-01-16 PROCEDURE — 71045 X-RAY EXAM CHEST 1 VIEW: CPT

## 2025-01-16 PROCEDURE — 250N000013 HC RX MED GY IP 250 OP 250 PS 637: Performed by: PHYSICIAN ASSISTANT

## 2025-01-16 PROCEDURE — 120N000002 HC R&B MED SURG/OB UMMC

## 2025-01-16 PROCEDURE — 97165 OT EVAL LOW COMPLEX 30 MIN: CPT | Mod: GO

## 2025-01-16 PROCEDURE — 97535 SELF CARE MNGMENT TRAINING: CPT | Mod: GO

## 2025-01-16 PROCEDURE — 36415 COLL VENOUS BLD VENIPUNCTURE: CPT | Performed by: PHYSICIAN ASSISTANT

## 2025-01-16 PROCEDURE — 250N000013 HC RX MED GY IP 250 OP 250 PS 637

## 2025-01-16 PROCEDURE — 80048 BASIC METABOLIC PNL TOTAL CA: CPT | Performed by: PHYSICIAN ASSISTANT

## 2025-01-16 PROCEDURE — 71045 X-RAY EXAM CHEST 1 VIEW: CPT | Mod: 77

## 2025-01-16 PROCEDURE — 97110 THERAPEUTIC EXERCISES: CPT | Mod: GO

## 2025-01-16 PROCEDURE — 999N000147 HC STATISTIC PT IP EVAL DEFER

## 2025-01-16 PROCEDURE — 85027 COMPLETE CBC AUTOMATED: CPT | Performed by: PHYSICIAN ASSISTANT

## 2025-01-16 PROCEDURE — 250N000011 HC RX IP 250 OP 636: Performed by: PHYSICIAN ASSISTANT

## 2025-01-16 RX ORDER — POLYETHYLENE GLYCOL 3350 17 G/17G
17 POWDER, FOR SOLUTION ORAL DAILY
Qty: 510 G | Refills: 0 | Status: SHIPPED | OUTPATIENT
Start: 2025-01-16

## 2025-01-16 RX ORDER — LIDOCAINE 4 G/G
1 PATCH TOPICAL EVERY 24 HOURS
Qty: 15 PATCH | Refills: 0 | Status: SHIPPED | OUTPATIENT
Start: 2025-01-16

## 2025-01-16 RX ORDER — ACETAMINOPHEN 325 MG/1
975 TABLET ORAL EVERY 6 HOURS
Qty: 120 TABLET | Refills: 0 | Status: SHIPPED | OUTPATIENT
Start: 2025-01-16

## 2025-01-16 RX ORDER — IBUPROFEN 600 MG/1
600 TABLET, FILM COATED ORAL EVERY 6 HOURS PRN
Qty: 60 TABLET | Refills: 0 | Status: SHIPPED | OUTPATIENT
Start: 2025-01-16

## 2025-01-16 RX ORDER — AMOXICILLIN 250 MG
1 CAPSULE ORAL 2 TIMES DAILY
Qty: 30 TABLET | Refills: 0 | Status: SHIPPED | OUTPATIENT
Start: 2025-01-16

## 2025-01-16 RX ORDER — METHOCARBAMOL 500 MG/1
250 TABLET, FILM COATED ORAL EVERY 6 HOURS PRN
Qty: 30 TABLET | Refills: 0 | Status: SHIPPED | OUTPATIENT
Start: 2025-01-16 | End: 2025-01-29

## 2025-01-16 RX ORDER — SIMETHICONE 80 MG
80 TABLET,CHEWABLE ORAL EVERY 6 HOURS PRN
Status: DISCONTINUED | OUTPATIENT
Start: 2025-01-16 | End: 2025-01-17 | Stop reason: HOSPADM

## 2025-01-16 RX ADMIN — IBUPROFEN 600 MG: 600 TABLET, FILM COATED ORAL at 06:40

## 2025-01-16 RX ADMIN — PANTOPRAZOLE SODIUM 40 MG: 40 TABLET, DELAYED RELEASE ORAL at 08:00

## 2025-01-16 RX ADMIN — ACETAMINOPHEN 975 MG: 325 TABLET, FILM COATED ORAL at 23:01

## 2025-01-16 RX ADMIN — ACETAMINOPHEN 975 MG: 325 TABLET, FILM COATED ORAL at 11:05

## 2025-01-16 RX ADMIN — ACETAMINOPHEN 975 MG: 325 TABLET, FILM COATED ORAL at 16:24

## 2025-01-16 RX ADMIN — ACETAMINOPHEN 975 MG: 325 TABLET, FILM COATED ORAL at 02:45

## 2025-01-16 RX ADMIN — ENOXAPARIN SODIUM 40 MG: 40 INJECTION SUBCUTANEOUS at 11:05

## 2025-01-16 RX ADMIN — OXYCODONE HYDROCHLORIDE 5 MG: 5 TABLET ORAL at 23:02

## 2025-01-16 RX ADMIN — OXYCODONE HYDROCHLORIDE 10 MG: 10 TABLET ORAL at 18:57

## 2025-01-16 RX ADMIN — LEVOTHYROXINE SODIUM 112 MCG: 0.11 TABLET ORAL at 08:00

## 2025-01-16 RX ADMIN — SIMETHICONE 80 MG: 80 TABLET, CHEWABLE ORAL at 19:39

## 2025-01-16 RX ADMIN — LIDOCAINE 1 PATCH: 4 PATCH TOPICAL at 08:01

## 2025-01-16 RX ADMIN — Medication 250 MG: at 00:55

## 2025-01-16 RX ADMIN — OXYCODONE HYDROCHLORIDE 10 MG: 10 TABLET ORAL at 02:56

## 2025-01-16 ASSESSMENT — ACTIVITIES OF DAILY LIVING (ADL)
ADLS_ACUITY_SCORE: 32
ADLS_ACUITY_SCORE: 32
WEAR_GLASSES_OR_BLIND: YES
CHANGE_IN_FUNCTIONAL_STATUS_SINCE_ONSET_OF_CURRENT_ILLNESS/INJURY: NO
CONCENTRATING,_REMEMBERING_OR_MAKING_DECISIONS_DIFFICULTY: NO
FALL_HISTORY_WITHIN_LAST_SIX_MONTHS: NO
DIFFICULTY_EATING/SWALLOWING: NO
PREVIOUS_RESPONSIBILITIES: MEAL PREP;HOUSEKEEPING;LAUNDRY;SHOPPING;YARDWORK;MEDICATION MANAGEMENT;FINANCES;DRIVING
ADLS_ACUITY_SCORE: 32
ADLS_ACUITY_SCORE: 33
TOILETING_ISSUES: NO
ADLS_ACUITY_SCORE: 32
DOING_ERRANDS_INDEPENDENTLY_DIFFICULTY: NO
ADLS_ACUITY_SCORE: 32
VISION_MANAGEMENT: GLASSES
ADLS_ACUITY_SCORE: 32
DRESSING/BATHING_DIFFICULTY: NO
ADLS_ACUITY_SCORE: 32
WALKING_OR_CLIMBING_STAIRS_DIFFICULTY: NO
ADLS_ACUITY_SCORE: 32
ADLS_ACUITY_SCORE: 32
HEARING_DIFFICULTY_OR_DEAF: NO
DIFFICULTY_COMMUNICATING: NO
ADLS_ACUITY_SCORE: 32

## 2025-01-16 NOTE — PHARMACY-ADMISSION MEDICATION HISTORY
Pharmacist Admission Medication History    Admission medication history is complete. The information provided in this note is only as accurate as the sources available at the time of the update.    Information Source(s): Patient via phone    Pertinent Information:   - Laura was unsure of the type of eye drops she uses, but purchases over-the-counter. Also did not know the strength of her ferrous sulfate, but takes 1 tab daily.  - Dexamethasone order was prescribed for a procedure - she does not take this regularly at home.     Changes made to PTA medication list:  Added: None  Deleted: Duplicate MVI order  Changed: None    Allergies reviewed with patient and updates made in EHR: no    Medication History Completed By:   Lora Siddiqui, PharmD      PTA Med List   Medication Sig Last Dose/Taking    acetaminophen (TYLENOL) 500 MG tablet Take 500-1,000 mg by mouth daily as needed for mild pain 1/14/2025 Bedtime    Ferrous Sulfate (IRON PO) Take 1 tablet by mouth daily 1/14/2025 Morning    levothyroxine (SYNTHROID/LEVOTHROID) 112 MCG tablet Take 1 tablet (112 mcg) by mouth daily (Patient taking differently: Take 112 mcg by mouth every morning.) 1/15/2025 at  3:30 AM    Multiple Minerals-Vitamins (CALCIUM-MAGNESIUM-ZINC-D3 PO) Take 500 mg by mouth daily. 1/14/2025 Morning    omeprazole (PRILOSEC) 20 MG DR capsule TAKE 1 CAPSULE BY MOUTH TWICE  DAILY BEFORE MEALS 1/14/2025 Evening    OVER-THE-COUNTER Place 1-2 drops into both eyes every hour as needed 1/14/2025    rosuvastatin (CRESTOR) 20 MG tablet Take 1 tablet (20 mg) by mouth daily (Patient taking differently: Take 20 mg by mouth at bedtime.) 1/14/2025 Bedtime

## 2025-01-16 NOTE — PLAN OF CARE
Goal Outcome Evaluation:      Plan of Care Reviewed With: patient    Overall Patient Progress: no change    Time: 8998-9452  Status: POD #1 s/p LOBECTOMY, LUNG, ROBOT-ASSISTED,flexible bronchoscopy   Neuros: A&Ox4, makes needs known.   Cardiac: WNL denies cardiac chest pain.   Respiratory: LS clear, diminished, denies SOB, IS encouraged. Weaned off oxygen this morning.   Pain: c/o incisional pain, L shoulder pain managed with prn robaxin x1, scheduled tylenol, prn oxycodone x1, prn ibuprofen x1 & hot pack on shoulder with some relief.   Nausea: Denies N/V  Diet: Tolerating CLD, advanced to regular diet this am.   GI/: Voiding spontaneously. +BS, +flatus, no BM.   Skin/incisions: L CT site dressing remains reinforced and 4 lap sites cdi.   Drains: L CT to WS, no air leak, minimal drainage.   Lines: L PIV x2 SL.   Activity: Ambulated to BR twice with walker/GB, up in a recliner this am.   New Changes this Shift: No acute changes.   Plan: XR chest in am. Continue POC.

## 2025-01-16 NOTE — PLAN OF CARE
"Admitted/transferred from: PACU  2 RN full   skin assessment completed by Chuyita Pinzon, IAIN and Obie FULLER RN.  Skin assessment finding: issues found Left CT to WS--leaking at site, Left back lap sites x3 covered with primapore CDI, Left abdomen lap site x1 covered with primapore CDI, R PIV SL, L PIV SL, old lower back scar    Interventions/actions: skin interventions CT dressing reinforced, monitor dressings, mepilex on sacrum, encourage activity/OOB      Bedside Emergency Equipment Present:  Suction Regulator: Yes  Suction Canister: Yes  Tubing between Regulator and Canister: Yes  O2 Regulator with Tree: Yes  Ambu Bag: Yes    Documentation of Language Proficiency Assessment:  Does the patient speak a language other than English at home? No   Have they had difficulty understanding or being understood in previous admissions or doctor visits? No   Do they have difficulty clearly explaining what brought them into the hospital? No   Do they show difficulty providing clear teach back about call light use? No     If the answer was \"yes,\" to more than one question, was an  utilized for the remainder of the admission assessment? N/A    Goal Outcome Evaluation:      Plan of Care Reviewed With: patient    Overall Patient Progress: no changeOverall Patient Progress: no change    A&Ox4. Afebrile. VSS on RA. Endorsed SOB, placed on 2L NC for comfort. Pain managed with PRN dilaudid, oxycodone, and tylenol. Tolerating CLD w/o nausea. CT to WS, no airleak, sanguinous output. Voiding spontaneously. No BM but passing flatus. Ambulated to bathroom with Ax1 and walker/GB. R PIV SL. L PIV SL. Continue with POC.     "

## 2025-01-16 NOTE — PLAN OF CARE
Goal Outcome Evaluation:      Plan of Care Reviewed With: patient    Overall Patient Progress: improvingOverall Patient Progress: improving    Outcome Evaluation: Pt reports minimal pain, tylenol effective.  tolerating regular diet, denies n/v.  L lateral to L upper back incisions CDI. L CT removed, follow up xray due at 1600.  continue to monitor.

## 2025-01-16 NOTE — DISCHARGE SUMMARY
"NAME: Laura Ferreira   MRN: [id number]   : 1960     DATE OF ADMISSION: 1/15/2025     PRE/POSTOPERATIVE DIAGNOSES: Lung nodule    PROCEDURES PERFORMED: Robot-assisted thoracoscopic left lower lobe superior segmentectomy and mediastinal lymph node dissection.    PATHOLOGY RESULTS: Pending at time of discharge     INTRAOPERATIVE COMPLICATIONS: None     POSTOPERATIVE MEDICAL ISSUES: None     DATE OF DISCHARGE:  2025    HOSPITAL COURSE: Laura Ferreira is a 64 year old female who on 1/15/2025 underwent the above-named procedures.  She tolerated the operation well and postoperatively was transferred to the general post-surgical unit.  The remainder of her course was essentially uncomplicated.  Prior to discharge, her pain was controlled well, she was able to perform ADLs and ambulate independently without difficulty, and had full return of bowel and bladder function.  On 2025, she was discharged home in stable condition.    Clinically Significant Risk Factors   { TIP  Diagnoses will continue to appear throughout the admission.  :[id number]}                           # Obesity: Estimated body mass index is 36.94 kg/m  as calculated from the following:    Height as of this encounter: 1.626 m (5' 4\").    Weight as of this encounter: 97.6 kg (215 lb 3.2 oz)., PRESENT ON ADMISSION            DISCHARGE EXAM: ***  A&O, NAD  Resp non-labored  Distal extremities warm    Incisions ***     DISCHARGE INSTRUCTIONS:  Discharge Procedure Orders   X-ray Chest 2 vws*   Standing Status: Future Standing Exp. Date: 25   Order Comments: Complete prior to 1 month follow up with Thoracic surgery     Order Specific Question Answer Comments   Priority Routine    Clinical Info for the Interpreting Provider S/P Left lower lobe superior segmentectomy      Reason for your hospital stay   Order Comments: Left lower lobe superior segmentectomy     Activity   Order Comments: Your activity upon discharge: no lifting greater than 20 pounds " for 2 weeks, then no more than 40 pounds for an additional 4 weeks     Order Specific Question Answer Comments   Is discharge order? Yes      Adult [facility] Follow-up and recommended labs and tests   Order Comments: 1.) Follow up with primary care physician, Sarita Shultz, in 1-2 weeks.  2.) Follow up with Thoracic surgery in one month, complete chest xray prior to appointment    Appointments on [place] and/or [facility] (with [facility] or [facility] provider or service). Call [phone] if you haven't heard regarding these appointments within 7 days of discharge.     Discharge Instructions   Order Comments: THORACIC SURGERY DISCHARGE INSTRUCTIONS    DIET: Regular diet as tolerated    If your plans upon discharge include prolonged periods of sitting (i.e a lengthy car or plane ride), it is highly beneficial to get up and walk at least once per hour to help prevent swelling and blood clots.     You may remove chest tube dressing 48 hours after tube removal and bandage the site at your own discretion thereafter.  Small amounts of leakage are normal for 2-3 days after removal.  Feel free to call with questions.    You may get incision wet 2 days after operation. Do not submerge, soak, or scrub incision or swim until seen in follow-up.    Take incentive spirometer home for continued frequent use    Activity as tolerated, no strenous activity until seen in follow-up, no lifting greater than 20 pounds for the next 2 weeks then no greater than 40 pounds for an additional 4 weeks.    Stay hydrated. Take over the counter fiber (metamucil or benefiber) and stool softeners (Miralax, docusate or senna) if becoming constipated.     Call for fever greater than 101.5, chills, increased size of incision, red skin around incision, vision changes, muscle strength changes, sensation changes, shortness of breath, or other concerns.    No driving while taking narcotic pain medication.    Transition to ibuprofen or  "tylenol/acetaminophen for pain control. Do not take tylenol/acetaminophen and acetaminophen containing narcotic (e.g., percocet or vicodin) at the same time. If you have known ulcer problems, or kidney trouble (elevated creatinine) do not take the ibuprofen.    If you think you will need a refill on your pain medications, you should call the thoracic surgery team at the number below at least 24hrs prior to running out.  It is also more difficult to provide refills Friday afternoon and over the weekend, so call before those times if possible.     In emergencies, call [phone]    For other Questions or Concerns;   A.) During weekday working hours (Monday through Friday 8am to 4:30pm)   call [phone] and ask to speak to a thoracic surgery nurse (RN or LPN).     B.) At nights (after 4:30pm), on weekends, or if urgent call [phone] and   tell the  \"I would like to page job code [phone], the thoracic surgery   fellow on call, please.\"     Diet   Order Comments: Follow this diet upon discharge: regular diet, no restrictions     Order Specific Question Answer Comments   Is discharge order? Yes        DISCHARGE MEDICATIONS:   Current Discharge Medication List        START taking these medications    Details   ibuprofen (ADVIL/MOTRIN) 600 MG tablet Take 1 tablet (600 mg) by mouth every 6 hours as needed for inflammatory pain.  Qty: 60 tablet, Refills: 0    Associated Diagnoses: Malignant neoplasm of lower lobe of left lung (H)      Lidocaine (LIDOCARE) 4 % Patch Place 1 patch over 12 hours onto the skin every 24 hours. To prevent lidocaine toxicity, patient should be patch free for 12 hrs daily.  Qty: 15 patch, Refills: 0    Associated Diagnoses: Malignant neoplasm of lower lobe of left lung (H)      methocarbamol (ROBAXIN) 500 MG tablet Take 0.5 tablets (250 mg) by mouth every 6 hours as needed for muscle spasms.  Qty: 30 tablet, Refills: 0    Associated Diagnoses: Malignant neoplasm of lower lobe of left lung " (H)      polyethylene glycol (MIRALAX) 17 GM/Dose powder Take 17 g by mouth daily.  Qty: 510 g, Refills: 0    Associated Diagnoses: Opioid-induced constipation      senna-docusate (SENOKOT-S/PERICOLACE) 8.6-50 MG tablet Take 1 tablet by mouth 2 times daily.  Qty: 30 tablet, Refills: 0    Associated Diagnoses: Opioid-induced constipation           CONTINUE these medications which have CHANGED    Details   acetaminophen (TYLENOL) 325 MG tablet Take 3 tablets (975 mg) by mouth every 6 hours.  Qty: 120 tablet, Refills: 0    Associated Diagnoses: Malignant neoplasm of lower lobe of left lung (H)           CONTINUE these medications which have NOT CHANGED    Details   Ferrous Sulfate (IRON PO) Take 1 tablet by mouth daily      levothyroxine (SYNTHROID/LEVOTHROID) 112 MCG tablet Take 1 tablet (112 mcg) by mouth daily  Qty: 90 tablet, Refills: 3    Associated Diagnoses: Postsurgical hypothyroidism      Multiple Minerals-Vitamins (CALCIUM-MAGNESIUM-ZINC-D3 PO) Take 500 mg by mouth daily.      omeprazole (PRILOSEC) 20 MG DR capsule TAKE 1 CAPSULE BY MOUTH TWICE  DAILY BEFORE MEALS  Qty: 200 capsule, Refills: 0    Associated Diagnoses: Leblanc's esophagus with esophagitis      OVER-THE-COUNTER Place 1-2 drops into both eyes every hour as needed      rosuvastatin (CRESTOR) 20 MG tablet Take 1 tablet (20 mg) by mouth daily  Qty: 90 tablet, Refills: 3    Associated Diagnoses: Hyperlipidemia LDL goal <130           STOP taking these medications       dexAMETHasone (DECADRON) 1 MG tablet Comments:   Reason for Stopping:               Ruy Melara MD, PGY-1  General Surgery Resident

## 2025-01-16 NOTE — PLAN OF CARE
Physical Therapy: Orders received. Chart reviewed and discussed with care team.? Physical Therapy not indicated due to patient mobilizing with SBA and on track for a safe discharge home. OT will work with patient during hospital stay to address any discharge needs.? Defer discharge recommendations to OT and medical team.? Will complete orders.

## 2025-01-16 NOTE — PROGRESS NOTES
THORACIC & FOREGUT SURGERY    S:  No adverse overnight events. Pt seen at bedside resting comfortably. L shoulder pain, no chest pain at this time.    O:  Vitals:    01/16/25 0551 01/16/25 0626 01/16/25 0635 01/16/25 0712   BP: 120/61      BP Location: Right arm      Patient Position:       Cuff Size:       Pulse: 67      Resp: 18 18 18 18   Temp: 98.1  F (36.7  C)      TempSrc: Oral      SpO2: 98% 95% 95% 95%   Weight:       Height:           A&Ox3, NAD  Breathing non-labored on room air  RRR on monitor  Soft, NDNT  Distal extremities warm  Incisions clean, dry, intact  Left chest tube with 16mL serosanguinous output, no air leak    Most Recent 3 CBC's:  Recent Labs   Lab Test 01/16/25  0726 01/02/25  1441 09/12/24  1041 04/23/24  1450   WBC 11.7* 7.3  --  6.3   HGB 13.9 13.7 15.2 13.7   MCV 92 93  --  98    219  --  231      Most Recent 3 BMP's:  Recent Labs   Lab Test 01/15/25  2010 01/02/25  1441 12/04/24  1344 04/23/24  1450 02/19/24  1433   NA  --  141  --  140 142   POTASSIUM  --  4.6  --  4.7 4.3   CHLORIDE  --  103  --  103 103   CO2  --  30*  --  27 31*   BUN  --  15.4  --  27.2* 20.1   CR 0.71 0.69  --  0.74 0.63   ANIONGAP  --  8  --  10 8   CYNDI  --  9.5  --  9.7 9.5   GLC  --  88 93 103* 127*     Most Recent 2 LFT's:  Recent Labs   Lab Test 02/15/23  1110 11/18/21  0810 12/15/20  1140   AST 25 20 19   ALT 49 39 35   ALKPHOS 59  --  65   BILITOTAL 0.4  --  0.4     Most Recent INR's and Anticoagulation Dosing History:  Anticoagulation Dose History           No data to display              Most Recent 3 Troponin's:No lab results found.  Most Recent Cholesterol Panel:  Recent Labs   Lab Test 04/17/24  0924   CHOL 173   LDL 93   HDL 64   TRIG 81     Most Recent 6 Bacteria Isolates From Any Culture (See EPIC Reports for Culture Details):No lab results found.  Most Recent TSH, T4 and A1c Labs:  Recent Labs   Lab Test 04/17/24  0924 02/28/18  1727 05/16/17  0924   TSH 1.74   < > 6.37*   T4  --   --  1.06    A1C 5.4  --   --     < > = values in this interval not displayed.       A/P: Laura Ferreira is a 64 year old female s/p robot-assisted thoracoscopic left lower lobe superior segmentectomy and mediastinal lymph node dissection. Recovering appropriately.  - regular diet as tolerated  - encourage out of bed  - left chest tube to water seal  - scheduled Tylenol and lidocaine patch, PRN oxycodone, ibuprofen, Robaxin, and Dilaudid for pain  - Protonix 40mg daily, scheduled Miralax and Senna  - Lovenox 40mg daily    Discussed with Dr. Powell, thoracic surgery fellow who discussed with staff    Ruy Melara MD, PGY-1  General Surgery Resident

## 2025-01-16 NOTE — PROGRESS NOTES
"  Thoracic Surgery Progress Note  Surgery Cross-Cover  Post Op Check    01/15/2025    Larua Ferreira is a 64 year old female POD#0 s/p Procedure(s):  LOBECTOMY, LUNG, ROBOT-ASSISTED,flexible bronchoscopy for Pre-Op Diagnosis Codes:      * Lung nodule [R91.1]    Pt reports their pain is controlled with current regimen. Denies nausea, SOB, chest pain, or dizziness. Patient Is passing flatus but has not yet had a BM and Is voiding spontaneously. Resting comfortably in bed. Feeling tired.    /62   Pulse 92   Temp 98.7  F (37.1  C) (Oral)   Resp 20   Ht 1.626 m (5' 4\")   Wt 99.1 kg (218 lb 7.6 oz)   LMP 04/01/2012   SpO2 95%   BMI 37.50 kg/m      Gen: A&O x4, NAD   Chest: breathing non-labored on nasal cannula at 2 liters per minute   Abdomen: soft, non-tender, non-distended. Non-peritonitic.  Incision: clean, dry, intact closed with dermabond. Mild oozing around chest tube site.  Extremities: warm and well perfused  Devices: Left pleural chest tube. Mild oozing around insertion site. To water seal. Minimal serosanguinous output. No air leak.    A/P: No acute post-op issues. Continue plan of care per primary team. Please call with any questions.    Vince Iglesias MD    "

## 2025-01-17 ENCOUNTER — APPOINTMENT (OUTPATIENT)
Dept: GENERAL RADIOLOGY | Facility: CLINIC | Age: 65
DRG: 165 | End: 2025-01-17
Attending: PHYSICIAN ASSISTANT
Payer: COMMERCIAL

## 2025-01-17 ENCOUNTER — APPOINTMENT (OUTPATIENT)
Dept: OCCUPATIONAL THERAPY | Facility: CLINIC | Age: 65
DRG: 165 | End: 2025-01-17
Attending: STUDENT IN AN ORGANIZED HEALTH CARE EDUCATION/TRAINING PROGRAM
Payer: COMMERCIAL

## 2025-01-17 ENCOUNTER — PATIENT OUTREACH (OUTPATIENT)
Dept: FAMILY MEDICINE | Facility: CLINIC | Age: 65
End: 2025-01-17

## 2025-01-17 VITALS
RESPIRATION RATE: 18 BRPM | OXYGEN SATURATION: 93 % | BODY MASS INDEX: 36.74 KG/M2 | SYSTOLIC BLOOD PRESSURE: 106 MMHG | HEIGHT: 64 IN | DIASTOLIC BLOOD PRESSURE: 69 MMHG | TEMPERATURE: 98.1 F | HEART RATE: 72 BPM | WEIGHT: 215.2 LBS

## 2025-01-17 PROCEDURE — 250N000013 HC RX MED GY IP 250 OP 250 PS 637

## 2025-01-17 PROCEDURE — 250N000013 HC RX MED GY IP 250 OP 250 PS 637: Performed by: PHYSICIAN ASSISTANT

## 2025-01-17 PROCEDURE — 71045 X-RAY EXAM CHEST 1 VIEW: CPT | Mod: 26 | Performed by: RADIOLOGY

## 2025-01-17 PROCEDURE — 250N000011 HC RX IP 250 OP 636: Performed by: PHYSICIAN ASSISTANT

## 2025-01-17 PROCEDURE — 71045 X-RAY EXAM CHEST 1 VIEW: CPT

## 2025-01-17 PROCEDURE — 97530 THERAPEUTIC ACTIVITIES: CPT | Mod: GO

## 2025-01-17 RX ORDER — OXYCODONE HYDROCHLORIDE 5 MG/1
5 TABLET ORAL EVERY 4 HOURS PRN
Qty: 40 TABLET | Refills: 0 | Status: SHIPPED | OUTPATIENT
Start: 2025-01-17

## 2025-01-17 RX ADMIN — LEVOTHYROXINE SODIUM 112 MCG: 0.11 TABLET ORAL at 08:24

## 2025-01-17 RX ADMIN — POLYETHYLENE GLYCOL 3350 17 G: 17 POWDER, FOR SOLUTION ORAL at 08:23

## 2025-01-17 RX ADMIN — LIDOCAINE 1 PATCH: 4 PATCH TOPICAL at 08:23

## 2025-01-17 RX ADMIN — SENNOSIDES AND DOCUSATE SODIUM 1 TABLET: 50; 8.6 TABLET ORAL at 08:24

## 2025-01-17 RX ADMIN — ENOXAPARIN SODIUM 40 MG: 40 INJECTION SUBCUTANEOUS at 08:23

## 2025-01-17 RX ADMIN — PANTOPRAZOLE SODIUM 40 MG: 40 TABLET, DELAYED RELEASE ORAL at 08:24

## 2025-01-17 RX ADMIN — ACETAMINOPHEN 975 MG: 325 TABLET, FILM COATED ORAL at 05:08

## 2025-01-17 RX ADMIN — SIMETHICONE 80 MG: 80 TABLET, CHEWABLE ORAL at 05:08

## 2025-01-17 RX ADMIN — ACETAMINOPHEN 975 MG: 325 TABLET, FILM COATED ORAL at 10:10

## 2025-01-17 ASSESSMENT — ACTIVITIES OF DAILY LIVING (ADL)
ADLS_ACUITY_SCORE: 33

## 2025-01-17 NOTE — PLAN OF CARE
"/69 (BP Location: Left arm)   Pulse 72   Temp 98.1  F (36.7  C) (Oral)   Resp 18   Ht 1.626 m (5' 4\")   Wt 97.6 kg (215 lb 3.2 oz)   LMP 04/01/2012   SpO2 93%   BMI 36.94 kg/m      Problem: Adult Inpatient Plan of Care  Goal: Plan of Care Review    Outcome: Adequate for Care Transition   AVS reviewed w and give to pt. PIV removed. Pt dressed. Meds picked up from pharmacy and given to pt. Pt discharged home.   "

## 2025-01-17 NOTE — PLAN OF CARE
"Goal Outcome Evaluation:      Plan of Care Reviewed With: patient    Overall Patient Progress: improvingOverall Patient Progress: improving    A&Ox4. Afebrile. VSS on RA. Pain managed with PRN  oxycodone and scheduled tylenol. Gas discomfort/bloating managed with PRN simethicone x1. Tolerating regular w/o nausea. Left CT removed--site covered with dry gauze and tegaderm CDI. Left upper back lap sites x3 covered with primapore CDI, Left abdomen lap site x1 covered with primapore CDI. Voiding spontaneously AUOP. No BM but passing flatus. Ambulated in halls independently with walker, gait steady. R PIV SL. Plan for discharge tomorrow AM. Continue with POC.     Vitals: /74 (BP Location: Left arm)   Pulse 81   Temp 98.3  F (36.8  C) (Oral)   Resp 18   Ht 1.626 m (5' 4\")   Wt 97.6 kg (215 lb 3.2 oz)   LMP 04/01/2012   SpO2 94%   BMI 36.94 kg/m         "

## 2025-01-17 NOTE — PLAN OF CARE
"Goal Outcome Evaluation:      Plan of Care Reviewed With: patient    Overall Patient Progress: improvingOverall Patient Progress: improving      /74 (BP Location: Left arm)   Pulse 81   Temp 98.3  F (36.8  C) (Oral)   Resp 18   Ht 1.626 m (5' 4\")   Wt 97.6 kg (215 lb 3.2 oz)   LMP 04/01/2012   SpO2 94%   BMI 36.94 kg/m        Activity: up ad marcela   Neuros: alert and orientated x 4, calm and cooperative   Cardiac: WNL   Respiratory: O2 sats mid 90's on RA, unlabored    GI/: abdomen soft/non-tender.  Voiding spont   Diet: regular   Lines: PIV   Incisions/Drains: left flank lap site dressings x 4, CDI.  Old chest tube site dressing CDI.  No drains    Labs: reviewed   Pain/nausea: taking scheduled tylenol overnight with \"adequate\" pain control.  No nausea    New changes this shift: none    Plan: Continue POC.  Probable discharge to home today             "

## 2025-01-17 NOTE — TELEPHONE ENCOUNTER
LLL superior segmentectomy     HOSPITAL COURSE: Laura Ferreira is a 64 year old female who on 1/15/2025 underwent the above-named procedures.  She tolerated the operation well and postoperatively was transferred to the general post-surgical unit.  The remainder of her course was essentially uncomplicated.  Prior to discharge, her pain was controlled well, she was able to perform ADLs and ambulate independently without difficulty, and had full return of bowel and bladder function.  On 1/17/2025, she was discharged home in stable condition.     Adult [facility] Follow-up and recommended labs and tests   Order Comments: 1.) Follow up with primary care physician, Sarita Shultz, in 1-2 weeks.  2.) Follow up with Thoracic surgery in one month, complete chest xray prior to appointment       Kita Ch RN on 1/17/2025 at 1:50 PM

## 2025-01-20 ENCOUNTER — PATIENT OUTREACH (OUTPATIENT)
Dept: SURGERY | Facility: CLINIC | Age: 65
End: 2025-01-20
Payer: COMMERCIAL

## 2025-01-20 LAB
INTERPRETATION SERPL IEP-IMP: NORMAL
LAB DIRECTOR COMMENTS: NORMAL
LAB DIRECTOR DISCLAIMER: NORMAL
LAB DIRECTOR INTERPRETATION: NORMAL
LAB DIRECTOR METHODOLOGY: NORMAL
LAB DIRECTOR RESULTS: NORMAL
LAB TEST RESULTS REPORTED IN RPTPERIOD: NORMAL
SEQUENCING METHOD PNL BLD/T: NORMAL
SPECIMEN TYPE: NORMAL
SPECIMEN TYPE: NORMAL

## 2025-01-20 NOTE — TELEPHONE ENCOUNTER
Transitions of Care Outreach  Chief Complaint   Patient presents with    Hospital F/U       Most Recent Admission Date: 1/15/2025   Most Recent Admission Diagnosis: Lung nodule - R91.1     Most Recent Discharge Date: 1/17/2025   Most Recent Discharge Diagnosis: Malignant neoplasm of lower lobe of left lung (H) - C34.32  Opioid-induced constipation - K59.03, T40.2X5A     Transitions of Care Assessment    Discharge Assessment  How are you doing now that you are home?: pretty well, eating and drinking  How are your symptoms? (Red Flag symptoms escalate to triage hotline per guidelines): Improved  Do you know how to contact your clinic care team if you have future questions or changes to your health status? : Yes  Does the patient have their discharge instructions? : Yes  Does the patient have questions regarding their discharge instructions? : No  Were you started on any new medications or were there changes to any of your previous medications? : Yes  Does the patient have all of their medications?: Yes  Do you have questions regarding any of your medications? : No  Do you have all of your needed medical supplies or equipment (DME)?  (i.e. oxygen tank, CPAP, cane, etc.): Yes    Follow up Plan     Discharge Follow-Up  Discharge follow up appointment scheduled in alignment with recommended follow up timeframe or Transitions of Risk Category? (Low = within 30 days; Moderate= within 14 days; High= within 7 days): No  Patient's follow up appointment not scheduled: Patient accepted scheduling support. Appt scheduled/requested per protocol.    Future Appointments   Date Time Provider Department Center   1/24/2025  9:50 AM [id number] [place] [address]   1/24/2025 10:30 AM Celeste King APRN [facility]   1/27/2025 11:00 AM Sarita Shultz MD [facility]   2/17/2025  9:25 AM [id number]   2/17/2025 10:15 AM Celeste King APRN [facility]   4/21/2025 11:30 AM Sarita Shultz MD  [facility]   6/5/2025  9:30 AM Carmen Stein MD [facility]   10/13/2025 10:30 AM Frida Larson MD [facility]       Outpatient Plan as outlined on AVS reviewed with patient.    For any urgent concerns, please contact our 24 hour nurse triage line: [phone] ([phone])       Yokasta Fam RN

## 2025-01-20 NOTE — PROGRESS NOTES
"Post Op Discharge Call    DATE OF ADMISSION: 1/15/2025      PRE/POSTOPERATIVE DIAGNOSES: Lung nodule     PROCEDURES PERFORMED: Robot-assisted thoracoscopic left lower lobe superior segmentectomy and mediastinal lymph node dissection.     PATHOLOGY RESULTS: Pending at time of discharge      INTRAOPERATIVE COMPLICATIONS: None      POSTOPERATIVE MEDICAL ISSUES: None      DATE OF DISCHARGE:  1/17/2025    Immediate Concerns: Pt reports she has blisters around incisions and chest tube site that started in the hospital and are improving. She has been applying HTC 2.5% cream that she had at home. She reports two larger blisters remain that haven't popped yet. Pt was advised to send a photo of the area through [employer] for further assessment and she was agreeable to this. We discussed that some drainage from former chest tube site is normal. She was advised to keep the area covered with a ABD pad secured with loose abd binder or other adhesive that will not irritate her skin. Pt will call back if the areas do not improve.     Pain:  No concerns with pain management. Pt reports she does not like to take oxycodone, makes her dizzy and \"foggy headed\", managing well with alternating tylenol/ibuprofen and robaxin, rates pain 4/10.     Incision:   See above. Denies signs of infection.     Drains:   No drain.     Diet:   Regular diet Prefers snacks over meals, we discussed this is ok. Focus on protein to aid in healing.    Bowels:   Passing gas. Had a normal BM today and is taking bowel meds.     Activity:   No difficulty with ADLs reported.     Post op/follow up plans:   Post op appointment scheduled,confirmed date and time with patient.       Future Appointments   Date Time Provider Department Center   1/24/2025  9:50 AM [address]   1/24/2025 10:30 AM Celeste King APRN [facility]   1/27/2025 11:00 AM Sarita Shultz MD [facility]   2/17/2025  9:25 AM [address]   2/17/2025 10:15 " Celeste Scanlon APRN [facility]   4/21/2025 11:30 AM Sarita Shultz MD [facility]   6/5/2025  9:30 AM [facility]   10/13/2025 10:30 AM Frida Larson MD [facility]         Patient has our direct number for any questions or concerns that may arise.    Radha Moore RN BSN  Thoracic Surgery RN Care Coordinator  Phone: [phone]

## 2025-01-21 LAB
PATH REPORT.COMMENTS IMP SPEC: ABNORMAL
PATH REPORT.COMMENTS IMP SPEC: YES
PATH REPORT.FINAL DX SPEC: ABNORMAL
PATH REPORT.GROSS SPEC: ABNORMAL
PATH REPORT.INTRAOP OBS SPEC DOC: ABNORMAL
PATH REPORT.MICROSCOPIC SPEC OTHER STN: ABNORMAL
PATH REPORT.RELEVANT HX SPEC: ABNORMAL
PATHOLOGY SYNOPTIC REPORT: ABNORMAL
PHOTO IMAGE: ABNORMAL

## 2025-01-21 PROCEDURE — 81455 SO/HL 51/>GSAP DNA/DNA&RNA: CPT | Performed by: STUDENT IN AN ORGANIZED HEALTH CARE EDUCATION/TRAINING PROGRAM

## 2025-01-21 PROCEDURE — G0452 MOLECULAR PATHOLOGY INTERPR: HCPCS | Mod: 26 | Performed by: PATHOLOGY

## 2025-01-22 NOTE — OP NOTE
Procedure Date: 1/15/25     PREOPERATIVE DIAGNOSIS:  Left Lower lobe lung nodule     POSTOPERATIVE DIAGNOSIS:  left lower lobe lung cancer.     PROCEDURE PERFORMED:  Robot-assisted thoracoscopic left lower lobe wedge resection,  superior segmentectomy and mediastinal lymph node dissection.     OPERATIVE FINDINGS:  left lower lobe lung lesion as noted on CAT scan.     SURGEON:  Dr. Shae Ocasio.       ASSISTING ASSISTANT:  Harrison Ernst, Registered PA.  Kalee Dudayday assisted on the full procedure due to lack of other adequately trained help.        OPERATIVE BLOOD LOSS:  30 mL     DESCRIPTION OF PROCEDURE              The patient was then positioned in the right lateral decubitus. The leftt chest was prepped and draped in a sterile manner.  We then proceeded with a standard robotic ports, which included two 8 mm ports and two 12 mm ports along the eighth intercostal space.  We had one assistant port inferiorly.  Upon entering the chest cavity,we identified the LLL lesion which was wedged out and sent for pathology. It returned as cancer.. The inferior pulmonary ligament was taken down, and the level 9 lymph node was sent off for frozen section.  A posterior mediastinal dissection was performed, and level 7 and level 5 lymph nodes were sent off for frozen section pathology.  The mediastinal pleura was then opened up medially and superiorly.    All these lymph nodes came back negative for malignancy.  We then proceeded to isolate the inferior pulmonary vein and identified the superior segmental branch and this was transected with a vascular load on the stapler.  The pulmonary artery branches to the leftt lower lobe superior segment were transected with vascular loads on the stapler.  The bronchus to the superiro segment was then identified and transected with a load on the stapler after confirming bronchial anatomy with a bronchoscopy.  All hilar nodes were sent for frozen pathology and retuned negative. The  parenchymanl margin was determined using ICG and pinpoint camera and several blue loads were used to complete the superior segmentectomy.   The specimen was removed using an anchor bag by extending the incision for the assistant port.  On frozen section, both nodules were identified as on CT and they were both cancer. The margin was clear was about 2cm including the staple lineAfter this, hemostasis was ensured.  A 28-Korean straight chest tube was left in the pleural cavity.  The intercostal spaces were injected with the bupivacaine mixture.  The lung was reinflated, and the port sites were closed in layers.  The patient tolerated the procedure well.

## 2025-01-24 ENCOUNTER — ANCILLARY PROCEDURE (OUTPATIENT)
Dept: GENERAL RADIOLOGY | Facility: CLINIC | Age: 65
End: 2025-01-24
Payer: COMMERCIAL

## 2025-01-24 PROCEDURE — 71046 X-RAY EXAM CHEST 2 VIEWS: CPT | Mod: GC | Performed by: RADIOLOGY

## 2025-01-28 ENCOUNTER — TUMOR CONFERENCE (OUTPATIENT)
Dept: ONCOLOGY | Facility: CLINIC | Age: 65
End: 2025-01-28
Payer: COMMERCIAL

## 2025-01-28 ENCOUNTER — VIRTUAL VISIT (OUTPATIENT)
Dept: FAMILY MEDICINE | Facility: CLINIC | Age: 65
End: 2025-01-28
Payer: COMMERCIAL

## 2025-01-28 DIAGNOSIS — C34.32 MALIGNANT NEOPLASM OF LOWER LOBE OF LEFT LUNG (H): Primary | ICD-10-CM

## 2025-01-28 DIAGNOSIS — E66.01 MORBID OBESITY (H): ICD-10-CM

## 2025-01-28 PROCEDURE — 98013 SYNCH AUDIO-ONLY EST LOW 20: CPT | Performed by: FAMILY MEDICINE

## 2025-01-28 NOTE — RESULT ENCOUNTER NOTE
I have personally reviewed the pathology results which support a diagnosis of stage IA2 Invasive adenocarcinoma.  Sites involved in this diagnosis include: Left lower lobe of lung      Celeste King, APRN CNS

## 2025-01-28 NOTE — TUMOR CONFERENCE
Tumor Conference Information           Documentation / Disclaimer Cancer Tumor Board Note  Cancer tumor board recommendations do not override what is determined to be reasonable care and treatment, which is dependent on the circumstances of a patient's case; the patient's medical, social, and personal concerns; and the clinical judgment of the oncologist [physician].    Pathology from surgery was reviewed. In gross, there was one nodule.    Radha Moore, RN BSN  Thoracic Surgery RN Care Coordinator  Phone: [phone]

## 2025-01-28 NOTE — PROGRESS NOTES
"Laura is a 64 year old who is being evaluated via a billable telephone visit.    What phone number would you like to be contacted at? [phone]   How would you like to obtain your AVS? Sonido  Originating Location (pt. Location): Home    Distant Location (provider location):  On-site  Telephone visit completed due to the patient did not have access to video, while the distant provider did.    Assessment & Plan     Laura was seen today for hospital f/u.    Diagnoses and all orders for this visit:    Malignant neoplasm of lower lobe of left lung (H)      - S/p LLL superior segmentectomy on 1/5, doing well.       - Following with Oncology. Next visit scheduled 2/17/2025    Morbid obesity (H)       -  Weight management plan: Discussed healthy diet and exercise guidelines    Patient is interested in weight loss medication therapy. She will check with insurance regarding coverage of medication and will plan to start therapy after completing thyroid labs and after post-op pain is under control. Patient verbalized understanding.        MED REC REQUIRED  Post Medication Reconciliation Status:  Discharge medications reconciled, continue medications without change  BMI  Estimated body mass index is 37.28 kg/m  as calculated from the following:    Height as of 1/15/25: 1.626 m (5' 4\").    Weight as of 1/24/25: 98.5 kg (217 lb 3.2 oz).   Weight management plan: Discussed healthy diet and exercise guidelines    The longitudinal plan of care for the diagnosis(es)/condition(s) as documented were addressed during this visit. Due to the added complexity in care, I will continue to support Laura in the subsequent management and with ongoing continuity of care.     Return in about 1 month (around 2/28/2025) for Follow up.      Subjective   Laura is a 64 year old, presenting for the following health issues:  Hospital F/U        1/28/2025    11:20 AM   Additional Questions   Roomed by Ana   Accompanied by Self     History of Present " Illness       Reason for visit:  Hospital Follow up   She is taking medications regularly.           1/20/2025   Post Discharge Outreach   How are you doing now that you are home? pretty well, eating and drinking   How are your symptoms? (Red Flag symptoms escalate to triage hotline per guidelines) Improved   Does the patient have their discharge instructions?  Yes   Does the patient have questions regarding their discharge instructions?  No   Were you started on any new medications or were there changes to any of your previous medications?  Yes   Does the patient have all of their medications? Yes   Do you have questions regarding any of your medications?  No   Do you have all of your needed medical supplies or equipment (DME)?  (i.e. oxygen tank, CPAP, cane, etc.) Yes       Hospital Follow-up Visit:    Hospital/Nursing Home/ Rehab Facility: [facility]  Date of Admission: 1/15/25  Date of Discharge: 1/17/25  Reason(s) for Admission: Robot-assisted thoracoscopic left lower lobe superior segmentectomy and mediastinal lymph node dissection.   Was the patient in the ICU or did the patient experience delirium during hospitalization?  No  Do you have any other stressors you would like to discuss with your provider? No    Problems taking medications regularly:  None  Medication changes since discharge: None  Problems adhering to non-medication therapy:  None    Summary of hospitalization:  [facility] hospital discharge summary reviewed    PROCEDURE   Robot assisted thoracoscopic left lower lobe wedge resection, superior segmentectomy and mediastinal lymph node dissection     DATE OF PROCEDURE  01/15/2025     HISTOPATHOLOGY    LUNG, LEFT LOWER LOBE,  SEGMENTECTOMY:  - INVASIVE ADENOCARCINOMA, PAPILLARY AND MUCINOUS FEATURES  - Tumor size: 1.1 cm in greatest dimension  - Visceral pleural invasion is not identified  - Lymphovascular invasion is not present  - Bronchial,  vascular and parenchymal resection margins are negative, > 1 cm  pT1bN0     NGS testing pending     COMPLICATIONS  None     INTERVAL STUDIES  CXR 01/24/2025: final report pending at time of clinic visit.  Compared to the 01/17/2025 CXR, the bibasilar opacities appear improved.          All questions were answered and the patient and present family were in agreement wi  Diagnostic Tests/Treatments reviewed.  Follow up needed: none  Other Healthcare Providers Involved in Patient s Care:         Specialist appointment - 2/17/2025  Update since discharge: improved.     Plan of care communicated with patient       Additional Concern-  Patient reports that the surgical site is healing well. Pain is controlled on NSAID, Tylenol and Robaxin as needed.   Reports that she is struggling with weight gain and efforts to lose the weight.   Interested in weight loss medication.   Recent weights-  Wt Readings from Last 4 Encounters:   01/24/25 98.5 kg (217 lb 3.2 oz)   01/16/25 97.6 kg (215 lb 3.2 oz)   01/02/25 101.1 kg (222 lb 12.8 oz)   12/12/24 98.4 kg (216 lb 14.4 oz)         Review of Systems  Constitutional, HEENT, cardiovascular, pulmonary, gi and gu systems are negative, except as otherwise noted.      Objective           Vitals:  No vitals were obtained today due to virtual visit.    Physical Exam   General: Alert and no distress //Respiratory: No audible wheeze, cough, or shortness of breath // Psychiatric:  Appropriate affect, tone, and pace of words      DATA  EXAM: PET ONCOLOGY (EYES TO THIGHS)  LOCATION: [facility]  DATE: 12/4/2024    IMPRESSION:  1. An irregular mostly solid pulmonary nodule in the left lower lobe measuring 1.2 x 0.8 cm demonstrates focal moderate FDG uptake, suspicious for malignancy, lung cancer. No evidence of keith or distant metastasis.  2. Several other small groundglass and part solid nodules elsewhere in both lungs are present, some of which demonstrate low-level but  appreciable uptake above background, suspicious for multifocal low-grade pulmonary adenocarcinoma. Recommend continued   chest CT imaging surveillance of these nodules.          Phone call duration: 25 minutes  Signed Electronically by: Sarita Shultz MD

## 2025-01-29 ENCOUNTER — MYC MEDICAL ADVICE (OUTPATIENT)
Dept: SURGERY | Facility: CLINIC | Age: 65
End: 2025-01-29
Payer: COMMERCIAL

## 2025-01-29 DIAGNOSIS — C34.32 MALIGNANT NEOPLASM OF LOWER LOBE OF LEFT LUNG (H): ICD-10-CM

## 2025-01-29 DIAGNOSIS — E78.5 HYPERLIPIDEMIA LDL GOAL <130: ICD-10-CM

## 2025-01-29 DIAGNOSIS — E89.0 POSTSURGICAL HYPOTHYROIDISM: ICD-10-CM

## 2025-01-29 DIAGNOSIS — K20.90 BARRETT'S ESOPHAGUS WITH ESOPHAGITIS: ICD-10-CM

## 2025-01-29 DIAGNOSIS — C34.32 MALIGNANT NEOPLASM OF LOWER LOBE OF LEFT LUNG (H): Primary | ICD-10-CM

## 2025-01-29 DIAGNOSIS — K22.70 BARRETT'S ESOPHAGUS WITH ESOPHAGITIS: ICD-10-CM

## 2025-01-29 RX ORDER — METHOCARBAMOL 500 MG/1
250 TABLET, FILM COATED ORAL EVERY 6 HOURS PRN
Qty: 30 TABLET | Refills: 0 | Status: SHIPPED | OUTPATIENT
Start: 2025-01-29

## 2025-01-29 RX ORDER — ROSUVASTATIN CALCIUM 20 MG/1
20 TABLET, COATED ORAL DAILY
Qty: 100 TABLET | Refills: 2 | OUTPATIENT
Start: 2025-01-29

## 2025-01-29 RX ORDER — LEVOTHYROXINE SODIUM 112 UG/1
112 TABLET ORAL DAILY
Qty: 100 TABLET | Refills: 2 | OUTPATIENT
Start: 2025-01-29

## 2025-02-04 DIAGNOSIS — C34.32 MALIGNANT NEOPLASM OF LOWER LOBE OF LEFT LUNG (H): ICD-10-CM

## 2025-02-04 RX ORDER — OXYCODONE HYDROCHLORIDE 5 MG/1
5 TABLET ORAL EVERY 4 HOURS PRN
Qty: 40 TABLET | Refills: 0 | Status: CANCELLED | OUTPATIENT
Start: 2025-02-04

## 2025-02-04 RX ORDER — OXYCODONE HYDROCHLORIDE 5 MG/1
5 TABLET ORAL EVERY 8 HOURS PRN
Qty: 21 TABLET | Refills: 0 | Status: SHIPPED | OUTPATIENT
Start: 2025-02-04

## 2025-02-17 ENCOUNTER — ANCILLARY PROCEDURE (OUTPATIENT)
Dept: GENERAL RADIOLOGY | Facility: CLINIC | Age: 65
End: 2025-02-17
Attending: STUDENT IN AN ORGANIZED HEALTH CARE EDUCATION/TRAINING PROGRAM
Payer: COMMERCIAL

## 2025-02-17 ENCOUNTER — DOCUMENTATION ONLY (OUTPATIENT)
Dept: SURGERY | Facility: CLINIC | Age: 65
End: 2025-02-17

## 2025-02-17 ENCOUNTER — ONCOLOGY VISIT (OUTPATIENT)
Dept: SURGERY | Facility: CLINIC | Age: 65
End: 2025-02-17
Payer: COMMERCIAL

## 2025-02-17 VITALS
TEMPERATURE: 98.6 F | RESPIRATION RATE: 16 BRPM | DIASTOLIC BLOOD PRESSURE: 82 MMHG | WEIGHT: 215.3 LBS | OXYGEN SATURATION: 97 % | SYSTOLIC BLOOD PRESSURE: 150 MMHG | BODY MASS INDEX: 36.96 KG/M2 | HEART RATE: 64 BPM

## 2025-02-17 DIAGNOSIS — C34.32 MALIGNANT NEOPLASM OF LOWER LOBE OF LEFT LUNG (H): ICD-10-CM

## 2025-02-17 PROCEDURE — 71046 X-RAY EXAM CHEST 2 VIEWS: CPT | Performed by: RADIOLOGY

## 2025-02-17 PROCEDURE — 99024 POSTOP FOLLOW-UP VISIT: CPT | Performed by: CLINICAL NURSE SPECIALIST

## 2025-02-17 PROCEDURE — G0463 HOSPITAL OUTPT CLINIC VISIT: HCPCS | Performed by: CLINICAL NURSE SPECIALIST

## 2025-02-17 RX ORDER — METHOCARBAMOL 500 MG/1
250 TABLET, FILM COATED ORAL EVERY 6 HOURS PRN
Qty: 30 TABLET | Refills: 0 | Status: SHIPPED | OUTPATIENT
Start: 2025-02-17

## 2025-02-17 ASSESSMENT — PAIN SCALES - GENERAL: PAINLEVEL_OUTOF10: NO PAIN (0)

## 2025-02-17 NOTE — PROGRESS NOTES
Dr. Ocasio reviewed pt's imaging in clinic on 1/28/25, plan for RUL nodule-repeat chest CT in 3 months and discuss at nodule conference. If pt is stable at that time, she can follow up with pulmonary nodule clinic.    Radha Moore RN BSN  Thoracic Surgery RN Care Coordinator  Phone: [phone]

## 2025-02-17 NOTE — NURSING NOTE
"Oncology Rooming Note    February 17, 2025 10:01 AM   Laura Ferreira is a 64 year old female who presents for:    Chief Complaint   Patient presents with    Oncology Clinic Visit     RTN Post op      Initial Vitals: BP (!) 150/82 (BP Location: Right arm, Patient Position: Sitting, Cuff Size: Adult Large)   Pulse 64   Temp 98.6  F (37  C) (Oral)   Resp 16   Wt 97.7 kg (215 lb 4.8 oz)   LMP 04/01/2012   SpO2 97%   BMI 36.96 kg/m   Estimated body mass index is 36.96 kg/m  as calculated from the following:    Height as of 1/15/25: 1.626 m (5' 4\").    Weight as of this encounter: 97.7 kg (215 lb 4.8 oz). Body surface area is 2.1 meters squared.  No Pain (0) Comment: Data Unavailable   Patient's last menstrual period was 04/01/2012.  Allergies reviewed: Yes  Medications reviewed: Yes    Medications: Medication refills not needed today.  Pharmacy name entered into [employer]:    [facility] - [address] [address] [place] [address] [place]     Frailty Screening:   Is the patient here for a new oncology consult visit in cancer care? 2. No    PHQ9:  Did this patient require a PHQ9?: No      Clinical concerns:Discuss methocarbamol refill.  States feels like may have a slight cold coming on congested.       Faviola Carroll             "

## 2025-02-17 NOTE — PROGRESS NOTES
THORACIC SURGERY FOLLOW UP VISIT      I saw Mrs. Ferreira in follow-up today. The clinical summary follows:     PREOP DIAGNOSIS   Left lower lobe lung nodule  PROCEDURE   Robot assisted thoracoscopic left lower lobe wedge resection, superior segmentectomy and mediastinal lymph node dissection    DATE OF PROCEDURE  01/15/2025    HISTOPATHOLOGY    LUNG, LEFT LOWER LOBE,  SEGMENTECTOMY:  - INVASIVE ADENOCARCINOMA, PAPILLARY AND MUCINOUS FEATURES  - Tumor size: 1.1 cm in greatest dimension  - Visceral pleural invasion is not identified  - Lymphovascular invasion is not present  - Bronchial, vascular and parenchymal resection margins are negative, > 1 cm  pT1bN0    A likely pathogenic mutation was detected in TP53 gene: c.993+1G>T.     No other clinically significant variants were detected in the analyzed genes.     TP53 mutations are very frequent in lung carcinomas and they play an important role in both oncogenic transformation of lung epithelial cells and lung carcinoma progression. Currently, predictive or prognostic implications for TP53 mutations are not clearly defined by NCCN guidelines.     The tumor mutational burden (TMB) for this sample falls between 10 and 16 mutations/Mb. Some studies have suggested there is limited activity of certain immune checkpoint blockade in tumors with TMB within this range (PMID: 47241384, 07243959). Panel based testing may over-estimate TMB values; see methodology below.      The tumor is microsatellite stable (DELANO) by NGS analysis. Separately reported PD-L1 IHC was interpreted as NEGATIVE PD-L1 EXPRESSION (TPS <1%)     Fusion event: negative    COMPLICATIONS  None    INTERVAL STUDIES  CXR 02/17/2025: Atelectasis in the left base unchanged. Heart size normal. No new suspicious opacities.              Past Medical History:   Diagnosis Date    Anemia     Leblanc's esophagus     EGD in 2014; recent EGD in 4/2018; repeat EGD in 3 years    Breast cancer ([facility])     Ex-smoker     1 PPD x 28  years, quit 20 years ago    Gastroesophageal reflux disease     Hematuria 01/23/2007    History of breast biopsy     left    Menopausal symptoms 07/17/2009    Motion sickness     Obese     Postsurgical hypothyroidism 07/17/2009    Symptomatic menopausal or female climacteric states 07/17/2009    Tear of lateral cartilage or meniscus of knee, current 10/24/2007      Past Surgical History:   Procedure Laterality Date    ABDOMEN SURGERY  1974    appendix    APPENDECTOMY OPEN      ARTHROSCOPY KNEE RT/LT  11/15/2007    right knee arthroscopy with arthroscopic lateral meniscectomy    BIOPSY Left     Breast. Benign    CL AFF SURGICAL PATHOLOGY  09/18/2002    endometrial biopsy    COLONOSCOPY      DAVINCI LOBECTOMY LUNG Left 1/15/2025    Procedure: LOBECTOMY, LUNG, ROBOT-ASSISTED,flexible bronchoscopy;  Surgeon: Shae Ocasio MD;  Location: [facility] OR    ELBOW SURGERY Right     ESOPHAGOSCOPY, GASTROSCOPY, DUODENOSCOPY (EGD), COMBINED N/A 12/23/2014    Procedure: COMBINED ESOPHAGOSCOPY, GASTROSCOPY, DUODENOSCOPY (EGD), BIOPSY SINGLE OR MULTIPLE;  Surgeon: Paradise Radford MD;  Location: [facility] OR    GRAFT FAT TO BREAST Right 11/08/2021    Procedure: AND FAT GRAFTING FROM ABDOMEN;  Surgeon: Genaro Garcia MD;  Location: [facility] OR    HC THYROIDECTOMY      goitre    INSERT TISSUE EXPANDER BREAST Bilateral 03/17/2021    Procedure: bilateral BREAST TISSUE EXPANDER;  Surgeon: Genaro Garcia MD;  Location: [facility] OR    LAMINECTOMY, FUSION LUMBAR ONE LEVEL, COMBINED  10/26/2011    Procedure:COMBINED LAMINECTOMY, FUSION LUMBAR ONE LEVEL; L4-5 Decompression, L4-5 Posterior Lateral Fusion with Madhavi and Local Bone; Surgeon:BARBRA BRIGHT; Location:[facility] OR    MASTECTOMY SIMPLE, SENTINEL NODE, COMBINED Bilateral 03/17/2021    Procedure: BILATERAL SKIN SPARING MASTECTOMY WITH right  SENTINEL LYMPH NODE BIOPSY;  Surgeon: Carmen Stein MD;  Location: [facility] OR    RECONSTRUCT BREAST Bilateral 11/08/2021    Procedure: REVISION OF  BILATERAL BREAST RECONSTRUCTION WITH REMOVE AND REPLACE BILATERAL BREAST IMPLANT;  Surgeon: Genaro Garcia MD;  Location:  OR    RECONSTRUCT BREAST BILATERAL, IMPLANT PROSTHESIS BILATERAL, COMBINED Bilateral 2021    Procedure: BILATERAL SECOND STAGE BREAST RECONSTRUCTION WITH SILICONE IMPLANTS;  Surgeon: Genaro Garcia MD;  Location:  OR    TONSILLECTOMY & ADENOIDECTOMY        Social History     Socioeconomic History    Marital status:      Spouse name: Not on file    Number of children: Not on file    Years of education: Not on file    Highest education level: Not on file   Occupational History    Not on file   Tobacco Use    Smoking status: Former     Current packs/day: 0.00     Average packs/day: 1 pack/day for 27.9 years (27.9 ttl pk-yrs)     Types: Cigarettes     Start date: 1971     Quit date: 12/15/1998     Years since quittin.1     Passive exposure: Never    Smokeless tobacco: Never    Tobacco comments:     quit 20 years ago   Vaping Use    Vaping status: Never Used   Substance and Sexual Activity    Alcohol use: Yes     Comment: once in a while    Drug use: No    Sexual activity: Yes     Partners: Male     Birth control/protection: None     Comment: none   Other Topics Concern    Parent/sibling w/ CABG, MI or angioplasty before 65F 55M? Yes     Comment: mother   Social History Narrative    Not on file     Social Drivers of Health     Financial Resource Strain: Low Risk  (2025)    Financial Resource Strain     Within the past 12 months, have you or your family members you live with been unable to get utilities (heat, electricity) when it was really needed?: No   Food Insecurity: Low Risk  (2025)    Food Insecurity     Within the past 12 months, did you worry that your food would run out before you got money to buy more?: No     Within the past 12 months, did the food you bought just not last and you didn t have money to get more?: No   Transportation  Needs: Low Risk  (1/16/2025)    Transportation Needs     Within the past 12 months, has lack of transportation kept you from medical appointments, getting your medicines, non-medical meetings or appointments, work, or from getting things that you need?: No   Physical Activity: Inactive (4/3/2024)    Exercise Vital Sign     Days of Exercise per Week: 0 days     Minutes of Exercise per Session: 0 min   Stress: No Stress Concern Present (4/3/2024)    [employer] - Occupational Stress Questionnaire     Feeling of Stress : Only a little   Social Connections: Unknown (4/3/2024)    Social Connection and Isolation Panel [NHANES]     Frequency of Communication with Friends and Family: Not on file     Frequency of Social Gatherings with Friends and Family: Twice a week     Attends Christianity Services: Not on file     Active Member of Clubs or Organizations: Not on file     Attends Club or Organization Meetings: Not on file     Marital Status: Not on file   Interpersonal Safety: Low Risk  (1/15/2025)    Interpersonal Safety     Do you feel physically and emotionally safe where you currently live?: Yes     Within the past 12 months, have you been hit, slapped, kicked or otherwise physically hurt by someone?: No     Within the past 12 months, have you been humiliated or emotionally abused in other ways by your partner or ex-partner?: No   Housing Stability: Low Risk  (1/16/2025)    Housing Stability     Do you have housing? : Yes     Are you worried about losing your housing?: No      SUBJECTIVE   Laura is doing well. She takes the methocarbamol on occasion-when her left chest wall gets really tight and uncomfortable. She does have a dry cough. She is working on increasing her activity and is wondering if it is ok for her to use the elliptical machine again. She is due for an adrenal CT in April and would like to coordinate her chest CT to be the same time so she doesn't have to make multiple  trips.    OBJECTIVE  BP (!) 150/82 (BP Location: Right arm, Patient Position: Sitting, Cuff Size: Adult Large)   Pulse 64   Temp 98.6  F (37  C) (Oral)   Resp 16   Wt 97.7 kg (215 lb 4.8 oz)   LMP 04/01/2012   SpO2 97%   BMI 36.96 kg/m       Her incisions are healing nicely.    From a personal perspective, she is here with her daughter.    DEONDRE Sanchez is a 64 year-old female status post robot assisted thoracoscopic left lower lobe wedge resection, superior segmentectomy and mediastinal lymph node dissection of a pT1bN0 (stage IA1) non small cell lung cancer. She is here for post operative follow up.    We discussed pathology form the left lower lobe and she is relieved that she does not need additional therapy. We will see her back with a new baseline chest CT for lung cancer surveillance and ongoing follow up of the right upper lobe lung nodule.    PLAN  I spent 25 min on the date of the encounter in chart review, patient visit, review of tests, documentation and/or discussion with other providers about the issues documented above. I reviewed the plan as follows:  Follow up with me in April with a chest CT prior. Will get chest CT on same day as adrenal CT.  All questions were answered and the patient and present family were in agreement with the plan.  I appreciate the opportunity to participate in the care of your patient and will keep you updated.  Sincerely,    HAIR Pierre CNS

## 2025-03-04 ENCOUNTER — ANCILLARY PROCEDURE (OUTPATIENT)
Dept: CT IMAGING | Facility: CLINIC | Age: 65
End: 2025-03-04
Attending: STUDENT IN AN ORGANIZED HEALTH CARE EDUCATION/TRAINING PROGRAM
Payer: COMMERCIAL

## 2025-03-04 DIAGNOSIS — E27.9 ADRENAL NODULE: ICD-10-CM

## 2025-03-04 PROCEDURE — 74170 CT ABD WO CNTRST FLWD CNTRST: CPT | Mod: TC | Performed by: INTERNAL MEDICINE

## 2025-03-04 RX ORDER — IOPAMIDOL 755 MG/ML
132 INJECTION, SOLUTION INTRAVASCULAR ONCE
Status: COMPLETED | OUTPATIENT
Start: 2025-03-04 | End: 2025-03-04

## 2025-03-04 RX ADMIN — IOPAMIDOL 132 ML: 755 INJECTION, SOLUTION INTRAVASCULAR at 09:56

## 2025-03-06 ENCOUNTER — LAB (OUTPATIENT)
Dept: LAB | Facility: CLINIC | Age: 65
End: 2025-03-06
Payer: COMMERCIAL

## 2025-03-06 DIAGNOSIS — E89.0 POSTSURGICAL HYPOTHYROIDISM: ICD-10-CM

## 2025-03-06 LAB
T4 FREE SERPL-MCNC: 1.31 NG/DL (ref 0.9–1.7)
TSH SERPL DL<=0.005 MIU/L-ACNC: 1.75 UIU/ML (ref 0.3–4.2)

## 2025-03-10 DIAGNOSIS — E89.0 POSTSURGICAL HYPOTHYROIDISM: ICD-10-CM

## 2025-03-10 RX ORDER — LEVOTHYROXINE SODIUM 112 UG/1
112 TABLET ORAL DAILY
Qty: 90 TABLET | Refills: 2 | Status: SHIPPED | OUTPATIENT
Start: 2025-03-10

## 2025-03-10 RX ORDER — LEVOTHYROXINE SODIUM 112 UG/1
112 TABLET ORAL DAILY
Qty: 90 TABLET | Refills: 2 | Status: SHIPPED | OUTPATIENT
Start: 2025-03-10 | End: 2025-03-10

## 2025-03-12 ENCOUNTER — LAB (OUTPATIENT)
Dept: LAB | Facility: CLINIC | Age: 65
End: 2025-03-12
Payer: COMMERCIAL

## 2025-03-12 DIAGNOSIS — E27.8 LEFT ADRENAL MASS: ICD-10-CM

## 2025-03-12 PROCEDURE — 36415 COLL VENOUS BLD VENIPUNCTURE: CPT

## 2025-03-12 PROCEDURE — 82533 TOTAL CORTISOL: CPT

## 2025-03-13 LAB — CORTIS 8H P 1 MG DEX SERPL-MCNC: 1.5 UG/DL

## 2025-04-16 SDOH — HEALTH STABILITY: PHYSICAL HEALTH: ON AVERAGE, HOW MANY MINUTES DO YOU ENGAGE IN EXERCISE AT THIS LEVEL?: 30 MIN

## 2025-04-16 SDOH — HEALTH STABILITY: PHYSICAL HEALTH: ON AVERAGE, HOW MANY DAYS PER WEEK DO YOU ENGAGE IN MODERATE TO STRENUOUS EXERCISE (LIKE A BRISK WALK)?: 3 DAYS

## 2025-04-16 ASSESSMENT — SOCIAL DETERMINANTS OF HEALTH (SDOH): HOW OFTEN DO YOU GET TOGETHER WITH FRIENDS OR RELATIVES?: THREE TIMES A WEEK

## 2025-04-21 ENCOUNTER — OFFICE VISIT (OUTPATIENT)
Dept: FAMILY MEDICINE | Facility: CLINIC | Age: 65
End: 2025-04-21
Attending: FAMILY MEDICINE
Payer: COMMERCIAL

## 2025-04-21 VITALS
DIASTOLIC BLOOD PRESSURE: 82 MMHG | WEIGHT: 219.4 LBS | BODY MASS INDEX: 37.46 KG/M2 | HEIGHT: 64 IN | OXYGEN SATURATION: 95 % | TEMPERATURE: 97.5 F | RESPIRATION RATE: 18 BRPM | HEART RATE: 74 BPM | SYSTOLIC BLOOD PRESSURE: 134 MMHG

## 2025-04-21 DIAGNOSIS — Z00.00 ENCOUNTER FOR MEDICARE ANNUAL WELLNESS EXAM: Primary | ICD-10-CM

## 2025-04-21 DIAGNOSIS — Z23 NEED FOR VACCINATION: ICD-10-CM

## 2025-04-21 DIAGNOSIS — E89.0 POSTSURGICAL HYPOTHYROIDISM: ICD-10-CM

## 2025-04-21 DIAGNOSIS — E66.812 CLASS 2 SEVERE OBESITY DUE TO EXCESS CALORIES WITH SERIOUS COMORBIDITY AND BODY MASS INDEX (BMI) OF 37.0 TO 37.9 IN ADULT (H): ICD-10-CM

## 2025-04-21 DIAGNOSIS — K20.90 BARRETT'S ESOPHAGUS WITH ESOPHAGITIS: ICD-10-CM

## 2025-04-21 DIAGNOSIS — E66.01 CLASS 2 SEVERE OBESITY DUE TO EXCESS CALORIES WITH SERIOUS COMORBIDITY AND BODY MASS INDEX (BMI) OF 37.0 TO 37.9 IN ADULT (H): ICD-10-CM

## 2025-04-21 DIAGNOSIS — K22.70 BARRETT'S ESOPHAGUS WITH ESOPHAGITIS: ICD-10-CM

## 2025-04-21 DIAGNOSIS — E78.5 HYPERLIPIDEMIA LDL GOAL <130: ICD-10-CM

## 2025-04-21 RX ORDER — LEVOTHYROXINE SODIUM 112 UG/1
112 TABLET ORAL DAILY
Qty: 90 TABLET | Refills: 2 | Status: CANCELLED | OUTPATIENT
Start: 2025-04-21

## 2025-04-21 RX ORDER — OMEPRAZOLE 20 MG/1
CAPSULE, DELAYED RELEASE ORAL
Qty: 180 CAPSULE | Refills: 2 | Status: CANCELLED | OUTPATIENT
Start: 2025-04-21

## 2025-04-21 RX ORDER — OMEPRAZOLE 20 MG/1
CAPSULE, DELAYED RELEASE ORAL
Qty: 180 CAPSULE | Refills: 2 | Status: SHIPPED | OUTPATIENT
Start: 2025-04-21

## 2025-04-21 RX ORDER — ROSUVASTATIN CALCIUM 20 MG/1
20 TABLET, COATED ORAL DAILY
Qty: 90 TABLET | Refills: 3 | Status: SHIPPED | OUTPATIENT
Start: 2025-04-21

## 2025-04-21 NOTE — PATIENT INSTRUCTIONS
Patient Education   Preventive Care Advice   This is general advice given by our system to help you stay healthy. However, your care team may have specific advice just for you. Please talk to your care team about your preventive care needs.  Nutrition  Eat 5 or more servings of fruits and vegetables each day.  Try wheat bread, brown rice and whole grain pasta (instead of white bread, rice, and pasta).  Get enough calcium and vitamin D. Check the label on foods and aim for 100% of the RDA (recommended daily allowance).  Lifestyle  Exercise at least 150 minutes each week  (30 minutes a day, 5 days a week).  Do muscle strengthening activities 2 days a week. These help control your weight and prevent disease.  No smoking.  Wear sunscreen to prevent skin cancer.  Have a dental exam and cleaning every 6 months.  Yearly exams  See your health care team every year to talk about:  Any changes in your health.  Any medicines your care team has prescribed.  Preventive care, family planning, and ways to prevent chronic diseases.  Shots (vaccines)   HPV shots (up to age 26), if you've never had them before.  Hepatitis B shots (up to age 59), if you've never had them before.  COVID-19 shot: Get this shot when it's due.  Flu shot: Get a flu shot every year.  Tetanus shot: Get a tetanus shot every 10 years.  Pneumococcal, hepatitis A, and RSV shots: Ask your care team if you need these based on your risk.  Shingles shot (for age 50 and up)  General health tests  Diabetes screening:  Starting at age 35, Get screened for diabetes at least every 3 years.  If you are younger than age 35, ask your care team if you should be screened for diabetes.  Cholesterol test: At age 39, start having a cholesterol test every 5 years, or more often if advised.  Bone density scan (DEXA): At age 50, ask your care team if you should have this scan for osteoporosis (brittle bones).  Hepatitis C: Get tested at least once in your life.  STIs (sexually  transmitted infections)  Before age 24: Ask your care team if you should be screened for STIs.  After age 24: Get screened for STIs if you're at risk. You are at risk for STIs (including HIV) if:  You are sexually active with more than one person.  You don't use condoms every time.  You or a partner was diagnosed with a sexually transmitted infection.  If you are at risk for HIV, ask about PrEP medicine to prevent HIV.  Get tested for HIV at least once in your life, whether you are at risk for HIV or not.  Cancer screening tests  Cervical cancer screening: If you have a cervix, begin getting regular cervical cancer screening tests starting at age 21.  Breast cancer scan (mammogram): If you've ever had breasts, begin having regular mammograms starting at age 40. This is a scan to check for breast cancer.  Colon cancer screening: It is important to start screening for colon cancer at age 45.  Have a colonoscopy test every 10 years (or more often if you're at risk) Or, ask your provider about stool tests like a FIT test every year or Cologuard test every 3 years.  To learn more about your testing options, visit:   .  For help making a decision, visit:   https://bit.ly/ma62815.  Prostate cancer screening test: If you have a prostate, ask your care team if a prostate cancer screening test (PSA) at age 55 is right for you.  Lung cancer screening: If you are a current or former smoker ages 50 to 80, ask your care team if ongoing lung cancer screenings are right for you.  For informational purposes only. Not to replace the advice of your health care provider. Copyright   2023 [facility] [employer]. All rights reserved. Clinically reviewed by the [facility] Transitions Program. [employer]dle [id number] - REV 01/24.  Hearing Loss: Care Instructions  Overview     Hearing loss is a sudden or slow decrease in how well you hear. It can range from slight to profound. Permanent hearing loss can occur with aging. It also can  happen when you are exposed long-term to loud noise. Examples include listening to loud music, riding motorcycles, or being around other loud machines.  Hearing loss can affect your work and home life. It can make you feel lonely or depressed. You may feel that you have lost your independence. But hearing aids and other devices can help you hear better and feel connected to others.  Follow-up care is a key part of your treatment and safety. Be sure to make and go to all appointments, and call your doctor if you are having problems. It's also a good idea to know your test results and keep a list of the medicines you take.  How can you care for yourself at home?  Avoid loud noises whenever possible. This helps keep your hearing from getting worse.  Always wear hearing protection around loud noises.  Wear a hearing aid as directed.  A professional can help you pick a hearing aid that will work best for you.  You can also get hearing aids over the counter for mild to moderate hearing loss.  Have hearing tests as your doctor suggests. They can show whether your hearing has changed. Your hearing aid may need to be adjusted.  Use other devices as needed. These may include:  Telephone amplifiers and hearing aids that can connect to a television, stereo, radio, or microphone.  Devices that use lights or vibrations. These alert you to the doorbell, a ringing telephone, or a baby monitor.  Television closed-captioning. This shows the words at the bottom of the screen. Most new TVs can do this.  TTY (text telephone). This lets you type messages back and forth on the telephone instead of talking or listening. These devices are also called TDD. When messages are typed on the keyboard, they are sent over the phone line to a receiving TTY. The message is shown on a monitor.  Use text messaging, social media, and email if it is hard for you to communicate by telephone.  Try to learn a listening technique called speechreading. It is  "not lipreading. You pay attention to people's gestures, expressions, posture, and tone of voice. These clues can help you understand what a person is saying. Face the person you are talking to, and have them face you. Make sure the lighting is good. You need to see the other person's face clearly.  Think about counseling if you need help to adjust to your hearing loss.  When should you call for help?  Watch closely for changes in your health, and be sure to contact your doctor if:    You think your hearing is getting worse.     You have new symptoms, such as dizziness or nausea.   Where can you learn more?  Go to https://www.[employer].com.net/patiented  Enter R798 in the search box to learn more about \"Hearing Loss: Care Instructions.\"  Current as of: October 27, 2024  Content Version: 14.4    5757-9807 [employer].   Care instructions adapted under license by your healthcare professional. If you have questions about a medical condition or this instruction, always ask your healthcare professional. [employer] disclaims any warranty or liability for your use of this information.       "

## 2025-04-21 NOTE — PROGRESS NOTES
Preventive Care Visit  [facility] PRIYA Shultz MD, Family Medicine  Apr 21, 2025      Assessment & Plan     Laura was seen today for medicare visit.    Diagnoses and all orders for this visit:    Encounter for Medicare annual wellness exam  -     PRIMARY CARE FOLLOW-UP SCHEDULING  -     REVIEW OF HEALTH MAINTENANCE PROTOCOL ORDERS  -     PRIMARY CARE FOLLOW-UP SCHEDULING; Future  -     CBC with platelets; Future  -     Comprehensive metabolic panel (BMP + Alb, Alk Phos, ALT, AST, Total. Bili, TP); Future    Hyperlipidemia LDL goal <130  -     Refill: rosuvastatin (CRESTOR) 20 MG tablet; Take 1 tablet (20 mg) by mouth daily.  -     Lipid panel reflex to direct LDL Fasting; Future    Postsurgical hypothyroidism, on Levothyroxine   -     TSH with free T4 reflex; Future    Leblanc's esophagus with esophagitis  -     Refill: omeprazole (PRILOSEC) 20 MG DR capsule; TAKE 1 CAPSULE BY MOUTH TWICE  DAILY BEFORE MEALS  -     Following with GI.    Need for vaccination  -     COVID-19 12+ (PFIZER)    Class 2 severe obesity due to excess calories with serious comorbidity and body mass index (BMI) of 37.0 to 37.9 in adult (H)        -    Weight management plan: Discussed healthy diet and exercise guidelines        -    Consider GIP/GLP-1a if labs are normal and insurance covers/patient is able to afford.        Patient has been advised of split billing requirements and indicates understanding: Yes        Counseling  Appropriate preventive services were addressed with this patient via screening, questionnaire, or discussion as appropriate for fall prevention, nutrition, physical activity, Tobacco-use cessation, social engagement, weight loss and cognition.  Checklist reviewing preventive services available has been given to the patient.  Reviewed patient's diet, addressing concerns and/or questions.   She is at risk for lack of exercise and has been provided with information to increase physical activity  for the benefit of her well-being.   The patient was provided with written information regarding signs of hearing loss.     The longitudinal plan of care for the diagnosis(es)/condition(s) as documented were addressed during this visit. Due to the added complexity in care, I will continue to support Laura in the subsequent management and with ongoing continuity of care.    Return in about 3 months (around 7/21/2025) for Medication check.      Subjective   Laura is a 64 year old, presenting for the following:  Medicare Visit        4/21/2025    11:10 AM   Additional Questions   Roomed by Ana   Accompanied by self       HPI    Laura is a pleasant 64 year old male patient of [employer] here for his Annual Physical and chronic disease management.     HYPERLIPIDEMIA - Patient has a long history of significant Hyperlipidemia requiring medication for treatment with recent good control. Patient reports no problems or side effects with the medication.   Last lipid panel-  Recent Labs   Lab Test 04/17/24  0924 02/15/23  1110   CHOL 173 174   HDL 64 71   LDL 93 86   TRIG 81 86         HYPOTHYROIDISM - Patient has a longstanding history of chronic Hypothyroidism. Patient has been doing well, noting no tremor, insomnia, hair loss or changes in skin texture. Continues to take medications as directed, without adverse reactions or side effects. Last TSH   Lab Results   Component Value Date    TSH 1.75 03/06/2025       History of Leblanc's esophagus- on a PPI BID. Last upper endoscopy revealed no signs of metaplasia. Last Upper endoscopy done on 6/2024.    History of malignant neoplasm of lower lobe of left lung /p LLL superior segmentectomy on 1/5, doing well. Following with Oncology. Patient reports that she's had a lingering cough since surgery. Has an upcoming scheduled CT chest on 4/24/2025         Advance Care Planning  Health Care Directive received at today's visit.  Forwarded to [employer].        4/16/2025   Critical access hospital    How would you rate your overall physical health? Good   Feel stress (tense, anxious, or unable to sleep) Only a little   (!) STRESS CONCERN      4/16/2025   Nutrition   Diet: I don't know         4/16/2025   Exercise   Days per week of moderate/strenous exercise 3 days   Average minutes spent exercising at this level 30 min         4/16/2025   Social Factors   Frequency of gathering with friends or relatives Three times a week   Worry food won't last until get money to buy more No   Food not last or not have enough money for food? No   Do you have housing? (Housing is defined as stable permanent housing and does not include staying ouside in a car, in a tent, in an abandoned building, in an overnight shelter, or couch-surfing.) Yes   Are you worried about losing your housing? No   Lack of transportation? No   Unable to get utilities (heat,electricity)? No         4/16/2025   Fall Risk   Fallen 2 or more times in the past year? No    No   Trouble with walking or balance? No    No       Multiple values from one day are sorted in reverse-chronological order          4/16/2025   Activities of Daily Living- Home Safety   Needs help with the following daily activites None of the above   Safety concerns in the home None of the above         4/16/2025   Dental   Dentist two times every year? Yes         4/16/2025   Hearing Screening   Hearing concerns? (!) IT'S HARD TO FOLLOW A CONVERSATION IN A NOISY RESTAURANT OR CROWDED ROOM.    (!) TROUBLE UNDERSTANDING SOFT OR WHISPERED SPEECH.       Multiple values from one day are sorted in reverse-chronological order         4/16/2025   Driving Risk Screening   Patient/family members have concerns about driving No         4/16/2025   General Alertness/Fatigue Screening   Have you been more tired than usual lately? No         4/16/2025   Urinary Incontinence Screening   Bothered by leaking urine in past 6 months No         Today's PHQ-2 Score:       4/21/2025    10:02 AM   PHQ-2  (  [employer])   Q1: Little interest or pleasure in doing things 0   Q2: Feeling down, depressed or hopeless 0   PHQ-2 Score 0    Q1: Little interest or pleasure in doing things Not at all   Q2: Feeling down, depressed or hopeless Not at all   PHQ-2 Score 0       Patient-reported           2025   Substance Use   Alcohol more than 3/day or more than 7/wk No   Do you have a current opioid prescription? No   How severe/bad is pain from 1 to 10? /10   Do you use any other substances recreationally? No     Social History     Tobacco Use    Smoking status: Former     Current packs/day: 0.00     Average packs/day: 1 pack/day for 27.9 years (27.9 ttl pk-yrs)     Types: Cigarettes     Start date: 1971     Quit date: 12/15/1998     Years since quittin.3     Passive exposure: Never    Smokeless tobacco: Never    Tobacco comments:     quit 20 years ago   Vaping Use    Vaping status: Never Used   Substance Use Topics    Alcohol use: Yes     Comment: once in a while    Drug use: No           2023   LAST FHS-7 RESULTS   1st degree relative breast or ovarian cancer No   Any relative bilateral breast cancer No   Any male have breast cancer No   Any ONE woman have BOTH breast AND ovarian cancer No   Any woman with breast cancer before 50yrs No   2 or more relatives with breast AND/OR ovarian cancer Yes   2 or more relatives with breast AND/OR bowel cancer Yes        Mammogram Screening - Mammogram every 1-2 years updated in Health Maintenance based on mutual decision making      History of abnormal Pap smear: No - age 65 or older with adequate negative prior screening test results (3 consecutive negative cytology results, 2 consecutive negative cotesting results, or 2 consecutive negative HrHPV test results within 10 years, with the most recent test occurring within the recommended screening interval for the test used)        Latest Ref Rng & Units 12/15/2020    11:35 AM 12/15/2020    11:25 AM 2017     4:57  PM   PAP / HPV   PAP (Historical)  NIL   NIL    HPV 16 DNA NEG^Negative  Negative     HPV 18 DNA NEG^Negative  Negative     Other HR HPV NEG^Negative  Negative       ASCVD Risk   The 10-year ASCVD risk score (José GILL, et al., 2019) is: 6%    Values used to calculate the score:      Age: 64 years      Sex: Female      Is Non- : No      Diabetic: No      Tobacco smoker: No      Systolic Blood Pressure: 134 mmHg      Is BP treated: No      HDL Cholesterol: 64 mg/dL      Total Cholesterol: 173 mg/dL      Reviewed and updated as needed this visit by Provider     Meds                Past Medical History:   Diagnosis Date    Anemia     Leblanc's esophagus     EGD in 2014; recent EGD in 4/2018; repeat EGD in 3 years    Breast cancer (H)     Ex-smoker     1 PPD x 28 years, quit 20 years ago    Gastroesophageal reflux disease     Hematuria 01/23/2007    History of breast biopsy     left    Menopausal symptoms 07/17/2009    Motion sickness     Obese     Postsurgical hypothyroidism 07/17/2009    Symptomatic menopausal or female climacteric states 07/17/2009    Tear of lateral cartilage or meniscus of knee, current 10/24/2007     Past Surgical History:   Procedure Laterality Date    ABDOMEN SURGERY  1974    appendix    APPENDECTOMY OPEN      ARTHROSCOPY KNEE RT/LT  11/15/2007    right knee arthroscopy with arthroscopic lateral meniscectomy    BIOPSY Left     Breast. Benign    CL AFF SURGICAL PATHOLOGY  09/18/2002    endometrial biopsy    COLONOSCOPY      DAVINCI LOBECTOMY LUNG Left 1/15/2025    Procedure: LOBECTOMY, LUNG, ROBOT-ASSISTED,flexible bronchoscopy;  Surgeon: Shae Ocasio MD;  Location: [facility] OR    ELBOW SURGERY Right     ESOPHAGOSCOPY, GASTROSCOPY, DUODENOSCOPY (EGD), COMBINED N/A 12/23/2014    Procedure: COMBINED ESOPHAGOSCOPY, GASTROSCOPY, DUODENOSCOPY (EGD), BIOPSY SINGLE OR MULTIPLE;  Surgeon: Paradise Radford MD;  Location: [facility] OR    GRAFT FAT TO BREAST Right 11/08/2021     Procedure: AND FAT GRAFTING FROM ABDOMEN;  Surgeon: Genaro Garcia MD;  Location:  OR    HC THYROIDECTOMY      goitre    INSERT TISSUE EXPANDER BREAST Bilateral 03/17/2021    Procedure: bilateral BREAST TISSUE EXPANDER;  Surgeon: Genaro Garcia MD;  Location: [facility] OR    LAMINECTOMY, FUSION LUMBAR ONE LEVEL, COMBINED  10/26/2011    Procedure:COMBINED LAMINECTOMY, FUSION LUMBAR ONE LEVEL; L4-5 Decompression, L4-5 Posterior Lateral Fusion with Madhavi and Local Bone; Surgeon:BARBRA BRIGHT; Location:[facility] OR    MASTECTOMY SIMPLE, SENTINEL NODE, COMBINED Bilateral 03/17/2021    Procedure: BILATERAL SKIN SPARING MASTECTOMY WITH right  SENTINEL LYMPH NODE BIOPSY;  Surgeon: Carmen Stein MD;  Location: [facility]    RECONSTRUCT BREAST Bilateral 11/08/2021    Procedure: REVISION OF BILATERAL BREAST RECONSTRUCTION WITH REMOVE AND REPLACE BILATERAL BREAST IMPLANT;  Surgeon: Genaro Garcia MD;  Location: [facility]    RECONSTRUCT BREAST BILATERAL, IMPLANT PROSTHESIS BILATERAL, COMBINED Bilateral 06/11/2021    Procedure: BILATERAL SECOND STAGE BREAST RECONSTRUCTION WITH SILICONE IMPLANTS;  Surgeon: Genaro Garcia MD;  Location: [facility]    TONSILLECTOMY & ADENOIDECTOMY       Lab work is in process  Labs reviewed in EPIC  BP Readings from Last 3 Encounters:   04/21/25 134/82   02/17/25 (!) 150/82   01/24/25 (!) 149/75    Wt Readings from Last 3 Encounters:   04/21/25 99.5 kg (219 lb 6.4 oz)   02/17/25 97.7 kg (215 lb 4.8 oz)   01/24/25 98.5 kg (217 lb 3.2 oz)                  Patient Active Problem List   Diagnosis    GERD (gastroesophageal reflux disease)    Menorrhagia    Iron deficiency anemia    Simple endometrial hyperplasia without atypia    Postsurgical hypothyroidism    Hyperlipidemia LDL goal <130    Malignant neoplasm of left breast (H)    Ductal carcinoma in situ (DCIS) of right breast    Right wrist pain    Class 2 severe obesity due to excess calories with serious comorbidity in  adult (H)    s/p LLL superior segmentectomy, 1/15    Lung nodule, multiple     Past Surgical History:   Procedure Laterality Date    ABDOMEN SURGERY  1974    appendix    APPENDECTOMY OPEN      ARTHROSCOPY KNEE RT/LT  11/15/2007    right knee arthroscopy with arthroscopic lateral meniscectomy    BIOPSY Left     Breast. Benign    CL AFF SURGICAL PATHOLOGY  09/18/2002    endometrial biopsy    COLONOSCOPY      DAVINCI LOBECTOMY LUNG Left 1/15/2025    Procedure: LOBECTOMY, LUNG, ROBOT-ASSISTED,flexible bronchoscopy;  Surgeon: Shae Ocasio MD;  Location: [facility] OR    ELBOW SURGERY Right     ESOPHAGOSCOPY, GASTROSCOPY, DUODENOSCOPY (EGD), COMBINED N/A 12/23/2014    Procedure: COMBINED ESOPHAGOSCOPY, GASTROSCOPY, DUODENOSCOPY (EGD), BIOPSY SINGLE OR MULTIPLE;  Surgeon: Paradise Radford MD;  Location: [facility]    GRAFT FAT TO BREAST Right 11/08/2021    Procedure: AND FAT GRAFTING FROM ABDOMEN;  Surgeon: Genaro Garcia MD;  Location:  OR    HC THYROIDECTOMY      goitre    INSERT TISSUE EXPANDER BREAST Bilateral 03/17/2021    Procedure: bilateral BREAST TISSUE EXPANDER;  Surgeon: Genaro Garcia MD;  [facility]: [facility]    LAMINECTOMY, FUSION LUMBAR ONE LEVEL, COMBINED  10/26/2011    Procedure:COMBINED LAMINECTOMY, FUSION LUMBAR ONE LEVEL; L4-5 Decompression, L4-5 Posterior Lateral Fusion with Madhavi and Local Bone; Surgeon:BARBRA BRIGHT; Location:[facility]    MASTECTOMY SIMPLE, SENTINEL NODE, COMBINED Bilateral 03/17/2021    Procedure: BILATERAL SKIN SPARING MASTECTOMY WITH right  SENTINEL LYMPH NODE BIOPSY;  Surgeon: Carmen Stein MD;  [facility]    RECONSTRUCT BREAST Bilateral 11/08/2021    Procedure: REVISION OF BILATERAL BREAST RECONSTRUCTION WITH REMOVE AND REPLACE BILATERAL BREAST IMPLANT;  Surgeon: Genaro Garcia MD;  Location: [facility]    RECONSTRUCT BREAST BILATERAL, IMPLANT PROSTHESIS BILATERAL, COMBINED Bilateral 06/11/2021    Procedure: BILATERAL SECOND STAGE BREAST RECONSTRUCTION  WITH SILICONE IMPLANTS;  Surgeon: Genaro Garcia MD;  Location:  OR    TONSILLECTOMY & ADENOIDECTOMY         Social History     Tobacco Use    Smoking status: Former     Current packs/day: 0.00     Average packs/day: 1 pack/day for 27.9 years (27.9 ttl pk-yrs)     Types: Cigarettes     Start date: 1971     Quit date: 12/15/1998     Years since quittin.3     Passive exposure: Never    Smokeless tobacco: Never    Tobacco comments:     quit 20 years ago   Substance Use Topics    Alcohol use: Yes     Comment: once in a while     Family History   Problem Relation Age of Onset    Asthma Mother     Diabetes Mother     Hypertension Mother     Arthritis Mother     Circulatory Mother     Obesity Mother     Thyroid Disease Mother     Aneurysm Mother         Brain,  at age 69    Hypertension Father     Alcohol/Drug Father     Arthritis Father     Obesity Father     Thyroid Disease Brother     Thyroid Disease Brother     Breast Cancer Maternal Grandmother     Breast Cancer Paternal Grandmother         My fathers mother    Colon Cancer No family hx of     Anesthesia Reaction No family hx of     Bleeding Disorder No family hx of          Current Outpatient Medications   Medication Sig Dispense Refill    acetaminophen (TYLENOL) 325 MG tablet Take 3 tablets (975 mg) by mouth every 6 hours. 120 tablet 0    Ferrous Sulfate (IRON PO) Take 1 tablet by mouth daily      ibuprofen (ADVIL/MOTRIN) 600 MG tablet Take 1 tablet (600 mg) by mouth every 6 hours as needed for inflammatory pain. 60 tablet 0    levothyroxine (SYNTHROID/LEVOTHROID) 112 MCG tablet Take 1 tablet (112 mcg) by mouth daily. 90 tablet 2    methocarbamol (ROBAXIN) 500 MG tablet Take 0.5 tablets (250 mg) by mouth every 6 hours as needed for muscle spasms. 30 tablet 0    Multiple Minerals-Vitamins (CALCIUM-MAGNESIUM-ZINC-D3 PO) Take 500 mg by mouth daily.      omeprazole (PRILOSEC) 20 MG DR capsule TAKE 1 CAPSULE BY MOUTH TWICE  DAILY BEFORE MEALS  180 capsule 2    OVER-THE-COUNTER Place 1-2 drops into both eyes every hour as needed      rosuvastatin (CRESTOR) 20 MG tablet Take 1 tablet (20 mg) by mouth daily. 90 tablet 3     Allergies   Allergen Reactions    Acetaminophen Nausea and Vomiting    Latex      Added based on information entered during case entry, please review and add reactions, type, and severity as needed    Nickel     Vicodin [Acetaminophen] Nausea and Vomiting    Adhesive Tape Rash    Cortisone Nausea and Vomiting and Nausea     oral    Cvs Petroleum Jelly [Basis Facial Moisturizer] Rash    Hydrocodone Nausea and Vomiting and Nausea    Other Drug Allergy (See Comments) Itching, Rash and Blisters     Some Medals (Nickel, Mike Silver)    Petrolatum Itching and Rash     Current providers sharing in care for this patient include:  Patient Care Team:  Sarita Shultz MD as PCP - General (Family Practice)  Sarita Shultz MD as Assigned PCP  Carmen Stein MD as MD (Surgery)  Raquel Zayas PA-C as Physician Assistant (Urology)  Frida Larson MD as Assigned Endocrinology Provider  Harrison Holt MD as MD (Critical Care)  Harrison Holt MD as Assigned Pulmonology Provider  Shae Ocasio MD as Assigned Surgical Provider    The following health maintenance items are reviewed in Epic and correct as of today:  Health Maintenance   Topic Date Due    LIPID  04/17/2025    TSH W/FREE T4 REFLEX  03/06/2026    MEDICARE ANNUAL WELLNESS VISIT  04/21/2026    ANNUAL REVIEW OF HM ORDERS  04/21/2026    FALL RISK ASSESSMENT  04/21/2026    COLORECTAL CANCER SCREENING  10/05/2027    DIABETES SCREENING  01/16/2028    ADVANCE CARE PLANNING  04/23/2029    DTAP/TDAP/TD IMMUNIZATION (3 - Td or Tdap) 12/15/2030    DEXA  04/30/2039    HEPATITIS C SCREENING  Completed    PHQ-2 (once per calendar year)  Completed    INFLUENZA VACCINE  Completed    Pneumococcal Vaccine: 50+ Years  Completed    ZOSTER IMMUNIZATION  Completed    RSV VACCINE   "Completed    COVID-19 Vaccine  Completed    HPV IMMUNIZATION  Aged Out    MENINGITIS IMMUNIZATION  Aged Out    MAMMO SCREENING  Discontinued    PAP  Discontinued    LUNG CANCER SCREENING  Discontinued         Review of Systems  Constitutional, HEENT, cardiovascular, pulmonary, gi and gu systems are negative, except as otherwise noted.     Objective    Exam  /82   Pulse 74   Temp 97.5  F (36.4  C) (Temporal)   Resp 18   Ht 1.629 m (5' 4.13\")   Wt 99.5 kg (219 lb 6.4 oz)   LMP 04/01/2012   SpO2 95%   BMI 37.50 kg/m     Estimated body mass index is 37.5 kg/m  as calculated from the following:    Height as of this encounter: 1.629 m (5' 4.13\").    Weight as of this encounter: 99.5 kg (219 lb 6.4 oz).    Physical Exam  GENERAL: alert and no distress  EYES: Eyes grossly normal to inspection, PERRL and conjunctivae and sclerae normal  HENT: ear canals and TM's normal, nose and mouth without ulcers or lesions  NECK: no adenopathy, no asymmetry, masses, or scars  RESP: lungs clear to auscultation - no rales, rhonchi or wheezes  CV: regular rate and rhythm, normal S1 S2, no S3 or S4, no murmur, click or rub, no peripheral edema  ABDOMEN: soft, nontender, no hepatosplenomegaly, no masses and bowel sounds normal  MS: no gross musculoskeletal defects noted, no edema  SKIN: no suspicious lesions or rashes  NEURO: Normal strength and tone, mentation intact and speech normal  PSYCH: mentation appears normal, affect normal/bright        4/21/2025   Mini Cog   Clock Draw Score 2 Normal   3 Item Recall 2 objects recalled   Mini Cog Total Score 4           4/10/2024   Vision Screen   Patient wears corrective lenses (select all that apply) Worn during vision screen   Vision Screen Results Pass     DATA  Previous labs reviewed.   Labs drawn and in process.    Signed Electronically by: Sarita Shultz MD    "

## 2025-04-24 ENCOUNTER — LAB (OUTPATIENT)
Dept: LAB | Facility: CLINIC | Age: 65
End: 2025-04-24
Payer: COMMERCIAL

## 2025-04-24 DIAGNOSIS — E78.5 HYPERLIPIDEMIA LDL GOAL <130: ICD-10-CM

## 2025-04-24 DIAGNOSIS — E89.0 POSTSURGICAL HYPOTHYROIDISM: ICD-10-CM

## 2025-04-24 DIAGNOSIS — Z00.00 ENCOUNTER FOR MEDICARE ANNUAL WELLNESS EXAM: ICD-10-CM

## 2025-04-24 LAB
ALBUMIN SERPL BCG-MCNC: 4.4 G/DL (ref 3.5–5.2)
ALP SERPL-CCNC: 75 U/L (ref 40–150)
ALT SERPL W P-5'-P-CCNC: 46 U/L (ref 0–50)
ANION GAP SERPL CALCULATED.3IONS-SCNC: 9 MMOL/L (ref 7–15)
AST SERPL W P-5'-P-CCNC: 35 U/L (ref 0–45)
BILIRUB SERPL-MCNC: 0.3 MG/DL
BUN SERPL-MCNC: 14.5 MG/DL (ref 8–23)
CALCIUM SERPL-MCNC: 9.5 MG/DL (ref 8.8–10.4)
CHLORIDE SERPL-SCNC: 105 MMOL/L (ref 98–107)
CHOLEST SERPL-MCNC: 164 MG/DL
CREAT SERPL-MCNC: 0.7 MG/DL (ref 0.51–0.95)
EGFRCR SERPLBLD CKD-EPI 2021: >90 ML/MIN/1.73M2
ERYTHROCYTE [DISTWIDTH] IN BLOOD BY AUTOMATED COUNT: 12.3 % (ref 10–15)
FASTING STATUS PATIENT QL REPORTED: YES
FASTING STATUS PATIENT QL REPORTED: YES
GLUCOSE SERPL-MCNC: 105 MG/DL (ref 70–99)
HCO3 SERPL-SCNC: 28 MMOL/L (ref 22–29)
HCT VFR BLD AUTO: 41.3 % (ref 35–47)
HDLC SERPL-MCNC: 63 MG/DL
HGB BLD-MCNC: 13.6 G/DL (ref 11.7–15.7)
LDLC SERPL CALC-MCNC: 85 MG/DL
MCH RBC QN AUTO: 30.8 PG (ref 26.5–33)
MCHC RBC AUTO-ENTMCNC: 32.9 G/DL (ref 31.5–36.5)
MCV RBC AUTO: 94 FL (ref 78–100)
NONHDLC SERPL-MCNC: 101 MG/DL
PLATELET # BLD AUTO: 196 10E3/UL (ref 150–450)
POTASSIUM SERPL-SCNC: 4.8 MMOL/L (ref 3.4–5.3)
PROT SERPL-MCNC: 6.9 G/DL (ref 6.4–8.3)
RBC # BLD AUTO: 4.41 10E6/UL (ref 3.8–5.2)
SODIUM SERPL-SCNC: 142 MMOL/L (ref 135–145)
TRIGL SERPL-MCNC: 81 MG/DL
TSH SERPL DL<=0.005 MIU/L-ACNC: 2.6 UIU/ML (ref 0.3–4.2)
WBC # BLD AUTO: 4.7 10E3/UL (ref 4–11)

## 2025-04-28 ENCOUNTER — TELEPHONE (OUTPATIENT)
Dept: FAMILY MEDICINE | Facility: CLINIC | Age: 65
End: 2025-04-28
Payer: COMMERCIAL

## 2025-04-28 NOTE — TELEPHONE ENCOUNTER
RN called patient and relayed providers message. Patient verbalized understanding.       Trice Snow RN on 4/28/2025 at 2:36 PM

## 2025-04-28 NOTE — TELEPHONE ENCOUNTER
PRIOR AUTHORIZATION DENIED    Medication: OZEMPIC (0.25 OR 0.5 MG/DOSE) 2 MG/3ML SC SOPN  Insurance Company: [employer] [place] - Phone [phone] Fax [phone]  Denial Date: 4/28/2025  Denial Reason(s): Med not appropriate for weight loss. Only FDA approved for type II diabetes. Weight loss meds will require a co morbidity or previous stroke or cardiovascular event.        Appeal Information:       Patient Notified: No

## 2025-04-28 NOTE — TELEPHONE ENCOUNTER
Please let patient know that the Prior Auth was completed and denied for Ozempic   Medicare Part D limits coverage of this drug for diabetes or previous stroke or cardiovascular event.   Ok to pay for it out of pocket if able.

## 2025-04-29 DIAGNOSIS — E89.0 POSTSURGICAL HYPOTHYROIDISM: ICD-10-CM

## 2025-04-29 RX ORDER — LEVOTHYROXINE SODIUM 112 UG/1
112 TABLET ORAL DAILY
Qty: 90 TABLET | Refills: 0 | Status: SHIPPED | OUTPATIENT
Start: 2025-04-29

## 2025-04-29 NOTE — PROGRESS NOTES
THORACIC SURGERY FOLLOW UP VISIT      I saw Mrs. Ferreira in follow-up today. The clinical summary follows:     PREOP DIAGNOSIS   Left lower lobe lung nodule  PROCEDURE   Robot assisted thoracoscopic left lower lobe wedge resection, superior segmentectomy and mediastinal lymph node dissection    DATE OF PROCEDURE  01/15/2025    HISTOPATHOLOGY    LUNG, LEFT LOWER LOBE,  SEGMENTECTOMY:  - INVASIVE ADENOCARCINOMA, PAPILLARY AND MUCINOUS FEATURES  - Tumor size: 1.1 cm in greatest dimension  - Visceral pleural invasion is not identified  - Lymphovascular invasion is not present  - Bronchial, vascular and parenchymal resection margins are negative, > 1 cm  pT1bN0    A likely pathogenic mutation was detected in TP53 gene: c.993+1G>T.     No other clinically significant variants were detected in the analyzed genes.     TP53 mutations are very frequent in lung carcinomas and they play an important role in both oncogenic transformation of lung epithelial cells and lung carcinoma progression. Currently, predictive or prognostic implications for TP53 mutations are not clearly defined by NCCN guidelines.     The tumor mutational burden (TMB) for this sample falls between 10 and 16 mutations/Mb. Some studies have suggested there is limited activity of certain immune checkpoint blockade in tumors with TMB within this range (PMID: 69820263, 10687232). Panel based testing may over-estimate TMB values; see methodology below.      The tumor is microsatellite stable (DELANO) by NGS analysis. Separately reported PD-L1 IHC was interpreted as NEGATIVE PD-L1 EXPRESSION (TPS <1%)     Fusion event: negative    COMPLICATIONS  None    INTERVAL STUDIES  CT chest 04/24/2025:    1.  Postsurgical changes of interval left lower lobe segmentectomy for resection of a pulmonary adenocarcinoma. Mild focal patchy consolidation adjacent to the suture line is favored postoperative scarring and atelectasis. Attention on follow-up.  2.  Right upper lobe part  solid nodule is stable in size but demonstrates a slowly increasing solid component. This likely represents a pulmonary adenocarcinoma spectrum lesion.  3.  Additional pulmonary nodules remain unchanged        Past Medical History:   Diagnosis Date    Anemia     Leblanc's esophagus     EGD in 2014; recent EGD in 4/2018; repeat EGD in 3 years    Breast cancer (H)     Ex-smoker     1 PPD x 28 years, quit 20 years ago    Gastroesophageal reflux disease     Hematuria 01/23/2007    History of breast biopsy     left    Menopausal symptoms 07/17/2009    Motion sickness     Obese     Postsurgical hypothyroidism 07/17/2009    Symptomatic menopausal or female climacteric states 07/17/2009    Tear of lateral cartilage or meniscus of knee, current 10/24/2007      Past Surgical History:   Procedure Laterality Date    ABDOMEN SURGERY  1974    appendix    APPENDECTOMY OPEN      ARTHROSCOPY KNEE RT/LT  11/15/2007    right knee arthroscopy with arthroscopic lateral meniscectomy    BIOPSY Left     Breast. Benign    CL AFF SURGICAL PATHOLOGY  09/18/2002    endometrial biopsy    COLONOSCOPY      DAVINCI LOBECTOMY LUNG Left 1/15/2025    Procedure: LOBECTOMY, LUNG, ROBOT-ASSISTED,flexible bronchoscopy;  Surgeon: Shae Ocasio MD;  Location: [facility]    ELBOW SURGERY Right     ESOPHAGOSCOPY, GASTROSCOPY, DUODENOSCOPY (EGD), COMBINED N/A 12/23/2014    Procedure: COMBINED ESOPHAGOSCOPY, GASTROSCOPY, DUODENOSCOPY (EGD), BIOPSY SINGLE OR MULTIPLE;  Surgeon: Paradise Radford MD;  Location: [facility]    GRAFT FAT TO BREAST Right 11/08/2021    Procedure: AND FAT GRAFTING FROM ABDOMEN;  Surgeon: Genaro Garcia MD;  Location: [facility]    HC THYROIDECTOMY      goitre    INSERT TISSUE EXPANDER BREAST Bilateral 03/17/2021    Procedure: bilateral BREAST TISSUE EXPANDER;  Surgeon: Genaro Garcia MD;  Location: [facility]    LAMINECTOMY, FUSION LUMBAR ONE LEVEL, COMBINED  10/26/2011    Procedure:COMBINED LAMINECTOMY, FUSION LUMBAR ONE LEVEL;  L4-5 Decompression, L4-5 Posterior Lateral Fusion with Madhavi and Local Bone; Surgeon:BARBRA BRIGHT; Location:[facility]    MASTECTOMY SIMPLE, SENTINEL NODE, COMBINED Bilateral 2021    Procedure: BILATERAL SKIN SPARING MASTECTOMY WITH right  SENTINEL LYMPH NODE BIOPSY;  Surgeon: Carmen Stein MD;  [facility]    RECONSTRUCT BREAST Bilateral 2021    Procedure: REVISION OF BILATERAL BREAST RECONSTRUCTION WITH REMOVE AND REPLACE BILATERAL BREAST IMPLANT;  Surgeon: Genaro Garcia MD;  [facility]    RECONSTRUCT BREAST BILATERAL, IMPLANT PROSTHESIS BILATERAL, COMBINED Bilateral 2021    Procedure: BILATERAL SECOND STAGE BREAST RECONSTRUCTION WITH SILICONE IMPLANTS;  Surgeon: Genaro Garcia MD;  [facility]    TONSILLECTOMY & ADENOIDECTOMY        Social History     Socioeconomic History    Marital status:      Spouse name: Not on file    Number of children: Not on file    Years of education: Not on file    Highest education level: Not on file   Occupational History    Not on file   Tobacco Use    Smoking status: Former     Current packs/day: 0.00     Average packs/day: 1 pack/day for 27.9 years (27.9 ttl pk-yrs)     Types: Cigarettes     Start date: 1971     Quit date: 12/15/1998     Years since quittin.3     Passive exposure: Never    Smokeless tobacco: Never    Tobacco comments:     quit 20 years ago   Vaping Use    Vaping status: Never Used   Substance and Sexual Activity    Alcohol use: Yes     Comment: once in a while    Drug use: No    Sexual activity: Not Currently     Partners: Male     Birth control/protection: None     Comment: none   Other Topics Concern    Parent/sibling w/ CABG, MI or angioplasty before 65F 55M? Yes     Comment: Mother   Social History Narrative    Not on file     Social Drivers of Health     Financial Resource Strain: Low Risk  (2025)    Financial Resource Strain     Within the past 12 months, have you or your family  members you live with been unable to get utilities (heat, electricity) when it was really needed?: No   Food Insecurity: Low Risk  (4/16/2025)    Food Insecurity     Within the past 12 months, did you worry that your food would run out before you got money to buy more?: No     Within the past 12 months, did the food you bought just not last and you didn t have money to get more?: No   Transportation Needs: Low Risk  (4/16/2025)    Transportation Needs     Within the past 12 months, has lack of transportation kept you from medical appointments, getting your medicines, non-medical meetings or appointments, work, or from getting things that you need?: No   Physical Activity: Insufficiently Active (4/16/2025)    Exercise Vital Sign     Days of Exercise per Week: 3 days     Minutes of Exercise per Session: 30 min   Stress: No Stress Concern Present (4/16/2025)    [employer] - Occupational Stress Questionnaire     Feeling of Stress : Only a little   Social Connections: Unknown (4/16/2025)    Social Connection and Isolation Panel [NHANES]     Frequency of Communication with Friends and Family: Not on file     Frequency of Social Gatherings with Friends and Family: Three times a week     Attends Restorationism Services: Not on file     Active Member of Clubs or Organizations: Not on file     Attends Club or Organization Meetings: Not on file     Marital Status: Not on file   Interpersonal Safety: Low Risk  (4/21/2025)    Interpersonal Safety     Do you feel physically and emotionally safe where you currently live?: Yes     Within the past 12 months, have you been hit, slapped, kicked or otherwise physically hurt by someone?: No     Within the past 12 months, have you been humiliated or emotionally abused in other ways by your partner or ex-partner?: No   Housing Stability: Low Risk  (4/16/2025)    Housing Stability     Do you have housing? : Yes     Are you worried about losing your housing?: No       REILLY Sanchez is doing well but is concerned with the CT report. She denies shortness of breath or infectious symptoms such as productive cough, fevers, night sweats, chills, fatigue and unexplained weight loss. She does still have an occasional dry cough that she has had since surgery.      From a personal perspective, her son-in-law, Edmond joins us on today's video visit.    DEONDRE Sanchez is a 64 year-old female status post robot assisted thoracoscopic left lower lobe wedge resection, superior segmentectomy and mediastinal lymph node dissection of a pT1bN0 (stage IA1) non small cell lung cancer.     We discussed the CT results and I explained that the solid component of the nodule is actually quite small-too small for percutaneous needle biopsy. We did discuss that this interval change could be part of an inflammatory response and could resolve. I do want to review her CT with the larger group at our multidisciplinary nodule conference this Friday. We will contact her after conference to let her know what the group thinks and recommends.    PLAN  I spent 25 min on the date of the encounter in chart review, patient visit, review of tests, documentation and/or discussion with other providers about the issues documented above. I reviewed the plan as follows:  Nodule conference discussion this Friday: short term follow up chest CT in 3 months versus surgical biopsy  All questions were answered and the patient and present family were in agreement with the plan.  I appreciate the opportunity to participate in the care of your patient and will keep you updated.  Sincerely,    HAIR Pierre CNS

## 2025-04-30 ENCOUNTER — VIRTUAL VISIT (OUTPATIENT)
Dept: SURGERY | Facility: CLINIC | Age: 65
End: 2025-04-30
Attending: CLINICAL NURSE SPECIALIST
Payer: COMMERCIAL

## 2025-04-30 VITALS — HEIGHT: 65 IN | BODY MASS INDEX: 36.15 KG/M2 | WEIGHT: 217 LBS

## 2025-04-30 DIAGNOSIS — C34.32 MALIGNANT NEOPLASM OF LOWER LOBE OF LEFT LUNG (H): Primary | ICD-10-CM

## 2025-04-30 PROCEDURE — 1126F AMNT PAIN NOTED NONE PRSNT: CPT | Mod: 95 | Performed by: CLINICAL NURSE SPECIALIST

## 2025-04-30 PROCEDURE — 98005 SYNCH AUDIO-VIDEO EST LOW 20: CPT | Performed by: CLINICAL NURSE SPECIALIST

## 2025-04-30 ASSESSMENT — PAIN SCALES - GENERAL: PAINLEVEL_OUTOF10: NO PAIN (0)

## 2025-04-30 NOTE — NURSING NOTE
Patient confirms medications and allergies are accurate via patients echeck in completion, and or denies any changes since last reviewed/verified.     Current patient location: [address] [facility] [address]    Is the patient currently in the state of [place]? YES    Visit mode: VIDEO    If the visit is dropped, the patient can be reconnected by:VIDEO VISIT: Text to cell phone:   Telephone Information:   Mobile [phone]       Will anyone else be joining the visit? AMAN Pruitt  (If patient encounters technical issues they should call [phone]    Are changes needed to the allergy or medication list? No    Are refills needed on medications prescribed by this physician? NO    Rooming Documentation:  Questionnaire(s) completed    Reason for visit: RECHECK    Albina TESFAYE

## 2025-04-30 NOTE — LETTER
4/30/2025      Laura Ferreira  [address] [address]      Dear Colleague,    Thank you for referring your patient, Laura Ferreira, to the [facility]. Please see a copy of my visit note below.    THORACIC SURGERY FOLLOW UP VISIT      I saw Mrs. Ferreira in follow-up today. The clinical summary follows:     PREOP DIAGNOSIS   Left lower lobe lung nodule  PROCEDURE   Robot assisted thoracoscopic left lower lobe wedge resection, superior segmentectomy and mediastinal lymph node dissection    DATE OF PROCEDURE  01/15/2025    HISTOPATHOLOGY    LUNG, LEFT LOWER LOBE,  SEGMENTECTOMY:  - INVASIVE ADENOCARCINOMA, PAPILLARY AND MUCINOUS FEATURES  - Tumor size: 1.1 cm in greatest dimension  - Visceral pleural invasion is not identified  - Lymphovascular invasion is not present  - Bronchial, vascular and parenchymal resection margins are negative, > 1 cm  pT1bN0    A likely pathogenic mutation was detected in TP53 gene: c.993+1G>T.     No other clinically significant variants were detected in the analyzed genes.     TP53 mutations are very frequent in lung carcinomas and they play an important role in both oncogenic transformation of lung epithelial cells and lung carcinoma progression. Currently, predictive or prognostic implications for TP53 mutations are not clearly defined by NCCN guidelines.     The tumor mutational burden (TMB) for this sample falls between 10 and 16 mutations/Mb. Some studies have suggested there is limited activity of certain immune checkpoint blockade in tumors with TMB within this range (PMID: 15491683, [id number]). Panel based testing may over-estimate TMB values; see methodology below.      The tumor is microsatellite stable (DELANO) by NGS analysis. Separately reported PD-L1 IHC was interpreted as NEGATIVE PD-L1 EXPRESSION (TPS <1%)     Fusion event: negative    COMPLICATIONS  None    INTERVAL STUDIES  CT chest 04/24/2025:    1.  Postsurgical changes of interval left  lower lobe segmentectomy for resection of a pulmonary adenocarcinoma. Mild focal patchy consolidation adjacent to the suture line is favored postoperative scarring and atelectasis. Attention on follow-up.  2.  Right upper lobe part solid nodule is stable in size but demonstrates a slowly increasing solid component. This likely represents a pulmonary adenocarcinoma spectrum lesion.  3.  Additional pulmonary nodules remain unchanged        Past Medical History:   Diagnosis Date     Anemia      Leblanc's esophagus     EGD in 2014; recent EGD in 4/2018; repeat EGD in 3 years     Breast cancer (H)      Ex-smoker     1 PPD x 28 years, quit 20 years ago     Gastroesophageal reflux disease      Hematuria 01/23/2007     History of breast biopsy     left     Menopausal symptoms 07/17/2009     Motion sickness      Obese      Postsurgical hypothyroidism 07/17/2009     Symptomatic menopausal or female climacteric states 07/17/2009     Tear of lateral cartilage or meniscus of knee, current 10/24/2007      Past Surgical History:   Procedure Laterality Date     ABDOMEN SURGERY  1974    appendix     APPENDECTOMY OPEN       ARTHROSCOPY KNEE RT/LT  11/15/2007    right knee arthroscopy with arthroscopic lateral meniscectomy     BIOPSY Left     Breast. Benign     CL AFF SURGICAL PATHOLOGY  09/18/2002    endometrial biopsy     COLONOSCOPY       DAVINCI LOBECTOMY LUNG Left 1/15/2025    Procedure: LOBECTOMY, LUNG, ROBOT-ASSISTED,flexible bronchoscopy;  Surgeon: Shae Ocasio MD;  Location: [facility]     ELBOW SURGERY Right      ESOPHAGOSCOPY, GASTROSCOPY, DUODENOSCOPY (EGD), COMBINED N/A 12/23/2014    Procedure: COMBINED ESOPHAGOSCOPY, GASTROSCOPY, DUODENOSCOPY (EGD), BIOPSY SINGLE OR MULTIPLE;  Surgeon: Paradise Radford MD;  Location: [facility]     GRAFT FAT TO BREAST Right 11/08/2021    Procedure: AND FAT GRAFTING FROM ABDOMEN;  Surgeon: Genaro Garcia MD;  Location: [facility]      THYROIDECTOMY      goitre     INSERT TISSUE EXPANDER  BREAST Bilateral 2021    Procedure: bilateral BREAST TISSUE EXPANDER;  Surgeon: Genaro Garcia MD;  Location: [facility]     LAMINECTOMY, FUSION LUMBAR ONE LEVEL, COMBINED  10/26/2011    Procedure:COMBINED LAMINECTOMY, FUSION LUMBAR ONE LEVEL; L4-5 Decompression, L4-5 Posterior Lateral Fusion with Madhavi and Local Bone; Surgeon:BARBRA BRIGHT; Location:[facility]     MASTECTOMY SIMPLE, SENTINEL NODE, COMBINED Bilateral 2021    Procedure: BILATERAL SKIN SPARING MASTECTOMY WITH right  SENTINEL LYMPH NODE BIOPSY;  Surgeon: Carmen Stein MD;  Location: [facility]     RECONSTRUCT BREAST Bilateral 2021    Procedure: REVISION OF BILATERAL BREAST RECONSTRUCTION WITH REMOVE AND REPLACE BILATERAL BREAST IMPLANT;  Surgeon: Genaro Garcia MD;  [facility]: [facility]     RECONSTRUCT BREAST BILATERAL, IMPLANT PROSTHESIS BILATERAL, COMBINED Bilateral 2021    Procedure: BILATERAL SECOND STAGE BREAST RECONSTRUCTION WITH SILICONE IMPLANTS;  Surgeon: Genaro Garcia MD;  Location: [facility]     TONSILLECTOMY & ADENOIDECTOMY        Social History     Socioeconomic History     Marital status:      Spouse name: Not on file     Number of children: Not on file     Years of education: Not on file     Highest education level: Not on file   Occupational History     Not on file   Tobacco Use     Smoking status: Former     Current packs/day: 0.00     Average packs/day: 1 pack/day for 27.9 years (27.9 ttl pk-yrs)     Types: Cigarettes     Start date: 1971     Quit date: 12/15/1998     Years since quittin.3     Passive exposure: Never     Smokeless tobacco: Never     Tobacco comments:     quit 20 years ago   Vaping Use     Vaping status: Never Used   Substance and Sexual Activity     Alcohol use: Yes     Comment: once in a while     Drug use: No     Sexual activity: Not Currently     Partners: Male     Birth control/protection: None     Comment: none   Other Topics Concern     Parent/sibling w/  CABG, MI or angioplasty before 65F 55M? Yes     Comment: Mother   Social History Narrative     Not on file     Social Drivers of Health     Financial Resource Strain: Low Risk  (4/16/2025)    Financial Resource Strain      Within the past 12 months, have you or your family members you live with been unable to get utilities (heat, electricity) when it was really needed?: No   Food Insecurity: Low Risk  (4/16/2025)    Food Insecurity      Within the past 12 months, did you worry that your food would run out before you got money to buy more?: No      Within the past 12 months, did the food you bought just not last and you didn t have money to get more?: No   Transportation Needs: Low Risk  (4/16/2025)    Transportation Needs      Within the past 12 months, has lack of transportation kept you from medical appointments, getting your medicines, non-medical meetings or appointments, work, or from getting things that you need?: No   Physical Activity: Insufficiently Active (4/16/2025)    Exercise Vital Sign      Days of Exercise per Week: 3 days      Minutes of Exercise per Session: 30 min   Stress: No Stress Concern Present (4/16/2025)    [employer] - Occupational Stress Questionnaire      Feeling of Stress : Only a little   Social Connections: Unknown (4/16/2025)    Social Connection and Isolation Panel [NHANES]      Frequency of Communication with Friends and Family: Not on file      Frequency of Social Gatherings with Friends and Family: Three times a week      Attends Restorationism Services: Not on file      Active Member of Clubs or Organizations: Not on file      Attends Club or Organization Meetings: Not on file      Marital Status: Not on file   Interpersonal Safety: Low Risk  (4/21/2025)    Interpersonal Safety      Do you feel physically and emotionally safe where you currently live?: Yes      Within the past 12 months, have you been hit, slapped, kicked or otherwise physically hurt by  someone?: No      Within the past 12 months, have you been humiliated or emotionally abused in other ways by your partner or ex-partner?: No   Housing Stability: Low Risk  (4/16/2025)    Housing Stability      Do you have housing? : Yes      Are you worried about losing your housing?: No      SUBJECTIVE   Laura is doing well but is concerned with the CT report. She denies shortness of breath or infectious symptoms such as productive cough, fevers, night sweats, chills, fatigue and unexplained weight loss. She does still have an occasional dry cough that she has had since surgery.      From a personal perspective, her son-in-law, Edmond joins us on today's video visit.    IMPRESSION   Laura is a 64 year-old female status post robot assisted thoracoscopic left lower lobe wedge resection, superior segmentectomy and mediastinal lymph node dissection of a pT1bN0 (stage IA1) non small cell lung cancer.     We discussed the CT results and I explained that the solid component of the nodule is actually quite small-too small for percutaneous needle biopsy. We did discuss that this interval change could be part of an inflammatory response and could resolve. I do want to review her CT with the larger group at our multidisciplinary nodule conference this Friday. We will contact her after conference to let her know what the group thinks and recommends.    PLAN  I spent 25 min on the date of the encounter in chart review, patient visit, review of tests, documentation and/or discussion with other providers about the issues documented above. I reviewed the plan as follows:  Nodule conference discussion this Friday: short term follow up chest CT in 3 months versus surgical biopsy  All questions were answered and the patient and present family were in agreement with the plan.  I appreciate the opportunity to participate in the care of your patient and will keep you updated.  Sincerely,    Celeste King, HAIR CNS       Again,  thank you for allowing me to participate in the care of your patient.        Sincerely,        HAIR Pierre    Electronically signed

## 2025-05-01 DIAGNOSIS — E89.0 POSTSURGICAL HYPOTHYROIDISM: ICD-10-CM

## 2025-05-01 RX ORDER — LEVOTHYROXINE SODIUM 112 UG/1
112 TABLET ORAL DAILY
Qty: 90 TABLET | Refills: 0 | OUTPATIENT
Start: 2025-05-01

## 2025-05-01 NOTE — TELEPHONE ENCOUNTER
Pharmacy requested refills that are already active on file. Refused request to pharmacy.   Provider Procedure Text (D): After obtaining clear surgical margins the defect was repaired by another provider.

## 2025-05-05 ENCOUNTER — PATIENT OUTREACH (OUTPATIENT)
Dept: ONCOLOGY | Facility: CLINIC | Age: 65
End: 2025-05-05
Payer: COMMERCIAL

## 2025-05-05 NOTE — PROGRESS NOTES
Oncology referral received from STEPHANIE King CNS, Thoracic Surgery, for patient with early stage lung cancer.    (See my bookmarks for pertinent records in Epic)    Hx LLL lung wedge by Dr Ocasio 1/15/2025, pT1b pN0, early stage lung adenocarcinoma. Recent CT chest 4/24/25 shows RUL nodule with possible increase in solid component/ recurrence/new slow growing lung ca (?) per TB, refer to Med Onc now and repeat CT chest in 3 months. Dr Montes has availability next week (5/14) or schedule per pt preference.    Leonel Parker RN  Oncology Nurse Navigator  [facility]  [phone]

## 2025-05-06 NOTE — PROGRESS NOTES
Plastic Surgery POD# 1    Patient feeling well, good pain control.    Skin flaps viable, no hematoma    Plan: home today, f/u next week.  Fully instructed.     show

## 2025-05-07 NOTE — TELEPHONE ENCOUNTER
RECORDS STATUS - ALL OTHER DIAGNOSIS      RECORDS RECEIVED FROM: [facility] - Internal records   DATE RECEIVED: 5/7

## 2025-05-14 ENCOUNTER — VIRTUAL VISIT (OUTPATIENT)
Dept: ONCOLOGY | Facility: CLINIC | Age: 65
End: 2025-05-14
Attending: CLINICAL NURSE SPECIALIST
Payer: COMMERCIAL

## 2025-05-14 ENCOUNTER — PRE VISIT (OUTPATIENT)
Dept: ONCOLOGY | Facility: CLINIC | Age: 65
End: 2025-05-14
Payer: COMMERCIAL

## 2025-05-14 VITALS
OXYGEN SATURATION: 97 % | SYSTOLIC BLOOD PRESSURE: 160 MMHG | DIASTOLIC BLOOD PRESSURE: 82 MMHG | WEIGHT: 213 LBS | HEIGHT: 65 IN | BODY MASS INDEX: 35.49 KG/M2 | HEART RATE: 70 BPM

## 2025-05-14 DIAGNOSIS — C34.32 MALIGNANT NEOPLASM OF LOWER LOBE OF LEFT LUNG (H): ICD-10-CM

## 2025-05-14 PROCEDURE — 1126F AMNT PAIN NOTED NONE PRSNT: CPT | Performed by: INTERNAL MEDICINE

## 2025-05-14 PROCEDURE — 3077F SYST BP >= 140 MM HG: CPT | Performed by: INTERNAL MEDICINE

## 2025-05-14 PROCEDURE — 3079F DIAST BP 80-89 MM HG: CPT | Performed by: INTERNAL MEDICINE

## 2025-05-14 PROCEDURE — 98003 SYNCH AUDIO-VIDEO NEW HI 60: CPT | Performed by: INTERNAL MEDICINE

## 2025-05-14 ASSESSMENT — PAIN SCALES - GENERAL: PAINLEVEL_OUTOF10: NO PAIN (0)

## 2025-05-14 NOTE — NURSING NOTE
Current patient location: [address] [facility] [address]    Is the patient currently in the state of [place]? YES    Visit mode: VIDEO    If the visit is dropped, the patient can be reconnected by:VIDEO VISIT: Text to cell phone:   Telephone Information:   Mobile [phone]    and VIDEO VISIT: Send to e-mail at: [email or web address]    Will anyone else be joining the visit? Pts daughters   (If patient encounters technical issues they should call [phone])    Are changes needed to the allergy or medication list? No    Patient denies any changes since echeck-in completion and states all information entered during echeck-in remains accurate.    Are refills needed on medications prescribed by this physician? NA    Rooming Documentation:  Questionnaire(s) not done per department protocol    Reason for visit: Consult      Chasity King MA VVF

## 2025-05-14 NOTE — LETTER
5/14/2025      Laura Ferreira  [address] [facility] [address]      Dear Colleague,    Thank you for referring your patient, Laura Ferreira, to the [facility]. Please see a copy of my visit note below.        [facility]  [address] [facility] [address]  Phone: [phone]  Fax: [phone]    PATIENT NAME: Laura Ferreira  MRN # [id number]   DATE OF VISIT: May 14, 2025  YOB: 1960     CANCER TYPE: lung adenocarcinoma  STAGE: [id number] (1A2)  ECOG PS: 0    PD-L1: <1% on [id number] (lobectomy specimen)  Lung panel: On [id number] F3. TMB 12.422, TP53 c.993+1G>T  NGS: N/A    ASSESSMENT AND PLAN  Lung adenocarcinoma, [id number] (1A2): 4 months after lobectomy. Here to review adjuvant therapy and discuss NGS results, which showed high TMB and TP53 mutation. Discussed SOC, and that adjuvant chemo or immunotherapy is not recommended. Discussed impact of TPS <1% on adjuvant immunotherapy even for more advanced cancers. The TP53 mutation and TMB do not impact that decision-making at this time. She has a LORENA GGO with a solid component, although the solid component is only 5 mm and has been stable on my review since at least last Nov. We discussed what we're watching for - increasing solid component, and that some of these GGOs can eventually turn into invasive cancers, although it's not possible to predict that timeline and it could be even years down the road. However, that LORENA nodule in particular warrants close attention, to the point that the standard imaging timelines after surgical resection of NSCLC could potentially require modification, depending on how that solid component did or did not change over the coming months to years. She does not require additional follow up with me and will continue to follow with my colleagues in Thoracic Surgery. She is welcome to contact me any time with any questions.       60 minutes spent by me on  the date of the encounter doing chart review, review of outside records, review of test results, interpretation of tests, patient visit, documentation, orders, discussion with other provider(s), discussion with family.     Ashlee Montes MD  Associate Professor of Medicine  Hematology, Oncology and Transplantation    REILLY Sanchez is a 63 yo female who is here to discuss adjuvant therapy and NGS results for resected lung adenocarcinoma.  Doing ok. More or less recovered from surgery. Had a fair bit of pain initially, subsiding. Exercising. Feels good.    CANCER SUMMARY  11/14/24 CT chest non contrast. 13 x 10 mm LLL solid nodule, similar 5 mm adjacent nodule, unchanged 24 x 10 mm RUL semisolid nodule with 5 mm solid component, unchanged 13 mm lingula GGO  12/14/24 PET. 1.2 x 0.8 cm LLL nodule, similar to 11/14/24, larger than 8/8/24 (SUV 5.9), 1.1 x 1.5 cm RUL GGO, part solid nodule (SUV 1.5), no adenopathy  1/8/25  Brain MRI. Negative for mets  1/8/25  CT chest w/contrast. 10 x 13 mm LLL nodule, increasing solid component. Multiple GGO including stable 15 mm R apical nodule, stable 5 mm RML subpleural nodule, 12 mm part solid lingular nodule. No adenopathy.   1/25/25  L VATS, superior segementectomy, MLND (Dr. Ocasio). Path: Adenocarcinoma, 1.1 cm, mixed mucinous and non-mucinous, G2, acinar/papillary, no VPI, no LVI, negative margins. 6 LNs negative - 7, 5, 9L, 10L. TPS <1%  4/24/25  CT chest w/contrast. Post surgical changes. Stable 23 x 11 mm RUL nodule, stable 5 mm ill defined solid component. 2 mm solid component along anterior medial aspect is slightly more conspicuous from prior, new from 11/14/24. Multiple other GGO noduels are stable. No adenopathy. Stable mild thickening L adrenal. Feeding or R renal artery suggestive of fibromuscular dysplasia.     PAST MEDICAL HISTORY  Lung adenocarcinoma as above  L adrenal nodule  Hypothyroidism  H/o L sacral ala fracture on MRI L spine 1/11/24   DJD L spine  S/p L  spine fusion 5/10/24  H/o DCIS, ER +, s/p bilateral mastectomy and reconstruction. No adjuvant hormone therapy.   - panel negative - note 3/9/21  Gross hematuria early . Saw Dr. Conn, Urology. Negative CT urogram, negative urine cytology, normal cytoscopy  H/o YURIDIA secondary to menorrhagia    CURRENT OUTPATIENT MEDICATIONS  Reviewed    ALLERGIES  Allergies   Allergen Reactions     Acetaminophen Nausea and Vomiting     Latex      Added based on information entered during case entry, please review and add reactions, type, and severity as needed     Nickel      Vicodin [Acetaminophen] Nausea and Vomiting     Adhesive Tape Rash     Cortisone Nausea and Vomiting and Nausea     oral     Cvs Petroleum Jelly [Basis Facial Moisturizer] Rash     Hydrocodone Nausea and Vomiting and Nausea     Other Drug Allergy (See Comments) Itching, Rash and Blisters     Some Medals (Nickel, Mike Silver)     Petrolatum Itching and Rash      SOCIAL HISTORY: Former smoker, quit in the late      FAMILY HISTORY:   Family History   Problem Relation Age of Onset     Asthma Mother      Diabetes Mother      Hypertension Mother      Arthritis Mother      Circulatory Mother      Obesity Mother      Thyroid Disease Mother      Aneurysm Mother         Brain,  at age 69     Hypertension Father      Alcohol/Drug Father      Arthritis Father      Obesity Father      Thyroid Disease Brother      Thyroid Disease Brother      Breast Cancer Maternal Grandmother      Breast Cancer Paternal Grandmother         My fathers mother     Colon Cancer No family hx of      Anesthesia Reaction No family hx of      Bleeding Disorder No family hx of       PHYSICAL EXAM  No vitals due to video visit   GEN: NAD  HEENT: EOMI, no icterus, injection or pallor  NEURO: alert    Remainder of physical exam deferred due to public health emergency and limitations of video visit.    LABORATORY AND IMAGING STUDIES    Labs 25 independently reviewed and  interpreted by me  CMP, CBC pd, TSH all ok     NGS results as above    Path report from 1/15/25 reviewed     CT Chest w contrast  Narrative: EXAM: CT CHEST W CONTRAST  LOCATION: [facility]  DATE: 4/24/2025    INDICATION: H O left lower lobe segmentectomy for adenocarcinoma. 3 month serial imaging of right upper lobe nodule.  COMPARISON: CT chest 01/08/2025.  TECHNIQUE: CT chest with IV contrast. Multiplanar reformats were obtained. Dose reduction techniques were used.    CONTRAST: 134 mL Isovue 370    FINDINGS:   LUNGS AND PLEURA: Postsurgical changes of interval left lower lobe segmentectomy for resection of a pulmonary adenocarcinoma. There is mild focal patchy consolidation adjacent to the suture line which is favored postoperative scarring and atelectasis   (series 4, image 149). Right upper lobe part solid nodule measures 23 x 11 mm compared to 23 x 11 mm (series 4, image 55). The 5 mm ill-defined solid component posteriorly is unchanged (series 4, image 56). The 2 mm solid component along the anterior   medial aspect of this nodule is slightly more conspicuous from prior and new from more remote CT 11/14/2024 (series 4, image 56). Multiple additional groundglass pulmonary nodules are stable with largest in the lingula measuring 13 mm (series 4, image   177). Stable scattered small solid pulmonary nodules measuring less than 6 mm such as a 4 mm right middle lobe subpleural nodule (series 4, image 177). No pleural effusion.    MEDIASTINUM/AXILLAE: Thyroidectomy. Normal heart size without pericardial effusion. No thoracic aneurysm. No thoracic adenopathy.    CORONARY ARTERY CALCIFICATION: Mild.    UPPER ABDOMEN: Stable mild thickening of the left adrenal gland. Feeding of the right renal artery suggestive of fibromuscular dysplasia.    MUSCULOSKELETAL: No aggressive osseous lesion. Healing fractures of the left sixth and eighth ribs. Bilateral mastectomy with implant  reconstruction.  Impression: IMPRESSION:   1.  Postsurgical changes of interval left lower lobe segmentectomy for resection of a pulmonary adenocarcinoma. Mild focal patchy consolidation adjacent to the suture line is favored postoperative scarring and atelectasis. Attention on follow-up.  2.  Right upper lobe part solid nodule is stable in size but demonstrates a slowly increasing solid component. This likely represents a pulmonary adenocarcinoma spectrum lesion.  3.  Additional pulmonary nodules remain unchanged.     CT chest personally reviewed and interpreted by me and compared with serial imaging back to last Aug 2024     Virtual Visit Details  Type of service:  Video Visit   Video Start Time: 2:08 PM  Video End Time: 2:36 PM  Originating Location (pt. Location): Home  Distant Location (provider location):  On-site  Platform used for Video Visit: Chance             Again, thank you for allowing me to participate in the care of your patient.        Sincerely,        Ashlee Montes MD    Electronically signed

## 2025-05-14 NOTE — PROGRESS NOTES
[facility]  [address] [facility] [address]  Phone: [phone]  Fax: [phone]    PATIENT NAME: Laura Ferreira  MRN # [id number]   DATE OF VISIT: May 14, 2025  YOB: 1960     CANCER TYPE: lung adenocarcinoma  STAGE: [id number] (1A2)  ECOG PS: 0    PD-L1: <1% on [id number] (lobectomy specimen)  Lung panel: On [id number] F3. TMB 12.422, TP53 c.993+1G>T  NGS: N/A    ASSESSMENT AND PLAN  Lung adenocarcinoma, [id number] (1A2): 4 months after lobectomy. Here to review adjuvant therapy and discuss NGS results, which showed high TMB and TP53 mutation. Discussed SOC, and that adjuvant chemo or immunotherapy is not recommended. Discussed impact of TPS <1% on adjuvant immunotherapy even for more advanced cancers. The TP53 mutation and TMB do not impact that decision-making at this time. She has a LORENA GGO with a solid component, although the solid component is only 5 mm and has been stable on my review since at least last Nov. We discussed what we're watching for - increasing solid component, and that some of these GGOs can eventually turn into invasive cancers, although it's not possible to predict that timeline and it could be even years down the road. However, that LORENA nodule in particular warrants close attention, to the point that the standard imaging timelines after surgical resection of NSCLC could potentially require modification, depending on how that solid component did or did not change over the coming months to years. She does not require additional follow up with me and will continue to follow with my colleagues in Thoracic Surgery. She is welcome to contact me any time with any questions.       60 minutes spent by me on the date of the encounter doing chart review, review of outside records, review of test results, interpretation of tests, patient visit, documentation, orders, discussion with other provider(s), discussion with family.     Ashlee  MD Jyoti  Associate Professor of Medicine  Hematology, Oncology and Transplantation    REILLY Sanchez is a 65 yo female who is here to discuss adjuvant therapy and NGS results for resected lung adenocarcinoma.  Doing ok. More or less recovered from surgery. Had a fair bit of pain initially, subsiding. Exercising. Feels good.    CANCER SUMMARY  11/14/24 CT chest non contrast. 13 x 10 mm LLL solid nodule, similar 5 mm adjacent nodule, unchanged 24 x 10 mm RUL semisolid nodule with 5 mm solid component, unchanged 13 mm lingula GGO  12/14/24 PET. 1.2 x 0.8 cm LLL nodule, similar to 11/14/24, larger than 8/8/24 (SUV 5.9), 1.1 x 1.5 cm RUL GGO, part solid nodule (SUV 1.5), no adenopathy  1/8/25  Brain MRI. Negative for mets  1/8/25  CT chest w/contrast. 10 x 13 mm LLL nodule, increasing solid component. Multiple GGO including stable 15 mm R apical nodule, stable 5 mm RML subpleural nodule, 12 mm part solid lingular nodule. No adenopathy.   1/25/25  L VATS, superior segementectomy, MLND (Dr. Ocasio). Path: Adenocarcinoma, 1.1 cm, mixed mucinous and non-mucinous, G2, acinar/papillary, no VPI, no LVI, negative margins. 6 LNs negative - 7, 5, 9L, 10L. TPS <1%  4/24/25  CT chest w/contrast. Post surgical changes. Stable 23 x 11 mm RUL nodule, stable 5 mm ill defined solid component. 2 mm solid component along anterior medial aspect is slightly more conspicuous from prior, new from 11/14/24. Multiple other GGO noduels are stable. No adenopathy. Stable mild thickening L adrenal. Feeding or R renal artery suggestive of fibromuscular dysplasia.     PAST MEDICAL HISTORY  Lung adenocarcinoma as above  L adrenal nodule  Hypothyroidism  H/o L sacral ala fracture on MRI L spine 1/11/24   DJD L spine  S/p L spine fusion 5/10/24  H/o DCIS, ER +, s/p bilateral mastectomy and reconstruction. No adjuvant hormone therapy.  2021 - panel negative - note 3/9/21  Gross hematuria early 2024. Saw Dr. Conn, Urology. Negative CT urogram,  negative urine cytology, normal cytoscopy  H/o YURIDIA secondary to menorrhagia    CURRENT OUTPATIENT MEDICATIONS  Reviewed    ALLERGIES  Allergies   Allergen Reactions    Acetaminophen Nausea and Vomiting    Latex      Added based on information entered during case entry, please review and add reactions, type, and severity as needed    Nickel     Vicodin [Acetaminophen] Nausea and Vomiting    Adhesive Tape Rash    Cortisone Nausea and Vomiting and Nausea     oral    Cvs Petroleum Jelly [Basis Facial Moisturizer] Rash    Hydrocodone Nausea and Vomiting and Nausea    Other Drug Allergy (See Comments) Itching, Rash and Blisters     Some Medals (Nickel, Mike Silver)    Petrolatum Itching and Rash      SOCIAL HISTORY: Former smoker, quit in the late      FAMILY HISTORY:   Family History   Problem Relation Age of Onset    Asthma Mother     Diabetes Mother     Hypertension Mother     Arthritis Mother     Circulatory Mother     Obesity Mother     Thyroid Disease Mother     Aneurysm Mother         Brain,  at age 69    Hypertension Father     Alcohol/Drug Father     Arthritis Father     Obesity Father     Thyroid Disease Brother     Thyroid Disease Brother     Breast Cancer Maternal Grandmother     Breast Cancer Paternal Grandmother         My fathers mother    Colon Cancer No family hx of     Anesthesia Reaction No family hx of     Bleeding Disorder No family hx of       PHYSICAL EXAM  No vitals due to video visit   GEN: NAD  HEENT: EOMI, no icterus, injection or pallor  NEURO: alert    Remainder of physical exam deferred due to public health emergency and limitations of video visit.    LABORATORY AND IMAGING STUDIES    Labs 25 independently reviewed and interpreted by me  CMP, CBC pd, TSH all ok     NGS results as above    Path report from 1/15/25 reviewed     CT Chest w contrast  Narrative: EXAM: CT CHEST W CONTRAST  LOCATION: [facility]  DATE: 2025    INDICATION: H O left lower  lobe segmentectomy for adenocarcinoma. 3 month serial imaging of right upper lobe nodule.  COMPARISON: CT chest 01/08/2025.  TECHNIQUE: CT chest with IV contrast. Multiplanar reformats were obtained. Dose reduction techniques were used.    CONTRAST: 134 mL Isovue 370    FINDINGS:   LUNGS AND PLEURA: Postsurgical changes of interval left lower lobe segmentectomy for resection of a pulmonary adenocarcinoma. There is mild focal patchy consolidation adjacent to the suture line which is favored postoperative scarring and atelectasis   (series 4, image 149). Right upper lobe part solid nodule measures 23 x 11 mm compared to 23 x 11 mm (series 4, image 55). The 5 mm ill-defined solid component posteriorly is unchanged (series 4, image 56). The 2 mm solid component along the anterior   medial aspect of this nodule is slightly more conspicuous from prior and new from more remote CT 11/14/2024 (series 4, image 56). Multiple additional groundglass pulmonary nodules are stable with largest in the lingula measuring 13 mm (series 4, image   177). Stable scattered small solid pulmonary nodules measuring less than 6 mm such as a 4 mm right middle lobe subpleural nodule (series 4, image 177). No pleural effusion.    MEDIASTINUM/AXILLAE: Thyroidectomy. Normal heart size without pericardial effusion. No thoracic aneurysm. No thoracic adenopathy.    CORONARY ARTERY CALCIFICATION: Mild.    UPPER ABDOMEN: Stable mild thickening of the left adrenal gland. Feeding of the right renal artery suggestive of fibromuscular dysplasia.    MUSCULOSKELETAL: No aggressive osseous lesion. Healing fractures of the left sixth and eighth ribs. Bilateral mastectomy with implant reconstruction.  Impression: IMPRESSION:   1.  Postsurgical changes of interval left lower lobe segmentectomy for resection of a pulmonary adenocarcinoma. Mild focal patchy consolidation adjacent to the suture line is favored postoperative scarring and atelectasis. Attention on  follow-up.  2.  Right upper lobe part solid nodule is stable in size but demonstrates a slowly increasing solid component. This likely represents a pulmonary adenocarcinoma spectrum lesion.  3.  Additional pulmonary nodules remain unchanged.     CT chest personally reviewed and interpreted by me and compared with serial imaging back to last Aug 2024     Virtual Visit Details  Type of service:  Video Visit   Video Start Time: 2:08 PM  Video End Time: 2:36 PM  Originating Location (pt. Location): Home  Distant Location (provider location):  On-site  Platform used for Video Visit: Chance

## 2025-07-03 ENCOUNTER — OFFICE VISIT (OUTPATIENT)
Dept: SURGERY | Facility: CLINIC | Age: 65
End: 2025-07-03
Payer: COMMERCIAL

## 2025-07-03 DIAGNOSIS — Z90.13 STATUS POST BILATERAL MASTECTOMY: Primary | ICD-10-CM

## 2025-07-03 NOTE — PROGRESS NOTES
[facility] Follow Up Note    CHIEF COMPLAINT:  History of right breast cancer    HISTORY OF PRESENT ILLNESS:  Laura Ferreira is a 64 year old female who is seen in follow up for right breast cancer.     She underwent bilateral mastectomies with right sentinel lymph node biopsy and immediate reconstruction with myself and Dr. Garcia on 3/17/2021 for 6cm of high grade DCIS. She had her 2nd stage reconstruction on 6/11/2021. She had replacement of her implants and fat grafting in October 2021.    Laura underwent a robotic assisted thoracoscopic left lower lobe wedge resection on 1/15/2025 due to a lung nodule.  Her pathology had revealed NSCLC, invasive adenocarcinoma, papillary and mucinous features measuring 1.1 cm nodes were negative.  Genetic testing had revealed a gene mutation in the T p53 gene.    She reports that since surgery she has had some tingling sensation and nerve type pain across her chest.          Past Medical History:   Diagnosis Date    Anemia     Leblanc's esophagus     EGD in 2014; recent EGD in 4/2018; repeat EGD in 3 years    Breast cancer (H)     Ex-smoker     1 PPD x 28 years, quit 20 years ago    Gastroesophageal reflux disease     Hematuria 01/23/2007    History of breast biopsy     left    Menopausal symptoms 07/17/2009    Motion sickness     Obese     Postsurgical hypothyroidism 07/17/2009    Symptomatic menopausal or female climacteric states 07/17/2009    Tear of lateral cartilage or meniscus of knee, current 10/24/2007       Past Surgical History:   Procedure Laterality Date    ABDOMEN SURGERY  1974    appendix    APPENDECTOMY OPEN      ARTHROSCOPY KNEE RT/LT  11/15/2007    right knee arthroscopy with arthroscopic lateral meniscectomy    BIOPSY Left     Breast. Benign    CL AFF SURGICAL PATHOLOGY  09/18/2002    endometrial biopsy    COLONOSCOPY      DAVINCI LOBECTOMY LUNG Left 1/15/2025    Procedure: LOBECTOMY, LUNG, ROBOT-ASSISTED,flexible bronchoscopy;  Surgeon:  Shae Ocasio MD;  Location: [facility] OR    ELBOW SURGERY Right     ESOPHAGOSCOPY, GASTROSCOPY, DUODENOSCOPY (EGD), COMBINED N/A 2014    Procedure: COMBINED ESOPHAGOSCOPY, GASTROSCOPY, DUODENOSCOPY (EGD), BIOPSY SINGLE OR MULTIPLE;  Surgeon: Paradise Radford MD;  Location: [facility] OR    GRAFT FAT TO BREAST Right 2021    Procedure: AND FAT GRAFTING FROM ABDOMEN;  Surgeon: Genaro Garcia MD;  Location: [facility] OR    HC THYROIDECTOMY      goitre    INSERT TISSUE EXPANDER BREAST Bilateral 2021    Procedure: bilateral BREAST TISSUE EXPANDER;  Surgeon: Genaro Garcia MD;  Location: [facility] OR    LAMINECTOMY, FUSION LUMBAR ONE LEVEL, COMBINED  10/26/2011    Procedure:COMBINED LAMINECTOMY, FUSION LUMBAR ONE LEVEL; L4-5 Decompression, L4-5 Posterior Lateral Fusion with Madhavi and Local Bone; Surgeon:BARBRA BRIGHT; Location:[facility] OR    MASTECTOMY SIMPLE, SENTINEL NODE, COMBINED Bilateral 2021    Procedure: BILATERAL SKIN SPARING MASTECTOMY WITH right  SENTINEL LYMPH NODE BIOPSY;  Surgeon: Carmen Stein MD;  Location: [facility]    RECONSTRUCT BREAST Bilateral 2021    Procedure: REVISION OF BILATERAL BREAST RECONSTRUCTION WITH REMOVE AND REPLACE BILATERAL BREAST IMPLANT;  Surgeon: Genaro Garcia MD;  Location: [facility]    RECONSTRUCT BREAST BILATERAL, IMPLANT PROSTHESIS BILATERAL, COMBINED Bilateral 2021    Procedure: BILATERAL SECOND STAGE BREAST RECONSTRUCTION WITH SILICONE IMPLANTS;  Surgeon: Genaro Garcia MD;  Location: [facility]    TONSILLECTOMY & ADENOIDECTOMY         Family History   Problem Relation Age of Onset    Asthma Mother     Diabetes Mother     Hypertension Mother     Arthritis Mother     Circulatory Mother     Obesity Mother     Thyroid Disease Mother     Aneurysm Mother         Brain,  at age 69    Hypertension Father     Alcohol/Drug Father     Arthritis Father     Obesity Father     Thyroid Disease Brother     Thyroid Disease Brother     Breast Cancer  Maternal Grandmother     Breast Cancer Paternal Grandmother         My fathers mother    Colon Cancer No family hx of     Anesthesia Reaction No family hx of     Bleeding Disorder No family hx of        Social History     Tobacco Use    Smoking status: Former     Current packs/day: 0.00     Average packs/day: 1 pack/day for 27.9 years (27.9 ttl pk-yrs)     Types: Cigarettes     Start date: 1971     Quit date: 12/15/1998     Years since quittin.5     Passive exposure: Never    Smokeless tobacco: Never    Tobacco comments:     quit 20 years ago   Substance Use Topics    Alcohol use: Yes     Comment: once in a while       Patient Active Problem List   Diagnosis    GERD (gastroesophageal reflux disease)    Menorrhagia    Iron deficiency anemia    Simple endometrial hyperplasia without atypia    Postsurgical hypothyroidism    Hyperlipidemia LDL goal <130    Malignant neoplasm of left breast (H)    Ductal carcinoma in situ (DCIS) of right breast    Right wrist pain    Class 2 severe obesity due to excess calories with serious comorbidity in adult (H)    s/p LLL superior segmentectomy, 1/15    Lung nodule, multiple     Allergies   Allergen Reactions    Acetaminophen Nausea and Vomiting    Latex      Added based on information entered during case entry, please review and add reactions, type, and severity as needed    Nickel     Vicodin [Acetaminophen] Nausea and Vomiting    Adhesive Tape Rash    Cortisone Nausea and Vomiting and Nausea     oral    Cvs Petroleum Jelly [Basis Facial Moisturizer] Rash    Hydrocodone Nausea and Vomiting and Nausea    Other Drug Allergy (See Comments) Itching, Rash and Blisters     Some Medals (Nickel, Mike Silver)    Petrolatum Itching and Rash     Current Outpatient Medications   Medication Sig Dispense Refill    acetaminophen (TYLENOL) 325 MG tablet Take 3 tablets (975 mg) by mouth every 6 hours. 120 tablet 0    Ferrous Sulfate (IRON PO) Take 1 tablet by mouth daily       ibuprofen (ADVIL/MOTRIN) 600 MG tablet Take 1 tablet (600 mg) by mouth every 6 hours as needed for inflammatory pain. 60 tablet 0    levothyroxine (SYNTHROID/LEVOTHROID) 112 MCG tablet Take 1 tablet (112 mcg) by mouth daily. 90 tablet 0    methocarbamol (ROBAXIN) 500 MG tablet Take 0.5 tablets (250 mg) by mouth every 6 hours as needed for muscle spasms. 30 tablet 0    Multiple Minerals-Vitamins (CALCIUM-MAGNESIUM-ZINC-D3 PO) Take 500 mg by mouth daily.      omeprazole (PRILOSEC) 20 MG DR capsule TAKE 1 CAPSULE BY MOUTH TWICE  DAILY BEFORE MEALS 180 capsule 2    OVER-THE-COUNTER Place 1-2 drops into both eyes every hour as needed      rosuvastatin (CRESTOR) 20 MG tablet Take 1 tablet (20 mg) by mouth daily. 90 tablet 3    semaglutide (OZEMPIC) 2 MG/3ML pen Inject 0.25 mg subcutaneously every 7 days. 3 mL 0     Vitals: LMP 04/01/2012   BMI= There is no height or weight on file to calculate BMI.    EXAM:  GENERAL: healthy, alert and no distress   BREAST: Breasts are surgically absent.  Implants are in place.  There is implant asymmetry and it seems the left may be flipped.  It is much more prominent than the right.  Skin is otherwise normal.  There are no palpable masses on her chest wall.  There is no axillary or supraclavicular lymphadenopathy.  CARDIOVASCULAR:  RRR  RESPIRATORY: nonlabored breathing  NECK: Neck supple. No adenopathy. Thyroid symmetric, normal size  SKIN: No suspicious lesions or rashes  LYMPH: Normal cervical lymph nodes      ASSESSMENT/PLAN:  Laura Ferreira is a 64-year-old female with history of prior breast cancer who I see for annual exams.  Her clinical exam is benign.  She does have some asymmetry of her reconstruction and I think the left implant is flipped.  She could follow-up with plastic surgery to discuss options.  She should continue with annual chest wall exams going forward.        10 minutes total time spent on the date of this encounter doing: chart review, review of test results,  patient visit, physical exam, education, counseling, developing plan of care, and documenting.      Carmen Stein MD  [facility]  [phone]        Please route or send letter to:  Primary Care Provider (PCP) and Referring Provider

## 2025-07-03 NOTE — LETTER
7/3/2025      Laura Ferreira  [address] [address]      Dear Colleague,    Thank you for referring your patient, Laura Ferreira, to the [facility]. Please see a copy of my visit note below.    [facility] Follow Up Note    CHIEF COMPLAINT:  History of right breast cancer    HISTORY OF PRESENT ILLNESS:  Laura Ferreira is a 64 year old female who is seen in follow up for right breast cancer.     She underwent bilateral mastectomies with right sentinel lymph node biopsy and immediate reconstruction with myself and Dr. Garcia on 3/17/2021 for 6cm of high grade DCIS. She had her 2nd stage reconstruction on 6/11/2021. She had replacement of her implants and fat grafting in October 2021.    Laura underwent a robotic assisted thoracoscopic left lower lobe wedge resection on 1/15/2025 due to a lung nodule.  Her pathology had revealed NSCLC, invasive adenocarcinoma, papillary and mucinous features measuring 1.1 cm nodes were negative.  Genetic testing had revealed a gene mutation in the T p53 gene.    She reports that since surgery she has had some tingling sensation and nerve type pain across her chest.          Past Medical History:   Diagnosis Date     Anemia      Leblanc's esophagus     EGD in 2014; recent EGD in 4/2018; repeat EGD in 3 years     Breast cancer (H)      Ex-smoker     1 PPD x 28 years, quit 20 years ago     Gastroesophageal reflux disease      Hematuria 01/23/2007     History of breast biopsy     left     Menopausal symptoms 07/17/2009     Motion sickness      Obese      Postsurgical hypothyroidism 07/17/2009     Symptomatic menopausal or female climacteric states 07/17/2009     Tear of lateral cartilage or meniscus of knee, current 10/24/2007       Past Surgical History:   Procedure Laterality Date     ABDOMEN SURGERY  1974    appendix     APPENDECTOMY OPEN       ARTHROSCOPY KNEE RT/LT  11/15/2007    right knee arthroscopy with arthroscopic lateral  meniscectomy     BIOPSY Left     Breast. Benign     CL AFF SURGICAL PATHOLOGY  09/18/2002    endometrial biopsy     COLONOSCOPY       DAVINCI LOBECTOMY LUNG Left 1/15/2025    Procedure: LOBECTOMY, LUNG, ROBOT-ASSISTED,flexible bronchoscopy;  Surgeon: Shae Ocasio MD;  Location: [facility] OR     ELBOW SURGERY Right      ESOPHAGOSCOPY, GASTROSCOPY, DUODENOSCOPY (EGD), COMBINED N/A 12/23/2014    Procedure: COMBINED ESOPHAGOSCOPY, GASTROSCOPY, DUODENOSCOPY (EGD), BIOPSY SINGLE OR MULTIPLE;  Surgeon: Paradise Radford MD;  Location: [facility]     GRAFT FAT TO BREAST Right 11/08/2021    Procedure: AND FAT GRAFTING FROM ABDOMEN;  Surgeon: Genaro Garcia MD;  Location: [facility] OR     HC THYROIDECTOMY      goitre     INSERT TISSUE EXPANDER BREAST Bilateral 03/17/2021    Procedure: bilateral BREAST TISSUE EXPANDER;  Surgeon: Genaro Garcia MD;  Location: [facility]     LAMINECTOMY, FUSION LUMBAR ONE LEVEL, COMBINED  10/26/2011    Procedure:COMBINED LAMINECTOMY, FUSION LUMBAR ONE LEVEL; L4-5 Decompression, L4-5 Posterior Lateral Fusion with Madhavi and Local Bone; Surgeon:BARBRA BRIGHT; Location:[facility]     MASTECTOMY SIMPLE, SENTINEL NODE, COMBINED Bilateral 03/17/2021    Procedure: BILATERAL SKIN SPARING MASTECTOMY WITH right  SENTINEL LYMPH NODE BIOPSY;  Surgeon: Carmen Stein MD;  Location: [facility]     RECONSTRUCT BREAST Bilateral 11/08/2021    Procedure: REVISION OF BILATERAL BREAST RECONSTRUCTION WITH REMOVE AND REPLACE BILATERAL BREAST IMPLANT;  Surgeon: Genaro Garcia MD;  Location: [facility]     RECONSTRUCT BREAST BILATERAL, IMPLANT PROSTHESIS BILATERAL, COMBINED Bilateral 06/11/2021    Procedure: BILATERAL SECOND STAGE BREAST RECONSTRUCTION WITH SILICONE IMPLANTS;  Surgeon: Genaro Garcia MD;  Location: [facility]     TONSILLECTOMY & ADENOIDECTOMY         Family History   Problem Relation Age of Onset     Asthma Mother      Diabetes Mother      Hypertension Mother      Arthritis Mother       Circulatory Mother      Obesity Mother      Thyroid Disease Mother      Aneurysm Mother         Brain,  at age 69     Hypertension Father      Alcohol/Drug Father      Arthritis Father      Obesity Father      Thyroid Disease Brother      Thyroid Disease Brother      Breast Cancer Maternal Grandmother      Breast Cancer Paternal Grandmother         My fathers mother     Colon Cancer No family hx of      Anesthesia Reaction No family hx of      Bleeding Disorder No family hx of        Social History     Tobacco Use     Smoking status: Former     Current packs/day: 0.00     Average packs/day: 1 pack/day for 27.9 years (27.9 ttl pk-yrs)     Types: Cigarettes     Start date: 1971     Quit date: 12/15/1998     Years since quittin.5     Passive exposure: Never     Smokeless tobacco: Never     Tobacco comments:     quit 20 years ago   Substance Use Topics     Alcohol use: Yes     Comment: once in a while       Patient Active Problem List   Diagnosis     GERD (gastroesophageal reflux disease)     Menorrhagia     Iron deficiency anemia     Simple endometrial hyperplasia without atypia     Postsurgical hypothyroidism     Hyperlipidemia LDL goal <130     Malignant neoplasm of left breast (H)     Ductal carcinoma in situ (DCIS) of right breast     Right wrist pain     Class 2 severe obesity due to excess calories with serious comorbidity in adult (H)     s/p LLL superior segmentectomy, 1/15     Lung nodule, multiple     Allergies   Allergen Reactions     Acetaminophen Nausea and Vomiting     Latex      Added based on information entered during case entry, please review and add reactions, type, and severity as needed     Nickel      Vicodin [Acetaminophen] Nausea and Vomiting     Adhesive Tape Rash     Cortisone Nausea and Vomiting and Nausea     oral     Cvs Petroleum Jelly [Basis Facial Moisturizer] Rash     Hydrocodone Nausea and Vomiting and Nausea     Other Drug Allergy (See Comments) Itching, Rash and  Blisters     Some Medals (Nickel, Mike Silver)     Petrolatum Itching and Rash     Current Outpatient Medications   Medication Sig Dispense Refill     acetaminophen (TYLENOL) 325 MG tablet Take 3 tablets (975 mg) by mouth every 6 hours. 120 tablet 0     Ferrous Sulfate (IRON PO) Take 1 tablet by mouth daily       ibuprofen (ADVIL/MOTRIN) 600 MG tablet Take 1 tablet (600 mg) by mouth every 6 hours as needed for inflammatory pain. 60 tablet 0     levothyroxine (SYNTHROID/LEVOTHROID) 112 MCG tablet Take 1 tablet (112 mcg) by mouth daily. 90 tablet 0     methocarbamol (ROBAXIN) 500 MG tablet Take 0.5 tablets (250 mg) by mouth every 6 hours as needed for muscle spasms. 30 tablet 0     Multiple Minerals-Vitamins (CALCIUM-MAGNESIUM-ZINC-D3 PO) Take 500 mg by mouth daily.       omeprazole (PRILOSEC) 20 MG DR capsule TAKE 1 CAPSULE BY MOUTH TWICE  DAILY BEFORE MEALS 180 capsule 2     OVER-THE-COUNTER Place 1-2 drops into both eyes every hour as needed       rosuvastatin (CRESTOR) 20 MG tablet Take 1 tablet (20 mg) by mouth daily. 90 tablet 3     semaglutide (OZEMPIC) 2 MG/3ML pen Inject 0.25 mg subcutaneously every 7 days. 3 mL 0     Vitals: LMP 04/01/2012   BMI= There is no height or weight on file to calculate BMI.    EXAM:  GENERAL: healthy, alert and no distress   BREAST: Breasts are surgically absent.  Implants are in place.  There is implant asymmetry and it seems the left may be flipped.  It is much more prominent than the right.  Skin is otherwise normal.  There are no palpable masses on her chest wall.  There is no axillary or supraclavicular lymphadenopathy.  CARDIOVASCULAR:  RRR  RESPIRATORY: nonlabored breathing  NECK: Neck supple. No adenopathy. Thyroid symmetric, normal size  SKIN: No suspicious lesions or rashes  LYMPH: Normal cervical lymph nodes      ASSESSMENT/PLAN:  Laura Ferreira is a 64-year-old female with history of prior breast cancer who I see for annual exams.  Her clinical exam is benign.  She  does have some asymmetry of her reconstruction and I think the left implant is flipped.  She could follow-up with plastic surgery to discuss options.  She should continue with annual chest wall exams going forward.        10 minutes total time spent on the date of this encounter doing: chart review, review of test results, patient visit, physical exam, education, counseling, developing plan of care, and documenting.      Carmen Stein MD  [facility]  [phone]        Please route or send letter to:  Primary Care Provider (PCP) and Referring Provider       Again, thank you for allowing me to participate in the care of your patient.        Sincerely,        Carmen Stein MD    Electronically signed

## 2025-07-03 NOTE — NURSING NOTE
Laura Ferreira's goals for this visit include:   Chief Complaint   Patient presents with    RECHECK     Annual chest wall exam, hx of right breast cancer  Return in about 1 year (around 4/25/2025) for Exam        Diagnosed with lung cancer Jan. 2025, had left lower lobe removed. Also underwent back surgery in May 2024.     She requests these members of her care team be copied on today's visit information:     PCP: Sarita Shultz    Referring Provider:  Referred Self, MD  No address on file    LMP 04/01/2012     Do you need any medication refills at today's visit?     Indu Miller on 7/3/2025 at 10:54 AM

## 2025-08-04 ENCOUNTER — LAB (OUTPATIENT)
Dept: LAB | Facility: CLINIC | Age: 65
End: 2025-08-04
Payer: COMMERCIAL

## 2025-08-04 DIAGNOSIS — C34.32 MALIGNANT NEOPLASM OF LOWER LOBE OF LEFT LUNG (H): ICD-10-CM

## 2025-08-04 LAB
BASOPHILS # BLD AUTO: 0.1 10E3/UL (ref 0–0.2)
BASOPHILS NFR BLD AUTO: 1 %
EOSINOPHIL # BLD AUTO: 0.1 10E3/UL (ref 0–0.7)
EOSINOPHIL NFR BLD AUTO: 2 %
ERYTHROCYTE [DISTWIDTH] IN BLOOD BY AUTOMATED COUNT: 12.1 % (ref 10–15)
HCT VFR BLD AUTO: 40.2 % (ref 35–47)
HGB BLD-MCNC: 13.2 G/DL (ref 11.7–15.7)
IMM GRANULOCYTES # BLD: 0 10E3/UL
IMM GRANULOCYTES NFR BLD: 0 %
LYMPHOCYTES # BLD AUTO: 1.5 10E3/UL (ref 0.8–5.3)
LYMPHOCYTES NFR BLD AUTO: 22 %
MCH RBC QN AUTO: 31 PG (ref 26.5–33)
MCHC RBC AUTO-ENTMCNC: 32.8 G/DL (ref 31.5–36.5)
MCV RBC AUTO: 94 FL (ref 78–100)
MONOCYTES # BLD AUTO: 0.5 10E3/UL (ref 0–1.3)
MONOCYTES NFR BLD AUTO: 7 %
NEUTROPHILS # BLD AUTO: 4.6 10E3/UL (ref 1.6–8.3)
NEUTROPHILS NFR BLD AUTO: 68 %
PLATELET # BLD AUTO: 202 10E3/UL (ref 150–450)
RBC # BLD AUTO: 4.26 10E6/UL (ref 3.8–5.2)
WBC # BLD AUTO: 6.8 10E3/UL (ref 4–11)

## 2025-08-04 PROCEDURE — 80053 COMPREHEN METABOLIC PANEL: CPT

## 2025-08-04 PROCEDURE — 85025 COMPLETE CBC W/AUTO DIFF WBC: CPT

## 2025-08-04 PROCEDURE — 36415 COLL VENOUS BLD VENIPUNCTURE: CPT

## 2025-08-05 LAB
ALBUMIN SERPL BCG-MCNC: 4.3 G/DL (ref 3.5–5.2)
ALP SERPL-CCNC: 49 U/L (ref 40–150)
ALT SERPL W P-5'-P-CCNC: 43 U/L (ref 0–50)
ANION GAP SERPL CALCULATED.3IONS-SCNC: 9 MMOL/L (ref 7–15)
AST SERPL W P-5'-P-CCNC: 31 U/L (ref 0–45)
BILIRUB SERPL-MCNC: 0.2 MG/DL
BUN SERPL-MCNC: 20.3 MG/DL (ref 8–23)
CALCIUM SERPL-MCNC: 9.3 MG/DL (ref 8.8–10.4)
CHLORIDE SERPL-SCNC: 103 MMOL/L (ref 98–107)
CREAT SERPL-MCNC: 0.72 MG/DL (ref 0.51–0.95)
EGFRCR SERPLBLD CKD-EPI 2021: >90 ML/MIN/1.73M2
GLUCOSE SERPL-MCNC: 99 MG/DL (ref 70–99)
HCO3 SERPL-SCNC: 28 MMOL/L (ref 22–29)
POTASSIUM SERPL-SCNC: 4.5 MMOL/L (ref 3.4–5.3)
PROT SERPL-MCNC: 6.4 G/DL (ref 6.4–8.3)
SODIUM SERPL-SCNC: 140 MMOL/L (ref 135–145)

## 2025-08-12 ENCOUNTER — HOSPITAL ENCOUNTER (OUTPATIENT)
Dept: CT IMAGING | Facility: HOSPITAL | Age: 65
Discharge: HOME | End: 2025-08-12
Attending: CLINICAL NURSE SPECIALIST
Payer: COMMERCIAL

## 2025-08-12 DIAGNOSIS — C34.32 MALIGNANT NEOPLASM OF LOWER LOBE OF LEFT LUNG (H): ICD-10-CM

## 2025-08-12 PROCEDURE — 250N000011 HC RX IP 250 OP 636: Performed by: CLINICAL NURSE SPECIALIST

## 2025-08-12 PROCEDURE — 71260 CT THORAX DX C+: CPT

## 2025-08-12 RX ORDER — IOPAMIDOL 755 MG/ML
75 INJECTION, SOLUTION INTRAVASCULAR ONCE
Status: COMPLETED | OUTPATIENT
Start: 2025-08-12 | End: 2025-08-12

## 2025-08-12 RX ADMIN — IOPAMIDOL 75 ML: 755 INJECTION, SOLUTION INTRAVENOUS at 10:41

## 2025-08-20 ENCOUNTER — VIRTUAL VISIT (OUTPATIENT)
Dept: SURGERY | Facility: CLINIC | Age: 65
End: 2025-08-20
Attending: INTERNAL MEDICINE
Payer: COMMERCIAL

## 2025-08-20 VITALS — HEIGHT: 65 IN | BODY MASS INDEX: 33.32 KG/M2 | WEIGHT: 200 LBS

## 2025-08-20 DIAGNOSIS — C34.32 MALIGNANT NEOPLASM OF LOWER LOBE OF LEFT LUNG (H): Primary | ICD-10-CM

## 2025-08-20 PROCEDURE — 1125F AMNT PAIN NOTED PAIN PRSNT: CPT | Mod: 95 | Performed by: CLINICAL NURSE SPECIALIST

## 2025-08-20 PROCEDURE — 98004 SYNCH AUDIO-VIDEO EST SF 10: CPT | Performed by: CLINICAL NURSE SPECIALIST

## 2025-08-20 ASSESSMENT — PAIN SCALES - GENERAL: PAINLEVEL_OUTOF10: MODERATE PAIN (6)

## 2025-08-23 ENCOUNTER — TELEPHONE (OUTPATIENT)
Dept: SURGERY | Facility: CLINIC | Age: 65
End: 2025-08-23
Payer: COMMERCIAL

## 2025-08-23 DIAGNOSIS — R93.429 KIDNEY FILLING DEFECT: Primary | ICD-10-CM

## 2025-08-26 ENCOUNTER — TUMOR CONFERENCE (OUTPATIENT)
Dept: ONCOLOGY | Facility: CLINIC | Age: 65
End: 2025-08-26
Payer: COMMERCIAL

## (undated) DEVICE — SYR 50ML LL W/O NDL 309653

## (undated) DEVICE — LINEN TOWEL PACK X5 5464

## (undated) DEVICE — ESU PENCIL W/SMOKE EVAC NEPTUNE STRYKER 0703-046-000

## (undated) DEVICE — GLOVE PROTEXIS W/NEU-THERA 7.5  2D73TE75

## (undated) DEVICE — PACK MAJOR SBA15MAFSI

## (undated) DEVICE — DRAPE IOBAN INCISE 23X17" 6650EZ

## (undated) DEVICE — DRAPE BREAST/CHEST 29420

## (undated) DEVICE — TUBING SUCTION 10'X3/16" N510

## (undated) DEVICE — SOL NACL 0.9% INJ 1000ML BAG 2B1324X

## (undated) DEVICE — NDL COUNTER 10CT

## (undated) DEVICE — ENDO DISSECTOR KITTNER CIGARETTE ROLL4"X4" 15505/25

## (undated) DEVICE — SYR 30ML LL W/O NDL 302832

## (undated) DEVICE — SUCTION CANISTER MEDIVAC LINER 3000ML W/LID 65651-530

## (undated) DEVICE — SYR 10ML FINGER CONTROL W/O NDL 309695

## (undated) DEVICE — NDL 19GA 1.5"

## (undated) DEVICE — STPL DAVINCI SUREFORM 45MM STR TIPRELOAD 12FIRE 480445

## (undated) DEVICE — SOL RINGERS LACTATED 1000ML BAG 2B2324X

## (undated) DEVICE — DAVINCI XI ESU BIPOLAR LONG 78DEG ANG ENDOWRIST EXT 471400

## (undated) DEVICE — DRAIN CHEST TUBE 24FR STR 8024

## (undated) DEVICE — DRAIN JACKSON PRATT RESERVOIR 100ML SU130-1305

## (undated) DEVICE — SUCTION CANISTER MEDIVAC LINER 1500ML W/LID 65651-515

## (undated) DEVICE — SOL WATER IRRIG 1000ML BOTTLE 2F7114

## (undated) DEVICE — Device

## (undated) DEVICE — LIGHT HANDLE X2

## (undated) DEVICE — SU SILK 2-0 SH 30" K833H

## (undated) DEVICE — PEN MARKING SKIN W/LABELS 31145884

## (undated) DEVICE — SUCTION TIP YANKAUER W/O VENT K86

## (undated) DEVICE — TIES BANDING T50R

## (undated) DEVICE — SU VICRYL 3-0 PS-1 18" UND J683

## (undated) DEVICE — SU VICRYL 2-0 CT-1 27" UND J259H

## (undated) DEVICE — DAVINCI XI FCP CADIERE 8MM ENDOWRIST 471049

## (undated) DEVICE — TUBING SUCTION LIPECTOMY

## (undated) DEVICE — DRSG KERLIX 4 1/2"X4YDS ROLL 6715

## (undated) DEVICE — DAVINCI XI DRAPE ARM 470015

## (undated) DEVICE — SOL NACL 0.9% IRRIG 1000ML BOTTLE 2F7124

## (undated) DEVICE — DRAIN JACKSON PRATT 15FR ROUND SU130-1323

## (undated) DEVICE — COLLECTION DEVICE SYSTEM TISSUE REVOLVE RV0001 2 PACK RV0002

## (undated) DEVICE — BNDG ELASTIC 6" DBL LENGTH UNSTERILE 6611-16

## (undated) DEVICE — DRSG STERI STRIP 1/2X4" R1547

## (undated) DEVICE — SPONGE LAP 18X18" X8435

## (undated) DEVICE — DAVINCI XI GRASPER FENESTRATED TIP UP 8MM 470347

## (undated) DEVICE — SU VICRYL 2-0 SH 27" UND J417H

## (undated) DEVICE — SU SILK 2-0 FSL 18" 677G

## (undated) DEVICE — TUBING FILTER TRI-LUMEN AIRSEAL ASC-EVAC1

## (undated) DEVICE — SYR BULB IRRIG 50ML LATEX FREE 0035280

## (undated) DEVICE — ESU ELEC BLADE 2.75" COATED/INSULATED E1455

## (undated) DEVICE — VIAL DECANTER STERILE WHITE DYNJDEC06

## (undated) DEVICE — GLOVE PROTEXIS MICRO 6.0  2D73PM60

## (undated) DEVICE — SU SILK 0 SH 30" K834H

## (undated) DEVICE — GLOVE BIOGEL PI MICRO SZ 6.5 48565

## (undated) DEVICE — MANIFOLD NEPTUNE 4 PORT 700-20

## (undated) DEVICE — TUBING INFUSION INFILTRATION LIPOSUCTION 156" 24-6008

## (undated) DEVICE — DAVINCI XI SEAL UNIVERSAL 5-12MM 470500

## (undated) DEVICE — SYR 10ML SLIP TIP W/O NDL

## (undated) DEVICE — KIT SPY ELITE DISP KIT W/DRAPE SGL PACK LC3001

## (undated) DEVICE — ESU PENCIL W/ROCKER SWITCH BLADE HOLSTER E2350HDB

## (undated) DEVICE — ESU ELEC BLADE HEX-LOCKING 2.5" E1450X

## (undated) DEVICE — ENDO VALVE BX EVIS MAJ-210

## (undated) DEVICE — ANTIFOG SOLUTION SEE SHARP 150M TROCAR SWABS 30978 (COI)

## (undated) DEVICE — NDL 22GA 1.5"

## (undated) DEVICE — ENDO VALVE SUCTION BRONCH EVIS MAJ-209

## (undated) DEVICE — SYR 30ML SLIP TIP W/O NDL 302833

## (undated) DEVICE — NDL SPINAL 22GA 3.5" QUINCKE 405181

## (undated) DEVICE — ENDO TROCAR CONMED AIRSEAL BLADELESS 12X120MM IAS12-120LP

## (undated) DEVICE — PACK MINOR SBA15MIFSE

## (undated) DEVICE — CATH TRAY FOLEY 16FR W/URINE METER FSD MCRB STATLOCK 303416A

## (undated) DEVICE — LINEN TOWEL PACK X6 WHITE 5487

## (undated) DEVICE — GLOVE PROTEXIS BLUE W/NEU-THERA 6.5  2D73EB65

## (undated) DEVICE — STPL DAVINCI SUREFORM 45MM RELOAD BLUE 48345B

## (undated) DEVICE — VESSEL LOOPS YELLOW MAXI 31145694

## (undated) DEVICE — LUBRICANT INST ELECTROLUBE EL101

## (undated) DEVICE — SU MONOCRYL 4-0 PS-2 27" UND Y426H

## (undated) DEVICE — ESU GROUND PAD ADULT REM W/15' CORD E7507DB

## (undated) DEVICE — SU VICRYL 4-0 PS-2 18" UND J496H

## (undated) DEVICE — SU VICRYL 2-0 CT-1 36" J345H

## (undated) DEVICE — DRSG PRIMAPORE 02X3" 7133

## (undated) DEVICE — PAD CHUX UNDERPAD 23X24" 7136

## (undated) DEVICE — SYR 50ML CATH TIP W/O NDL 309620

## (undated) DEVICE — SYR 10ML LL W/O NDL 302995

## (undated) DEVICE — SURGICEL ABSORBABLE HEMOSTAT SNOW 4"X4" 2083

## (undated) DEVICE — PREP SKIN SCRUB TRAY 4461A

## (undated) DEVICE — ESU GROUND PAD UNIVERSAL W/O CORD

## (undated) DEVICE — BLADE KNIFE SURG 15 371115

## (undated) DEVICE — SU SILK 0 TIE 6X30" A306H

## (undated) DEVICE — SUCTION MANIFOLD NEPTUNE 2 SYS 4 PORT 0702-020-000

## (undated) DEVICE — DAVINCI XI REDUCER 8-12MM 470381

## (undated) DEVICE — PREP CHLORAPREP 26ML TINTED HI-LITE ORANGE 930815

## (undated) DEVICE — SLEEVE PROTECTIVE BREAST BIOPSY  GMSLV001-10

## (undated) DEVICE — DAVINCI XI DRAPE COLUMN 470341

## (undated) DEVICE — ATTACHMENT DEVICE DRAIN/TUBE 9781

## (undated) DEVICE — SU VICRYL+ 0 27 UR6 VLT VCP603H

## (undated) DEVICE — POUCH TISSUE RETRIEVAL 15MM 6.00" INTRO TRS190SB2

## (undated) DEVICE — SUCTION DRY CHEST DRAIN OASIS 3600-100

## (undated) DEVICE — STPL DAVINCI SUREFORM 30X8MM 488530

## (undated) DEVICE — ADH LIQUID MASTISOL TOPICAL VIAL 2-3ML 0523-48

## (undated) RX ORDER — LIDOCAINE HYDROCHLORIDE 20 MG/ML
INJECTION, SOLUTION EPIDURAL; INFILTRATION; INTRACAUDAL; PERINEURAL
Status: DISPENSED
Start: 2021-06-11

## (undated) RX ORDER — FENTANYL CITRATE 50 UG/ML
INJECTION, SOLUTION INTRAMUSCULAR; INTRAVENOUS
Status: DISPENSED
Start: 2021-03-17

## (undated) RX ORDER — APREPITANT 40 MG/1
CAPSULE ORAL
Status: DISPENSED
Start: 2021-03-17

## (undated) RX ORDER — LIDOCAINE HYDROCHLORIDE 20 MG/ML
INJECTION, SOLUTION EPIDURAL; INFILTRATION; INTRACAUDAL; PERINEURAL
Status: DISPENSED
Start: 2021-11-08

## (undated) RX ORDER — ONDANSETRON 2 MG/ML
INJECTION INTRAMUSCULAR; INTRAVENOUS
Status: DISPENSED
Start: 2021-11-08

## (undated) RX ORDER — FENTANYL CITRATE 50 UG/ML
INJECTION, SOLUTION INTRAMUSCULAR; INTRAVENOUS
Status: DISPENSED
Start: 2021-11-08

## (undated) RX ORDER — ACETAMINOPHEN 500 MG
TABLET ORAL
Status: DISPENSED
Start: 2021-03-17

## (undated) RX ORDER — VECURONIUM BROMIDE 1 MG/ML
INJECTION, POWDER, LYOPHILIZED, FOR SOLUTION INTRAVENOUS
Status: DISPENSED
Start: 2021-03-17

## (undated) RX ORDER — ACETAMINOPHEN 325 MG/1
TABLET ORAL
Status: DISPENSED
Start: 2021-11-08

## (undated) RX ORDER — DEXAMETHASONE SODIUM PHOSPHATE 4 MG/ML
INJECTION, SOLUTION INTRA-ARTICULAR; INTRALESIONAL; INTRAMUSCULAR; INTRAVENOUS; SOFT TISSUE
Status: DISPENSED
Start: 2021-11-08

## (undated) RX ORDER — CEFAZOLIN SODIUM 1 G/3ML
INJECTION, POWDER, FOR SOLUTION INTRAMUSCULAR; INTRAVENOUS
Status: DISPENSED
Start: 2021-03-17

## (undated) RX ORDER — SCOLOPAMINE TRANSDERMAL SYSTEM 1 MG/1
PATCH, EXTENDED RELEASE TRANSDERMAL
Status: DISPENSED
Start: 2021-03-17

## (undated) RX ORDER — BUPIVACAINE HYDROCHLORIDE AND EPINEPHRINE 2.5; 5 MG/ML; UG/ML
INJECTION, SOLUTION EPIDURAL; INFILTRATION; INTRACAUDAL; PERINEURAL
Status: DISPENSED
Start: 2021-03-17

## (undated) RX ORDER — DEXAMETHASONE SODIUM PHOSPHATE 4 MG/ML
INJECTION, SOLUTION INTRA-ARTICULAR; INTRALESIONAL; INTRAMUSCULAR; INTRAVENOUS; SOFT TISSUE
Status: DISPENSED
Start: 2021-06-11

## (undated) RX ORDER — HYDROMORPHONE HCL IN WATER/PF 6 MG/30 ML
PATIENT CONTROLLED ANALGESIA SYRINGE INTRAVENOUS
Status: DISPENSED
Start: 2025-01-15

## (undated) RX ORDER — HYDROMORPHONE HYDROCHLORIDE 1 MG/ML
INJECTION, SOLUTION INTRAMUSCULAR; INTRAVENOUS; SUBCUTANEOUS
Status: DISPENSED
Start: 2021-11-08

## (undated) RX ORDER — HYDROMORPHONE HYDROCHLORIDE 1 MG/ML
INJECTION, SOLUTION INTRAMUSCULAR; INTRAVENOUS; SUBCUTANEOUS
Status: DISPENSED
Start: 2021-03-17

## (undated) RX ORDER — CEFAZOLIN SODIUM 2 G/100ML
INJECTION, SOLUTION INTRAVENOUS
Status: DISPENSED
Start: 2021-11-08

## (undated) RX ORDER — NEOSTIGMINE METHYLSULFATE 1 MG/ML
VIAL (ML) INJECTION
Status: DISPENSED
Start: 2021-11-08

## (undated) RX ORDER — FENTANYL CITRATE 50 UG/ML
INJECTION, SOLUTION INTRAMUSCULAR; INTRAVENOUS
Status: DISPENSED
Start: 2021-06-11

## (undated) RX ORDER — ENOXAPARIN SODIUM 100 MG/ML
INJECTION SUBCUTANEOUS
Status: DISPENSED
Start: 2025-01-15

## (undated) RX ORDER — CEFAZOLIN SODIUM 1 G/3ML
INJECTION, POWDER, FOR SOLUTION INTRAMUSCULAR; INTRAVENOUS
Status: DISPENSED
Start: 2021-06-11

## (undated) RX ORDER — FENTANYL CITRATE 0.05 MG/ML
INJECTION, SOLUTION INTRAMUSCULAR; INTRAVENOUS
Status: DISPENSED
Start: 2021-06-11

## (undated) RX ORDER — FENTANYL CITRATE 50 UG/ML
INJECTION, SOLUTION INTRAMUSCULAR; INTRAVENOUS
Status: DISPENSED
Start: 2025-01-15

## (undated) RX ORDER — HYDROMORPHONE HYDROCHLORIDE 1 MG/ML
INJECTION, SOLUTION INTRAMUSCULAR; INTRAVENOUS; SUBCUTANEOUS
Status: DISPENSED
Start: 2025-01-15

## (undated) RX ORDER — PROPOFOL 10 MG/ML
INJECTION, EMULSION INTRAVENOUS
Status: DISPENSED
Start: 2021-06-11

## (undated) RX ORDER — CEFAZOLIN SODIUM/WATER 2 G/20 ML
SYRINGE (ML) INTRAVENOUS
Status: DISPENSED
Start: 2025-01-15

## (undated) RX ORDER — HYDROMORPHONE HYDROCHLORIDE 1 MG/ML
INJECTION, SOLUTION INTRAMUSCULAR; INTRAVENOUS; SUBCUTANEOUS
Status: DISPENSED
Start: 2021-06-11

## (undated) RX ORDER — PROPOFOL 10 MG/ML
INJECTION, EMULSION INTRAVENOUS
Status: DISPENSED
Start: 2021-03-17

## (undated) RX ORDER — HYDROXYZINE HYDROCHLORIDE 50 MG/1
TABLET, FILM COATED ORAL
Status: DISPENSED
Start: 2021-03-17

## (undated) RX ORDER — BUPIVACAINE HYDROCHLORIDE AND EPINEPHRINE 2.5; 5 MG/ML; UG/ML
INJECTION, SOLUTION EPIDURAL; INFILTRATION; INTRACAUDAL; PERINEURAL
Status: DISPENSED
Start: 2025-01-15

## (undated) RX ORDER — GLYCOPYRROLATE 0.2 MG/ML
INJECTION, SOLUTION INTRAMUSCULAR; INTRAVENOUS
Status: DISPENSED
Start: 2021-11-08

## (undated) RX ORDER — INDOCYANINE GREEN AND WATER 25 MG
KIT INJECTION
Status: DISPENSED
Start: 2025-01-15

## (undated) RX ORDER — ONDANSETRON 2 MG/ML
INJECTION INTRAMUSCULAR; INTRAVENOUS
Status: DISPENSED
Start: 2021-06-11

## (undated) RX ORDER — ACETAMINOPHEN 500 MG
TABLET ORAL
Status: DISPENSED
Start: 2021-11-08

## (undated) RX ORDER — SCOPOLAMINE 1 MG/3D
PATCH, EXTENDED RELEASE TRANSDERMAL
Status: DISPENSED
Start: 2025-01-15

## (undated) RX ORDER — WATER 10 ML/10ML
INJECTION INTRAMUSCULAR; INTRAVENOUS; SUBCUTANEOUS
Status: DISPENSED
Start: 2021-03-17